# Patient Record
Sex: FEMALE | Race: WHITE | NOT HISPANIC OR LATINO | Employment: OTHER | URBAN - NONMETROPOLITAN AREA
[De-identification: names, ages, dates, MRNs, and addresses within clinical notes are randomized per-mention and may not be internally consistent; named-entity substitution may affect disease eponyms.]

---

## 2023-03-02 ENCOUNTER — OFFICE VISIT (OUTPATIENT)
Dept: CARDIOLOGY | Facility: CLINIC | Age: 58
End: 2023-03-02
Payer: COMMERCIAL

## 2023-03-02 VITALS
OXYGEN SATURATION: 97 % | HEIGHT: 65 IN | BODY MASS INDEX: 48.82 KG/M2 | DIASTOLIC BLOOD PRESSURE: 97 MMHG | RESPIRATION RATE: 16 BRPM | SYSTOLIC BLOOD PRESSURE: 151 MMHG | WEIGHT: 293 LBS

## 2023-03-02 DIAGNOSIS — R06.09 DYSPNEA ON EXERTION: ICD-10-CM

## 2023-03-02 DIAGNOSIS — I10 ESSENTIAL HYPERTENSION: ICD-10-CM

## 2023-03-02 DIAGNOSIS — I48.19 ATRIAL FIBRILLATION, PERSISTENT: Primary | ICD-10-CM

## 2023-03-02 PROCEDURE — 93000 ELECTROCARDIOGRAM COMPLETE: CPT | Performed by: INTERNAL MEDICINE

## 2023-03-02 PROCEDURE — 99204 OFFICE O/P NEW MOD 45 MIN: CPT | Performed by: INTERNAL MEDICINE

## 2023-03-02 RX ORDER — ASPIRIN 81 MG/1
1 TABLET, COATED ORAL DAILY
COMMUNITY
Start: 2022-12-30

## 2023-03-02 RX ORDER — LANOLIN ALCOHOL/MO/W.PET/CERES
2500 CREAM (GRAM) TOPICAL DAILY
COMMUNITY

## 2023-03-02 RX ORDER — SPIRONOLACTONE 50 MG/1
1 TABLET, FILM COATED ORAL DAILY
COMMUNITY
Start: 2023-02-15

## 2023-03-02 RX ORDER — MAGNESIUM OXIDE 400 MG/1
250 TABLET ORAL DAILY
COMMUNITY

## 2023-03-02 RX ORDER — APIXABAN 5 MG/1
TABLET, FILM COATED ORAL
COMMUNITY
Start: 2023-01-27

## 2023-03-02 RX ORDER — LEVOTHYROXINE SODIUM 0.12 MG/1
1 TABLET ORAL DAILY
COMMUNITY
Start: 2023-02-22

## 2023-03-02 RX ORDER — ATORVASTATIN CALCIUM 40 MG/1
40 TABLET, FILM COATED ORAL
COMMUNITY
Start: 2023-02-22

## 2023-03-02 RX ORDER — CARVEDILOL 3.12 MG/1
1 TABLET ORAL EVERY 12 HOURS SCHEDULED
COMMUNITY
Start: 2023-02-22 | End: 2023-03-02

## 2023-03-02 RX ORDER — CARVEDILOL 6.25 MG/1
6.25 TABLET ORAL EVERY 12 HOURS SCHEDULED
Qty: 60 TABLET | Refills: 3 | Status: SHIPPED | COMMUNITY
Start: 2023-03-02 | End: 2023-03-21 | Stop reason: SDUPTHER

## 2023-03-02 NOTE — PROGRESS NOTES
Val Purcell PA-C  Ana Maradiaga  1965  03/02/2023    There is no problem list on file for this patient.      Dear Val Purcell PA-C:    Subjective     Ana Maradiaga is a 57 y.o. female with the problems as listed above, presents    Chief complaint: To establish cardiac care and follow-up in a patient with persistent/chronic atrial fibrillation.    History of Present Illness: Ms. Maradiaga is a pleasant 57-year-old  female who has a history of atrial fibrillation apparently since 2018.  She has recently relocated from [place] and wants to establish cardiac care and follow-up through our office.  On further questioning she states that she was diagnosed to have atrial fibrillation in 2018.  She denies having had any attempts at electrocardioversion in [place].  She has been on Eliquis which she was initially taking once a day due to feeling of shakiness taking it twice a day.  She had a stroke in 2021 following which she was strongly advised to take it twice a day as reccommended.  She has since been taking it twice a day.  She denies any bleeding problems with Eliquis but still having the shakes from it.  She denies any other heart problems such as coronary artery disease or history of heart failure.  She has chronic dyspnea with mild to moderate exertion with no PND, significant orthopnea.  She has a chronic bilateral leg edema with lymphedema.  She denies any complaints of chest pains.    Exam End: 11/17/21 10:28 Last Resulted: 11/17/21 16:35   Received From: [facility]  Result Received: 03/02/23 11:31     Intracardiac echocardiogram  Order: [id number]  Narrative    This result has an attachment that is not available.   •  Left Ventricle: Systolic function is normal with an ejection fraction   of 55-60%.   •  Left Atrium: Agitated saline bubble study revealed no evidence of right   to left interatrial shunting .   •  Mitral Valve: The leaflets are mildly thickened. There is mild annular    calcification.     Left Ventricle   Left ventricle appears normal in size. There is borderline increased wall thickness/hypertrophy. Systolic function is normal with an ejection fraction of 55-60%. No segmental wall motion abnormalities. Unable to assess diastolic function.     Right Ventricle   Right ventricular size appears normal. Systolic function is normal.     Left Atrium   Left atrium is normal in size. Left atrium volume index is normal. The left atrial volume index is 23.4 mL/m2. Agitated saline bubble study revealed no evidence of right to left interatrial shunting .     Right Atrium   Right atrium is normal in size.     IVC/SVC   IVC appears normal. There is normal collapse with deep inspiration.     Mitral Valve   The leaflets are mildly thickened. There is mild annular calcification. There is trace regurgitation. There is no evidence of mitral valve stenosis.     Tricuspid Valve   Tricuspid valve appears to be normal. There is no regurgitation or stenosis.     Aortic Valve   The aortic valve is trileaflet. There is no regurgitation or stenosis.     Pulmonic Valve   Pulmonic valve structure is grossly normal. There is trace regurgitation. There is no evidence of pulmonic valve stenosis. The peak gradient is 7.00 mmHg.     Ascending Aorta   The aortic root is normal in size.     Pericardium   The pericardium has a fat pad.     Study Details   A complete echo was performed using complete 2D, color flow Doppler and spectral Doppler. An agitated saline bubble study was performed.      Allergies   Allergen Reactions   • Vancomycin Hives   • Cefepime Rash   :    Current Outpatient Medications:   •  Aspirin Low Dose 81 MG EC tablet, Take 1 tablet by mouth Daily., Disp: , Rfl:   •  atorvastatin (LIPITOR) 40 MG tablet, Take 1 tablet by mouth every night at bedtime., Disp: , Rfl:   •  carvedilol (COREG) 6.25 MG tablet, Take 1 tablet by mouth Every 12 (Twelve) Hours., Disp: 60 tablet, Rfl: 3  •  Eliquis 5 MG  "tablet tablet, , Disp: , Rfl:   •  levothyroxine (SYNTHROID, LEVOTHROID) 125 MCG tablet, Take 1 tablet by mouth Daily., Disp: , Rfl:   •  magnesium oxide (MAG-OX) 400 MG tablet, Take 250 mg by mouth Daily., Disp: , Rfl:   •  spironolactone (ALDACTONE) 50 MG tablet, Take 1 tablet by mouth Daily., Disp: , Rfl:   •  vitamin B-12 (CYANOCOBALAMIN) 1000 MCG tablet, Take 2.5 tablets by mouth Daily., Disp: , Rfl:   •  vitamin D3 125 MCG (5000 UT) capsule capsule, Take 1 capsule by mouth Daily., Disp: , Rfl:     Past Medical History:   Diagnosis Date   • Anemia    • Arrhythmia 2018    Afib   • Arthritis    • Atrial fibrillation (HCC) 2018   • Bronchitis    • Chronic kidney disease 2018   • Gout    • Hypertension    • Stroke (HCC) November 2021     History reviewed. No pertinent surgical history.  Family History   Problem Relation Age of Onset   • Heart disease Mother    • Stroke Mother    • Hypertension Father    • Arthritis Father      Social History     Tobacco Use   • Smoking status: Never   • Smokeless tobacco: Never   Vaping Use   • Vaping Use: Never used   Substance Use Topics   • Alcohol use: Never   • Drug use: Never     Review of Systems   Constitutional: Negative.   HENT: Negative.    Eyes: Negative.    Cardiovascular: Negative.    Respiratory: Positive for shortness of breath.    Endocrine: Negative.    Hematologic/Lymphatic: Negative.    Skin: Negative.    Musculoskeletal: Positive for joint pain.   Gastrointestinal: Negative.    Genitourinary: Negative.    Neurological: Negative.    Psychiatric/Behavioral: Negative.    Allergic/Immunologic: Negative.      Objective   Blood pressure 151/97, resp. rate 16, height 165.1 cm (65\"), weight (!) 162 kg (357 lb 9.6 oz), SpO2 97 %.  Body mass index is 59.51 kg/m².    Vitals reviewed.   Constitutional:       Appearance: Well-developed.      Comments: Morbidly obese lady who is alert, oriented x3 and is in no apparent distress at this time.   Eyes:      Conjunctiva/sclera: " Conjunctivae normal.   HENT:      Head: Normocephalic.   Neck:      Thyroid: No thyromegaly.      Vascular: No JVD.      Trachea: No tracheal deviation.   Pulmonary:      Effort: No respiratory distress.      Breath sounds: Normal breath sounds. No wheezing. No rales.   Cardiovascular:      PMI at left midclavicular line. Normal rate. Irregularly irregular rhythm. Normal S1. Normal S2.      Murmurs: There is no murmur.      No gallop. No click. No rub.   Pulses:     Intact distal pulses.   Edema:     Pretibial: bilateral 3+ edema of the pretibial area.     Ankle: bilateral 3+ edema of the ankle.     Feet: bilateral 3+ edema of the feet.  Abdominal:      General: Bowel sounds are normal.      Palpations: Abdomen is soft. There is no abdominal mass.      Tenderness: There is no abdominal tenderness.   Musculoskeletal:      Cervical back: Normal range of motion and neck supple. Skin:     General: Skin is warm and dry.   Neurological:      Mental Status: Alert and oriented to person, place, and time.      Cranial Nerves: No cranial nerve deficit.         Lab Results   Component Value Date     08/13/2022    K 4.3 08/13/2022     08/13/2022    CO2 23 08/13/2022    BUN 16 08/13/2022    CREATININE 0.85 08/13/2022    CALCIUM 8.0 (L) 08/13/2022    AST 12 08/12/2022    ALT 14 08/12/2022    ALKPHOS 105 08/12/2022     Lab Results   Component Value Date    CKTOTAL 237 (H) 11/14/2021       Lab Results   Component Value Date    MG 2.2 08/13/2022     Lab Results   Component Value Date    TSH 5.45 (H) 03/21/2022      Lab Results   Component Value Date     (H) 08/09/2022       ECG 12 Lead    Date/Time: 3/2/2023 11:57 AM  Performed by: Otilio Bartlett MD  Authorized by: Otilio Bartlett MD   Previous ECG: no previous ECG available  Rhythm: atrial fibrillation  Other findings: non-specific ST-T wave changes                 Assessment & Plan :   Diagnosis Plan   1. Atrial fibrillation, persistent (HCC)  ECG 12 Lead     Adult Transthoracic Echo Complete       2. Essential hypertension with elevated blood pressures.        3. Dyspnea on exertion  Adult Transthoracic Echo Complete           Recommendations:  Orders Placed This Encounter   Procedures   • ECG 12 Lead   • Adult Transthoracic Echo Complete w/ Color, Spectral and Contrast if necessary per protocol        1. We will increase the dose of carvedilol to 6.25 mg p.o. twice daily  2. I have asked her to keep a check on her blood pressure at home twice a day and bring it with next visit.  3. I have discussed about the option of electrical cardioversion which apparently has not been tried.  She wants to think about it and let us know.  4. We will reevaluate her LV function with an echo Doppler study.    Return in about 6 weeks (around 4/13/2023).     I appreciate very much the opportunity to participate in the cardiovascular care of your patients.      With Best Regards,    Otilio Bartlett MD, [facility]    Dragon disclaimer:  Much of this encounter note is an electronic transcription/translation of spoken language to printed text. The electronic translation of spoken language may permit erroneous, or at times, nonsensical words or phrases to be inadvertently transcribed; Although I have reviewed the note for such errors, some may still exist.

## 2023-03-21 NOTE — TELEPHONE ENCOUNTER
Caller: Ana Maradiaga    Relationship: Self    Best call back number: [phone]    Requested Prescriptions:   Requested Prescriptions     Pending Prescriptions Disp Refills   • carvedilol (COREG) 6.25 MG tablet 60 tablet 3     Sig: Take 1 tablet by mouth Every 12 (Twelve) Hours.        Pharmacy where request should be sent: [id number] - [place], [place] - [address] AT [facility] [address] - [phone]  - [phone] FX     Last office visit with prescribing clinician: Visit date not found   Last telemedicine visit with prescribing clinician: 4/14/2023   Next office visit with prescribing clinician: 4/14/2023         Does the patient have less than a 3 day supply:  [] Yes  [x] No    Would you like a call back once the refill request has been completed: [] Yes [x] No    If the office needs to give you a call back, can they leave a voicemail: [] Yes [x] No    Kristina Titus Rep   03/21/23 12:04 EDT

## 2023-03-22 RX ORDER — CARVEDILOL 6.25 MG/1
6.25 TABLET ORAL EVERY 12 HOURS SCHEDULED
Qty: 60 TABLET | Refills: 3 | Status: SHIPPED | OUTPATIENT
Start: 2023-03-22

## 2023-03-23 ENCOUNTER — HOSPITAL ENCOUNTER (OUTPATIENT)
Dept: CARDIOLOGY | Facility: HOSPITAL | Age: 58
Discharge: HOME | End: 2023-03-23
Admitting: INTERNAL MEDICINE
Payer: COMMERCIAL

## 2023-03-23 DIAGNOSIS — I48.19 ATRIAL FIBRILLATION, PERSISTENT: ICD-10-CM

## 2023-03-23 DIAGNOSIS — R06.09 DYSPNEA ON EXERTION: ICD-10-CM

## 2023-03-23 PROCEDURE — 93306 TTE W/DOPPLER COMPLETE: CPT

## 2023-03-23 PROCEDURE — 93306 TTE W/DOPPLER COMPLETE: CPT | Performed by: INTERNAL MEDICINE

## 2023-03-26 LAB
BH CV ECHO MEAS - AO MAX PG: 6.3 MMHG
BH CV ECHO MEAS - AO MEAN PG: 3 MMHG
BH CV ECHO MEAS - AO ROOT DIAM: 2.9 CM
BH CV ECHO MEAS - AO V2 MAX: 125 CM/SEC
BH CV ECHO MEAS - AO V2 VTI: 21.9 CM
BH CV ECHO MEAS - EDV(CUBED): 68.9 ML
BH CV ECHO MEAS - EDV(MOD-SP4): 41.7 ML
BH CV ECHO MEAS - EF(MOD-SP4): 41 %
BH CV ECHO MEAS - ESV(CUBED): 68.9 ML
BH CV ECHO MEAS - ESV(MOD-SP4): 24.6 ML
BH CV ECHO MEAS - FS: 0 %
BH CV ECHO MEAS - IVS/LVPW: 1 CM
BH CV ECHO MEAS - IVSD: 1.5 CM
BH CV ECHO MEAS - LA DIMENSION: 3.6 CM
BH CV ECHO MEAS - LAT PEAK E' VEL: 11 CM/SEC
BH CV ECHO MEAS - LV DIASTOLIC VOL/BSA (35-75): 16.5 CM2
BH CV ECHO MEAS - LV MASS(C)D: 241 GRAMS
BH CV ECHO MEAS - LV SYSTOLIC VOL/BSA (12-30): 9.7 CM2
BH CV ECHO MEAS - LVIDD: 4.1 CM
BH CV ECHO MEAS - LVIDS: 4.1 CM
BH CV ECHO MEAS - LVOT AREA: 4.2 CM2
BH CV ECHO MEAS - LVOT DIAM: 2.3 CM
BH CV ECHO MEAS - LVPWD: 1.5 CM
BH CV ECHO MEAS - MED PEAK E' VEL: 8.5 CM/SEC
BH CV ECHO MEAS - MV A MAX VEL: 30.6 CM/SEC
BH CV ECHO MEAS - MV E MAX VEL: 93.6 CM/SEC
BH CV ECHO MEAS - MV E/A: 3.1
BH CV ECHO MEAS - PA ACC TIME: 0.04 SEC
BH CV ECHO MEAS - PA PR(ACCEL): 63.3 MMHG
BH CV ECHO MEAS - SI(MOD-SP4): 6.8 ML/M2
BH CV ECHO MEAS - SV(MOD-SP4): 17.1 ML
BH CV ECHO MEAS - TAPSE (>1.6): 1.38 CM
BH CV ECHO MEASUREMENTS AVERAGE E/E' RATIO: 9.6
LEFT ATRIUM VOLUME INDEX: 27.2 ML/M2
MAXIMAL PREDICTED HEART RATE: 163 BPM
STRESS TARGET HR: 139 BPM

## 2023-04-14 ENCOUNTER — OFFICE VISIT (OUTPATIENT)
Dept: CARDIOLOGY | Facility: CLINIC | Age: 58
End: 2023-04-14
Payer: COMMERCIAL

## 2023-04-14 VITALS
DIASTOLIC BLOOD PRESSURE: 88 MMHG | HEART RATE: 91 BPM | HEIGHT: 65 IN | BODY MASS INDEX: 48.82 KG/M2 | WEIGHT: 293 LBS | SYSTOLIC BLOOD PRESSURE: 160 MMHG | OXYGEN SATURATION: 95 %

## 2023-04-14 DIAGNOSIS — I10 ESSENTIAL HYPERTENSION: ICD-10-CM

## 2023-04-14 DIAGNOSIS — R06.09 DYSPNEA ON EXERTION: ICD-10-CM

## 2023-04-14 DIAGNOSIS — I48.19 ATRIAL FIBRILLATION, PERSISTENT: Primary | ICD-10-CM

## 2023-04-14 PROCEDURE — 99214 OFFICE O/P EST MOD 30 MIN: CPT | Performed by: NURSE PRACTITIONER

## 2023-04-14 PROCEDURE — 93000 ELECTROCARDIOGRAM COMPLETE: CPT | Performed by: NURSE PRACTITIONER

## 2023-04-14 RX ORDER — FLECAINIDE ACETATE 50 MG/1
50 TABLET ORAL 2 TIMES DAILY
Qty: 60 TABLET | Refills: 11 | Status: SHIPPED | OUTPATIENT
Start: 2023-04-14

## 2023-04-14 NOTE — H&P (VIEW-ONLY)
aVl Purcell PA  Ana Maradiaga  1965  04/14/2023    There is no problem list on file for this patient.      Dear Val Purcell PA:    Subjective     Chief Complaint   Patient presents with   • Follow-up     Echo, 6 week   • Med Management           History of Present Illness:    Ana Maradiaga is a 57 y.o. female with a past medical history of atrial fibrillation and CVA. She presents today for routine cardiology follow up.  Recently, she did undergo echocardiogram which revealed an LVEF of 56 to 60% with normal LV wall motion.  Left atrium was normal in size and volume.  Previously, antiarrhythmic therapy and cardioversion was recommended.  The patient has been thinking about this and would like to pursue cardioversion.  She has shortness of breath with mild exertion.  She has no known history of coronary artery disease and has had no recent chest pains.          Allergies   Allergen Reactions   • Vancomycin Hives   • Cefepime Rash   :      Current Outpatient Medications:   •  Aspirin Low Dose 81 MG EC tablet, Take 1 tablet by mouth Daily., Disp: , Rfl:   •  atorvastatin (LIPITOR) 40 MG tablet, Take 1 tablet by mouth every night at bedtime., Disp: , Rfl:   •  carvedilol (COREG) 6.25 MG tablet, Take 1 tablet by mouth Every 12 (Twelve) Hours., Disp: 60 tablet, Rfl: 3  •  Eliquis 5 MG tablet tablet, , Disp: , Rfl:   •  levothyroxine (SYNTHROID, LEVOTHROID) 125 MCG tablet, Take 1 tablet by mouth Daily., Disp: , Rfl:   •  magnesium oxide (MAG-OX) 400 MG tablet, Take 250 mg by mouth Daily., Disp: , Rfl:   •  vitamin B-12 (CYANOCOBALAMIN) 1000 MCG tablet, Take 2.5 tablets by mouth Daily., Disp: , Rfl:   •  vitamin D3 125 MCG (5000 UT) capsule capsule, Take 1 capsule by mouth Daily., Disp: , Rfl:   •  flecainide (TAMBOCOR) 50 MG tablet, Take 1 tablet by mouth 2 (Two) Times a Day., Disp: 60 tablet, Rfl: 11  •  spironolactone (ALDACTONE) 50 MG tablet, Take 1 tablet by mouth Daily., Disp: , Rfl:       The following  "portions of the patient's history were reviewed and updated as appropriate: allergies, current medications, past family history, past medical history, past social history, past surgical history and problem list.    Social History     Tobacco Use   • Smoking status: Never   • Smokeless tobacco: Never   Vaping Use   • Vaping Use: Never used   Substance Use Topics   • Alcohol use: Never   • Drug use: Never       Review of Systems   Constitutional: Positive for malaise/fatigue. Negative for decreased appetite.   Cardiovascular: Positive for dyspnea on exertion. Negative for chest pain and palpitations.   Respiratory: Negative for cough and shortness of breath.        Objective   Vitals:    04/14/23 1152 04/14/23 1154   BP: 162/99 160/88   BP Location: Right arm Left arm   Patient Position: Sitting Sitting   Cuff Size: Adult Adult   Pulse: 91    SpO2: 95%    Weight: (!) 161 kg (356 lb)    Height: 165.1 cm (65\")      Body mass index is 59.24 kg/m².        Vitals reviewed.   Constitutional:       Appearance: Healthy appearance. Well-developed and not in distress.   HENT:      Head: Normocephalic and atraumatic.   Neck:      Vascular: No JVD.   Pulmonary:      Effort: Pulmonary effort is normal.      Breath sounds: Normal breath sounds. No wheezing. No rales.   Cardiovascular:      Normal rate. Irregularly irregular rhythm.      Murmurs: There is no murmur.      . No S3 and S4 gallop.   Abdominal:      General: Bowel sounds are normal.      Palpations: Abdomen is soft.   Skin:     General: Skin is warm and dry.   Neurological:      Mental Status: Alert, oriented to person, place, and time and oriented to person, place and time.   Psychiatric:         Mood and Affect: Mood normal.         Behavior: Behavior normal.         Lab Results   Component Value Date     08/13/2022    K 4.3 08/13/2022     08/13/2022    CO2 23 08/13/2022    BUN 16 08/13/2022    CREATININE 0.85 08/13/2022    CALCIUM 8.0 (L) 08/13/2022    AST " 12 08/12/2022    ALT 14 08/12/2022    ALKPHOS 105 08/12/2022     Lab Results   Component Value Date    CKTOTAL 237 (H) 11/14/2021     Lab Results   Component Value Date    HGB 10.0 (L) 10/20/2018     Lab Results   Component Value Date    INR 1.6 (H) 11/12/2021    INR 1.3 (H) 10/19/2018     Lab Results   Component Value Date    MG 2.2 08/13/2022     Lab Results   Component Value Date    TSH 5.45 (H) 03/21/2022      Lab Results   Component Value Date     (H) 08/09/2022             ECG 12 Lead    Date/Time: 4/14/2023 12:50 PM  Performed by: Neli Vergara APRN  Authorized by: Neli Vergara APRN   Comparison: compared with previous ECG   Rhythm: atrial fibrillation  BPM: 67  Other findings: non-specific ST-T wave changes  Comments: QRS 89 ms              Assessment & Plan    Diagnosis Plan   1. Atrial fibrillation, persistent  flecainide (TAMBOCOR) 50 MG tablet    Cardioversion External in Cardiology Department    ECG 12 Lead      2. Dyspnea on exertion  ECG 12 Lead      3. Essential hypertension with elevated blood pressures.  ECG 12 Lead                   Recommendations:    1. I discussed her plan of care with Dr. Bartlett. We will start flecainide 50 mg BID since she has had no recent anginal symptoms and no history of CAD.  2. EKG in 48 hours  3. Will plan for electrical cardioversion in 3 weeks. She states has not missed any doses of Eliquis.  4. Follow up in 4 weeks or sooner if needed.        Return in about 4 weeks (around 5/12/2023) for Recheck.    As always, I appreciate very much the opportunity to participate in the cardiovascular care of your patients.      With Best Regards,    TREASURE Garrett

## 2023-04-14 NOTE — LETTER
April 17, 2023     RED Moreno  [address] [address]    Patient: Ana Maradiaga   YOB: 1965   Date of Visit: 4/14/2023       Dear RED Padgett:    Thank you for referring Ana Maradiaga to me for evaluation. Below are the relevant portions of my assessment and plan of care.    If you have questions, please do not hesitate to call me. I look forward to following Ana along with you.         Sincerely,        TREASURE Garrett        CC: No Recipients    Neli Vergara, TREASURE  04/17/23 1522  Signed  Val Purcell PA  Ana Maradiaga  1965  04/14/2023    There is no problem list on file for this patient.      Dear Val Purcell PA:    Subjective      Chief Complaint   Patient presents with   • Follow-up     Echo, 6 week   • Med Management           History of Present Illness:    Ana Maradiaga is a 57 y.o. female with a past medical history of atrial fibrillation and CVA. She presents today for routine cardiology follow up.  Recently, she did undergo echocardiogram which revealed an LVEF of 56 to 60% with normal LV wall motion.  Left atrium was normal in size and volume.  Previously, antiarrhythmic therapy and cardioversion was recommended.  The patient has been thinking about this and would like to pursue cardioversion.  She has shortness of breath with mild exertion.  She has no known history of coronary artery disease and has had no recent chest pains.          Allergies   Allergen Reactions   • Vancomycin Hives   • Cefepime Rash   :      Current Outpatient Medications:   •  Aspirin Low Dose 81 MG EC tablet, Take 1 tablet by mouth Daily., Disp: , Rfl:   •  atorvastatin (LIPITOR) 40 MG tablet, Take 1 tablet by mouth every night at bedtime., Disp: , Rfl:   •  carvedilol (COREG) 6.25 MG tablet, Take 1 tablet by mouth Every 12 (Twelve) Hours., Disp: 60 tablet, Rfl: 3  •  Eliquis 5 MG tablet tablet, , Disp: , Rfl:   •  levothyroxine (SYNTHROID, LEVOTHROID) 125 MCG  "tablet, Take 1 tablet by mouth Daily., Disp: , Rfl:   •  magnesium oxide (MAG-OX) 400 MG tablet, Take 250 mg by mouth Daily., Disp: , Rfl:   •  vitamin B-12 (CYANOCOBALAMIN) 1000 MCG tablet, Take 2.5 tablets by mouth Daily., Disp: , Rfl:   •  vitamin D3 125 MCG (5000 UT) capsule capsule, Take 1 capsule by mouth Daily., Disp: , Rfl:   •  flecainide (TAMBOCOR) 50 MG tablet, Take 1 tablet by mouth 2 (Two) Times a Day., Disp: 60 tablet, Rfl: 11  •  spironolactone (ALDACTONE) 50 MG tablet, Take 1 tablet by mouth Daily., Disp: , Rfl:       The following portions of the patient's history were reviewed and updated as appropriate: allergies, current medications, past family history, past medical history, past social history, past surgical history and problem list.    Social History     Tobacco Use   • Smoking status: Never   • Smokeless tobacco: Never   Vaping Use   • Vaping Use: Never used   Substance Use Topics   • Alcohol use: Never   • Drug use: Never       Review of Systems   Constitutional: Positive for malaise/fatigue. Negative for decreased appetite.   Cardiovascular: Positive for dyspnea on exertion. Negative for chest pain and palpitations.   Respiratory: Negative for cough and shortness of breath.        Objective    Vitals:    04/14/23 1152 04/14/23 1154   BP: 162/99 160/88   BP Location: Right arm Left arm   Patient Position: Sitting Sitting   Cuff Size: Adult Adult   Pulse: 91    SpO2: 95%    Weight: (!) 161 kg (356 lb)    Height: 165.1 cm (65\")      Body mass index is 59.24 kg/m².        Vitals reviewed.   Constitutional:       Appearance: Healthy appearance. Well-developed and not in distress.   HENT:      Head: Normocephalic and atraumatic.   Neck:      Vascular: No JVD.   Pulmonary:      Effort: Pulmonary effort is normal.      Breath sounds: Normal breath sounds. No wheezing. No rales.   Cardiovascular:      Normal rate. Irregularly irregular rhythm.      Murmurs: There is no murmur.      . No S3 and S4 " gallop.   Abdominal:      General: Bowel sounds are normal.      Palpations: Abdomen is soft.   Skin:     General: Skin is warm and dry.   Neurological:      Mental Status: Alert, oriented to person, place, and time and oriented to person, place and time.   Psychiatric:         Mood and Affect: Mood normal.         Behavior: Behavior normal.         Lab Results   Component Value Date     08/13/2022    K 4.3 08/13/2022     08/13/2022    CO2 23 08/13/2022    BUN 16 08/13/2022    CREATININE 0.85 08/13/2022    CALCIUM 8.0 (L) 08/13/2022    AST 12 08/12/2022    ALT 14 08/12/2022    ALKPHOS 105 08/12/2022     Lab Results   Component Value Date    CKTOTAL 237 (H) 11/14/2021     Lab Results   Component Value Date    HGB 10.0 (L) 10/20/2018     Lab Results   Component Value Date    INR 1.6 (H) 11/12/2021    INR 1.3 (H) 10/19/2018     Lab Results   Component Value Date    MG 2.2 08/13/2022     Lab Results   Component Value Date    TSH 5.45 (H) 03/21/2022      Lab Results   Component Value Date     (H) 08/09/2022             ECG 12 Lead    Date/Time: 4/14/2023 12:50 PM  Performed by: Neli Vergara APRN  Authorized by: Neli Vergara APRN   Comparison: compared with previous ECG   Rhythm: atrial fibrillation  BPM: 67  Other findings: non-specific ST-T wave changes  Comments: QRS 89 ms              Assessment & Plan    Diagnosis Plan   1. Atrial fibrillation, persistent  flecainide (TAMBOCOR) 50 MG tablet    Cardioversion External in Cardiology Department    ECG 12 Lead      2. Dyspnea on exertion  ECG 12 Lead      3. Essential hypertension with elevated blood pressures.  ECG 12 Lead                  Recommendations:    1. I discussed her plan of care with Dr. Bartlett. We will start flecainide 50 mg BID since she has had no recent anginal symptoms and no history of CAD.  2. EKG in 48 hours  3. Will plan for electrical cardioversion in 3 weeks. She states has not missed any doses of Eliquis.  4. Follow  up in 4 weeks or sooner if needed.        Return in about 4 weeks (around 5/12/2023) for Recheck.    As always, I appreciate very much the opportunity to participate in the cardiovascular care of your patients.      With Best Regards,    Neli Vergara APRN

## 2023-04-14 NOTE — PROGRESS NOTES
Val Purcell PA  Ana Maradiaga  1965  04/14/2023    There is no problem list on file for this patient.      Dear Val Purcell PA:    Subjective     Chief Complaint   Patient presents with   • Follow-up     Echo, 6 week   • Med Management           History of Present Illness:    Ana Maradiaga is a 57 y.o. female with a past medical history of atrial fibrillation and CVA. She presents today for routine cardiology follow up.  Recently, she did undergo echocardiogram which revealed an LVEF of 56 to 60% with normal LV wall motion.  Left atrium was normal in size and volume.  Previously, antiarrhythmic therapy and cardioversion was recommended.  The patient has been thinking about this and would like to pursue cardioversion.  She has shortness of breath with mild exertion.  She has no known history of coronary artery disease and has had no recent chest pains.          Allergies   Allergen Reactions   • Vancomycin Hives   • Cefepime Rash   :      Current Outpatient Medications:   •  Aspirin Low Dose 81 MG EC tablet, Take 1 tablet by mouth Daily., Disp: , Rfl:   •  atorvastatin (LIPITOR) 40 MG tablet, Take 1 tablet by mouth every night at bedtime., Disp: , Rfl:   •  carvedilol (COREG) 6.25 MG tablet, Take 1 tablet by mouth Every 12 (Twelve) Hours., Disp: 60 tablet, Rfl: 3  •  Eliquis 5 MG tablet tablet, , Disp: , Rfl:   •  levothyroxine (SYNTHROID, LEVOTHROID) 125 MCG tablet, Take 1 tablet by mouth Daily., Disp: , Rfl:   •  magnesium oxide (MAG-OX) 400 MG tablet, Take 250 mg by mouth Daily., Disp: , Rfl:   •  vitamin B-12 (CYANOCOBALAMIN) 1000 MCG tablet, Take 2.5 tablets by mouth Daily., Disp: , Rfl:   •  vitamin D3 125 MCG (5000 UT) capsule capsule, Take 1 capsule by mouth Daily., Disp: , Rfl:   •  flecainide (TAMBOCOR) 50 MG tablet, Take 1 tablet by mouth 2 (Two) Times a Day., Disp: 60 tablet, Rfl: 11  •  spironolactone (ALDACTONE) 50 MG tablet, Take 1 tablet by mouth Daily., Disp: , Rfl:       The following  "portions of the patient's history were reviewed and updated as appropriate: allergies, current medications, past family history, past medical history, past social history, past surgical history and problem list.    Social History     Tobacco Use   • Smoking status: Never   • Smokeless tobacco: Never   Vaping Use   • Vaping Use: Never used   Substance Use Topics   • Alcohol use: Never   • Drug use: Never       Review of Systems   Constitutional: Positive for malaise/fatigue. Negative for decreased appetite.   Cardiovascular: Positive for dyspnea on exertion. Negative for chest pain and palpitations.   Respiratory: Negative for cough and shortness of breath.        Objective   Vitals:    04/14/23 1152 04/14/23 1154   BP: 162/99 160/88   BP Location: Right arm Left arm   Patient Position: Sitting Sitting   Cuff Size: Adult Adult   Pulse: 91    SpO2: 95%    Weight: (!) 161 kg (356 lb)    Height: 165.1 cm (65\")      Body mass index is 59.24 kg/m².        Vitals reviewed.   Constitutional:       Appearance: Healthy appearance. Well-developed and not in distress.   HENT:      Head: Normocephalic and atraumatic.   Neck:      Vascular: No JVD.   Pulmonary:      Effort: Pulmonary effort is normal.      Breath sounds: Normal breath sounds. No wheezing. No rales.   Cardiovascular:      Normal rate. Irregularly irregular rhythm.      Murmurs: There is no murmur.      . No S3 and S4 gallop.   Abdominal:      General: Bowel sounds are normal.      Palpations: Abdomen is soft.   Skin:     General: Skin is warm and dry.   Neurological:      Mental Status: Alert, oriented to person, place, and time and oriented to person, place and time.   Psychiatric:         Mood and Affect: Mood normal.         Behavior: Behavior normal.         Lab Results   Component Value Date     08/13/2022    K 4.3 08/13/2022     08/13/2022    CO2 23 08/13/2022    BUN 16 08/13/2022    CREATININE 0.85 08/13/2022    CALCIUM 8.0 (L) 08/13/2022    AST " 12 08/12/2022    ALT 14 08/12/2022    ALKPHOS 105 08/12/2022     Lab Results   Component Value Date    CKTOTAL 237 (H) 11/14/2021     Lab Results   Component Value Date    HGB 10.0 (L) 10/20/2018     Lab Results   Component Value Date    INR 1.6 (H) 11/12/2021    INR 1.3 (H) 10/19/2018     Lab Results   Component Value Date    MG 2.2 08/13/2022     Lab Results   Component Value Date    TSH 5.45 (H) 03/21/2022      Lab Results   Component Value Date     (H) 08/09/2022             ECG 12 Lead    Date/Time: 4/14/2023 12:50 PM  Performed by: Neli Vergara APRN  Authorized by: Neli Vergara APRN   Comparison: compared with previous ECG   Rhythm: atrial fibrillation  BPM: 67  Other findings: non-specific ST-T wave changes  Comments: QRS 89 ms              Assessment & Plan    Diagnosis Plan   1. Atrial fibrillation, persistent  flecainide (TAMBOCOR) 50 MG tablet    Cardioversion External in Cardiology Department    ECG 12 Lead      2. Dyspnea on exertion  ECG 12 Lead      3. Essential hypertension with elevated blood pressures.  ECG 12 Lead                   Recommendations:    1. I discussed her plan of care with Dr. Bartlett. We will start flecainide 50 mg BID since she has had no recent anginal symptoms and no history of CAD.  2. EKG in 48 hours  3. Will plan for electrical cardioversion in 3 weeks. She states has not missed any doses of Eliquis.  4. Follow up in 4 weeks or sooner if needed.        Return in about 4 weeks (around 5/12/2023) for Recheck.    As always, I appreciate very much the opportunity to participate in the cardiovascular care of your patients.      With Best Regards,    TREASURE Garrett

## 2023-04-17 ENCOUNTER — TELEPHONE (OUTPATIENT)
Dept: CARDIOLOGY | Facility: CLINIC | Age: 58
End: 2023-04-17
Payer: COMMERCIAL

## 2023-04-18 NOTE — TELEPHONE ENCOUNTER
Called pt back and spoke with her. She stated someone had tired to call her form the office. I advised her it was in regards into her EKG her son done. Neli reviewed it and stated she will need to come in office for an EKG. She expressed understanding and stated she will speak with her son and see when he can bring her by for one. I advised her that she does not have to have an apt but to come as soon as she can.

## 2023-04-19 ENCOUNTER — CLINICAL SUPPORT (OUTPATIENT)
Dept: CARDIOLOGY | Facility: CLINIC | Age: 58
End: 2023-04-19
Payer: COMMERCIAL

## 2023-04-19 DIAGNOSIS — I48.19 ATRIAL FIBRILLATION, PERSISTENT: Primary | ICD-10-CM

## 2023-04-19 PROCEDURE — 93000 ELECTROCARDIOGRAM COMPLETE: CPT | Performed by: NURSE PRACTITIONER

## 2023-04-19 NOTE — PROGRESS NOTES
Patient here for EKG only s/p starting flecainide     .  ECG 12 Lead    Date/Time: 4/19/2023 8:14 AM  Performed by: Neli Vergara APRN  Authorized by: Neli Vergara APRN   Comparison: compared with previous ECG   Rhythm: atrial fibrillation  BPM: 60  Comments: QRS 98 ms

## 2023-04-28 ENCOUNTER — TRANSCRIBE ORDERS (OUTPATIENT)
Dept: ADMINISTRATIVE | Facility: HOSPITAL | Age: 58
End: 2023-04-28
Payer: COMMERCIAL

## 2023-04-28 DIAGNOSIS — N18.9 CHRONIC KIDNEY DISEASE, UNSPECIFIED CKD STAGE: Primary | ICD-10-CM

## 2023-05-05 ENCOUNTER — ANESTHESIA EVENT (OUTPATIENT)
Dept: CARDIOLOGY | Facility: HOSPITAL | Age: 58
End: 2023-05-05
Payer: COMMERCIAL

## 2023-05-05 ENCOUNTER — ANESTHESIA (OUTPATIENT)
Dept: CARDIOLOGY | Facility: HOSPITAL | Age: 58
End: 2023-05-05
Payer: COMMERCIAL

## 2023-05-05 ENCOUNTER — HOSPITAL ENCOUNTER (OUTPATIENT)
Dept: CARDIOLOGY | Facility: HOSPITAL | Age: 58
Discharge: HOME | End: 2023-05-05
Payer: COMMERCIAL

## 2023-05-05 VITALS
HEART RATE: 77 BPM | OXYGEN SATURATION: 99 % | HEIGHT: 65 IN | WEIGHT: 293 LBS | BODY MASS INDEX: 48.82 KG/M2 | TEMPERATURE: 97.7 F | DIASTOLIC BLOOD PRESSURE: 90 MMHG | SYSTOLIC BLOOD PRESSURE: 138 MMHG | RESPIRATION RATE: 20 BRPM

## 2023-05-05 DIAGNOSIS — I48.19 ATRIAL FIBRILLATION, PERSISTENT: ICD-10-CM

## 2023-05-05 LAB
MAXIMAL PREDICTED HEART RATE: 163 BPM
STRESS TARGET HR: 139 BPM

## 2023-05-05 PROCEDURE — 92960 CARDIOVERSION ELECTRIC EXT: CPT

## 2023-05-05 PROCEDURE — 25010000002 PROPOFOL 10 MG/ML EMULSION: Performed by: NURSE ANESTHETIST, CERTIFIED REGISTERED

## 2023-05-05 RX ORDER — FLECAINIDE ACETATE 50 MG/1
100 TABLET ORAL 2 TIMES DAILY
Qty: 60 TABLET | Refills: 11 | Status: SHIPPED | OUTPATIENT
Start: 2023-05-05 | End: 2023-05-05 | Stop reason: SDUPTHER

## 2023-05-05 RX ORDER — FLECAINIDE ACETATE 100 MG/1
100 TABLET ORAL 2 TIMES DAILY
Qty: 60 TABLET | Refills: 2 | Status: SHIPPED | OUTPATIENT
Start: 2023-05-05

## 2023-05-05 RX ORDER — SODIUM CHLORIDE 9 MG/ML
INJECTION, SOLUTION INTRAVENOUS CONTINUOUS PRN
Status: DISCONTINUED | OUTPATIENT
Start: 2023-05-05 | End: 2023-05-05 | Stop reason: SURG

## 2023-05-05 RX ORDER — PROPOFOL 10 MG/ML
VIAL (ML) INTRAVENOUS AS NEEDED
Status: DISCONTINUED | OUTPATIENT
Start: 2023-05-05 | End: 2023-05-05 | Stop reason: SURG

## 2023-05-05 RX ORDER — BUMETANIDE 1 MG/1
1 TABLET ORAL DAILY
COMMUNITY

## 2023-05-05 RX ADMIN — SODIUM CHLORIDE: 9 INJECTION, SOLUTION INTRAVENOUS at 08:18

## 2023-05-05 RX ADMIN — PROPOFOL 50 MG: 10 INJECTION, EMULSION INTRAVENOUS at 08:26

## 2023-05-05 RX ADMIN — PROPOFOL 50 MG: 10 INJECTION, EMULSION INTRAVENOUS at 08:30

## 2023-05-05 NOTE — TELEPHONE ENCOUNTER
Pt had cardioversion done today and  sent in Flecainide 50 mg 2 tabs BID and her insurance wont cover that.

## 2023-05-05 NOTE — DISCHARGE INSTR - ACTIVITY
Resume previous activity. Do not drive for 24 hours.  Please arrive between 9:00am-11:00am to get and EKG at Dr. Bartlett's office on Monday, May 8th, 2023.  Patient/Family given discharge instructions, verbalizes understanding. Patient taken to POV per [facility] staff.

## 2023-05-05 NOTE — ANESTHESIA POSTPROCEDURE EVALUATION
Patient: Ana Maradiaga    Procedure Summary     Date: 05/05/23 Room / Location: [facility]    Anesthesia Start: 0818 Anesthesia Stop: 0834    Procedure: CARDIOVERSION EXTERNAL IN CARDIOLOGY DEPARTMENT Diagnosis:       Atrial fibrillation, persistent      (persistent atrial fibrillation)    Scheduled Providers: Otilio Bartlett MD Provider: Robert Livingston MD    Anesthesia Type: general ASA Status: 3          Anesthesia Type: general    Vitals  Vitals Value Taken Time   /90 05/05/23 0855   Temp     Pulse 77 05/05/23 0855   Resp 20 05/05/23 0855   SpO2 99 % 05/05/23 0855           Post Anesthesia Care and Evaluation    Patient location during evaluation: bedside  Patient participation: complete - patient participated  Level of consciousness: awake  Pain score: 2  Pain management: adequate    Airway patency: patent  Anesthetic complications: No anesthetic complications  PONV Status: controlled  Cardiovascular status: acceptable and blood pressure returned to baseline  Respiratory status: acceptable and room air  Hydration status: acceptable  No anesthesia care post op

## 2023-05-05 NOTE — ANESTHESIA PREPROCEDURE EVALUATION
Anesthesia Evaluation     Patient summary reviewed and Nursing notes reviewed   no history of anesthetic complications:  NPO Solid Status: > 8 hours  NPO Liquid Status: > 8 hours           Airway   Mallampati: II  TM distance: >3 FB  Neck ROM: full  No difficulty expected  Dental - normal exam     Pulmonary - negative pulmonary ROS and normal exam    breath sounds clear to auscultation  Cardiovascular - normal exam    PT is on anticoagulation therapy  Patient on routine beta blocker  Rhythm: regular  Rate: normal    (+) hypertension, dysrhythmias Atrial Fib, hyperlipidemia,       Neuro/Psych  (+) CVA,    GI/Hepatic/Renal/Endo    (+)   renal disease CRI, thyroid problem hypothyroidism    Musculoskeletal     Abdominal  - normal exam   Substance History - negative use     OB/GYN negative ob/gyn ROS         Other   arthritis,                      Anesthesia Plan    ASA 3     general     intravenous induction     Anesthetic plan, risks, benefits, and alternatives have been provided, discussed and informed consent has been obtained with: patient.        CODE STATUS:

## 2023-05-08 ENCOUNTER — CLINICAL SUPPORT (OUTPATIENT)
Dept: CARDIOLOGY | Facility: CLINIC | Age: 58
End: 2023-05-08
Payer: COMMERCIAL

## 2023-05-08 DIAGNOSIS — I48.19 ATRIAL FIBRILLATION, PERSISTENT: ICD-10-CM

## 2023-05-08 NOTE — PROGRESS NOTES
ECG 12 Lead    Date/Time: 5/8/2023 9:01 AM  Performed by: Adam Lane PA-C  Authorized by: Adam Lane PA-C   Comparison: compared with previous ECG   Comparison to previous ECG: Now in sinus rhythm  Rhythm: sinus rhythm  ST Segments: ST segments normal    Clinical impression: normal ECG  Comments: Now in sinus rhythm. QTc 426

## 2023-05-11 ENCOUNTER — CLINICAL SUPPORT (OUTPATIENT)
Dept: CARDIOLOGY | Facility: CLINIC | Age: 58
End: 2023-05-11
Payer: COMMERCIAL

## 2023-05-12 ENCOUNTER — CLINICAL SUPPORT (OUTPATIENT)
Dept: CARDIOLOGY | Facility: CLINIC | Age: 58
End: 2023-05-12
Payer: COMMERCIAL

## 2023-05-12 DIAGNOSIS — I48.0 PAROXYSMAL ATRIAL FIBRILLATION: ICD-10-CM

## 2023-05-12 DIAGNOSIS — I48.19 ATRIAL FIBRILLATION, PERSISTENT: Primary | ICD-10-CM

## 2023-05-12 PROCEDURE — 93000 ELECTROCARDIOGRAM COMPLETE: CPT | Performed by: NURSE PRACTITIONER

## 2023-05-12 NOTE — PROGRESS NOTES
Patient here for EKG only post cardioversion and increase in flecainide    .  ECG 12 Lead    Date/Time: 5/12/2023 8:11 AM  Performed by: Neli Vergara APRN  Authorized by: Neli Vergara APRN   Comparison: compared with previous ECG   Similar to previous ECG  Rhythm: sinus rhythm  BPM: 78  Conduction: 1st degree AV block  Comments:  ms  QTc 372 ms        .

## 2023-05-18 LAB
MAXIMAL PREDICTED HEART RATE: 163 BPM
STRESS TARGET HR: 139 BPM

## 2023-06-02 ENCOUNTER — HOSPITAL ENCOUNTER (OUTPATIENT)
Dept: ULTRASOUND IMAGING | Facility: HOSPITAL | Age: 58
Discharge: HOME | End: 2023-06-02

## 2023-06-02 DIAGNOSIS — N18.9 CHRONIC KIDNEY DISEASE, UNSPECIFIED CKD STAGE: ICD-10-CM

## 2023-06-02 PROCEDURE — 76775 US EXAM ABDO BACK WALL LIM: CPT

## 2023-06-05 ENCOUNTER — OFFICE VISIT (OUTPATIENT)
Dept: CARDIOLOGY | Facility: CLINIC | Age: 58
End: 2023-06-05
Payer: COMMERCIAL

## 2023-06-05 VITALS
HEART RATE: 77 BPM | HEIGHT: 65 IN | OXYGEN SATURATION: 96 % | WEIGHT: 293 LBS | DIASTOLIC BLOOD PRESSURE: 83 MMHG | SYSTOLIC BLOOD PRESSURE: 152 MMHG | BODY MASS INDEX: 48.82 KG/M2

## 2023-06-05 DIAGNOSIS — I48.0 PAROXYSMAL ATRIAL FIBRILLATION: Primary | ICD-10-CM

## 2023-06-05 PROCEDURE — 99213 OFFICE O/P EST LOW 20 MIN: CPT | Performed by: NURSE PRACTITIONER

## 2023-06-05 RX ORDER — FLECAINIDE ACETATE 100 MG/1
100 TABLET ORAL 2 TIMES DAILY
Qty: 180 TABLET | Refills: 1 | Status: SHIPPED | OUTPATIENT
Start: 2023-06-05

## 2023-06-05 RX ORDER — APIXABAN 5 MG/1
5 TABLET, FILM COATED ORAL EVERY 12 HOURS SCHEDULED
Qty: 180 TABLET | Refills: 1 | Status: SHIPPED | OUTPATIENT
Start: 2023-06-05

## 2023-06-05 RX ORDER — CARVEDILOL 6.25 MG/1
6.25 TABLET ORAL EVERY 12 HOURS SCHEDULED
Qty: 180 TABLET | Refills: 1 | Status: SHIPPED | OUTPATIENT
Start: 2023-06-05

## 2023-06-05 NOTE — LETTER
June 5, 2023     RED Moreno  [address] [address]    Patient: Ana Maradiaga   YOB: 1965   Date of Visit: 6/5/2023       Dear RED Padgett:    Thank you for referring Ana Maradiaga to me for evaluation. Below are the relevant portions of my assessment and plan of care.    If you have questions, please do not hesitate to call me. I look forward to following Ana along with you.         Sincerely,        TREASURE Garrett        CC: No Recipients    Neli Vergara APRN  06/05/23 0926  Sign when Signing Visit  Val Purcell PA  Ana Maradiaga  1965  06/05/2023    There is no problem list on file for this patient.      Dear Val Purcell PA:    Subjective    Chief Complaint   Patient presents with   • Follow-up     S/P CARDIOVERSION    FEELING MUCH BETTER           History of Present Illness:    Ana Maradiaga is a 57 y.o. female with a past medical history of atrial fibrillation and CVA.  She presents today for routine cardiology follow-up.  She underwent electrical cardioversion on 5/5/2023 by Dr. Bartlett.  However, this was unsuccessful.  Flecainide was increased to 100 mg twice daily.  She then returned for EKG and was noted to be in sinus rhythm.  She is feeling much better.  Her shortness of breath has improved.  She is no longer having any palpitations.  She denies any bleeding issues with Eliquis.          Allergies   Allergen Reactions   • Vancomycin Hives   • Cefepime Rash   :      Current Outpatient Medications:   •  Aspirin Low Dose 81 MG EC tablet, Take 1 tablet by mouth Daily., Disp: , Rfl:   •  atorvastatin (LIPITOR) 40 MG tablet, Take 1 tablet by mouth every night at bedtime., Disp: , Rfl:   •  bumetanide (BUMEX) 1 MG tablet, Take 1 tablet by mouth Daily., Disp: , Rfl:   •  carvedilol (COREG) 6.25 MG tablet, Take 1 tablet by mouth Every 12 (Twelve) Hours., Disp: 180 tablet, Rfl: 1  •  Eliquis 5 MG tablet tablet, Take 1 tablet by mouth Every 12  "(Twelve) Hours., Disp: 180 tablet, Rfl: 1  •  flecainide (TAMBOCOR) 100 MG tablet, Take 1 tablet by mouth 2 (Two) Times a Day., Disp: 180 tablet, Rfl: 1  •  levothyroxine (SYNTHROID, LEVOTHROID) 125 MCG tablet, Take 1 tablet by mouth Daily., Disp: , Rfl:   •  magnesium oxide (MAG-OX) 400 MG tablet, Take 250 mg by mouth Daily., Disp: , Rfl:   •  vitamin B-12 (CYANOCOBALAMIN) 1000 MCG tablet, Take 2.5 tablets by mouth Daily., Disp: , Rfl:   •  vitamin D3 125 MCG (5000 UT) capsule capsule, Take 1 capsule by mouth Daily., Disp: , Rfl:       The following portions of the patient's history were reviewed and updated as appropriate: allergies, current medications, past family history, past medical history, past social history, past surgical history and problem list.    Social History     Tobacco Use   • Smoking status: Never   • Smokeless tobacco: Never   Vaping Use   • Vaping Use: Never used   Substance Use Topics   • Alcohol use: Never   • Drug use: Never       Review of Systems   Constitutional: Negative for decreased appetite and malaise/fatigue.   Cardiovascular:  Negative for chest pain, dyspnea on exertion and palpitations.   Respiratory:  Negative for cough and shortness of breath.      Objective  Vitals:    06/05/23 0840   BP: 152/83   Pulse: 77   SpO2: 96%   Weight: (!) 163 kg (360 lb 3.2 oz)   Height: 165.1 cm (65\")     Body mass index is 59.94 kg/m².        Vitals reviewed.   Constitutional:       Appearance: Healthy appearance. Well-developed and not in distress.   HENT:      Head: Normocephalic and atraumatic.   Neck:      Vascular: No carotid bruit or JVD.   Pulmonary:      Effort: Pulmonary effort is normal.      Breath sounds: Normal breath sounds. No wheezing. No rales.   Cardiovascular:      Normal rate. Regular rhythm.      Murmurs: There is no murmur.      . No S3 and S4 gallop.   Abdominal:      General: Bowel sounds are normal.      Palpations: Abdomen is soft.   Skin:     General: Skin is warm and " dry.   Neurological:      Mental Status: Alert, oriented to person, place, and time and oriented to person, place and time.   Psychiatric:         Mood and Affect: Mood normal.         Behavior: Behavior normal.       Lab Results   Component Value Date     08/13/2022    K 4.3 08/13/2022     08/13/2022    CO2 23 08/13/2022    BUN 16 08/13/2022    CREATININE 0.85 08/13/2022    CALCIUM 8.0 (L) 08/13/2022    AST 12 08/12/2022    ALT 14 08/12/2022    ALKPHOS 105 08/12/2022     Lab Results   Component Value Date    CKTOTAL 237 (H) 11/14/2021     Lab Results   Component Value Date    HGB 10.0 (L) 10/20/2018     Lab Results   Component Value Date    INR 1.6 (H) 11/12/2021    INR 1.3 (H) 10/19/2018     Lab Results   Component Value Date    MG 2.2 08/13/2022     Lab Results   Component Value Date    TSH 5.45 (H) 03/21/2022      Lab Results   Component Value Date     (H) 08/09/2022           Procedures      Assessment & Plan   Diagnosis Plan   1. Paroxysmal atrial fibrillation  flecainide (TAMBOCOR) 100 MG tablet    Eliquis 5 MG tablet tablet    carvedilol (COREG) 6.25 MG tablet                   Recommendations:    Paroxysmal atrial fibrillation - now maintaining sinus rhythm. Continue flecainide, Eliquis, and carvedilol.  Follow up in 3 months or sooner if needed.        Return in about 3 months (around 9/5/2023) for Recheck.    As always, I appreciate very much the opportunity to participate in the cardiovascular care of your patients.      With Best Regards,    TREASURE Garrett

## 2023-06-05 NOTE — PROGRESS NOTES
Val Purcell PA  Ana Maradiaga  1965  06/05/2023    There is no problem list on file for this patient.      Dear Val Purcell PA:    Subjective     Chief Complaint   Patient presents with    Follow-up     S/P CARDIOVERSION    FEELING MUCH BETTER           History of Present Illness:    Ana Maradiaga is a 57 y.o. female with a past medical history of atrial fibrillation and CVA.  She presents today for routine cardiology follow-up.  She underwent electrical cardioversion on 5/5/2023 by Dr. Bartlett.  However, this was unsuccessful.  Flecainide was increased to 100 mg twice daily.  She then returned for EKG and was noted to be in sinus rhythm.  She is feeling much better.  Her shortness of breath has improved.  She is no longer having any palpitations.  She denies any bleeding issues with Eliquis.          Allergies   Allergen Reactions    Vancomycin Hives    Cefepime Rash   :      Current Outpatient Medications:     Aspirin Low Dose 81 MG EC tablet, Take 1 tablet by mouth Daily., Disp: , Rfl:     atorvastatin (LIPITOR) 40 MG tablet, Take 1 tablet by mouth every night at bedtime., Disp: , Rfl:     bumetanide (BUMEX) 1 MG tablet, Take 1 tablet by mouth Daily., Disp: , Rfl:     carvedilol (COREG) 6.25 MG tablet, Take 1 tablet by mouth Every 12 (Twelve) Hours., Disp: 180 tablet, Rfl: 1    Eliquis 5 MG tablet tablet, Take 1 tablet by mouth Every 12 (Twelve) Hours., Disp: 180 tablet, Rfl: 1    flecainide (TAMBOCOR) 100 MG tablet, Take 1 tablet by mouth 2 (Two) Times a Day., Disp: 180 tablet, Rfl: 1    levothyroxine (SYNTHROID, LEVOTHROID) 125 MCG tablet, Take 1 tablet by mouth Daily., Disp: , Rfl:     magnesium oxide (MAG-OX) 400 MG tablet, Take 250 mg by mouth Daily., Disp: , Rfl:     vitamin B-12 (CYANOCOBALAMIN) 1000 MCG tablet, Take 2.5 tablets by mouth Daily., Disp: , Rfl:     vitamin D3 125 MCG (5000 UT) capsule capsule, Take 1 capsule by mouth Daily., Disp: , Rfl:       The following portions of the  "patient's history were reviewed and updated as appropriate: allergies, current medications, past family history, past medical history, past social history, past surgical history and problem list.    Social History     Tobacco Use    Smoking status: Never    Smokeless tobacco: Never   Vaping Use    Vaping Use: Never used   Substance Use Topics    Alcohol use: Never    Drug use: Never       Review of Systems   Constitutional: Negative for decreased appetite and malaise/fatigue.   Cardiovascular:  Negative for chest pain, dyspnea on exertion and palpitations.   Respiratory:  Negative for cough and shortness of breath.      Objective   Vitals:    06/05/23 0840   BP: 152/83   Pulse: 77   SpO2: 96%   Weight: (!) 163 kg (360 lb 3.2 oz)   Height: 165.1 cm (65\")     Body mass index is 59.94 kg/m².        Vitals reviewed.   Constitutional:       Appearance: Healthy appearance. Well-developed and not in distress.   HENT:      Head: Normocephalic and atraumatic.   Neck:      Vascular: No carotid bruit or JVD.   Pulmonary:      Effort: Pulmonary effort is normal.      Breath sounds: Normal breath sounds. No wheezing. No rales.   Cardiovascular:      Normal rate. Regular rhythm.      Murmurs: There is no murmur.      . No S3 and S4 gallop.   Abdominal:      General: Bowel sounds are normal.      Palpations: Abdomen is soft.   Skin:     General: Skin is warm and dry.   Neurological:      Mental Status: Alert, oriented to person, place, and time and oriented to person, place and time.   Psychiatric:         Mood and Affect: Mood normal.         Behavior: Behavior normal.       Lab Results   Component Value Date     08/13/2022    K 4.3 08/13/2022     08/13/2022    CO2 23 08/13/2022    BUN 16 08/13/2022    CREATININE 0.85 08/13/2022    CALCIUM 8.0 (L) 08/13/2022    AST 12 08/12/2022    ALT 14 08/12/2022    ALKPHOS 105 08/12/2022     Lab Results   Component Value Date    CKTOTAL 237 (H) 11/14/2021     Lab Results "   Component Value Date    HGB 10.0 (L) 10/20/2018     Lab Results   Component Value Date    INR 1.6 (H) 11/12/2021    INR 1.3 (H) 10/19/2018     Lab Results   Component Value Date    MG 2.2 08/13/2022     Lab Results   Component Value Date    TSH 5.45 (H) 03/21/2022      Lab Results   Component Value Date     (H) 08/09/2022           Procedures      Assessment & Plan    Diagnosis Plan   1. Paroxysmal atrial fibrillation  flecainide (TAMBOCOR) 100 MG tablet    Eliquis 5 MG tablet tablet    carvedilol (COREG) 6.25 MG tablet                   Recommendations:    Paroxysmal atrial fibrillation - now maintaining sinus rhythm. Continue flecainide, Eliquis, and carvedilol.  Follow up in 3 months or sooner if needed.        Return in about 3 months (around 9/5/2023) for Recheck.    As always, I appreciate very much the opportunity to participate in the cardiovascular care of your patients.      With Best Regards,    TREASURE Garrett

## 2023-08-04 ENCOUNTER — TRANSCRIBE ORDERS (OUTPATIENT)
Dept: ADMINISTRATIVE | Facility: HOSPITAL | Age: 58
End: 2023-08-04
Payer: COMMERCIAL

## 2023-08-04 DIAGNOSIS — R31.9 HEMATURIA, UNSPECIFIED TYPE: ICD-10-CM

## 2023-08-04 DIAGNOSIS — R93.41 ABNORMAL RADIOLOGIC FINDINGS ON DIAGNOSTIC IMAGING OF RENAL PELVIS, URETER, OR BLADDER: ICD-10-CM

## 2023-08-04 DIAGNOSIS — R93.41 BLADDER FILLING DEFECT: ICD-10-CM

## 2023-08-04 DIAGNOSIS — E28.1 ANDROGEN EXCESS: Primary | ICD-10-CM

## 2023-09-01 ENCOUNTER — HOSPITAL ENCOUNTER (OUTPATIENT)
Dept: CT IMAGING | Facility: HOSPITAL | Age: 58
Discharge: HOME | End: 2023-09-01
Admitting: STUDENT IN AN ORGANIZED HEALTH CARE EDUCATION/TRAINING PROGRAM
Payer: COMMERCIAL

## 2023-09-01 DIAGNOSIS — R31.9 HEMATURIA, UNSPECIFIED TYPE: ICD-10-CM

## 2023-09-01 DIAGNOSIS — R93.41 BLADDER FILLING DEFECT: ICD-10-CM

## 2023-09-01 DIAGNOSIS — E28.1 ANDROGEN EXCESS: ICD-10-CM

## 2023-09-01 PROCEDURE — 74176 CT ABD & PELVIS W/O CONTRAST: CPT

## 2023-09-08 ENCOUNTER — OFFICE VISIT (OUTPATIENT)
Dept: CARDIOLOGY | Facility: CLINIC | Age: 58
End: 2023-09-08
Payer: COMMERCIAL

## 2023-09-08 VITALS
DIASTOLIC BLOOD PRESSURE: 92 MMHG | WEIGHT: 293 LBS | OXYGEN SATURATION: 97 % | SYSTOLIC BLOOD PRESSURE: 138 MMHG | HEART RATE: 70 BPM | HEIGHT: 65 IN | BODY MASS INDEX: 48.82 KG/M2

## 2023-09-08 DIAGNOSIS — I48.0 PAROXYSMAL ATRIAL FIBRILLATION: Primary | ICD-10-CM

## 2023-09-08 DIAGNOSIS — I10 ESSENTIAL HYPERTENSION: ICD-10-CM

## 2023-09-08 PROCEDURE — 93000 ELECTROCARDIOGRAM COMPLETE: CPT | Performed by: NURSE PRACTITIONER

## 2023-09-08 PROCEDURE — 99213 OFFICE O/P EST LOW 20 MIN: CPT | Performed by: NURSE PRACTITIONER

## 2023-09-08 RX ORDER — SPIRONOLACTONE 25 MG/1
25 TABLET ORAL DAILY
COMMUNITY

## 2023-09-08 NOTE — PROGRESS NOTES
Val Purcell PA  Ana Maradiaga  1965  09/08/2023    There is no problem list on file for this patient.      Dear Val Purcell PA:    Subjective     Chief Complaint   Patient presents with    Med Management     Verbal.     Follow-up     Routine 3 mth f/up.            History of Present Illness:    Ana Maradiaga is a 58 y.o. female with a past medical history of atrial fibrillation and CVA.  She presents today for routine cardiology follow-up. She reports she has been doing very well. The patient denies chest pain, shortness of breath, palpitations, dizziness, lightheadedness, near syncope, and syncope.  She denies any bleeding issues with Eliquis.          Allergies   Allergen Reactions    Vancomycin Hives    Cefepime Rash   :      Current Outpatient Medications:     Aspirin Low Dose 81 MG EC tablet, Take 1 tablet by mouth Daily., Disp: , Rfl:     atorvastatin (LIPITOR) 40 MG tablet, Take 1 tablet by mouth every night at bedtime., Disp: , Rfl:     bumetanide (BUMEX) 1 MG tablet, Take 1 tablet by mouth Daily., Disp: , Rfl:     carvedilol (COREG) 6.25 MG tablet, Take 1 tablet by mouth Every 12 (Twelve) Hours., Disp: 180 tablet, Rfl: 1    Eliquis 5 MG tablet tablet, Take 1 tablet by mouth Every 12 (Twelve) Hours., Disp: 180 tablet, Rfl: 1    flecainide (TAMBOCOR) 100 MG tablet, Take 1 tablet by mouth 2 (Two) Times a Day., Disp: 180 tablet, Rfl: 1    levothyroxine (SYNTHROID, LEVOTHROID) 125 MCG tablet, Take 1 tablet by mouth Daily., Disp: , Rfl:     magnesium oxide (MAG-OX) 400 MG tablet, Take 250 mg by mouth Daily., Disp: , Rfl:     metFORMIN (GLUCOPHAGE) 500 MG tablet, Take 1 tablet by mouth Daily With Breakfast., Disp: , Rfl:     spironolactone (ALDACTONE) 25 MG tablet, Take 1 tablet by mouth Daily., Disp: , Rfl:     vitamin B-12 (CYANOCOBALAMIN) 1000 MCG tablet, Take 2.5 tablets by mouth Daily., Disp: , Rfl:     vitamin D3 125 MCG (5000 UT) capsule capsule, Take 1 capsule by mouth Daily., Disp: , Rfl:  "      The following portions of the patient's history were reviewed and updated as appropriate: allergies, current medications, past family history, past medical history, past social history, past surgical history and problem list.    Social History     Tobacco Use    Smoking status: Never    Smokeless tobacco: Never   Vaping Use    Vaping Use: Never used   Substance Use Topics    Alcohol use: Never    Drug use: Never       Review of Systems   Constitutional: Negative for decreased appetite and malaise/fatigue.   Cardiovascular:  Negative for chest pain, dyspnea on exertion and palpitations.   Respiratory:  Negative for cough and shortness of breath.      Objective   Vitals:    09/08/23 0912 09/08/23 0913   BP: 175/95 138/92   BP Location: Right arm Left arm   Patient Position: Sitting Sitting   Cuff Size: Adult Adult   Pulse: 76 70   SpO2: 97% 97%   Weight: (!) 156 kg (345 lb) (!) 156 kg (345 lb)   Height: 165.1 cm (65\") 165.1 cm (65\")     Body mass index is 57.41 kg/m².        Vitals reviewed.   Constitutional:       Appearance: Healthy appearance. Well-developed and not in distress.   HENT:      Head: Normocephalic and atraumatic.   Neck:      Vascular: No carotid bruit or JVD.   Pulmonary:      Effort: Pulmonary effort is normal.      Breath sounds: Normal breath sounds. No wheezing. No rales.   Cardiovascular:      Normal rate. Regular rhythm.      Murmurs: There is no murmur.      . No S3 and S4 gallop.   Edema:     Peripheral edema absent.   Abdominal:      General: Bowel sounds are normal.      Palpations: Abdomen is soft.   Skin:     General: Skin is warm and dry.   Neurological:      Mental Status: Alert, oriented to person, place, and time and oriented to person, place and time.   Psychiatric:         Mood and Affect: Mood normal.         Behavior: Behavior normal.       Lab Results   Component Value Date     06/22/2023    K 4.0 06/22/2023    CL 98 06/22/2023    CO2 27.0 06/22/2023    BUN 22 (H) " 06/22/2023    CREATININE 1.11 (H) 06/22/2023    GLUCOSE 110 (H) 06/22/2023    CALCIUM 9.0 06/22/2023    AST 19 06/22/2023    ALT 19 06/22/2023    ALKPHOS 122 (H) 06/22/2023     Lab Results   Component Value Date    CKTOTAL 237 (H) 11/14/2021     Lab Results   Component Value Date    HGB 10.0 (L) 10/20/2018     Lab Results   Component Value Date    INR 1.6 (H) 11/12/2021    INR 1.3 (H) 10/19/2018     Lab Results   Component Value Date    MG 2.2 08/13/2022     Lab Results   Component Value Date    TSH 5.45 (H) 03/21/2022      Lab Results   Component Value Date     (H) 08/09/2022             ECG 12 Lead    Date/Time: 9/8/2023 9:36 AM  Performed by: Neli Vergara APRN  Authorized by: Neli Vergara APRN   Comparison: compared with previous ECG   Similar to previous ECG  Rhythm: sinus rhythm  BPM: 66  Conduction: 1st degree AV block  Comments:  ms  QTc 421 ms          Assessment & Plan    Diagnosis Plan   1. Paroxysmal atrial fibrillation  ECG 12 Lead      2. Essential hypertension with elevated blood pressures.  ECG 12 Lead                   Recommendations:    Paroxysmal atrial fibrillation - maintaining sinus rhythm. Continue flecainide, carvedilol, and Eliquis.  Essential hypertension - BP controlled.  Follow up in 6 months or sooner if needed.        Return in about 6 months (around 3/8/2024) for Recheck.    As always, I appreciate very much the opportunity to participate in the cardiovascular care of your patients.      With Best Regards,    TREASURE Garrett

## 2023-09-13 ENCOUNTER — TRANSCRIBE ORDERS (OUTPATIENT)
Dept: ADMINISTRATIVE | Facility: HOSPITAL | Age: 58
End: 2023-09-13
Payer: COMMERCIAL

## 2023-09-13 DIAGNOSIS — Z12.31 VISIT FOR SCREENING MAMMOGRAM: Primary | ICD-10-CM

## 2023-10-17 ENCOUNTER — TRANSCRIBE ORDERS (OUTPATIENT)
Dept: ADMINISTRATIVE | Facility: HOSPITAL | Age: 58
End: 2023-10-17
Payer: COMMERCIAL

## 2023-10-17 ENCOUNTER — HOSPITAL ENCOUNTER (OUTPATIENT)
Dept: GENERAL RADIOLOGY | Facility: HOSPITAL | Age: 58
Discharge: HOME | End: 2023-10-17
Payer: COMMERCIAL

## 2023-10-17 ENCOUNTER — HOSPITAL ENCOUNTER (OUTPATIENT)
Dept: MAMMOGRAPHY | Facility: HOSPITAL | Age: 58
Discharge: HOME | End: 2023-10-17
Payer: COMMERCIAL

## 2023-10-17 DIAGNOSIS — M54.50 LOW BACK PAIN, UNSPECIFIED BACK PAIN LATERALITY, UNSPECIFIED CHRONICITY, UNSPECIFIED WHETHER SCIATICA PRESENT: Primary | ICD-10-CM

## 2023-10-17 DIAGNOSIS — M54.50 LOW BACK PAIN, UNSPECIFIED BACK PAIN LATERALITY, UNSPECIFIED CHRONICITY, UNSPECIFIED WHETHER SCIATICA PRESENT: ICD-10-CM

## 2023-10-17 DIAGNOSIS — M13.0 POLYARTHRITIS: ICD-10-CM

## 2023-10-17 DIAGNOSIS — Z12.31 VISIT FOR SCREENING MAMMOGRAM: ICD-10-CM

## 2023-10-17 PROCEDURE — 77067 SCR MAMMO BI INCL CAD: CPT

## 2023-10-17 PROCEDURE — 72070 X-RAY EXAM THORAC SPINE 2VWS: CPT

## 2023-10-17 PROCEDURE — 72220 X-RAY EXAM SACRUM TAILBONE: CPT

## 2023-10-17 PROCEDURE — 72040 X-RAY EXAM NECK SPINE 2-3 VW: CPT

## 2023-10-17 PROCEDURE — 77063 BREAST TOMOSYNTHESIS BI: CPT

## 2023-10-17 PROCEDURE — 72100 X-RAY EXAM L-S SPINE 2/3 VWS: CPT

## 2024-01-25 DIAGNOSIS — I48.0 PAROXYSMAL ATRIAL FIBRILLATION: ICD-10-CM

## 2024-01-25 RX ORDER — FLECAINIDE ACETATE 100 MG/1
100 TABLET ORAL 2 TIMES DAILY
Qty: 180 TABLET | Refills: 1 | Status: SHIPPED | OUTPATIENT
Start: 2024-01-25

## 2024-04-11 ENCOUNTER — OFFICE VISIT (OUTPATIENT)
Dept: CARDIOLOGY | Facility: CLINIC | Age: 59
End: 2024-04-11
Payer: MEDICARE

## 2024-04-11 VITALS
BODY MASS INDEX: 48.82 KG/M2 | WEIGHT: 293 LBS | SYSTOLIC BLOOD PRESSURE: 173 MMHG | HEIGHT: 65 IN | OXYGEN SATURATION: 93 % | DIASTOLIC BLOOD PRESSURE: 91 MMHG | HEART RATE: 72 BPM

## 2024-04-11 DIAGNOSIS — I10 ESSENTIAL HYPERTENSION: ICD-10-CM

## 2024-04-11 DIAGNOSIS — I48.0 PAROXYSMAL ATRIAL FIBRILLATION: Primary | ICD-10-CM

## 2024-04-11 PROCEDURE — 99214 OFFICE O/P EST MOD 30 MIN: CPT | Performed by: NURSE PRACTITIONER

## 2024-04-11 PROCEDURE — 1160F RVW MEDS BY RX/DR IN RCRD: CPT | Performed by: NURSE PRACTITIONER

## 2024-04-11 PROCEDURE — 93000 ELECTROCARDIOGRAM COMPLETE: CPT | Performed by: NURSE PRACTITIONER

## 2024-04-11 PROCEDURE — 1159F MED LIST DOCD IN RCRD: CPT | Performed by: NURSE PRACTITIONER

## 2024-04-11 NOTE — PROGRESS NOTES
Val Purcell PA  Ana Maradiaga  1965  04/11/2024    There is no problem list on file for this patient.      Dear Val Purcell PA:    Subjective     Chief Complaint   Patient presents with    Follow-up     6 MONTH FOLLOW UP           History of Present Illness:    Ana Maradiaga is a 58 y.o. female with a past medical history of paroxysmal atrial fibrillation and CVA.  She presents today for routine cardiology follow-up.  She denies any chest pain, shortness of breath, or palpitations.  Her blood pressure is elevated in the office today but she reports she was up most of the night and believes this is the issue.  She reports her blood pressure is typically within normal limits.  She denies any bleeding issues with Eliquis.          Allergies   Allergen Reactions    Vancomycin Hives    Cefepime Rash   :      Current Outpatient Medications:     Aspirin Low Dose 81 MG EC tablet, Take 1 tablet by mouth Daily., Disp: , Rfl:     atorvastatin (LIPITOR) 40 MG tablet, Take 1 tablet by mouth every night at bedtime., Disp: , Rfl:     bumetanide (BUMEX) 1 MG tablet, Take 1 tablet by mouth Daily., Disp: , Rfl:     carvedilol (COREG) 6.25 MG tablet, Take 1 tablet by mouth Every 12 (Twelve) Hours., Disp: 180 tablet, Rfl: 1    Eliquis 5 MG tablet tablet, Take 1 tablet by mouth Every 12 (Twelve) Hours., Disp: 180 tablet, Rfl: 1    flecainide (TAMBOCOR) 100 MG tablet, TAKE 1 TABLET BY MOUTH TWICE DAILY, Disp: 180 tablet, Rfl: 1    levothyroxine (SYNTHROID, LEVOTHROID) 125 MCG tablet, Take 1 tablet by mouth Daily., Disp: , Rfl:     magnesium oxide (MAG-OX) 400 MG tablet, Take 250 mg by mouth Daily., Disp: , Rfl:     metFORMIN (GLUCOPHAGE) 500 MG tablet, Take 1 tablet by mouth Daily With Breakfast., Disp: , Rfl:     vitamin B-12 (CYANOCOBALAMIN) 1000 MCG tablet, Take 2.5 tablets by mouth Daily., Disp: , Rfl:     vitamin D3 125 MCG (5000 UT) capsule capsule, Take 1 capsule by mouth Daily., Disp: , Rfl:       The following  "portions of the patient's history were reviewed and updated as appropriate: allergies, current medications, past family history, past medical history, past social history, past surgical history and problem list.    Social History     Tobacco Use    Smoking status: Never    Smokeless tobacco: Never   Vaping Use    Vaping status: Never Used   Substance Use Topics    Alcohol use: Never    Drug use: Never       Review of Systems   Constitutional: Negative for decreased appetite and malaise/fatigue.   Cardiovascular:  Negative for chest pain, dyspnea on exertion and palpitations.   Respiratory:  Negative for cough and shortness of breath.        Objective   Vitals:    04/11/24 1135   BP: 173/91   BP Location: Left arm   Patient Position: Sitting   Cuff Size: Large Adult   Pulse: 72   SpO2: 93%   Weight: (!) 159 kg (351 lb 3.2 oz)   Height: 165.1 cm (65\")     Body mass index is 58.44 kg/m².        Vitals reviewed.   Constitutional:       Appearance: Healthy appearance. Well-developed and not in distress.   HENT:      Head: Normocephalic and atraumatic.   Neck:      Vascular: No carotid bruit or JVD.   Pulmonary:      Effort: Pulmonary effort is normal.      Breath sounds: Normal breath sounds. No wheezing. No rales.   Cardiovascular:      Normal rate. Regular rhythm.      Murmurs: There is no murmur.      . No S3 and S4 gallop.   Edema:     Peripheral edema absent.   Abdominal:      General: Bowel sounds are normal.      Palpations: Abdomen is soft.   Skin:     General: Skin is warm and dry.   Neurological:      Mental Status: Alert, oriented to person, place, and time and oriented to person, place and time.   Psychiatric:         Mood and Affect: Mood normal.         Behavior: Behavior normal.         Lab Results   Component Value Date     06/22/2023    K 4.0 06/22/2023    CL 98 06/22/2023    CO2 27.0 06/22/2023    BUN 22 (H) 06/22/2023    CREATININE 1.11 (H) 06/22/2023    GLUCOSE 110 (H) 06/22/2023    CALCIUM 9.0 " 06/22/2023    AST 19 06/22/2023    ALT 19 06/22/2023    ALKPHOS 122 (H) 06/22/2023     Lab Results   Component Value Date    HGB 10.0 (L) 10/20/2018     Lab Results   Component Value Date    TSH 5.45 (H) 03/21/2022          Results for orders placed during the hospital encounter of 03/23/23    Adult Transthoracic Echo Complete w/ Color, Spectral and Contrast if necessary per protocol    Interpretation Summary    Normal left ventricular cavity size and wall thickness noted. All left ventricular wall segments contract normally.    Left ventricular ejection fraction appears to be 56 - 60%.    The aortic valve is structurally normal with no regurgitation or stenosis present.    The mitral valve is structurally normal with no significant stenosis present. Trace mitral valve regurgitation is present.    There is no evidence of pericardial effusion. .                ECG 12 Lead    Date/Time: 4/11/2024 11:57 AM  Performed by: Neli Vergara APRN    Authorized by: Neli Vergara APRN  Comparison: compared with previous ECG   Similar to previous ECG  Rhythm: sinus rhythm  BPM: 68  Comments:  ms  QTc 463 ms            Assessment & Plan    Diagnosis Plan   1. Paroxysmal atrial fibrillation  ECG 12 Lead    CBC & Differential    Basic Metabolic Panel      2. Essential hypertension  ECG 12 Lead    CBC & Differential    Basic Metabolic Panel                   Recommendations:    Paroxysmal atrial fibrillation-maintaining sinus rhythm on flecainide.  Continue flecainide, carvedilol, and Eliquis.  CBC and BMP ordered.  Essential hypertension-BP uncontrolled.  I did recommend increasing antihypertensives that she has declined.  She is going to monitor BP at home and let us know what her home BP is running.  Will adjust antihypertensives pending results.  Follow up in 6 months or sooner if needed.        Return in about 6 months (around 10/11/2024) for Recheck.    As always, I appreciate very much the opportunity to  participate in the cardiovascular care of your patients.      With Best Regards,    TREASURE Garrett

## 2024-04-12 ENCOUNTER — TELEPHONE (OUTPATIENT)
Dept: CARDIOLOGY | Facility: CLINIC | Age: 59
End: 2024-04-12
Payer: COMMERCIAL

## 2024-04-12 NOTE — TELEPHONE ENCOUNTER
Patient called and said that Neli wanted her to call her back today with her blood pressure readings for today.     7:45 a.m. 142/84  9:05 a.m. 150/81    She said she tried it 5 minutes after that and it went to 131/75.     Thanks!

## 2024-05-22 DIAGNOSIS — I48.0 PAROXYSMAL ATRIAL FIBRILLATION: ICD-10-CM

## 2024-05-24 RX ORDER — FLECAINIDE ACETATE 100 MG/1
100 TABLET ORAL 2 TIMES DAILY
Qty: 180 TABLET | Refills: 1 | Status: SHIPPED | OUTPATIENT
Start: 2024-05-24

## 2024-08-27 ENCOUNTER — TELEPHONE (OUTPATIENT)
Dept: CARDIOLOGY | Facility: CLINIC | Age: 59
End: 2024-08-27
Payer: COMMERCIAL

## 2024-08-27 DIAGNOSIS — I48.0 PAROXYSMAL ATRIAL FIBRILLATION: ICD-10-CM

## 2024-08-27 RX ORDER — CARVEDILOL 6.25 MG/1
6.25 TABLET ORAL EVERY 12 HOURS
Qty: 60 TABLET | Refills: 4 | Status: SHIPPED | OUTPATIENT
Start: 2024-08-27

## 2024-08-27 NOTE — TELEPHONE ENCOUNTER
Caller: Ana Maradiaga    Relationship: Self    Best call back number: [phone]     Which medication are you concerned about: LOSARTAN POTASSIUM 25MG    Who prescribed you this medication: RED GUTIERREZ    When did you start taking this medication: HAS NOT STARTED IT YET, WANTED TO CHECK WITH HARVEY MCNALLY.     What are your concerns: WANTED TO BE SURE THIS IS OKAY PER CARDIOLOGIST    How long have you had these concerns: SINCE YESTERDAY WHEN SEEN AT PCP'S OFFICE.   BP YESTERDAY: 140/90  AFTER WALKING 140/88

## 2024-10-03 ENCOUNTER — TELEPHONE (OUTPATIENT)
Dept: CARDIOLOGY | Facility: CLINIC | Age: 59
End: 2024-10-03
Payer: COMMERCIAL

## 2024-10-11 ENCOUNTER — OFFICE VISIT (OUTPATIENT)
Dept: CARDIOLOGY | Facility: CLINIC | Age: 59
End: 2024-10-11
Payer: MEDICARE

## 2024-10-11 VITALS
WEIGHT: 293 LBS | DIASTOLIC BLOOD PRESSURE: 110 MMHG | OXYGEN SATURATION: 97 % | HEART RATE: 73 BPM | BODY MASS INDEX: 48.82 KG/M2 | HEIGHT: 65 IN | SYSTOLIC BLOOD PRESSURE: 168 MMHG

## 2024-10-11 DIAGNOSIS — I48.0 PAROXYSMAL ATRIAL FIBRILLATION: Primary | ICD-10-CM

## 2024-10-11 DIAGNOSIS — I10 ESSENTIAL HYPERTENSION: ICD-10-CM

## 2024-10-11 DIAGNOSIS — I48.0 PAROXYSMAL ATRIAL FIBRILLATION: ICD-10-CM

## 2024-10-11 PROCEDURE — 1160F RVW MEDS BY RX/DR IN RCRD: CPT | Performed by: NURSE PRACTITIONER

## 2024-10-11 PROCEDURE — 1159F MED LIST DOCD IN RCRD: CPT | Performed by: NURSE PRACTITIONER

## 2024-10-11 PROCEDURE — 99214 OFFICE O/P EST MOD 30 MIN: CPT | Performed by: NURSE PRACTITIONER

## 2024-10-11 PROCEDURE — 93000 ELECTROCARDIOGRAM COMPLETE: CPT | Performed by: NURSE PRACTITIONER

## 2024-10-11 RX ORDER — AMLODIPINE BESYLATE 5 MG/1
5 TABLET ORAL DAILY
Qty: 90 TABLET | Refills: 1 | Status: SHIPPED | OUTPATIENT
Start: 2024-10-11 | End: 2024-10-14

## 2024-10-11 RX ORDER — AMLODIPINE BESYLATE 5 MG/1
5 TABLET ORAL DAILY
Qty: 30 TABLET | Refills: 0 | Status: SHIPPED | OUTPATIENT
Start: 2024-10-11 | End: 2024-10-11 | Stop reason: SDUPTHER

## 2024-10-11 NOTE — PROGRESS NOTES
Val Purcell PA  Ana Maradiaga  1965  10/11/2024    There is no problem list on file for this patient.      Dear Val Purcell PA:    Subjective     Chief Complaint   Patient presents with    Paroxysmal atrial fibrillation         History of Present Illness:    Ana Maradiaga is a 59 y.o. female with a past medical history of paroxysmal atrial fibrillation and CVA. She presents today for routine cardiology follow-up. The patient denies chest pain, shortness of breath, palpitations, dizziness, lightheadedness, near syncope, and syncope.  Denies bleeding issues with Eliquis. Her BP has been in the 140's systolic elsewhere. PCP recommended starting Losartan but nephrology wanted to hold off for now due to renal function.          Allergies   Allergen Reactions    Vancomycin Hives    Cefepime Rash    Cephalosporins Rash   :      Current Outpatient Medications:     amLODIPine (NORVASC) 5 MG tablet, Take 1 tablet by mouth Daily., Disp: 90 tablet, Rfl: 1    Aspirin Low Dose 81 MG EC tablet, Take 1 tablet by mouth Daily., Disp: , Rfl:     atorvastatin (LIPITOR) 40 MG tablet, Take 1 tablet by mouth every night at bedtime., Disp: , Rfl:     bumetanide (BUMEX) 1 MG tablet, Take 2 tablets by mouth Daily., Disp: , Rfl:     carvedilol (COREG) 6.25 MG tablet, TAKE 1 TABLET BY MOUTH EVERY 12 HOURS, Disp: 60 tablet, Rfl: 4    Eliquis 5 MG tablet tablet, Take 1 tablet by mouth Every 12 (Twelve) Hours., Disp: 180 tablet, Rfl: 1    flecainide (TAMBOCOR) 100 MG tablet, TAKE 1 TABLET BY MOUTH TWICE A DAY, Disp: 180 tablet, Rfl: 1    levothyroxine (SYNTHROID, LEVOTHROID) 125 MCG tablet, Take 1 tablet by mouth Daily., Disp: , Rfl:     magnesium oxide (MAG-OX) 400 MG tablet, Take 250 mg by mouth Daily., Disp: , Rfl:     metFORMIN (GLUCOPHAGE) 500 MG tablet, Take 1 tablet by mouth Daily With Breakfast., Disp: , Rfl:     vitamin B-12 (CYANOCOBALAMIN) 1000 MCG tablet, Take 2.5 tablets by mouth Daily., Disp: , Rfl:     vitamin D3  "125 MCG (5000 UT) capsule capsule, Take 1 capsule by mouth Daily., Disp: , Rfl:       The following portions of the patient's history were reviewed and updated as appropriate: allergies, current medications, past family history, past medical history, past social history, past surgical history and problem list.    Social History     Tobacco Use    Smoking status: Never    Smokeless tobacco: Never   Vaping Use    Vaping status: Never Used   Substance Use Topics    Alcohol use: Never    Drug use: Never       Review of Systems   Constitutional: Negative for decreased appetite and malaise/fatigue.   Cardiovascular:  Negative for chest pain, dyspnea on exertion and palpitations.   Respiratory:  Negative for cough and shortness of breath.        Objective   Vitals:    10/11/24 1126   BP: (!) 168/110   BP Location: Left arm   Patient Position: Sitting   Cuff Size: Adult   Pulse: 73   SpO2: 97%   Weight: (!) 164 kg (362 lb)   Height: 165.1 cm (65\")     Body mass index is 60.24 kg/m².        Vitals reviewed.   Constitutional:       Appearance: Healthy appearance. Well-developed and not in distress.   HENT:      Head: Normocephalic and atraumatic.   Neck:      Vascular: No carotid bruit or JVD.   Pulmonary:      Effort: Pulmonary effort is normal.      Breath sounds: Normal breath sounds. No wheezing. No rales.   Cardiovascular:      Normal rate. Regular rhythm.      Murmurs: There is no murmur.      . No S3 and S4 gallop.   Edema:     Peripheral edema absent.   Abdominal:      General: Bowel sounds are normal.      Palpations: Abdomen is soft.   Skin:     General: Skin is warm and dry.   Neurological:      Mental Status: Alert, oriented to person, place, and time and oriented to person, place and time.   Psychiatric:         Mood and Affect: Mood normal.         Behavior: Behavior normal.         Lab Results   Component Value Date     06/22/2023    K 4.0 06/22/2023    CL 98 06/22/2023    CO2 27.0 06/22/2023    BUN 22 " (H) 06/22/2023    CREATININE 1.11 (H) 06/22/2023    GLUCOSE 110 (H) 06/22/2023    CALCIUM 9.0 06/22/2023    AST 19 06/22/2023    ALT 19 06/22/2023    ALKPHOS 122 (H) 06/22/2023     Lab Results   Component Value Date    HGB 10.0 (L) 10/20/2018     Lab Results   Component Value Date    TSH 5.45 (H) 03/21/2022          Results for orders placed during the hospital encounter of 03/23/23    Adult Transthoracic Echo Complete w/ Color, Spectral and Contrast if necessary per protocol    Interpretation Summary    Normal left ventricular cavity size and wall thickness noted. All left ventricular wall segments contract normally.    Left ventricular ejection fraction appears to be 56 - 60%.    The aortic valve is structurally normal with no regurgitation or stenosis present.    The mitral valve is structurally normal with no significant stenosis present. Trace mitral valve regurgitation is present.    There is no evidence of pericardial effusion. .                ECG 12 Lead    Date/Time: 10/11/2024 11:56 AM  Performed by: Neli Vergara APRN    Authorized by: Neli Vergara APRN  Comparison: compared with previous ECG   Similar to previous ECG  Rhythm: sinus rhythm  BPM: 66  Conduction: 1st degree AV block  Comments:  ms  QTc 436 ms          Assessment & Plan    Diagnosis Plan   1. Paroxysmal atrial fibrillation  ECG 12 Lead    amLODIPine (NORVASC) 5 MG tablet      2. Essential hypertension  ECG 12 Lead               Recommendations:  Paroxysmal atrial fibrillation - Continue flecainide, Eliquis, and carvedilol. She is currently maintaining sinus rhythm.  Essential hypertension - Will add amlodipine 5 mg daily. She will return to PCP office in a few weeks and have BP rechecked. I would not increase carvedilol since heart rate has been around 60 bpm.  Follow up in 6 months or sooner if needed.      Return in about 6 months (around 4/11/2025) for Recheck.    As always, I appreciate very much the opportunity to  participate in the cardiovascular care of your patients.      With Best Regards,    TREASURE Garrett

## 2024-10-14 RX ORDER — AMLODIPINE BESYLATE 5 MG/1
5 TABLET ORAL DAILY
Qty: 90 TABLET | Refills: 1 | Status: SHIPPED | OUTPATIENT
Start: 2024-10-14

## 2024-10-22 DIAGNOSIS — I48.0 PAROXYSMAL ATRIAL FIBRILLATION: ICD-10-CM

## 2024-10-23 RX ORDER — FLECAINIDE ACETATE 100 MG/1
100 TABLET ORAL 2 TIMES DAILY
Qty: 180 TABLET | Refills: 1 | Status: SHIPPED | OUTPATIENT
Start: 2024-10-23

## 2024-10-24 ENCOUNTER — OFFICE VISIT (OUTPATIENT)
Dept: ENDOCRINOLOGY | Facility: CLINIC | Age: 59
End: 2024-10-24
Payer: MEDICARE

## 2024-10-24 ENCOUNTER — SPECIALTY PHARMACY (OUTPATIENT)
Dept: PHARMACY | Facility: HOSPITAL | Age: 59
End: 2024-10-24
Payer: MEDICARE

## 2024-10-24 VITALS
HEART RATE: 94 BPM | SYSTOLIC BLOOD PRESSURE: 195 MMHG | DIASTOLIC BLOOD PRESSURE: 101 MMHG | WEIGHT: 256.4 LBS | OXYGEN SATURATION: 95 % | BODY MASS INDEX: 42.67 KG/M2

## 2024-10-24 DIAGNOSIS — E11.65 TYPE 2 DIABETES MELLITUS WITH HYPERGLYCEMIA, WITHOUT LONG-TERM CURRENT USE OF INSULIN: Primary | ICD-10-CM

## 2024-10-24 DIAGNOSIS — L68.0 HIRSUTISM: ICD-10-CM

## 2024-10-24 DIAGNOSIS — E03.9 ACQUIRED HYPOTHYROIDISM: ICD-10-CM

## 2024-10-24 PROCEDURE — 82627 DEHYDROEPIANDROSTERONE: CPT | Performed by: INTERNAL MEDICINE

## 2024-10-24 PROCEDURE — 84403 ASSAY OF TOTAL TESTOSTERONE: CPT | Performed by: INTERNAL MEDICINE

## 2024-10-24 PROCEDURE — 82533 TOTAL CORTISOL: CPT | Performed by: INTERNAL MEDICINE

## 2024-10-24 PROCEDURE — 99204 OFFICE O/P NEW MOD 45 MIN: CPT | Performed by: INTERNAL MEDICINE

## 2024-10-24 PROCEDURE — 84443 ASSAY THYROID STIM HORMONE: CPT | Performed by: INTERNAL MEDICINE

## 2024-10-24 NOTE — PROGRESS NOTES
Specialty Pharmacy Patient Management Program  Endocrinology Initial Assessment       Ana Maradiaga is a 59 y.o. female with Type 2 Diabetes seen by an Endocrinology provider and enrolled in the Endocrinology Patient Management program offered by [facility].  An initial outreach was conducted, including assessment of therapy appropriateness and specialty medication education for Mounjaro. The patient was introduced to services offered by [facility], including: regular assessments, refill coordination, curbside pick-up or mail order delivery options, prior authorization maintenance, and financial assistance programs as applicable. The patient was also provided with contact information for the pharmacy team.     Patient wants to use [facility] for Mounjaro.     Insurance Coverage & Financial Support  Problems getting insurance coverage- Will enroll in SpRx services      Relevant Past Medical History and Comorbidities  Relevant medical history and concomitant health conditions were discussed with the patient. The patient's chart has been reviewed for relevant past medical history and comorbid health conditions and updated as necessary.   Past Medical History:   Diagnosis Date    Anemia     Arrhythmia 2018    Afib    Arthritis     Atrial fibrillation 2018    Bronchitis     Chronic kidney disease 2018    Gout     Hypertension     Stroke November 2021     Social History     Socioeconomic History    Marital status: Single   Tobacco Use    Smoking status: Never    Smokeless tobacco: Never   Vaping Use    Vaping status: Never Used   Substance and Sexual Activity    Alcohol use: Never    Drug use: Never    Sexual activity: Not Currently     Partners: Male       Problem list reviewed by Elisa Kaufman RP on 10/24/2024 at  3:34 PM    Allergies  Known allergies and reactions were discussed with the patient. The patient's chart has been reviewed for  allergy information and  "updated as necessary.   Allergies   Allergen Reactions    Vancomycin Hives    Cefepime Rash    Cephalosporins Rash       Allergies reviewed by Elisa Kaufman [facility] on 10/24/2024 at  2:00 PM    Relevant Laboratory Values    No results found for: \"HGBA1C\"  Lab Results   Component Value Date    GLUCOSE 110 (H) 06/22/2023    CALCIUM 9.0 06/22/2023     06/22/2023    K 4.0 06/22/2023    CO2 27.0 06/22/2023    CL 98 06/22/2023    BUN 22 (H) 06/22/2023    CREATININE 1.11 (H) 06/22/2023    EGFRIFAFRI >60 08/13/2022    EGFRIFNONA >60 08/13/2022    BCR 19.8 06/22/2023    ANIONGAP 13.0 06/22/2023     No results found for: \"CHOL\", \"CHLPL\", \"TRIG\", \"HDL\", \"LDL\", \"LDLDIRECT\"      Current Medication List  This medication list has been reviewed with the patient and evaluated for any interactions or necessary modifications/recommendations, and updated to include all prescription medications, OTC medications, and supplements the patient is currently taking.  This list reflects what is contained in the patient's profile, which has also been marked as reviewed to communicate to other providers it is the most up to date version of the patient's current medication therapy.     Current Outpatient Medications:     amLODIPine (NORVASC) 5 MG tablet, TAKE 1 TABLET BY MOUTH DAILY, Disp: 90 tablet, Rfl: 1    Aspirin Low Dose 81 MG EC tablet, Take 1 tablet by mouth Daily., Disp: , Rfl:     atorvastatin (LIPITOR) 40 MG tablet, Take 1 tablet by mouth every night at bedtime., Disp: , Rfl:     carvedilol (COREG) 6.25 MG tablet, TAKE 1 TABLET BY MOUTH EVERY 12 HOURS, Disp: 60 tablet, Rfl: 4    Eliquis 5 MG tablet tablet, Take 1 tablet by mouth Every 12 (Twelve) Hours., Disp: 180 tablet, Rfl: 1    flecainide (TAMBOCOR) 100 MG tablet, TAKE 1 TABLET BY MOUTH TWICE A DAY, Disp: 180 tablet, Rfl: 1    levothyroxine (SYNTHROID, LEVOTHROID) 125 MCG tablet, Take 1 tablet by mouth Daily., Disp: , Rfl:     magnesium oxide (MAG-OX) 400 MG tablet, Take 250 mg " by mouth Daily., Disp: , Rfl:     vitamin B-12 (CYANOCOBALAMIN) 1000 MCG tablet, Take 2.5 tablets by mouth Daily., Disp: , Rfl:     vitamin D3 125 MCG (5000 UT) capsule capsule, Take 1 capsule by mouth Daily., Disp: , Rfl:     bumetanide (BUMEX) 1 MG tablet, Take 2 tablets by mouth Daily., Disp: , Rfl:     Tirzepatide (MOUNJARO) 2.5 MG/0.5ML solution pen-injector pen, Inject 0.5 mL under the skin into the appropriate area as directed 1 (One) Time Per Week., Disp: 2 mL, Rfl: 5    Medicines reviewed by Elisa Kaufman [facility] on 10/24/2024 at  2:05 PM    Drug Interactions  Aspirin + Eliquis: use together than increase risk of bleeding. Patient denies any signs or symptoms of of bleeding     Recommended Medications Assessment  Aspirin -  Currently Taking   Statin -  Currently Taking   ACEi/ARB - Not Taking Currently    Current 10-Year ASCVD Risk: cannot calculate due to LDL <70    Adherence and Self-Administration  Adherence related to the patient's specialty therapy was discussed with the patient. The Adherence segment of this outreach has been reviewed and updated.   Is there a concern with patient's ability to self administer the medication correctly and without issue?: No  Were any potential barriers to adherence identified during the initial assessment or patient education?: Yes  Are there any concerns regarding the patient's understanding of the importance of medication adherence?: No    Barriers to Patient Adherence and/or Self-Administration: Medication Access issues    Methods for Supporting Patient Adherence and/or Self-Administration: Enroll in specialty pharmacy for coordination of refills     Goals of Therapy  Goals related to the patient's specialty therapy were discussed with the patient. The Patient Goals segment of this outreach has been reviewed and updated.    Goals Addressed Today        HEMOGLOBIN A1C < 7              Specialty Pharmacy General Goal      Minimize hypoglycemia             Reassessment  Plan & Follow-Up  Patient's diabetes is controlled with A1C of 6.5%.  Medication Therapy Changes:  Per Clemente Barclay MD: will order  Mounjaro 2.5mg weekly   Related Plans, Therapy Recommendations or Therapy Problems to Be Addressed:   PA required for coverage. Submitted today via cover my meds  Will contact patient when approved to verify cost  Informed patient to monitor for signs and symptoms of active bleeding due to combination of aspirin and eliquis    Attestation  I attest the patient was actively involved in and has agreed to the above plan of care. If the prescribed therapy is at any point deemed not appropriate based on the current or future assessments, a consultation will be initiated with the patient's specialty care provider to determine the best course of action. The revised plan of therapy will be documented along with any required assessments and/or additional patient education provided..    Elisa Kaufman [facility]  15:57 EDT

## 2024-10-24 NOTE — ASSESSMENT & PLAN NOTE
A1c 6.5 so by definition has diabetes. No tolerant to metformin.   The plan would be mounjaro  Side effects were addressed   Will have her meet with the pharmacy team

## 2024-10-24 NOTE — PROGRESS NOTES
Office Note      Date: 10/24/2024  Patient Name: Ana Maradiaga  MRN: [id number]  : 1965    Chief Complaint   Patient presents with    Hypothyroidism       History of Present Illness:   Ana Maradiaga is a 59 y.o. female who presents for Diabetes type 2. .   Has had pre- diabetes for about 2 years and has been prescribed metformin but does not take it regularly.     Bg checks are not consistent  A1c - 6.5  Complications: none known has ckd but not from diabetes.  Assocaited conditions: htn, hlp, overweight, hypothyoridism     Nutritions: eats one meal per day;.. fast food and sweets are her weaknesses  Physical activity- limited.       Subjective          Review of Systems:   Review of Systems   Constitutional:  Positive for fatigue and unexpected weight change.   HENT:  Positive for trouble swallowing.    Musculoskeletal:  Positive for back pain.       The following portions of the patient's history were reviewed and updated as appropriate: allergies, current medications, past family history, past medical history, past social history, past surgical history, and problem list.    Objective     Visit Vitals  BP (!) 195/101 (BP Location: Right arm, Patient Position: Sitting, Cuff Size: Small Adult)   Pulse 94   Wt 116 kg (256 lb 6.4 oz)   SpO2 95%   BMI 42.67 kg/m²           Physical Exam:  Physical Exam  Vitals reviewed.   Constitutional:       Comments: Bmi 42   HENT:      Head: Normocephalic.   Eyes:      Extraocular Movements: Extraocular movements intact.   Cardiovascular:      Rate and Rhythm: Normal rate.   Pulmonary:      Effort: Pulmonary effort is normal.   Lymphadenopathy:      Cervical: No cervical adenopathy.   Neurological:      Mental Status: She is alert.   Psychiatric:         Mood and Affect: Mood normal.         Thought Content: Thought content normal.         Judgment: Judgment normal.         Assessment / Plan      Assessment & Plan:  Problem List Items Addressed This Visit        Type 2 diabetes mellitus with hyperglycemia, without long-term current use of insulin - Primary    Overview     Has had pre- diabetes for about 2 years and has been prescribed metformin but does not take it regularly.    Bg checks are not consistent  A1c - 6.5  Complications: none known has ckd but not from diabetes.  Assocaited conditions: htn, hlp, overweight, hypothyoridism    Nutritions: eats one meal per day;.. fast food and sweets are her weaknesses  Physical activity- limited.          Current Assessment & Plan     A1c 6.5 so by definition has diabetes. No tolerant to metformin.   The plan would be mounjaro  Side effects were addressed   Will have her meet with the pharmacy team          Relevant Medications    Tirzepatide (MOUNJARO) 2.5 MG/0.5ML solution pen-injector pen    Acquired hypothyroidism    Current Assessment & Plan     Clinically euthyroid. Thyroid levels ordered. Medication to be adjusted accordingly.          Relevant Medications    levothyroxine (SYNTHROID, LEVOTHROID) 125 MCG tablet    carvedilol (COREG) 6.25 MG tablet    Other Relevant Orders    TSH    Hirsutism    Overview     Has noted onset of increased facial hair for several years          Current Assessment & Plan     Has moderate facial hirsutism. Likely a normal post menopausal variant  Plan to check androgens          Relevant Orders    Testosterone    Cortisol    DHEA-Sulfate        Electronically signed by : Clemente Barclay MD   10/24/2024

## 2024-10-25 LAB
CORTIS SERPL-MCNC: 0.34 MCG/DL
DHEA-S SERPL-MCNC: 2.4 UG/DL (ref 29.4–220.5)
TESTOST SERPL-MCNC: 333 NG/DL (ref 2.9–40.8)
TSH SERPL DL<=0.05 MIU/L-ACNC: 4.02 UIU/ML (ref 0.27–4.2)

## 2024-10-28 NOTE — PROGRESS NOTES
Specialty Pharmacy Patient Management Program  Prior Authorization Approval     Prior Authorization for Mounjaro was Approved    Approval Start Date: 7/26/24  Approval End Date: 10/24/25  Authorization / Reference / Case Number: [id number]    CoverMyMeds Key: [id number]    Thank you,    Leyla Crooks, PharmD, [facility]  Clinical Specialty Pharmacist, Endocrinology   10/28/24  08:30 EDT

## 2024-11-07 ENCOUNTER — TELEPHONE (OUTPATIENT)
Dept: ENDOCRINOLOGY | Facility: CLINIC | Age: 59
End: 2024-11-07
Payer: MEDICARE

## 2024-11-07 DIAGNOSIS — R79.89 ELEVATED TESTOSTERONE LEVEL IN FEMALE: Primary | ICD-10-CM

## 2024-11-07 NOTE — TELEPHONE ENCOUNTER
Patient has some questions about her test results and possible medication that she could take. She would please like a call back.

## 2024-11-07 NOTE — TELEPHONE ENCOUNTER
Spoke with patient.  She states her DHEA-sulfate was low.  States that she has been really weak and this has been going on for a while.  When she looked it up, it said her levels could cause muscle weakness. States it is getting worse.  She wants to know if there is anything she can take for that.  Also states that she was not on any steroids when she had labs done but she was taking biotin.

## 2024-11-07 NOTE — TELEPHONE ENCOUNTER
Spoke with patient.  She is agreeable with CT.  Would like to have it done at [facility] in [place].

## 2024-11-07 NOTE — TELEPHONE ENCOUNTER
The dhea-s is low because her testosterone is exceedingly high which is why I recommended a CT of the abdomen and pelvis. She did not answer my question  about ordering one of those.

## 2024-11-07 NOTE — TELEPHONE ENCOUNTER
Please let pt know the order has been placed. Please verify with patient that she has not had and treatments- pellets or shots of testosterone

## 2024-11-14 ENCOUNTER — TELEPHONE (OUTPATIENT)
Dept: ENDOCRINOLOGY | Facility: CLINIC | Age: 59
End: 2024-11-14
Payer: MEDICARE

## 2024-11-14 RX ORDER — TIRZEPATIDE 5 MG/.5ML
0.5 INJECTION, SOLUTION SUBCUTANEOUS WEEKLY
Qty: 2 ML | Refills: 0 | Status: SHIPPED | OUTPATIENT
Start: 2024-11-14

## 2024-11-14 NOTE — TELEPHONE ENCOUNTER
Patient called to see if she could go up on her Mounjaro. She has done really well on the 2.5 MG.

## 2024-11-15 ENCOUNTER — SPECIALTY PHARMACY (OUTPATIENT)
Dept: PHARMACY | Facility: HOSPITAL | Age: 59
End: 2024-11-15
Payer: MEDICARE

## 2024-11-15 NOTE — PROGRESS NOTES
Specialty Pharmacy Refill Coordination Note     Ana is a 59 y.o. female contacted today regarding refills of  Mounjaro specialty medication(s).    Reviewed and verified with patient:       Specialty medication(s) and dose(s) confirmed: yes    Refill Questions      Flowsheet Row Most Recent Value   Changes to allergies? No   Changes to medications? No   New conditions or infections since last clinic visit No   Unplanned office visit, urgent care, ED, or hospital admission in the last 4 weeks  No   How does patient/caregiver feel medication is working? Very good   Financial problems or insurance changes  No   Since the previous refill, were any specialty medication doses or scheduled injections missed or delayed?  No   Does this patient require a clinical escalation to a pharmacist? No            Delivery Questions      Flowsheet Row Most Recent Value   Delivery method  at Pharmacy   Delivery address verified with patient/caregiver? Yes   Delivery address Home   Medication(s) being filled and delivered Tirzepatide (Mounjaro)   Doses left of specialty medications na   Copay verified? No   Copay amount 288.09$   Copay form of payment No copayment ($0)   Signature Required Yes                   Follow-up: 30 day(s)     Meme Desai Pharmacy Technician  Specialty Pharmacy Technician

## 2024-12-10 NOTE — TELEPHONE ENCOUNTER
Rx Refill Note  Requested Prescriptions     Pending Prescriptions Disp Refills    Tirzepatide (Mounjaro) 5 MG/0.5ML solution auto-injector 2 mL 0     Sig: Inject 0.5 mL under the skin into the appropriate area as directed 1 (One) Time Per Week.      Last office visit with prescribing clinician: 10/24/2024      Next office visit with prescribing clinician: Visit date not found       Archana Desai MA  12/10/24, 15:47 EST

## 2024-12-11 RX ORDER — TIRZEPATIDE 5 MG/.5ML
0.5 INJECTION, SOLUTION SUBCUTANEOUS WEEKLY
Qty: 2 ML | Refills: 0 | Status: SHIPPED | OUTPATIENT
Start: 2024-12-11

## 2024-12-14 ENCOUNTER — HOSPITAL ENCOUNTER (OUTPATIENT)
Dept: CT IMAGING | Facility: HOSPITAL | Age: 59
Discharge: HOME | End: 2024-12-14
Payer: MEDICARE

## 2024-12-14 DIAGNOSIS — R79.89 ELEVATED TESTOSTERONE LEVEL IN FEMALE: ICD-10-CM

## 2024-12-14 PROCEDURE — 25510000001 IOPAMIDOL 61 % SOLUTION: Performed by: INTERNAL MEDICINE

## 2024-12-14 PROCEDURE — 74178 CT ABD&PLV WO CNTR FLWD CNTR: CPT

## 2024-12-14 RX ORDER — IOPAMIDOL 612 MG/ML
100 INJECTION, SOLUTION INTRAVASCULAR
Status: COMPLETED | OUTPATIENT
Start: 2024-12-14 | End: 2024-12-14

## 2024-12-14 RX ADMIN — IOPAMIDOL 70 ML: 612 INJECTION, SOLUTION INTRAVENOUS at 14:28

## 2024-12-19 ENCOUNTER — HOSPITAL ENCOUNTER (INPATIENT)
Facility: HOSPITAL | Age: 59
LOS: 21 days | Discharge: NURSING, REHAB OR LONG-TERM CARE | End: 2025-01-09
Attending: STUDENT IN AN ORGANIZED HEALTH CARE EDUCATION/TRAINING PROGRAM | Admitting: FAMILY MEDICINE
Payer: MEDICARE

## 2024-12-19 ENCOUNTER — APPOINTMENT (OUTPATIENT)
Dept: MRI IMAGING | Facility: HOSPITAL | Age: 59
End: 2024-12-19
Payer: MEDICARE

## 2024-12-19 ENCOUNTER — APPOINTMENT (OUTPATIENT)
Dept: ULTRASOUND IMAGING | Facility: HOSPITAL | Age: 59
End: 2024-12-19
Payer: MEDICARE

## 2024-12-19 DIAGNOSIS — R31.9 URINARY TRACT INFECTION WITH HEMATURIA, SITE UNSPECIFIED: Primary | ICD-10-CM

## 2024-12-19 DIAGNOSIS — N39.0 URINARY TRACT INFECTION WITH HEMATURIA, SITE UNSPECIFIED: Primary | ICD-10-CM

## 2024-12-19 DIAGNOSIS — R53.1 WEAKNESS: ICD-10-CM

## 2024-12-19 DIAGNOSIS — R79.89 HIGH SERUM TESTOSTERONE: ICD-10-CM

## 2024-12-19 LAB
ABO GROUP BLD: NORMAL
ABO GROUP BLD: NORMAL
ALBUMIN SERPL-MCNC: 3.7 G/DL (ref 3.5–5.2)
ALBUMIN/GLOB SERPL: 0.8 G/DL
ALP SERPL-CCNC: 128 U/L (ref 39–117)
ALT SERPL W P-5'-P-CCNC: 31 U/L (ref 1–33)
AMPHET+METHAMPHET UR QL: NEGATIVE
AMPHETAMINES UR QL: NEGATIVE
ANION GAP SERPL CALCULATED.3IONS-SCNC: 15.3 MMOL/L (ref 5–15)
ANION GAP SERPL CALCULATED.3IONS-SCNC: 16.7 MMOL/L (ref 5–15)
AST SERPL-CCNC: 34 U/L (ref 1–32)
BACTERIA UR QL AUTO: ABNORMAL /HPF
BARBITURATES UR QL SCN: NEGATIVE
BASOPHILS # BLD AUTO: 0.04 10*3/MM3 (ref 0–0.2)
BASOPHILS # BLD AUTO: 0.05 10*3/MM3 (ref 0–0.2)
BASOPHILS NFR BLD AUTO: 0.4 % (ref 0–1.5)
BASOPHILS NFR BLD AUTO: 0.5 % (ref 0–1.5)
BENZODIAZ UR QL SCN: NEGATIVE
BILIRUB CONJ SERPL-MCNC: 0.4 MG/DL (ref 0–0.3)
BILIRUB SERPL-MCNC: 1.5 MG/DL (ref 0–1.2)
BILIRUB UR QL STRIP: ABNORMAL
BLD GP AB SCN SERPL QL: NEGATIVE
BUN SERPL-MCNC: 20 MG/DL (ref 6–20)
BUN SERPL-MCNC: 20 MG/DL (ref 6–20)
BUN/CREAT SERPL: 12.9 (ref 7–25)
BUN/CREAT SERPL: 13.1 (ref 7–25)
BUPRENORPHINE SERPL-MCNC: NEGATIVE NG/ML
CALCIUM SPEC-SCNC: 9.4 MG/DL (ref 8.6–10.5)
CALCIUM SPEC-SCNC: 9.9 MG/DL (ref 8.6–10.5)
CANNABINOIDS SERPL QL: NEGATIVE
CHLORIDE SERPL-SCNC: 97 MMOL/L (ref 98–107)
CHLORIDE SERPL-SCNC: 98 MMOL/L (ref 98–107)
CK SERPL-CCNC: 67 U/L (ref 20–180)
CLARITY UR: ABNORMAL
CO2 SERPL-SCNC: 24.3 MMOL/L (ref 22–29)
CO2 SERPL-SCNC: 24.7 MMOL/L (ref 22–29)
COCAINE UR QL: NEGATIVE
COLOR UR: ABNORMAL
CORTIS SERPL-MCNC: 4.44 MCG/DL
CREAT SERPL-MCNC: 1.53 MG/DL (ref 0.57–1)
CREAT SERPL-MCNC: 1.55 MG/DL (ref 0.57–1)
D DIMER PPP FEU-MCNC: 0.27 MCGFEU/ML (ref 0–0.59)
D-LACTATE SERPL-SCNC: 1.5 MMOL/L (ref 0.5–2)
DEPRECATED RDW RBC AUTO: 55 FL (ref 37–54)
DEPRECATED RDW RBC AUTO: 56.9 FL (ref 37–54)
EGFRCR SERPLBLD CKD-EPI 2021: 38.4 ML/MIN/1.73
EGFRCR SERPLBLD CKD-EPI 2021: 39 ML/MIN/1.73
EOSINOPHIL # BLD AUTO: 0.11 10*3/MM3 (ref 0–0.4)
EOSINOPHIL # BLD AUTO: 0.13 10*3/MM3 (ref 0–0.4)
EOSINOPHIL NFR BLD AUTO: 1.2 % (ref 0.3–6.2)
EOSINOPHIL NFR BLD AUTO: 1.2 % (ref 0.3–6.2)
ERYTHROCYTE [DISTWIDTH] IN BLOOD BY AUTOMATED COUNT: 16 % (ref 12.3–15.4)
ERYTHROCYTE [DISTWIDTH] IN BLOOD BY AUTOMATED COUNT: 16 % (ref 12.3–15.4)
ETHANOL BLD-MCNC: <10 MG/DL (ref 0–10)
ETHANOL UR QL: <0.01 %
FENTANYL UR-MCNC: NEGATIVE NG/ML
FERRITIN SERPL-MCNC: 53.7 NG/ML (ref 13–150)
FLUAV RNA RESP QL NAA+PROBE: NOT DETECTED
FLUBV RNA RESP QL NAA+PROBE: NOT DETECTED
GLOBULIN UR ELPH-MCNC: 4.5 GM/DL
GLUCOSE SERPL-MCNC: 83 MG/DL (ref 65–99)
GLUCOSE SERPL-MCNC: 93 MG/DL (ref 65–99)
GLUCOSE UR STRIP-MCNC: NEGATIVE MG/DL
HCT VFR BLD AUTO: 41.1 % (ref 34–46.6)
HCT VFR BLD AUTO: 43 % (ref 34–46.6)
HGB BLD-MCNC: 13.3 G/DL (ref 12–15.9)
HGB BLD-MCNC: 14.4 G/DL (ref 12–15.9)
HGB UR QL STRIP.AUTO: ABNORMAL
HOLD SPECIMEN: NORMAL
HOLD SPECIMEN: NORMAL
HYALINE CASTS UR QL AUTO: ABNORMAL /LPF
IMM GRANULOCYTES # BLD AUTO: 0.05 10*3/MM3 (ref 0–0.05)
IMM GRANULOCYTES # BLD AUTO: 0.09 10*3/MM3 (ref 0–0.05)
IMM GRANULOCYTES NFR BLD AUTO: 0.5 % (ref 0–0.5)
IMM GRANULOCYTES NFR BLD AUTO: 0.8 % (ref 0–0.5)
INR PPP: 1.91 (ref 0.9–1.1)
KETONES UR QL STRIP: NEGATIVE
LDH SERPL-CCNC: 387 U/L (ref 135–214)
LEUKOCYTE ESTERASE UR QL STRIP.AUTO: ABNORMAL
LIPASE SERPL-CCNC: 42 U/L (ref 13–60)
LYMPHOCYTES # BLD AUTO: 1.62 10*3/MM3 (ref 0.7–3.1)
LYMPHOCYTES # BLD AUTO: 1.63 10*3/MM3 (ref 0.7–3.1)
LYMPHOCYTES NFR BLD AUTO: 15 % (ref 19.6–45.3)
LYMPHOCYTES NFR BLD AUTO: 17.2 % (ref 19.6–45.3)
MCH RBC QN AUTO: 31 PG (ref 26.6–33)
MCH RBC QN AUTO: 31.4 PG (ref 26.6–33)
MCHC RBC AUTO-ENTMCNC: 32.4 G/DL (ref 31.5–35.7)
MCHC RBC AUTO-ENTMCNC: 33.5 G/DL (ref 31.5–35.7)
MCV RBC AUTO: 93.9 FL (ref 79–97)
MCV RBC AUTO: 95.8 FL (ref 79–97)
METHADONE UR QL SCN: NEGATIVE
MONOCYTES # BLD AUTO: 0.86 10*3/MM3 (ref 0.1–0.9)
MONOCYTES # BLD AUTO: 0.92 10*3/MM3 (ref 0.1–0.9)
MONOCYTES NFR BLD AUTO: 8.5 % (ref 5–12)
MONOCYTES NFR BLD AUTO: 9.1 % (ref 5–12)
NEUTROPHILS NFR BLD AUTO: 6.76 10*3/MM3 (ref 1.7–7)
NEUTROPHILS NFR BLD AUTO: 71.6 % (ref 42.7–76)
NEUTROPHILS NFR BLD AUTO: 74 % (ref 42.7–76)
NEUTROPHILS NFR BLD AUTO: 8.04 10*3/MM3 (ref 1.7–7)
NITRITE UR QL STRIP: POSITIVE
NRBC BLD AUTO-RTO: 0 /100 WBC (ref 0–0.2)
NRBC BLD AUTO-RTO: 0 /100 WBC (ref 0–0.2)
OPIATES UR QL: NEGATIVE
OXYCODONE UR QL SCN: NEGATIVE
PCP UR QL SCN: NEGATIVE
PH UR STRIP.AUTO: <=5 [PH] (ref 5–8)
PLATELET # BLD AUTO: 195 10*3/MM3 (ref 140–450)
PLATELET # BLD AUTO: 225 10*3/MM3 (ref 140–450)
PMV BLD AUTO: 10 FL (ref 6–12)
PMV BLD AUTO: 9.7 FL (ref 6–12)
POTASSIUM SERPL-SCNC: 3.5 MMOL/L (ref 3.5–5.2)
POTASSIUM SERPL-SCNC: 3.6 MMOL/L (ref 3.5–5.2)
PROT SERPL-MCNC: 8.2 G/DL (ref 6–8.5)
PROT UR QL STRIP: ABNORMAL
PROTHROMBIN TIME: 22 SECONDS (ref 12.1–14.7)
RBC # BLD AUTO: 4.29 10*6/MM3 (ref 3.77–5.28)
RBC # BLD AUTO: 4.58 10*6/MM3 (ref 3.77–5.28)
RBC # UR STRIP: ABNORMAL /HPF
REF LAB TEST METHOD: ABNORMAL
RH BLD: POSITIVE
RH BLD: POSITIVE
SARS-COV-2 RNA RESP QL NAA+PROBE: NOT DETECTED
SODIUM SERPL-SCNC: 138 MMOL/L (ref 136–145)
SODIUM SERPL-SCNC: 138 MMOL/L (ref 136–145)
SP GR UR STRIP: 1.02 (ref 1–1.03)
SQUAMOUS #/AREA URNS HPF: ABNORMAL /HPF
T&S EXPIRATION DATE: NORMAL
TRICYCLICS UR QL SCN: NEGATIVE
TSH SERPL DL<=0.05 MIU/L-ACNC: 3.32 UIU/ML (ref 0.27–4.2)
URATE SERPL-MCNC: 13.5 MG/DL (ref 2.4–5.7)
UROBILINOGEN UR QL STRIP: ABNORMAL
WBC # UR STRIP: ABNORMAL /HPF
WBC NRBC COR # BLD AUTO: 10.86 10*3/MM3 (ref 3.4–10.8)
WBC NRBC COR # BLD AUTO: 9.44 10*3/MM3 (ref 3.4–10.8)
WHOLE BLOOD HOLD COAG: NORMAL
WHOLE BLOOD HOLD SPECIMEN: NORMAL

## 2024-12-19 PROCEDURE — 82024 ASSAY OF ACTH: CPT | Performed by: STUDENT IN AN ORGANIZED HEALTH CARE EDUCATION/TRAINING PROGRAM

## 2024-12-19 PROCEDURE — 86900 BLOOD TYPING SEROLOGIC ABO: CPT

## 2024-12-19 PROCEDURE — 86850 RBC ANTIBODY SCREEN: CPT | Performed by: STUDENT IN AN ORGANIZED HEALTH CARE EDUCATION/TRAINING PROGRAM

## 2024-12-19 PROCEDURE — 36415 COLL VENOUS BLD VENIPUNCTURE: CPT | Performed by: STUDENT IN AN ORGANIZED HEALTH CARE EDUCATION/TRAINING PROGRAM

## 2024-12-19 PROCEDURE — 70553 MRI BRAIN STEM W/O & W/DYE: CPT

## 2024-12-19 PROCEDURE — 36415 COLL VENOUS BLD VENIPUNCTURE: CPT

## 2024-12-19 PROCEDURE — 87086 URINE CULTURE/COLONY COUNT: CPT | Performed by: STUDENT IN AN ORGANIZED HEALTH CARE EDUCATION/TRAINING PROGRAM

## 2024-12-19 PROCEDURE — 86901 BLOOD TYPING SEROLOGIC RH(D): CPT | Performed by: STUDENT IN AN ORGANIZED HEALTH CARE EDUCATION/TRAINING PROGRAM

## 2024-12-19 PROCEDURE — 76830 TRANSVAGINAL US NON-OB: CPT | Performed by: RADIOLOGY

## 2024-12-19 PROCEDURE — 83690 ASSAY OF LIPASE: CPT | Performed by: STUDENT IN AN ORGANIZED HEALTH CARE EDUCATION/TRAINING PROGRAM

## 2024-12-19 PROCEDURE — 99285 EMERGENCY DEPT VISIT HI MDM: CPT

## 2024-12-19 PROCEDURE — 80053 COMPREHEN METABOLIC PANEL: CPT | Performed by: STUDENT IN AN ORGANIZED HEALTH CARE EDUCATION/TRAINING PROGRAM

## 2024-12-19 PROCEDURE — 84443 ASSAY THYROID STIM HORMONE: CPT | Performed by: STUDENT IN AN ORGANIZED HEALTH CARE EDUCATION/TRAINING PROGRAM

## 2024-12-19 PROCEDURE — 86901 BLOOD TYPING SEROLOGIC RH(D): CPT

## 2024-12-19 PROCEDURE — 84550 ASSAY OF BLOOD/URIC ACID: CPT | Performed by: STUDENT IN AN ORGANIZED HEALTH CARE EDUCATION/TRAINING PROGRAM

## 2024-12-19 PROCEDURE — 82728 ASSAY OF FERRITIN: CPT | Performed by: STUDENT IN AN ORGANIZED HEALTH CARE EDUCATION/TRAINING PROGRAM

## 2024-12-19 PROCEDURE — 99222 1ST HOSP IP/OBS MODERATE 55: CPT | Performed by: FAMILY MEDICINE

## 2024-12-19 PROCEDURE — 93010 ELECTROCARDIOGRAM REPORT: CPT | Performed by: INTERNAL MEDICINE

## 2024-12-19 PROCEDURE — 85025 COMPLETE CBC W/AUTO DIFF WBC: CPT | Performed by: STUDENT IN AN ORGANIZED HEALTH CARE EDUCATION/TRAINING PROGRAM

## 2024-12-19 PROCEDURE — 82077 ASSAY SPEC XCP UR&BREATH IA: CPT | Performed by: STUDENT IN AN ORGANIZED HEALTH CARE EDUCATION/TRAINING PROGRAM

## 2024-12-19 PROCEDURE — 93005 ELECTROCARDIOGRAM TRACING: CPT | Performed by: STUDENT IN AN ORGANIZED HEALTH CARE EDUCATION/TRAINING PROGRAM

## 2024-12-19 PROCEDURE — 85379 FIBRIN DEGRADATION QUANT: CPT | Performed by: STUDENT IN AN ORGANIZED HEALTH CARE EDUCATION/TRAINING PROGRAM

## 2024-12-19 PROCEDURE — 25010000002 CEFTRIAXONE PER 250 MG: Performed by: STUDENT IN AN ORGANIZED HEALTH CARE EDUCATION/TRAINING PROGRAM

## 2024-12-19 PROCEDURE — 76830 TRANSVAGINAL US NON-OB: CPT

## 2024-12-19 PROCEDURE — 85610 PROTHROMBIN TIME: CPT | Performed by: STUDENT IN AN ORGANIZED HEALTH CARE EDUCATION/TRAINING PROGRAM

## 2024-12-19 PROCEDURE — 82550 ASSAY OF CK (CPK): CPT | Performed by: STUDENT IN AN ORGANIZED HEALTH CARE EDUCATION/TRAINING PROGRAM

## 2024-12-19 PROCEDURE — 25510000002 GADOBENATE DIMEGLUMINE 529 MG/ML SOLUTION: Performed by: STUDENT IN AN ORGANIZED HEALTH CARE EDUCATION/TRAINING PROGRAM

## 2024-12-19 PROCEDURE — 80307 DRUG TEST PRSMV CHEM ANLYZR: CPT | Performed by: STUDENT IN AN ORGANIZED HEALTH CARE EDUCATION/TRAINING PROGRAM

## 2024-12-19 PROCEDURE — 83615 LACTATE (LD) (LDH) ENZYME: CPT | Performed by: STUDENT IN AN ORGANIZED HEALTH CARE EDUCATION/TRAINING PROGRAM

## 2024-12-19 PROCEDURE — 81001 URINALYSIS AUTO W/SCOPE: CPT | Performed by: STUDENT IN AN ORGANIZED HEALTH CARE EDUCATION/TRAINING PROGRAM

## 2024-12-19 PROCEDURE — 25810000003 SODIUM CHLORIDE 0.9 % SOLUTION: Performed by: FAMILY MEDICINE

## 2024-12-19 PROCEDURE — 82248 BILIRUBIN DIRECT: CPT | Performed by: STUDENT IN AN ORGANIZED HEALTH CARE EDUCATION/TRAINING PROGRAM

## 2024-12-19 PROCEDURE — 85025 COMPLETE CBC W/AUTO DIFF WBC: CPT | Performed by: FAMILY MEDICINE

## 2024-12-19 PROCEDURE — 87040 BLOOD CULTURE FOR BACTERIA: CPT | Performed by: STUDENT IN AN ORGANIZED HEALTH CARE EDUCATION/TRAINING PROGRAM

## 2024-12-19 PROCEDURE — 84146 ASSAY OF PROLACTIN: CPT | Performed by: STUDENT IN AN ORGANIZED HEALTH CARE EDUCATION/TRAINING PROGRAM

## 2024-12-19 PROCEDURE — 86900 BLOOD TYPING SEROLOGIC ABO: CPT | Performed by: STUDENT IN AN ORGANIZED HEALTH CARE EDUCATION/TRAINING PROGRAM

## 2024-12-19 PROCEDURE — 83605 ASSAY OF LACTIC ACID: CPT | Performed by: STUDENT IN AN ORGANIZED HEALTH CARE EDUCATION/TRAINING PROGRAM

## 2024-12-19 PROCEDURE — A9577 INJ MULTIHANCE: HCPCS | Performed by: STUDENT IN AN ORGANIZED HEALTH CARE EDUCATION/TRAINING PROGRAM

## 2024-12-19 PROCEDURE — 70553 MRI BRAIN STEM W/O & W/DYE: CPT | Performed by: RADIOLOGY

## 2024-12-19 PROCEDURE — 82533 TOTAL CORTISOL: CPT | Performed by: STUDENT IN AN ORGANIZED HEALTH CARE EDUCATION/TRAINING PROGRAM

## 2024-12-19 PROCEDURE — 87636 SARSCOV2 & INF A&B AMP PRB: CPT | Performed by: STUDENT IN AN ORGANIZED HEALTH CARE EDUCATION/TRAINING PROGRAM

## 2024-12-19 RX ORDER — BISACODYL 5 MG/1
5 TABLET, DELAYED RELEASE ORAL DAILY PRN
Status: DISCONTINUED | OUTPATIENT
Start: 2024-12-19 | End: 2025-01-09 | Stop reason: HOSPADM

## 2024-12-19 RX ORDER — ONDANSETRON 2 MG/ML
4 INJECTION INTRAMUSCULAR; INTRAVENOUS EVERY 6 HOURS PRN
Status: DISCONTINUED | OUTPATIENT
Start: 2024-12-19 | End: 2025-01-09 | Stop reason: HOSPADM

## 2024-12-19 RX ORDER — POLYETHYLENE GLYCOL 3350 17 G/17G
17 POWDER, FOR SOLUTION ORAL DAILY PRN
Status: DISCONTINUED | OUTPATIENT
Start: 2024-12-19 | End: 2025-01-09 | Stop reason: HOSPADM

## 2024-12-19 RX ORDER — SODIUM CHLORIDE 0.9 % (FLUSH) 0.9 %
10 SYRINGE (ML) INJECTION AS NEEDED
Status: DISCONTINUED | OUTPATIENT
Start: 2024-12-19 | End: 2025-01-09 | Stop reason: HOSPADM

## 2024-12-19 RX ORDER — SODIUM CHLORIDE 9 MG/ML
75 INJECTION, SOLUTION INTRAVENOUS CONTINUOUS
Status: ACTIVE | OUTPATIENT
Start: 2024-12-19 | End: 2024-12-20

## 2024-12-19 RX ORDER — SODIUM CHLORIDE 9 MG/ML
40 INJECTION, SOLUTION INTRAVENOUS AS NEEDED
Status: DISCONTINUED | OUTPATIENT
Start: 2024-12-19 | End: 2025-01-09 | Stop reason: HOSPADM

## 2024-12-19 RX ORDER — POTASSIUM CHLORIDE 1500 MG/1
40 TABLET, EXTENDED RELEASE ORAL EVERY 4 HOURS
Status: COMPLETED | OUTPATIENT
Start: 2024-12-19 | End: 2024-12-20

## 2024-12-19 RX ORDER — HYDROCODONE BITARTRATE AND ACETAMINOPHEN 5; 325 MG/1; MG/1
1 TABLET ORAL EVERY 6 HOURS PRN
Status: ACTIVE | OUTPATIENT
Start: 2024-12-19 | End: 2025-01-02

## 2024-12-19 RX ORDER — BISACODYL 10 MG
10 SUPPOSITORY, RECTAL RECTAL DAILY PRN
Status: DISCONTINUED | OUTPATIENT
Start: 2024-12-19 | End: 2025-01-09 | Stop reason: HOSPADM

## 2024-12-19 RX ORDER — SODIUM CHLORIDE 0.9 % (FLUSH) 0.9 %
10 SYRINGE (ML) INJECTION EVERY 12 HOURS SCHEDULED
Status: DISCONTINUED | OUTPATIENT
Start: 2024-12-19 | End: 2025-01-09 | Stop reason: HOSPADM

## 2024-12-19 RX ORDER — AMOXICILLIN 250 MG
2 CAPSULE ORAL 2 TIMES DAILY PRN
Status: DISCONTINUED | OUTPATIENT
Start: 2024-12-19 | End: 2025-01-09 | Stop reason: HOSPADM

## 2024-12-19 RX ORDER — ONDANSETRON 4 MG/1
4 TABLET, ORALLY DISINTEGRATING ORAL EVERY 6 HOURS PRN
Status: DISCONTINUED | OUTPATIENT
Start: 2024-12-19 | End: 2025-01-09 | Stop reason: HOSPADM

## 2024-12-19 RX ADMIN — GADOBENATE DIMEGLUMINE 20 ML: 529 INJECTION, SOLUTION INTRAVENOUS at 18:55

## 2024-12-19 RX ADMIN — SODIUM CHLORIDE 75 ML/HR: 9 INJECTION, SOLUTION INTRAVENOUS at 21:17

## 2024-12-19 RX ADMIN — SODIUM CHLORIDE 2000 MG: 9 INJECTION, SOLUTION INTRAVENOUS at 17:43

## 2024-12-19 RX ADMIN — Medication 10 ML: at 21:17

## 2024-12-19 RX ADMIN — POTASSIUM CHLORIDE 40 MEQ: 1500 TABLET, EXTENDED RELEASE ORAL at 21:39

## 2024-12-19 NOTE — H&P
[facility]   HISTORY AND PHYSICAL    Patient Name: Ana Maradiaga  : 1965  MRN: [id number]  Primary Care Physician:  Val Purcell PA  Date of admission: 2024    Subjective   Subjective     Chief Complaint: Hematuria leg weakness    History of Present Illness  Patient is a 59-year-old female with past medical history of anemia, atrial fibrillation, chronic kidney disease, hypertension, gout, and a history of a CVA.  Patient presents to the emergency department with a couple of different complaints.  Patient says she started noticing some hematuria for the last couple of days.  Patient had a CT scan of her abdomen to evaluate for possible intra-abdominal tumors.  The patient has had very high levels of testosterone and they were looking to rule out hormone secreting tumors.  They did not discover tumors but did find a staghorn calculus in her left kidney.  Patient says she had had occasional dysuria but all of it has been much worse today.  Patient is also been experiencing gradual but significant lower extremity weakness.  Patient says that it is not on focal but over the last several weeks she has had more and more difficulty ambulating throughout the house.  Patient says the only thing that is really different in her routine is that she was started on Mounjaro.  She has lost 20 pounds and is having no GI difficulties.  Here in the emergency department the patient was found to have a significant nitrite positive urinary tract infection with large blood.  Her blood work shows an increased creatinine at 1.55, her white blood cell count is greater than 10 and her LDH is elevated.  Review of Systems   As stated above in his present illness all other systems were reviewed and subsequently negative  Personal History     Past Medical History:   Diagnosis Date    Anemia     Arrhythmia 2018    Afib    Arthritis     Atrial fibrillation 2018    Bronchitis     Chronic kidney disease 2018    Gout      Hypertension     Stroke November 2021       No past surgical history on file.    Family History: family history includes Arthritis in her father; Breast cancer in her paternal cousin; Heart disease in her mother; Hypertension in her father and sister; Stroke in her mother. Otherwise pertinent FHx was reviewed and not pertinent to current issue.    Social History:  reports that she has never smoked. She has never used smokeless tobacco. She reports that she does not drink alcohol and does not use drugs.    Home Medications:  Tirzepatide, amLODIPine, apixaban, aspirin, atorvastatin, bumetanide, carvedilol, flecainide, levothyroxine, magnesium oxide, vitamin B-12, and vitamin D3    Allergies:  Allergies   Allergen Reactions    Vancomycin Hives    Cefepime Rash    Cephalosporins Rash       Objective    Objective     Vitals:   Temp:  [97.9 °F (36.6 °C)] 97.9 °F (36.6 °C)  Heart Rate:  [66-69] 69  Resp:  [17] 17  BP: (127-159)/(74-92) 149/81    Physical Exam  Constitutional:  Well-developed and well-nourished.  No acute distress.      HENT:  Head:  Normocephalic and atraumatic.  Mouth:  Moist mucous membranes.    Eyes:  Conjunctivae and EOM are normal. No scleral icterus.    Neck:  Neck supple.  No JVD present.    Cardiovascular:  Normal rate, regular rhythm and normal heart sounds with no murmur.  Pulmonary/Chest:  No respiratory distress, no wheezes, no crackles, with normal breath sounds and good air movement.  Abdominal:  Soft. No distension and no tenderness.   Musculoskeletal:  No tenderness, and no deformity.  No red or swollen joints anywhere.    Neurological:  Alert and oriented to person, place, and time.  No cranial nerve deficit.    Skin:  Skin is warm and dry. No rash noted. No pallor.   Peripheral vascular:  No clubbing, no cyanosis, no edema.  Psychiatric: Appropriate mood and affect  :    Result Review    Result Review:  I have personally reviewed the results from the time of this admission to 12/19/2024  18:37 EST and agree with these findings:  []  Laboratory list / accordion  []  Microbiology  []  Radiology  []  EKG/Telemetry   []  Cardiology/Vascular   []  Pathology  []  Old records  []  Other:  Most notable findings include:       Assessment & Plan   Assessment / Plan     Brief Patient Summary:  Ana Maradiaga is a 59 y.o. female who presents to the ED with complaints of bilateral lower extremity weakness and hematuria.  She is found to have a significant urinary tract infection and the workup for her lower extremity weakness has began    Active Hospital Problems:  Complicated UTI  - Patient was started on Rocephin in the ED which we will continue on the floor  - Gentle hydration overnight  - Await urine culture    Bilateral lower extremity weakness  -MRI of the brain  - PT and OT evaluation  - Consider neurology consultation  -    VTE Prophylaxis:  Mechanical VTE prophylaxis orders are signed & held.          CODE STATUS:    Code Status (Patient has no pulse and is not breathing): CPR (Attempt to Resuscitate)  Medical Interventions (Patient has pulse or is breathing): Full Support    Admission Status:  I believe this patient meets inpatient status.    Thien Reardon, DO

## 2024-12-19 NOTE — ED PROVIDER NOTES
Subjective   History of Present Illness  Patient is a 59-year-old female with history significant for type 2 diabetes mellitus, atrial fibrillation and hypertension who comes to the ER for evaluation of weakness and gross hematuria.  Patient has been following outpatient with endocrinology due to elevated testosterone levels and recently had a CT abdomen pelvis to rule out ovarian/adrenal tumor.  That CT 4 days ago noted a large left staghorn calculus.  She reports occasional dysuria but no flank pain.  She has no abdominal pain, diarrhea.  No shortness of breath, productive cough or upper respiratory symptoms.  She reports occasional blurred vision, sometimes sharp pains in the right eye but then will dissipate without any intervention.  She does not have issues with headaches.  Her weakness is nonfocal.  She has had difficulty ambulating throughout the house.  She is on Mounjaro-denies any nausea or vomiting.  She is lost approximately 20 pounds and this was recently refilled by endocrinology.      Review of Systems   Constitutional:  Positive for fatigue. Negative for chills and fever.   HENT:  Negative for ear pain, sinus pain and sore throat.    Respiratory:  Negative for cough, chest tightness, shortness of breath and wheezing.    Cardiovascular:  Negative for chest pain, palpitations and leg swelling.   Gastrointestinal:  Negative for abdominal pain, constipation, diarrhea, nausea and vomiting.   Genitourinary:  Positive for dysuria and hematuria. Negative for urgency.   Musculoskeletal:  Negative for arthralgias and myalgias.   Neurological:  Positive for weakness. Negative for dizziness, syncope and light-headedness.   Psychiatric/Behavioral:  Negative for confusion.        Past Medical History:   Diagnosis Date    Anemia     Arrhythmia 2018    Afib    Arthritis     Atrial fibrillation 2018    Bronchitis     Chronic kidney disease 2018    Gout     Hypertension     Stroke November 2021       Allergies    Allergen Reactions    Vancomycin Hives    Cefepime Rash    Cephalosporins Rash       No past surgical history on file.    Family History   Problem Relation Age of Onset    Heart disease Mother     Stroke Mother     Hypertension Father     Arthritis Father     Hypertension Sister     Breast cancer Paternal Cousin        Social History     Socioeconomic History    Marital status: Single   Tobacco Use    Smoking status: Never    Smokeless tobacco: Never   Vaping Use    Vaping status: Never Used   Substance and Sexual Activity    Alcohol use: Never    Drug use: Never    Sexual activity: Not Currently     Partners: Male           Objective   Physical Exam    Procedures           ED Course  ED Course as of 12/19/24 1746   Thu Dec 19, 2024   1441 ECG 12 Lead Other; weakness  NSR, 1st degree AV block, rate 68, , no acute ST or T wave changes [CW]      ED Course User Index  [CW] Mark Vasquez, DO                                                       Medical Decision Making  --Patient is hemodynamically stable  -- Labs overall fairly unremarkable  --Urine with 3+ bacteria  --Discussed with endocrinology Dr. Barclay, recommended ovarian ultrasound to rule out tumor which was ultimately negative  --IV Rocephin, follow-up urine culture  --Patient has significantly declined from a physical standpoint cannot get in and out of bed or ambulate throughout the house.  She is agreeable to PT/OT and short-term rehab  --MRI of the brain ordered after discussion with medicine  --Discussed with medicine, will admit    Amount and/or Complexity of Data Reviewed  Labs: ordered.  Radiology: ordered.  ECG/medicine tests: ordered. Decision-making details documented in ED Course.    Risk  Prescription drug management.        Final diagnoses:   Urinary tract infection with hematuria, site unspecified   Weakness   High serum testosterone       ED Disposition  ED Disposition       ED Disposition   Decision to Admit    Condition    --    Comment   --               No follow-up provider specified.       Medication List      No changes were made to your prescriptions during this visit.            Mark Vasquez,   12/19/24 1037

## 2024-12-19 NOTE — ED NOTES
Attempted to call report, was told the RN was getting report and would call the ED back for report

## 2024-12-20 LAB
ANION GAP SERPL CALCULATED.3IONS-SCNC: 11.1 MMOL/L (ref 5–15)
BASOPHILS # BLD AUTO: 0.04 10*3/MM3 (ref 0–0.2)
BASOPHILS NFR BLD AUTO: 0.5 % (ref 0–1.5)
BUN SERPL-MCNC: 19 MG/DL (ref 6–20)
BUN/CREAT SERPL: 13.6 (ref 7–25)
CALCIUM SPEC-SCNC: 8.7 MG/DL (ref 8.6–10.5)
CHLORIDE SERPL-SCNC: 101 MMOL/L (ref 98–107)
CO2 SERPL-SCNC: 25.9 MMOL/L (ref 22–29)
CREAT SERPL-MCNC: 1.4 MG/DL (ref 0.57–1)
DEPRECATED RDW RBC AUTO: 59.3 FL (ref 37–54)
EGFRCR SERPLBLD CKD-EPI 2021: 43.4 ML/MIN/1.73
EOSINOPHIL # BLD AUTO: 0.1 10*3/MM3 (ref 0–0.4)
EOSINOPHIL NFR BLD AUTO: 1.3 % (ref 0.3–6.2)
ERYTHROCYTE [DISTWIDTH] IN BLOOD BY AUTOMATED COUNT: 16.5 % (ref 12.3–15.4)
GLUCOSE SERPL-MCNC: 77 MG/DL (ref 65–99)
HCT VFR BLD AUTO: 38.1 % (ref 34–46.6)
HGB BLD-MCNC: 11.9 G/DL (ref 12–15.9)
IMM GRANULOCYTES # BLD AUTO: 0.04 10*3/MM3 (ref 0–0.05)
IMM GRANULOCYTES NFR BLD AUTO: 0.5 % (ref 0–0.5)
LYMPHOCYTES # BLD AUTO: 1.41 10*3/MM3 (ref 0.7–3.1)
LYMPHOCYTES NFR BLD AUTO: 17.9 % (ref 19.6–45.3)
MCH RBC QN AUTO: 30.4 PG (ref 26.6–33)
MCHC RBC AUTO-ENTMCNC: 31.2 G/DL (ref 31.5–35.7)
MCV RBC AUTO: 97.4 FL (ref 79–97)
MONOCYTES # BLD AUTO: 0.82 10*3/MM3 (ref 0.1–0.9)
MONOCYTES NFR BLD AUTO: 10.4 % (ref 5–12)
NEUTROPHILS NFR BLD AUTO: 5.45 10*3/MM3 (ref 1.7–7)
NEUTROPHILS NFR BLD AUTO: 69.4 % (ref 42.7–76)
NRBC BLD AUTO-RTO: 0 /100 WBC (ref 0–0.2)
PLATELET # BLD AUTO: 169 10*3/MM3 (ref 140–450)
PMV BLD AUTO: 10.1 FL (ref 6–12)
POTASSIUM SERPL-SCNC: 4.5 MMOL/L (ref 3.5–5.2)
PROLACTIN SERPL-MCNC: 10 NG/ML (ref 4.79–23.3)
QT INTERVAL: 436 MS
QTC INTERVAL: 463 MS
RBC # BLD AUTO: 3.91 10*6/MM3 (ref 3.77–5.28)
SODIUM SERPL-SCNC: 138 MMOL/L (ref 136–145)
WBC NRBC COR # BLD AUTO: 7.86 10*3/MM3 (ref 3.4–10.8)

## 2024-12-20 PROCEDURE — 99232 SBSQ HOSP IP/OBS MODERATE 35: CPT | Performed by: FAMILY MEDICINE

## 2024-12-20 PROCEDURE — 97167 OT EVAL HIGH COMPLEX 60 MIN: CPT

## 2024-12-20 PROCEDURE — 25010000002 CEFTRIAXONE PER 250 MG: Performed by: FAMILY MEDICINE

## 2024-12-20 PROCEDURE — 85025 COMPLETE CBC W/AUTO DIFF WBC: CPT | Performed by: FAMILY MEDICINE

## 2024-12-20 PROCEDURE — 80048 BASIC METABOLIC PNL TOTAL CA: CPT | Performed by: FAMILY MEDICINE

## 2024-12-20 RX ORDER — ATORVASTATIN CALCIUM 40 MG/1
40 TABLET, FILM COATED ORAL NIGHTLY
Status: DISCONTINUED | OUTPATIENT
Start: 2024-12-20 | End: 2025-01-09 | Stop reason: HOSPADM

## 2024-12-20 RX ORDER — AMLODIPINE BESYLATE 5 MG/1
5 TABLET ORAL DAILY
Status: DISCONTINUED | OUTPATIENT
Start: 2024-12-20 | End: 2024-12-23

## 2024-12-20 RX ORDER — ASPIRIN 81 MG/1
81 TABLET ORAL DAILY
Status: DISCONTINUED | OUTPATIENT
Start: 2024-12-20 | End: 2025-01-09 | Stop reason: HOSPADM

## 2024-12-20 RX ORDER — CARVEDILOL 6.25 MG/1
6.25 TABLET ORAL EVERY 12 HOURS SCHEDULED
Status: DISCONTINUED | OUTPATIENT
Start: 2024-12-20 | End: 2024-12-23

## 2024-12-20 RX ORDER — FLECAINIDE ACETATE 50 MG/1
100 TABLET ORAL 2 TIMES DAILY
Status: DISCONTINUED | OUTPATIENT
Start: 2024-12-20 | End: 2025-01-09 | Stop reason: HOSPADM

## 2024-12-20 RX ORDER — LEVOTHYROXINE SODIUM 125 UG/1
125 TABLET ORAL DAILY
Status: DISCONTINUED | OUTPATIENT
Start: 2024-12-20 | End: 2025-01-09 | Stop reason: HOSPADM

## 2024-12-20 RX ORDER — LANOLIN ALCOHOL/MO/W.PET/CERES
1000 CREAM (GRAM) TOPICAL DAILY
Status: DISCONTINUED | OUTPATIENT
Start: 2024-12-20 | End: 2025-01-09 | Stop reason: HOSPADM

## 2024-12-20 RX ORDER — BUMETANIDE 1 MG/1
2 TABLET ORAL DAILY
Status: CANCELLED | OUTPATIENT
Start: 2024-12-20

## 2024-12-20 RX ADMIN — SODIUM CHLORIDE 2000 MG: 9 INJECTION, SOLUTION INTRAVENOUS at 16:31

## 2024-12-20 RX ADMIN — ASPIRIN 81 MG: 81 TABLET, COATED ORAL at 15:01

## 2024-12-20 RX ADMIN — POLYETHYLENE GLYCOL (3350) 17 G: 17 POWDER, FOR SOLUTION ORAL at 10:35

## 2024-12-20 RX ADMIN — CARVEDILOL 6.25 MG: 6.25 TABLET, FILM COATED ORAL at 15:01

## 2024-12-20 RX ADMIN — Medication 10 ML: at 22:47

## 2024-12-20 RX ADMIN — LEVOTHYROXINE SODIUM 125 MCG: 0.12 TABLET ORAL at 15:02

## 2024-12-20 RX ADMIN — Medication 1000 MCG: at 15:01

## 2024-12-20 RX ADMIN — Medication 400 MG: at 15:01

## 2024-12-20 RX ADMIN — FLECAINIDE ACETATE 100 MG: 50 TABLET ORAL at 22:47

## 2024-12-20 RX ADMIN — ATORVASTATIN CALCIUM 40 MG: 40 TABLET, FILM COATED ORAL at 22:47

## 2024-12-20 RX ADMIN — POTASSIUM CHLORIDE 40 MEQ: 1500 TABLET, EXTENDED RELEASE ORAL at 00:22

## 2024-12-20 RX ADMIN — APIXABAN 5 MG: 5 TABLET, FILM COATED ORAL at 22:47

## 2024-12-20 RX ADMIN — APIXABAN 5 MG: 5 TABLET, FILM COATED ORAL at 15:01

## 2024-12-20 RX ADMIN — CARVEDILOL 6.25 MG: 6.25 TABLET, FILM COATED ORAL at 22:47

## 2024-12-20 RX ADMIN — Medication 5000 UNITS: at 15:01

## 2024-12-20 RX ADMIN — AMLODIPINE BESYLATE 5 MG: 5 TABLET ORAL at 15:01

## 2024-12-20 NOTE — PROGRESS NOTES
[facility] HOSPITALIST PROGRESS NOTE     Patient Identification:  Name:  Ana Maradiaga  Age:  59 y.o.  Sex:  female  :  1965  MRN:  [id number]  Visit Number:  [id number]  ROOM: [address] [place] [address]     Primary Care Provider:  Val Purcell PA     Date of Admission: 2024    Length of stay in inpatient status:  1    Subjective     Chief Compliant:    Chief Complaint   Patient presents with    Weakness - Generalized    Blood in Urine     History of Presenting Illness:    Patient was seen and examined face-to-face.  Overall she says she is feeling a little bit better.  Physical therapy has been working with her.  I informed her that her MRI did not show any signs of multiple sclerosis.  She continues to have gross blood in her urine  Objective     Current Hospital Meds:  amLODIPine, 5 mg, Oral, Daily  apixaban, 5 mg, Oral, Q12H  aspirin, 81 mg, Oral, Daily  atorvastatin, 40 mg, Oral, Nightly  carvedilol, 6.25 mg, Oral, Q12H  cefTRIAXone, 2,000 mg, Intravenous, Q24H  flecainide, 100 mg, Oral, BID  levothyroxine, 125 mcg, Oral, Daily  magnesium oxide, 400 mg, Oral, Daily  sodium chloride, 10 mL, Intravenous, Q12H  vitamin B-12, 1,000 mcg, Oral, Daily  vitamin D3, 5,000 Units, Oral, Daily       Current Antimicrobial Therapy:  Anti-Infectives (From admission, onward)      Ordered     Dose/Rate Route Frequency Start Stop    24 0935  cefTRIAXone (ROCEPHIN) 2,000 mg in sodium chloride 0.9 % 100 mL IVPB-VTB        Ordering Provider: Thien Reardon DO    2,000 mg  200 mL/hr over 30 Minutes Intravenous Every 24 Hours 24 1700 24 1659    24 1555  cefTRIAXone (ROCEPHIN) 2,000 mg in sodium chloride 0.9 % 100 mL IVPB-VTB        Ordering Provider: Mark Vasquez DO    2,000 mg  200 mL/hr over 30 Minutes Intravenous Once 24 1610 24 1813          Current Diuretic Therapy:  Diuretics (From admission, onward)      None           ----------------------------------------------------------------------------------------------------------------------  Vital Signs:  Temp:  [97.9 °F (36.6 °C)-98.3 °F (36.8 °C)] 98.3 °F (36.8 °C)  Heart Rate:  [66-73] 73  Resp:  [16-18] 18  BP: (104-159)/(55-92) 129/67  SpO2:  [94 %-99 %] 96 %  on   ;   Device (Oxygen Therapy): room air  Body mass index is 56.25 kg/m².    Wt Readings from Last 3 Encounters:   12/19/24 (!) 153 kg (338 lb)   10/24/24 116 kg (256 lb 6.4 oz)   10/11/24 (!) 164 kg (362 lb)     Intake & Output (last 3 days)         12/17 0701  12/18 0700 12/18 0701  12/19 0700 12/19 0701  12/20 0700 12/20 0701  12/21 0700    P.O.   500 360    I.V. (mL/kg)   686 (4.5)     IV Piggyback   100     Total Intake(mL/kg)   1286 (8.4) 360 (2.4)    Urine (mL/kg/hr)   250     Stool   0     Total Output   250     Net   +1036 +360            Stool Unmeasured Occurrence   0 x           Diet: Cardiac; Healthy Heart (2-3 Na+); Fluid Consistency: Thin (IDDSI 0)  ----------------------------------------------------------------------------------------------------------------------  Physical Exam  Constitutional:  Well-developed and well-nourished.  No acute distress.      HENT:  Head:  Normocephalic and atraumatic.  Mouth:  Moist mucous membranes.    Eyes:  Conjunctivae and EOM are normal. No scleral icterus.    Neck:  Neck supple.  No JVD present.    Cardiovascular:  Normal rate, regular rhythm and normal heart sounds with no murmur.  Pulmonary/Chest:  No respiratory distress, no wheezes, no crackles, with normal breath sounds and good air movement.  Abdominal:  Soft. No distension and no tenderness.   Musculoskeletal:  No tenderness, and no deformity.  No red or swollen joints anywhere.    Neurological:  Alert and oriented to person, place, and time.  No cranial nerve deficit.    Skin:  Skin is warm and dry. No rash noted. No pallor.   Peripheral vascular:  No clubbing, no cyanosis, no edema.  Psychiatric:  "Appropriate mood and affect  : Pure wick in place with grossly bloody urine in the tubing and canister    ----------------------------------------------------------------------------------------------------------------------  Tele:    ----------------------------------------------------------------------------------------------------------------------  LABS:    CBC and coagulation:  Results from last 7 days   Lab Units 12/20/24 0424 12/19/24 1956 12/19/24  1439   LACTATE mmol/L  --   --  1.5   WBC 10*3/mm3 7.86 9.44 10.86*   HEMOGLOBIN g/dL 11.9* 13.3 14.4   HEMATOCRIT % 38.1 41.1 43.0   MCV fL 97.4* 95.8 93.9   MCHC g/dL 31.2* 32.4 33.5   PLATELETS 10*3/mm3 169 195 225   INR   --   --  1.91*   D DIMER QUANT MCGFEU/mL  --   --  0.27     Acid/base balance:      Renal and electrolytes:  Results from last 7 days   Lab Units 12/20/24 0424 12/19/24 1956 12/19/24  1439   SODIUM mmol/L 138 138 138   POTASSIUM mmol/L 4.5 3.6 3.5   CHLORIDE mmol/L 101 98 97*   CO2 mmol/L 25.9 24.7 24.3   BUN mg/dL 19 20 20   CREATININE mg/dL 1.40* 1.53* 1.55*   CALCIUM mg/dL 8.7 9.4 9.9   GLUCOSE mg/dL 77 93 83     Estimated Creatinine Clearance: 65.2 mL/min (A) (by C-G formula based on SCr of 1.4 mg/dL (H)).    Liver and pancreatic function:  Results from last 7 days   Lab Units 12/19/24  1439   ALBUMIN g/dL 3.7   BILIRUBIN mg/dL 1.5*   ALK PHOS U/L 128*   AST (SGOT) U/L 34*   ALT (SGPT) U/L 31   LIPASE U/L 42     Endocrine function:  No results found for: \"HGBA1C\"  Point of care bedside glucose levels:      Glucose levels from the [facility]:  Results from last 7 days   Lab Units 12/20/24  0424 12/19/24  1956 12/19/24  1439   GLUCOSE mg/dL 77 93 83     Lab Results   Component Value Date    TSH 3.320 12/19/2024    FREET4 1.18 03/21/2022     Cardiac:  Results from last 7 days   Lab Units 12/19/24  1439   CK TOTAL U/L 67       Cultures:  Lab Results   Component Value Date    COLORU Red (A) 12/19/2024    CLARITYU Turbid (A) 12/19/2024    PHUR " "<=5.0 12/19/2024    PROTEINUR 50.7 06/22/2023    GLUCOSEU Negative 12/19/2024    KETONESU Negative 12/19/2024    BLOODU Moderate (2+) (A) 12/19/2024    NITRITEU Positive (A) 12/19/2024    LEUKOCYTESUR Large (3+) (A) 12/19/2024    BILIRUBINUR Moderate (2+) (A) 12/19/2024    UROBILINOGEN 0.2 E.U./dL 12/19/2024    RBCUA Too Numerous to Count (A) 12/19/2024    WBCUA Too Numerous to Count (A) 12/19/2024    BACTERIA 3+ (A) 12/19/2024     Microbiology Results (last 10 days)       Procedure Component Value - Date/Time    COVID PRE-OP / PRE-PROCEDURE SCREENING ORDER (NO ISOLATION) - Swab, Nasopharynx [[id number]]  (Normal) Collected: 12/19/24 1452    Lab Status: Final result Specimen: Swab from Nasopharynx Updated: 12/19/24 1624    Narrative:      The following orders were created for panel order COVID PRE-OP / PRE-PROCEDURE SCREENING ORDER (NO ISOLATION) - Swab, Nasopharynx.  Procedure                               Abnormality         Status                     ---------                               -----------         ------                     COVID-19 and FLU A/B PCR...[[id number]]  Normal              Final result                 Please view results for these tests on the individual orders.    COVID-19 and FLU A/B PCR, 1 HR TAT - Swab, Nasopharynx [[id number]]  (Normal) Collected: 12/19/24 1452    Lab Status: Final result Specimen: Swab from Nasopharynx Updated: 12/19/24 1624     COVID19 Not Detected     Influenza A PCR Not Detected     Influenza B PCR Not Detected    Narrative:      Fact sheet for providers: https://www.fda.gov/media/520486/download    Fact sheet for patients: https://www.fda.gov/media/296935/download    Test performed by PCR.    Urine Culture - Urine, Urine, Clean Catch [[id number]]  (Normal) Collected: 12/19/24 1439    Lab Status: Preliminary result Specimen: Urine, Clean Catch Updated: 12/20/24 1206     Urine Culture Culture in progress            No results found for: \"PREGTESTUR\", \"PREGSERUM\", \"HCG\", " "\"HCGQUANT\"  Pain Management Panel  More data may exist         Latest Ref Rng & Units 12/19/2024 6/22/2023   Pain Management Panel   Creatinine, Urine mg/dL  mg/dL - 152.9  152.9    Amphetamine, Urine Qual Negative Negative  -   Barbiturates Screen, Urine Negative Negative  -   Benzodiazepine Screen, Urine Negative Negative  -   Buprenorphine, Screen, Urine Negative Negative  -   Cocaine Screen, Urine Negative Negative  -   Fentanyl, Urine Negative Negative  -   Methadone Screen , Urine Negative Negative  -   Methamphetamine, Ur Negative Negative  -      Details          Multiple values from one day are sorted in reverse-chronological order               I have personally looked at the labs and they are summarized above.  ----------------------------------------------------------------------------------------------------------------------  Detailed radiology reports for the last 24 hours:    Imaging Results (Last 24 Hours)       Procedure Component Value Units Date/Time    MRI Brain With & Without Contrast [[id number]] Collected: 12/19/24 1929     Updated: 12/19/24 1937    Narrative:      PROCEDURE: MRI examination of the brain performed with and without IV  contrast on December 19, 2024. The examination was performed with T1,  T2, FLAIR, diffusion, and postcontrast T1-weighted sequences with  imaging performed in the axial, sagittal, and coronal planes. The  patient was administered 20 mL of gadolinium contrast IV without  complication.     HISTORY: Evaluate for MS.     COMPARISON: None.     FINDINGS:     Mild generalized brain volume loss consistent with age.  Mild to moderate chronic small vessel ischemic type changes in the  periventricular and subcortical white matter.  Prior infarct noted along the mid to anterior segment of the right  insular cortex with involvement of the lateral cortex of the right  frontal lobe as well as the subcortical white matter most consistent  with changes waited to prior infarction. " Mild encephalomalacia is noted  affecting the affected cortex in the lateral aspect of the right frontal  lobe seen best on image 14, series 6.  No hydrocephalus.  No shift of midline structures.  No restricted diffusion identified to suggest acute or subacute  ischemia.  No structural anomaly identified.  Mild mucosal thickening in the paranasal sinuses.  The mastoid air cells are clear.  No pathologic contrast enhancement.  Unremarkable appearance to the intracranial arterial vasculature with no  features to suggest aneurysm or vascular malformation.  Patent dural sinuses with no features to suggest dural sinus thrombosis.  Unremarkable appearance to the pituitary gland and there is a midline  position to the pituitary stalk.  Normal size to the sella.  No tonsillar ectopia.       Impression:         Mild generalized brain volume loss with mild to moderate chronic small  vessel ischemic type changes in the periventricular and subcortical  white matter.  Prior infarct noted involving the mid to anterior segment of the right  insular cortex  Prior infarct noted involving the lateral aspect of the right frontal  lobe with associated mild encephalomalacia affecting the cortex.  No restricted diffusion identified to suggest acute or subacute  ischemia.  No acute intracranial hemorrhage, mass, infarct, or edema.  No conclusive features of demyelinating process.  No conclusive features of demyelinating process within the corpus  callosum.  No pathologic constant Aldana.  Mild mucosal thickening in the paranasal sinuses.        This report was finalized on 12/19/2024 7:35 PM by Sj Ware MD.       US Non-ob Transvaginal [[id number]] Collected: 12/19/24 1654     Updated: 12/19/24 1658    Narrative:      EXAMINATION: US NON-OB TRANSVAGINAL-      CLINICAL INDICATION: r/o ovarian mass        COMPARISON: None available     Transabdominal sonographic imaging of the pelvis     Uterus measures 7.53 x 2.54 x 4.03 cm      Endometrium is 0.53 cm.     Ovaries show blood flow     No complex adnexal mass     No free fluid.       Impression:      No complex mass or free fluid identified on today's study        This report was finalized on 12/19/2024 4:56 PM by Dr. Jasson Shaw MD.             I have personally looked at the radiology images and I have read the available final and preliminary reports.    Assessment & Plan    Complicated UTI  - Patient was started on Rocephin in the ED which we will continue on the floor  - Gentle hydration overnight  - Await urine culture     Bilateral lower extremity weakness  -MRI of the brain within normal limits  - PT and OT evaluation  - Consider neurology consultation if her strength does not improve    VTE Prophylaxis:   Active VTE Prophylaxis  Pharmacologic:        Start     Dose Route Frequency Stop    12/20/24 1430  apixaban (ELIQUIS) tablet 5 mg         5 mg PO Every 12 Hours Scheduled --                  Mechanical:        Start        12/19/24 1932  Maintain Sequential Compression Device  Continuous                             The patient is high risk due to the following diagnoses/reasons: Complicated UTI    Disposition: Discharge to home early next week if she is not approved for inpatient rehab    Thien Reardon DO  [facility]  12/20/24  13:53 EST

## 2024-12-20 NOTE — PLAN OF CARE
Goal Outcome Evaluation:  Plan of Care Reviewed With: patient        Progress: no change  Outcome Evaluation: Patient sitting up in bed at this itme. A&O. VSS on RA. No acutew changes noted. Patient has been up to bedside commode this shift, x2 assist to ambulate. No requests or complaints at this time. Will continue with POC.

## 2024-12-20 NOTE — THERAPY EVALUATION
Acute Care - Occupational Therapy Initial Evaluation   [place]     Patient Name: Ana Maradiaga  : 1965  MRN: [id number]  Today's Date: 2024             Admit Date: 2024       ICD-10-CM ICD-9-CM   1. Urinary tract infection with hematuria, site unspecified  N39.0 599.0    R31.9 599.70   2. Weakness  R53.1 780.79   3. High serum testosterone  R79.89 790.99     Patient Active Problem List   Diagnosis    Type 2 diabetes mellitus with hyperglycemia, without long-term current use of insulin    Acquired hypothyroidism    Hirsutism    Elevated testosterone level in female    Complicated UTI (urinary tract infection)     Past Medical History:   Diagnosis Date    Anemia     Arrhythmia     Afib    Arthritis     Atrial fibrillation     Bronchitis     Chronic kidney disease     Gout     Hypertension     Stroke 2021     History reviewed. No pertinent surgical history.      OT ASSESSMENT FLOWSHEET (Last 12 Hours)       OT Evaluation and Treatment       Row Name 24 1030                   OT Time and Intention    Subjective Information complains of;weakness  -KR        Document Type evaluation  -KR        Mode of Treatment occupational therapy  -KR        Patient Effort adequate  -KR        Symptoms Noted During/After Treatment fatigue  -KR           General Information    General Observations of Patient alert/cooperative  -KR        Prior Level of Function mod assist:  -KR           Living Environment    Current Living Arrangements home  -KR        People in Home child(woody), adult  -KR        Primary Care Provided by self;child(woody)  -KR           Home Use of Assistive/Adaptive Equipment    Equipment Currently Used at Home rollator  -KR           Cognition    Affect/Mental Status (Cognition) WFL  -KR        Orientation Status (Cognition) oriented x 3  -KR        Follows Commands (Cognition) WFL  -KR           Range of Motion Comprehensive    Comment, General Range of Motion BUE WFL   -KR           Strength Comprehensive (MMT)    Comment, General Manual Muscle Testing (MMT) Assessment BUE 3-/5  -KR           Activities of Daily Living    BADL Assessment/Intervention bathing;upper body dressing;lower body dressing;grooming;feeding;toileting  -KR           Bathing Assessment/Intervention    [place] Level (Bathing) bathing skills;maximum assist (25% patient effort)  -KR           Upper Body Dressing Assessment/Training    [place] Level (Upper Body Dressing) upper body dressing skills;maximum assist (25% patient effort)  -KR           Lower Body Dressing Assessment/Training    [place] Level (Lower Body Dressing) lower body dressing skills;maximum assist (25% patient effort)  -KR           Grooming Assessment/Training    [place] Level (Grooming) grooming skills;moderate assist (50% patient effort)  -KR           Self-Feeding Assessment/Training    [place] Level (Feeding) feeding skills;set up  -KR           Toileting Assessment/Training    [place] Level (Toileting) toileting skills;maximum assist (25% patient effort);dependent (less than 25% patient effort)  -KR           Plan of Care Review    Plan of Care Reviewed With patient  -KR           Therapy Assessment/Plan (OT)    Planned Therapy Interventions (OT) activity tolerance training;adaptive equipment training;BADL retraining;strengthening exercise  -KR           Therapy Plan Review/Discharge Plan (OT)    Anticipated Discharge Disposition (OT) inpatient rehabilitation facility;home  -KR           OT Goals    Dressing Goal Selection (OT) dressing, OT goal 1  -KR        Strength Goal Selection (OT) strength, OT goal 1  -KR        Activity Tolerance Goal Selection (OT) activity tolerance, OT goal 1  -KR           Dressing Goal 1 (OT)    Activity/Device (Dressing Goal 1, OT) dressing skills, all  -KR        [place]/Cues Needed (Dressing Goal 1, OT) minimum assist (75% or more patient effort)  -KR        Time Frame  (Dressing Goal 1, OT) by discharge  -KR           Strength Goal 1 (OT)    Strength Goal 1 (OT) BUE increase x 1 to improve mobility/self care  -KR        Time Frame (Strength Goal 1, OT) by discharge  -KR            Activity Tolerance Goal 1 (OT)    Activity Tolerance Goal 1 (OT) Increase to enhance ADL performance  -KR        Activity Level (Endurance Goal 1, OT) 15 min activity  -KR        Time Frame (Activity Tolerance Goal 1, OT) by discharge  -KR           Patient Education Goal (OT)    Activity (Patient Education Goal, OT) AE/DME training to enhance safety/independence in home environment  -KR        Todd/Cues/Accuracy (Memory Goal 2, OT) verbalizes understanding  -KR        Time Frame (Patient Education Goal, OT) by discharge  -KR                  User Key  (r) = Recorded By, (t) = Taken By, (c) = Cosigned By      Initials Name Effective Dates    Dano Bruno OT 06/16/21 -                      Occupational Therapy Education        No education to display                      OT Recommendation and Plan  Planned Therapy Interventions (OT): activity tolerance training, adaptive equipment training, BADL retraining, strengthening exercise  Plan of Care Review  Plan of Care Reviewed With: patient  Plan of Care Reviewed With: patient        Time Calculation:     Therapy Charges for Today       Code Description Service Date Service Provider Modifiers Qty    [id number] HC OT EVAL HIGH COMPLEXITY 4 12/20/2024 Dano Samuel OT GO 1                 aDno Samuel OT  12/20/2024

## 2024-12-20 NOTE — CASE MANAGEMENT/SOCIAL WORK
Discharge Planning Assessment  [facility]     Patient Name: Ana Maradiaga  MRN: [id number]  Today's Date: 12/20/2024    Admit Date: 12/19/2024    Plan: CM spoke with Pt. Pt lives with her son Dano in [facility] and plans to return at d/c. Pt does not have HH or oxygen. Pt has a rollator that she bought off [employer]. Pt is requesting a wheelchair at d/c. Pt has no preference of DME company. PCP is Val MOON and gets prescriptions filled at [facility]. Pt has [employer] and denies any trouble with coverage. Family will transport at d/c. CM will follow.   Discharge Needs Assessment       Row Name 12/20/24 1519       Living Environment    People in Home child(woody), adult    Primary Care Provided by self    Provides Primary Care For no one    Family Caregiver if Needed none    Able to Return to Prior Arrangements yes       Resource/Environmental Concerns    Resource/Environmental Concerns none    Transportation Concerns none       Transition Planning    Patient/Family Anticipates Transition to home with family       Discharge Needs Assessment    Equipment Currently Used at Home rollator    Anticipated Changes Related to Illness none    Equipment Needed After Discharge wheelchair, manual    Provided Post Acute Provider List? N/A    Provided Post Acute Provider Quality & Resource List? N/A                   Discharge Plan       Row Name 12/20/24 1522       Plan    Plan CM spoke with Pt. Pt lives with her son Dano in [facility] and plans to return at d/c. Pt does not have HH or oxygen. Pt has a rollator that she bought off [employer]. Pt is requesting a wheelchair at d/c. Pt has no preference of DME company. PCP is Val MOON and gets prescriptions filled at [facility]. Pt has [employer] and denies any trouble with coverage. Family will transport at d/c. CM will follow.                        Alexa Lorwy RN

## 2024-12-20 NOTE — PLAN OF CARE
Goal Outcome Evaluation:      Pt admitted from ER this shift. Pt complains of severe weakness. Pt is x2 assist to turn in bed, dress, sit up, etc. Potassium replaced. VSS. No further requests at this time. Will continue with plan of care.

## 2024-12-20 NOTE — PAYOR COMM NOTE
"[facility]  NPI:[id number]    Utilization Review  Contact: Ny Odom RN  Phone: [phone]  Fax:[phone]    INITIATE INPATIENT AUTHORIZATION  Ana Maradiaga (59 y.o. Female)       Date of Birth   1965    Social Security Number       Address   [address] [place] [address]    Home Phone   [phone]    MRN   [id number]       Christian   Orthodox    Marital Status   Single                            Admission Date   24    Admission Type   Emergency    Admitting Provider   Thien Reardon DO    Attending Provider   Thien Reardon DO    Department, Room/Bed   [address], [address]/       Discharge Date       Discharge Disposition       Discharge Destination                                 Attending Provider: Thien Reardon DO    Allergies: Vancomycin, Cefepime, Cephalosporins    Isolation: None   Infection: None   Code Status: CPR    Ht: 165.1 cm (65\")   Wt: 153 kg (338 lb)    Admission Cmt: None   Principal Problem: Complicated UTI (urinary tract infection) [N39.0]                   Active Insurance as of 2024       Primary Coverage       Payor Plan Insurance Group Employer/Plan Group    ANTHEM MEDICARE REPLACEMENT ANTHEM MEDICARE ADVANTAGE [id number]       Payor Plan Address Payor Plan Phone Number Payor Plan Fax Number Effective Dates    [address] [phone]  2024 - None Entered    [facility] [id number]         Subscriber Name Subscriber Birth Date Member ID       ANA MARADIAGA 1965 [id number]                     Emergency Contacts        (Rel.) Home Phone Work Phone Mobile Phone    BLANCA MARADIAGA (Son) -- -- [phone]    DANYEL MARADIAGAOTHY (Relative) -- -- [phone]                 History & Physical        Thien Reardon DO at 24 1837          [facility]   HISTORY AND PHYSICAL    Patient Name: Ana Maradiaga  : 1965  MRN: [id number]  Primary Care Physician:  Val Purcell, " PA  Date of admission: 12/19/2024    Subjective  Subjective     Chief Complaint: Hematuria leg weakness    History of Present Illness  Patient is a 59-year-old female with past medical history of anemia, atrial fibrillation, chronic kidney disease, hypertension, gout, and a history of a CVA.  Patient presents to the emergency department with a couple of different complaints.  Patient says she started noticing some hematuria for the last couple of days.  Patient had a CT scan of her abdomen to evaluate for possible intra-abdominal tumors.  The patient has had very high levels of testosterone and they were looking to rule out hormone secreting tumors.  They did not discover tumors but did find a staghorn calculus in her left kidney.  Patient says she had had occasional dysuria but all of it has been much worse today.  Patient is also been experiencing gradual but significant lower extremity weakness.  Patient says that it is not on focal but over the last several weeks she has had more and more difficulty ambulating throughout the house.  Patient says the only thing that is really different in her routine is that she was started on Mounjaro.  She has lost 20 pounds and is having no GI difficulties.  Here in the emergency department the patient was found to have a significant nitrite positive urinary tract infection with large blood.  Her blood work shows an increased creatinine at 1.55, her white blood cell count is greater than 10 and her LDH is elevated.  Review of Systems   As stated above in his present illness all other systems were reviewed and subsequently negative  Personal History     Past Medical History:   Diagnosis Date    Anemia     Arrhythmia 2018    Afib    Arthritis     Atrial fibrillation 2018    Bronchitis     Chronic kidney disease 2018    Gout     Hypertension     Stroke November 2021       No past surgical history on file.    Family History: family history includes Arthritis in her father; Breast  cancer in her paternal cousin; Heart disease in her mother; Hypertension in her father and sister; Stroke in her mother. Otherwise pertinent FHx was reviewed and not pertinent to current issue.    Social History:  reports that she has never smoked. She has never used smokeless tobacco. She reports that she does not drink alcohol and does not use drugs.    Home Medications:  Tirzepatide, amLODIPine, apixaban, aspirin, atorvastatin, bumetanide, carvedilol, flecainide, levothyroxine, magnesium oxide, vitamin B-12, and vitamin D3    Allergies:  Allergies   Allergen Reactions    Vancomycin Hives    Cefepime Rash    Cephalosporins Rash       Objective   Objective     Vitals:   Temp:  [97.9 °F (36.6 °C)] 97.9 °F (36.6 °C)  Heart Rate:  [66-69] 69  Resp:  [17] 17  BP: (127-159)/(74-92) 149/81    Physical Exam  Constitutional:  Well-developed and well-nourished.  No acute distress.      HENT:  Head:  Normocephalic and atraumatic.  Mouth:  Moist mucous membranes.    Eyes:  Conjunctivae and EOM are normal. No scleral icterus.    Neck:  Neck supple.  No JVD present.    Cardiovascular:  Normal rate, regular rhythm and normal heart sounds with no murmur.  Pulmonary/Chest:  No respiratory distress, no wheezes, no crackles, with normal breath sounds and good air movement.  Abdominal:  Soft. No distension and no tenderness.   Musculoskeletal:  No tenderness, and no deformity.  No red or swollen joints anywhere.    Neurological:  Alert and oriented to person, place, and time.  No cranial nerve deficit.    Skin:  Skin is warm and dry. No rash noted. No pallor.   Peripheral vascular:  No clubbing, no cyanosis, no edema.  Psychiatric: Appropriate mood and affect  :    Result Review   Result Review:  I have personally reviewed the results from the time of this admission to 12/19/2024 18:37 EST and agree with these findings:  []  Laboratory list / accordion  []  Microbiology  []  Radiology  []  EKG/Telemetry   []  Cardiology/Vascular    []  Pathology  []  Old records  []  Other:  Most notable findings include:       Assessment & Plan  Assessment / Plan     Brief Patient Summary:  Ana Maradiaga is a 59 y.o. female who presents to the ED with complaints of bilateral lower extremity weakness and hematuria.  She is found to have a significant urinary tract infection and the workup for her lower extremity weakness has began    Active Hospital Problems:  Complicated UTI  - Patient was started on Rocephin in the ED which we will continue on the floor  - Gentle hydration overnight  - Await urine culture    Bilateral lower extremity weakness  -MRI of the brain  - PT and OT evaluation  - Consider neurology consultation  -    VTE Prophylaxis:  Mechanical VTE prophylaxis orders are signed & held.          CODE STATUS:    Code Status (Patient has no pulse and is not breathing): CPR (Attempt to Resuscitate)  Medical Interventions (Patient has pulse or is breathing): Full Support    Admission Status:  I believe this patient meets inpatient status.    Thien Reardon DO    Electronically signed by Thien Reardon DO at 12/19/24 1901          Emergency Department Notes        Patricia Jean RN at 12/19/24 1918          Report given to 3N, RN    Electronically signed by Patricia Jean RN at 12/19/24 1918       Patricia Jean RN at 12/19/24 1849          Attempted to call report, was told the RN was getting report and would call the ED back for report     Electronically signed by Patricia Jean RN at 12/19/24 1858       Mark Vasquez DO at 12/19/24 1423          Subjective   History of Present Illness  Patient is a 59-year-old female with history significant for type 2 diabetes mellitus, atrial fibrillation and hypertension who comes to the ER for evaluation of weakness and gross hematuria.  Patient has been following outpatient with endocrinology due to elevated testosterone levels and recently had a CT abdomen pelvis to rule out  ovarian/adrenal tumor.  That CT 4 days ago noted a large left staghorn calculus.  She reports occasional dysuria but no flank pain.  She has no abdominal pain, diarrhea.  No shortness of breath, productive cough or upper respiratory symptoms.  She reports occasional blurred vision, sometimes sharp pains in the right eye but then will dissipate without any intervention.  She does not have issues with headaches.  Her weakness is nonfocal.  She has had difficulty ambulating throughout the house.  She is on Mounjaro-denies any nausea or vomiting.  She is lost approximately 20 pounds and this was recently refilled by endocrinology.      Review of Systems   Constitutional:  Positive for fatigue. Negative for chills and fever.   HENT:  Negative for ear pain, sinus pain and sore throat.    Respiratory:  Negative for cough, chest tightness, shortness of breath and wheezing.    Cardiovascular:  Negative for chest pain, palpitations and leg swelling.   Gastrointestinal:  Negative for abdominal pain, constipation, diarrhea, nausea and vomiting.   Genitourinary:  Positive for dysuria and hematuria. Negative for urgency.   Musculoskeletal:  Negative for arthralgias and myalgias.   Neurological:  Positive for weakness. Negative for dizziness, syncope and light-headedness.   Psychiatric/Behavioral:  Negative for confusion.        Past Medical History:   Diagnosis Date    Anemia     Arrhythmia 2018    Afib    Arthritis     Atrial fibrillation 2018    Bronchitis     Chronic kidney disease 2018    Gout     Hypertension     Stroke November 2021       Allergies   Allergen Reactions    Vancomycin Hives    Cefepime Rash    Cephalosporins Rash       No past surgical history on file.    Family History   Problem Relation Age of Onset    Heart disease Mother     Stroke Mother     Hypertension Father     Arthritis Father     Hypertension Sister     Breast cancer Paternal Cousin        Social History     Socioeconomic History    Marital status:  Single   Tobacco Use    Smoking status: Never    Smokeless tobacco: Never   Vaping Use    Vaping status: Never Used   Substance and Sexual Activity    Alcohol use: Never    Drug use: Never    Sexual activity: Not Currently     Partners: Male           Objective   Physical Exam    Procedures          ED Course  ED Course as of 12/19/24 1746   Thu Dec 19, 2024   1441 ECG 12 Lead Other; weakness  NSR, 1st degree AV block, rate 68, , no acute ST or T wave changes [CW]      ED Course User Index  [CW] Mark Vasquez DO                                                       Medical Decision Making  --Patient is hemodynamically stable  -- Labs overall fairly unremarkable  --Urine with 3+ bacteria  --Discussed with endocrinology Dr. Barclay, recommended ovarian ultrasound to rule out tumor which was ultimately negative  --IV Rocephin, follow-up urine culture  --Patient has significantly declined from a physical standpoint cannot get in and out of bed or ambulate throughout the house.  She is agreeable to PT/OT and short-term rehab  --MRI of the brain ordered after discussion with medicine  --Discussed with medicine, will admit    Amount and/or Complexity of Data Reviewed  Labs: ordered.  Radiology: ordered.  ECG/medicine tests: ordered. Decision-making details documented in ED Course.    Risk  Prescription drug management.        Final diagnoses:   Urinary tract infection with hematuria, site unspecified   Weakness   High serum testosterone       ED Disposition  ED Disposition       ED Disposition   Decision to Admit    Condition   --    Comment   --               No follow-up provider specified.       Medication List      No changes were made to your prescriptions during this visit.            Mark Vasquez DO  12/19/24 1746      Electronically signed by Mark Vasquez DO at 12/19/24 1746       Vital Signs (last 2 days)       Date/Time Temp Temp src Pulse Resp BP Patient Position SpO2     12/20/24 0600 98 (36.7) Oral 67 16 104/55 Lying --    12/20/24 0237 98.2 (36.8) Oral 70 18 120/61 Lying --    12/19/24 1917 98 (36.7) Oral 69 18 145/82  Lying 96    BP: rn aware at 12/19/24 1917 12/19/24 1800 -- -- 69 -- 149/81 -- 97    12/19/24 1745 -- -- 68 -- 145/76 -- 95    12/19/24 1730 -- -- 67 -- 127/85 -- 96    12/19/24 1715 -- -- 67 -- 142/79 -- 94    12/19/24 1700 -- -- 67 -- 155/85 -- 97    12/19/24 1645 -- -- 67 -- 154/83 -- 97    12/19/24 1630 -- -- 69 -- 151/89 -- 98    12/19/24 1615 -- -- 68 -- 144/75 -- 96    12/19/24 1600 -- -- 66 -- 138/87 -- 97    12/19/24 1530 -- -- 67 -- 134/90 -- 98    12/19/24 1515 -- -- 67 -- 140/74 -- 98    12/19/24 1500 -- -- 68 -- 141/78 -- 97    12/19/24 1445 -- -- 69 -- 145/81 -- 99    12/19/24 1430 -- -- 69 -- 159/92 -- 99    12/19/24 1425 -- -- 69 17 152/76 -- 96    12/19/24 1420 97.9 (36.6) Oral -- -- -- -- --          Facility-Administered Medications as of 12/20/2024   Medication Dose Route Frequency Provider Last Rate Last Admin    sennosides-docusate (PERICOLACE) 8.6-50 MG per tablet 2 tablet  2 tablet Oral BID PRN Thien Reardon DO        And    polyethylene glycol (MIRALAX) packet 17 g  17 g Oral Daily PRN Thien Reardon DO        And    bisacodyl (DULCOLAX) EC tablet 5 mg  5 mg Oral Daily PRN Thien Reardon DO        And    bisacodyl (DULCOLAX) suppository 10 mg  10 mg Rectal Daily PRN Thien Reardon DO        Calcium Replacement - Follow Nurse / BPA Driven Protocol   Not Applicable PRN Thien Reardon DO        [COMPLETED] cefTRIAXone (ROCEPHIN) 2,000 mg in sodium chloride 0.9 % 100 mL IVPB-VTB  2,000 mg Intravenous Once Mark Vasquez DO   Stopped at 12/19/24 1813    [COMPLETED] gadobenate dimeglumine (MULTIHANCE) injection 20 mL  20 mL Intravenous Once in imaging Mark Vasquez DO   20 mL at 12/19/24 1855    HYDROcodone-acetaminophen (NORCO) 5-325 MG per tablet 1 tablet  1 tablet Oral Q6H PRN Thien Reardon DO         Magnesium Standard Dose Replacement - Follow Nurse / BPA Driven Protocol   Not Applicable PRN Thien Reardon DO        ondansetron ODT (ZOFRAN-ODT) disintegrating tablet 4 mg  4 mg Oral Q6H PRN Thien Reardon DO        Or    ondansetron (ZOFRAN) injection 4 mg  4 mg Intravenous Q6H PRN Thien Reardon DO        Phosphorus Replacement - Follow Nurse / BPA Driven Protocol   Not Applicable PRN Thien Reardon DO        [COMPLETED] potassium chloride (KLOR-CON M20) CR tablet 40 mEq  40 mEq Oral Q4H Thien Reardon DO   40 mEq at 12/20/24 0022    Potassium Replacement - Follow Nurse / BPA Driven Protocol   Not Applicable PRN Thien Reardon DO        sodium chloride 0.9 % flush 10 mL  10 mL Intravenous PRN Mark Vasquez DO        sodium chloride 0.9 % flush 10 mL  10 mL Intravenous Q12H Thien Reardon DO   10 mL at 12/19/24 2117    sodium chloride 0.9 % flush 10 mL  10 mL Intravenous PRN Thien Reardon DO        sodium chloride 0.9 % infusion 40 mL  40 mL Intravenous PRN Thien Reardon DO        sodium chloride 0.9 % infusion  75 mL/hr Intravenous Continuous Thien Reardon DO 75 mL/hr at 12/19/24 2117 75 mL/hr at 12/19/24 2117     Physician Progress Notes (all)    No notes of this type exist for this encounter.       Consult Notes (all)    No notes of this type exist for this encounter.

## 2024-12-21 LAB
ANION GAP SERPL CALCULATED.3IONS-SCNC: 10.3 MMOL/L (ref 5–15)
BACTERIA SPEC AEROBE CULT: NORMAL
BASOPHILS # BLD AUTO: 0.04 10*3/MM3 (ref 0–0.2)
BASOPHILS NFR BLD AUTO: 0.6 % (ref 0–1.5)
BUN SERPL-MCNC: 18 MG/DL (ref 6–20)
BUN/CREAT SERPL: 14.3 (ref 7–25)
CALCIUM SPEC-SCNC: 8.4 MG/DL (ref 8.6–10.5)
CHLORIDE SERPL-SCNC: 105 MMOL/L (ref 98–107)
CO2 SERPL-SCNC: 24.7 MMOL/L (ref 22–29)
CREAT SERPL-MCNC: 1.26 MG/DL (ref 0.57–1)
DEPRECATED RDW RBC AUTO: 59.9 FL (ref 37–54)
EGFRCR SERPLBLD CKD-EPI 2021: 49.3 ML/MIN/1.73
EOSINOPHIL # BLD AUTO: 0.14 10*3/MM3 (ref 0–0.4)
EOSINOPHIL NFR BLD AUTO: 2 % (ref 0.3–6.2)
ERYTHROCYTE [DISTWIDTH] IN BLOOD BY AUTOMATED COUNT: 16.5 % (ref 12.3–15.4)
GLUCOSE SERPL-MCNC: 127 MG/DL (ref 65–99)
HCT VFR BLD AUTO: 35.6 % (ref 34–46.6)
HGB BLD-MCNC: 11.3 G/DL (ref 12–15.9)
IMM GRANULOCYTES # BLD AUTO: 0.05 10*3/MM3 (ref 0–0.05)
IMM GRANULOCYTES NFR BLD AUTO: 0.7 % (ref 0–0.5)
LYMPHOCYTES # BLD AUTO: 1.15 10*3/MM3 (ref 0.7–3.1)
LYMPHOCYTES NFR BLD AUTO: 16.7 % (ref 19.6–45.3)
MCH RBC QN AUTO: 31.1 PG (ref 26.6–33)
MCHC RBC AUTO-ENTMCNC: 31.7 G/DL (ref 31.5–35.7)
MCV RBC AUTO: 98.1 FL (ref 79–97)
MONOCYTES # BLD AUTO: 0.77 10*3/MM3 (ref 0.1–0.9)
MONOCYTES NFR BLD AUTO: 11.2 % (ref 5–12)
NEUTROPHILS NFR BLD AUTO: 4.75 10*3/MM3 (ref 1.7–7)
NEUTROPHILS NFR BLD AUTO: 68.8 % (ref 42.7–76)
NRBC BLD AUTO-RTO: 0 /100 WBC (ref 0–0.2)
PLATELET # BLD AUTO: 158 10*3/MM3 (ref 140–450)
PMV BLD AUTO: 10.4 FL (ref 6–12)
POTASSIUM SERPL-SCNC: 3.8 MMOL/L (ref 3.5–5.2)
RBC # BLD AUTO: 3.63 10*6/MM3 (ref 3.77–5.28)
SODIUM SERPL-SCNC: 140 MMOL/L (ref 136–145)
WBC NRBC COR # BLD AUTO: 6.9 10*3/MM3 (ref 3.4–10.8)

## 2024-12-21 PROCEDURE — 25010000002 CEFTRIAXONE PER 250 MG: Performed by: FAMILY MEDICINE

## 2024-12-21 PROCEDURE — 80048 BASIC METABOLIC PNL TOTAL CA: CPT | Performed by: FAMILY MEDICINE

## 2024-12-21 PROCEDURE — 85025 COMPLETE CBC W/AUTO DIFF WBC: CPT | Performed by: FAMILY MEDICINE

## 2024-12-21 PROCEDURE — 99232 SBSQ HOSP IP/OBS MODERATE 35: CPT | Performed by: FAMILY MEDICINE

## 2024-12-21 RX ORDER — NYSTATIN 100000 [USP'U]/G
POWDER TOPICAL EVERY 12 HOURS SCHEDULED
Status: DISPENSED | OUTPATIENT
Start: 2024-12-21 | End: 2025-01-03

## 2024-12-21 RX ADMIN — LEVOTHYROXINE SODIUM 125 MCG: 0.12 TABLET ORAL at 09:21

## 2024-12-21 RX ADMIN — APIXABAN 5 MG: 5 TABLET, FILM COATED ORAL at 09:21

## 2024-12-21 RX ADMIN — NYSTATIN: 100000 POWDER TOPICAL at 11:26

## 2024-12-21 RX ADMIN — APIXABAN 5 MG: 5 TABLET, FILM COATED ORAL at 21:57

## 2024-12-21 RX ADMIN — NYSTATIN: 100000 POWDER TOPICAL at 22:03

## 2024-12-21 RX ADMIN — FLECAINIDE ACETATE 100 MG: 50 TABLET ORAL at 21:57

## 2024-12-21 RX ADMIN — Medication 1000 MCG: at 09:21

## 2024-12-21 RX ADMIN — SODIUM CHLORIDE 2000 MG: 9 INJECTION, SOLUTION INTRAVENOUS at 17:19

## 2024-12-21 RX ADMIN — Medication 5000 UNITS: at 09:20

## 2024-12-21 RX ADMIN — Medication 400 MG: at 09:21

## 2024-12-21 RX ADMIN — CARVEDILOL 6.25 MG: 6.25 TABLET, FILM COATED ORAL at 09:21

## 2024-12-21 RX ADMIN — AMLODIPINE BESYLATE 5 MG: 5 TABLET ORAL at 09:20

## 2024-12-21 RX ADMIN — Medication 10 ML: at 21:57

## 2024-12-21 RX ADMIN — ATORVASTATIN CALCIUM 40 MG: 40 TABLET, FILM COATED ORAL at 21:57

## 2024-12-21 RX ADMIN — ASPIRIN 81 MG: 81 TABLET, COATED ORAL at 09:20

## 2024-12-21 RX ADMIN — FLECAINIDE ACETATE 100 MG: 50 TABLET ORAL at 09:20

## 2024-12-21 RX ADMIN — Medication 10 ML: at 09:21

## 2024-12-21 RX ADMIN — CARVEDILOL 6.25 MG: 6.25 TABLET, FILM COATED ORAL at 21:57

## 2024-12-21 NOTE — PLAN OF CARE
Goal Outcome Evaluation:  Plan of Care Reviewed With: patient        Progress: no change  Outcome Evaluation: Patient's VSS. Pt wound care done per orders. Pt a&ox4. Pt voiced no further concerns at this time.

## 2024-12-21 NOTE — PROGRESS NOTES
[facility] HOSPITALIST PROGRESS NOTE     Patient Identification:  Name:  Ana Maradiaga  Age:  59 y.o.  Sex:  female  :  1965  MRN:  [id number]  Visit Number:  [id number]  ROOM: [address] [place] [address]     Primary Care Provider:  Val Purcell PA     Date of Admission: 2024    Length of stay in inpatient status:  2    Subjective     Chief Compliant:    Chief Complaint   Patient presents with    Weakness - Generalized    Blood in Urine     History of Presenting Illness:    Patient was seen and examined face-to-face.  Overall she says she is feeling a little bit better.  Physical therapy has been working with her.  I informed her that her MRI did not show any signs of multiple sclerosis.  She continues to have gross blood in her urine  Objective     Current Hospital Meds:  amLODIPine, 5 mg, Oral, Daily  apixaban, 5 mg, Oral, Q12H  aspirin, 81 mg, Oral, Daily  atorvastatin, 40 mg, Oral, Nightly  carvedilol, 6.25 mg, Oral, Q12H  cefTRIAXone, 2,000 mg, Intravenous, Q24H  flecainide, 100 mg, Oral, BID  levothyroxine, 125 mcg, Oral, Daily  magnesium oxide, 400 mg, Oral, Daily  nystatin, , Topical, Q12H  sodium chloride, 10 mL, Intravenous, Q12H  vitamin B-12, 1,000 mcg, Oral, Daily  vitamin D3, 5,000 Units, Oral, Daily       Current Antimicrobial Therapy:  Anti-Infectives (From admission, onward)      Ordered     Dose/Rate Route Frequency Start Stop    24 0935  cefTRIAXone (ROCEPHIN) 2,000 mg in sodium chloride 0.9 % 100 mL IVPB-VTB        Ordering Provider: Thien Reardon DO    2,000 mg  200 mL/hr over 30 Minutes Intravenous Every 24 Hours 24 1700 24 1659    24 1555  cefTRIAXone (ROCEPHIN) 2,000 mg in sodium chloride 0.9 % 100 mL IVPB-VTB        Ordering Provider: Mark Vasquez DO    2,000 mg  200 mL/hr over 30 Minutes Intravenous Once 24 1610 24 1813          Current Diuretic Therapy:  Diuretics (From admission, onward)      None           ----------------------------------------------------------------------------------------------------------------------  Vital Signs:  Temp:  [97.6 °F (36.4 °C)-98.2 °F (36.8 °C)] 98 °F (36.7 °C)  Heart Rate:  [60-72] 64  Resp:  [17-20] 18  BP: (118-159)/(50-84) 127/72  SpO2:  [92 %-98 %] 98 %  on   ;   Device (Oxygen Therapy): room air  Body mass index is 56.25 kg/m².    Wt Readings from Last 3 Encounters:   12/19/24 (!) 153 kg (338 lb)   10/24/24 116 kg (256 lb 6.4 oz)   10/11/24 (!) 164 kg (362 lb)     Intake & Output (last 3 days)         12/17 0701  12/18 0700 12/18 0701  12/19 0700 12/19 0701  12/20 0700 12/20 0701  12/21 0700    P.O.   500 360    I.V. (mL/kg)   686 (4.5)     IV Piggyback   100     Total Intake(mL/kg)   1286 (8.4) 360 (2.4)    Urine (mL/kg/hr)   250     Stool   0     Total Output   250     Net   +1036 +360            Stool Unmeasured Occurrence   0 x           Diet: Cardiac; Healthy Heart (2-3 Na+); Fluid Consistency: Thin (IDDSI 0)  ----------------------------------------------------------------------------------------------------------------------  Physical Exam  Constitutional:  Well-developed and well-nourished.  No acute distress.      HENT:  Head:  Normocephalic and atraumatic.  Mouth:  Moist mucous membranes.    Eyes:  Conjunctivae and EOM are normal. No scleral icterus.    Neck:  Neck supple.  No JVD present.    Cardiovascular:  Normal rate, regular rhythm and normal heart sounds with no murmur.  Pulmonary/Chest:  No respiratory distress, no wheezes, no crackles, with normal breath sounds and good air movement.  Abdominal:  Soft. No distension and no tenderness.   Musculoskeletal:  No tenderness, and no deformity.  No red or swollen joints anywhere.    Neurological:  Alert and oriented to person, place, and time.  No cranial nerve deficit.    Skin: Dry appearing redness of her face.  Peripheral vascular:  No clubbing, no cyanosis, no edema.  Psychiatric: Appropriate mood and  "affect  : Pure wick in place with grossly bloody urine in the tubing and canister    ----------------------------------------------------------------------------------------------------------------------  Tele:    ----------------------------------------------------------------------------------------------------------------------  LABS:    CBC and coagulation:  Results from last 7 days   Lab Units 12/21/24 0019 12/20/24 0424 12/19/24 1956 12/19/24  1439   LACTATE mmol/L  --   --   --  1.5   WBC 10*3/mm3 6.90 7.86 9.44 10.86*   HEMOGLOBIN g/dL 11.3* 11.9* 13.3 14.4   HEMATOCRIT % 35.6 38.1 41.1 43.0   MCV fL 98.1* 97.4* 95.8 93.9   MCHC g/dL 31.7 31.2* 32.4 33.5   PLATELETS 10*3/mm3 158 169 195 225   INR   --   --   --  1.91*   D DIMER QUANT MCGFEU/mL  --   --   --  0.27     Acid/base balance:      Renal and electrolytes:  Results from last 7 days   Lab Units 12/21/24 0019 12/20/24 0424 12/19/24 1956 12/19/24  1439   SODIUM mmol/L 140 138 138 138   POTASSIUM mmol/L 3.8 4.5 3.6 3.5   CHLORIDE mmol/L 105 101 98 97*   CO2 mmol/L 24.7 25.9 24.7 24.3   BUN mg/dL 18 19 20 20   CREATININE mg/dL 1.26* 1.40* 1.53* 1.55*   CALCIUM mg/dL 8.4* 8.7 9.4 9.9   GLUCOSE mg/dL 127* 77 93 83     Estimated Creatinine Clearance: 72.4 mL/min (A) (by C-G formula based on SCr of 1.26 mg/dL (H)).    Liver and pancreatic function:  Results from last 7 days   Lab Units 12/19/24  1439   ALBUMIN g/dL 3.7   BILIRUBIN mg/dL 1.5*   ALK PHOS U/L 128*   AST (SGOT) U/L 34*   ALT (SGPT) U/L 31   LIPASE U/L 42     Endocrine function:  No results found for: \"HGBA1C\"  Point of care bedside glucose levels:      Glucose levels from the [facility]:  Results from last 7 days   Lab Units 12/21/24  0019 12/20/24  0424 12/19/24  1956 12/19/24  1439   GLUCOSE mg/dL 127* 77 93 83     Lab Results   Component Value Date    TSH 3.320 12/19/2024    FREET4 1.18 03/21/2022     Cardiac:  Results from last 7 days   Lab Units 12/19/24  1439   CK TOTAL U/L 67     "   Cultures:  Lab Results   Component Value Date    COLORU Red (A) 12/19/2024    CLARITYU Turbid (A) 12/19/2024    PHUR <=5.0 12/19/2024    PROTEINUR 50.7 06/22/2023    GLUCOSEU Negative 12/19/2024    KETONESU Negative 12/19/2024    BLOODU Moderate (2+) (A) 12/19/2024    NITRITEU Positive (A) 12/19/2024    LEUKOCYTESUR Large (3+) (A) 12/19/2024    BILIRUBINUR Moderate (2+) (A) 12/19/2024    UROBILINOGEN 0.2 E.U./dL 12/19/2024    RBCUA Too Numerous to Count (A) 12/19/2024    WBCUA Too Numerous to Count (A) 12/19/2024    BACTERIA 3+ (A) 12/19/2024     Microbiology Results (last 10 days)       Procedure Component Value - Date/Time    COVID PRE-OP / PRE-PROCEDURE SCREENING ORDER (NO ISOLATION) - Swab, Nasopharynx [[id number]]  (Normal) Collected: 12/19/24 1452    Lab Status: Final result Specimen: Swab from Nasopharynx Updated: 12/19/24 1624    Narrative:      The following orders were created for panel order COVID PRE-OP / PRE-PROCEDURE SCREENING ORDER (NO ISOLATION) - Swab, Nasopharynx.  Procedure                               Abnormality         Status                     ---------                               -----------         ------                     COVID-19 and FLU A/B PCR...[[id number]]  Normal              Final result                 Please view results for these tests on the individual orders.    COVID-19 and FLU A/B PCR, 1 HR TAT - Swab, Nasopharynx [[id number]]  (Normal) Collected: 12/19/24 1452    Lab Status: Final result Specimen: Swab from Nasopharynx Updated: 12/19/24 1624     COVID19 Not Detected     Influenza A PCR Not Detected     Influenza B PCR Not Detected    Narrative:      Fact sheet for providers: https://www.fda.gov/media/069479/download    Fact sheet for patients: https://www.fda.gov/media/812570/download    Test performed by PCR.    Blood Culture - Blood, Hand, Right [[id number]]  (Normal) Collected: 12/19/24 4003    Lab Status: Preliminary result Specimen: Blood from Hand, Right Updated:  "12/20/24 1500     Blood Culture No growth at 24 hours    Blood Culture - Blood, Arm, Right [[id number]]  (Normal) Collected: 12/19/24 1439    Lab Status: Preliminary result Specimen: Blood from Arm, Right Updated: 12/20/24 1500     Blood Culture No growth at 24 hours    Urine Culture - Urine, Urine, Clean Catch [[id number]]  (Normal) Collected: 12/19/24 1439    Lab Status: Preliminary result Specimen: Urine, Clean Catch Updated: 12/20/24 1206     Urine Culture Culture in progress            No results found for: \"PREGTESTUR\", \"PREGSERUM\", \"HCG\", \"HCGQUANT\"  Pain Management Panel  More data may exist         Latest Ref Rng & Units 12/19/2024 6/22/2023   Pain Management Panel   Creatinine, Urine mg/dL  mg/dL - 152.9  152.9    Amphetamine, Urine Qual Negative Negative  -   Barbiturates Screen, Urine Negative Negative  -   Benzodiazepine Screen, Urine Negative Negative  -   Buprenorphine, Screen, Urine Negative Negative  -   Cocaine Screen, Urine Negative Negative  -   Fentanyl, Urine Negative Negative  -   Methadone Screen , Urine Negative Negative  -   Methamphetamine, Ur Negative Negative  -      Details          Multiple values from one day are sorted in reverse-chronological order               I have personally looked at the labs and they are summarized above.  ----------------------------------------------------------------------------------------------------------------------  Detailed radiology reports for the last 24 hours:    Imaging Results (Last 24 Hours)       ** No results found for the last 24 hours. **          I have personally looked at the radiology images and I have read the available final and preliminary reports.    Assessment & Plan    Complicated UTI  - Patient was started on Rocephin in the ED which we will continue on the floor  - Gentle hydration overnight  - Await urine culture    Bilateral lower extremity weakness  -MRI of the brain within normal limits  - PT and OT evaluation  - Consider " neurology consultation if her strength does not improve    Rosacea  -I will start the patient on MetroGel if available  VTE Prophylaxis:   Active VTE Prophylaxis  Pharmacologic:        Start     Dose Route Frequency Stop    12/20/24 1430  apixaban (ELIQUIS) tablet 5 mg         5 mg PO Every 12 Hours Scheduled --                  Mechanical:        Start        12/19/24 1932  Maintain Sequential Compression Device  Continuous                             The patient is high risk due to the following diagnoses/reasons: Complicated UTI    Disposition: Discharge to home early next week if she is not approved for inpatient rehab    Thien Reardon DO  [facility]  12/21/24  14:11 EST

## 2024-12-21 NOTE — PLAN OF CARE
Goal Outcome Evaluation:  Plan of Care Reviewed With: patient        Progress: no change  Outcome Evaluation: Patient has been resting in bed throughout the night. aox4, VSS on RA. no acute changes noted at this time. will continue with plan of care.

## 2024-12-22 PROCEDURE — 25010000002 CEFTRIAXONE PER 250 MG: Performed by: FAMILY MEDICINE

## 2024-12-22 PROCEDURE — 99232 SBSQ HOSP IP/OBS MODERATE 35: CPT | Performed by: FAMILY MEDICINE

## 2024-12-22 RX ORDER — DIPHENHYDRAMINE HCL 12.5 MG/5ML
12.5 SOLUTION ORAL ONCE
Status: COMPLETED | OUTPATIENT
Start: 2024-12-22 | End: 2024-12-22

## 2024-12-22 RX ORDER — FAMOTIDINE 20 MG/1
20 TABLET, FILM COATED ORAL ONCE
Status: COMPLETED | OUTPATIENT
Start: 2024-12-22 | End: 2024-12-22

## 2024-12-22 RX ADMIN — Medication 400 MG: at 08:07

## 2024-12-22 RX ADMIN — AMLODIPINE BESYLATE 5 MG: 5 TABLET ORAL at 08:08

## 2024-12-22 RX ADMIN — CARVEDILOL 6.25 MG: 6.25 TABLET, FILM COATED ORAL at 21:12

## 2024-12-22 RX ADMIN — APIXABAN 5 MG: 5 TABLET, FILM COATED ORAL at 08:08

## 2024-12-22 RX ADMIN — ATORVASTATIN CALCIUM 40 MG: 40 TABLET, FILM COATED ORAL at 20:51

## 2024-12-22 RX ADMIN — FLECAINIDE ACETATE 100 MG: 50 TABLET ORAL at 20:51

## 2024-12-22 RX ADMIN — CARVEDILOL 6.25 MG: 6.25 TABLET, FILM COATED ORAL at 08:08

## 2024-12-22 RX ADMIN — Medication 5000 UNITS: at 08:08

## 2024-12-22 RX ADMIN — APIXABAN 5 MG: 5 TABLET, FILM COATED ORAL at 20:51

## 2024-12-22 RX ADMIN — Medication 1000 MCG: at 08:08

## 2024-12-22 RX ADMIN — ASPIRIN 81 MG: 81 TABLET, COATED ORAL at 08:11

## 2024-12-22 RX ADMIN — Medication 10 ML: at 20:51

## 2024-12-22 RX ADMIN — DIPHENHYDRAMINE HYDROCHLORIDE 12.5 MG: 12.5 SOLUTION ORAL at 23:36

## 2024-12-22 RX ADMIN — FLECAINIDE ACETATE 100 MG: 50 TABLET ORAL at 08:08

## 2024-12-22 RX ADMIN — SODIUM CHLORIDE 2000 MG: 9 INJECTION, SOLUTION INTRAVENOUS at 16:16

## 2024-12-22 RX ADMIN — LEVOTHYROXINE SODIUM 125 MCG: 0.12 TABLET ORAL at 08:07

## 2024-12-22 RX ADMIN — Medication 10 ML: at 08:07

## 2024-12-22 RX ADMIN — NYSTATIN: 100000 POWDER TOPICAL at 20:51

## 2024-12-22 RX ADMIN — NYSTATIN: 100000 POWDER TOPICAL at 08:09

## 2024-12-22 RX ADMIN — FAMOTIDINE 20 MG: 20 TABLET, FILM COATED ORAL at 23:48

## 2024-12-22 NOTE — PROGRESS NOTES
[facility] HOSPITALIST PROGRESS NOTE     Patient Identification:  Name:  Ana Maradiaga  Age:  59 y.o.  Sex:  female  :  1965  MRN:  [id number]  Visit Number:  [id number]  ROOM: [address] [place] [address]     Primary Care Provider:  Val Purcell PA     Date of Admission: 2024    Length of stay in inpatient status:  3    Subjective     Chief Compliant:    Chief Complaint   Patient presents with    Weakness - Generalized    Blood in Urine     History of Presenting Illness:    Patient was seen and examined face-to-face.  Patient says she attempted to stand on her own and while able to with her hands on the arms of the bedside commode she felt very shaky and overall very weak.    Objective     Current Hospital Meds:  amLODIPine, 5 mg, Oral, Daily  apixaban, 5 mg, Oral, Q12H  aspirin, 81 mg, Oral, Daily  atorvastatin, 40 mg, Oral, Nightly  carvedilol, 6.25 mg, Oral, Q12H  cefTRIAXone, 2,000 mg, Intravenous, Q24H  flecainide, 100 mg, Oral, BID  levothyroxine, 125 mcg, Oral, Daily  magnesium oxide, 400 mg, Oral, Daily  nystatin, , Topical, Q12H  sodium chloride, 10 mL, Intravenous, Q12H  vitamin B-12, 1,000 mcg, Oral, Daily  vitamin D3, 5,000 Units, Oral, Daily       Current Antimicrobial Therapy:  Anti-Infectives (From admission, onward)      Ordered     Dose/Rate Route Frequency Start Stop    24 0935  cefTRIAXone (ROCEPHIN) 2,000 mg in sodium chloride 0.9 % 100 mL IVPB-VTB        Ordering Provider: Thien Reardon DO    2,000 mg  200 mL/hr over 30 Minutes Intravenous Every 24 Hours 24 1700 24 1659    24 1555  cefTRIAXone (ROCEPHIN) 2,000 mg in sodium chloride 0.9 % 100 mL IVPB-VTB        Ordering Provider: Mark Vasquez DO    2,000 mg  200 mL/hr over 30 Minutes Intravenous Once 24 1610 24 1813          Current Diuretic Therapy:  Diuretics (From admission, onward)      None           ----------------------------------------------------------------------------------------------------------------------  Vital Signs:  Temp:  [97.9 °F (36.6 °C)-98.4 °F (36.9 °C)] 98.2 °F (36.8 °C)  Heart Rate:  [68-74] 70  Resp:  [16-20] 18  BP: (105-133)/(60-74) 117/69  SpO2:  [95 %-96 %] 96 %  on   ;   Device (Oxygen Therapy): room air  Body mass index is 56.25 kg/m².    Wt Readings from Last 3 Encounters:   12/19/24 (!) 153 kg (338 lb)   10/24/24 116 kg (256 lb 6.4 oz)   10/11/24 (!) 164 kg (362 lb)     Intake & Output (last 3 days)         12/17 0701  12/18 0700 12/18 0701  12/19 0700 12/19 0701  12/20 0700 12/20 0701  12/21 0700    P.O.   500 360    I.V. (mL/kg)   686 (4.5)     IV Piggyback   100     Total Intake(mL/kg)   1286 (8.4) 360 (2.4)    Urine (mL/kg/hr)   250     Stool   0     Total Output   250     Net   +1036 +360            Stool Unmeasured Occurrence   0 x           Diet: Cardiac; Healthy Heart (2-3 Na+); Fluid Consistency: Thin (IDDSI 0)  ----------------------------------------------------------------------------------------------------------------------  Physical Exam  Constitutional:  Well-developed and well-nourished.  No acute distress.      HENT:  Head:  Normocephalic and atraumatic.  Mouth:  Moist mucous membranes.    Eyes:  Conjunctivae and EOM are normal. No scleral icterus.    Neck:  Neck supple.  No JVD present.    Cardiovascular:  Normal rate, regular rhythm and normal heart sounds with no murmur.  Pulmonary/Chest:  No respiratory distress, no wheezes, no crackles, with normal breath sounds and good air movement.  Abdominal:  Soft. No distension and no tenderness.   Musculoskeletal:  No tenderness, and no deformity.  No red or swollen joints anywhere.    Neurological:  Alert and oriented to person, place, and time.  No cranial nerve deficit.    Skin: Dry appearing redness of her face.  Peripheral vascular:  No clubbing, no cyanosis, no edema.  Psychiatric: Appropriate mood and  "affect  : Pure wick in place with grossly bloody urine in the tubing and canister    ----------------------------------------------------------------------------------------------------------------------  Tele:    ----------------------------------------------------------------------------------------------------------------------  LABS:    CBC and coagulation:  Results from last 7 days   Lab Units 12/21/24 0019 12/20/24 0424 12/19/24 1956 12/19/24  1439   LACTATE mmol/L  --   --   --  1.5   WBC 10*3/mm3 6.90 7.86 9.44 10.86*   HEMOGLOBIN g/dL 11.3* 11.9* 13.3 14.4   HEMATOCRIT % 35.6 38.1 41.1 43.0   MCV fL 98.1* 97.4* 95.8 93.9   MCHC g/dL 31.7 31.2* 32.4 33.5   PLATELETS 10*3/mm3 158 169 195 225   INR   --   --   --  1.91*   D DIMER QUANT MCGFEU/mL  --   --   --  0.27     Acid/base balance:      Renal and electrolytes:  Results from last 7 days   Lab Units 12/21/24 0019 12/20/24 0424 12/19/24 1956 12/19/24  1439   SODIUM mmol/L 140 138 138 138   POTASSIUM mmol/L 3.8 4.5 3.6 3.5   CHLORIDE mmol/L 105 101 98 97*   CO2 mmol/L 24.7 25.9 24.7 24.3   BUN mg/dL 18 19 20 20   CREATININE mg/dL 1.26* 1.40* 1.53* 1.55*   CALCIUM mg/dL 8.4* 8.7 9.4 9.9   GLUCOSE mg/dL 127* 77 93 83     Estimated Creatinine Clearance: 72.4 mL/min (A) (by C-G formula based on SCr of 1.26 mg/dL (H)).    Liver and pancreatic function:  Results from last 7 days   Lab Units 12/19/24  1439   ALBUMIN g/dL 3.7   BILIRUBIN mg/dL 1.5*   ALK PHOS U/L 128*   AST (SGOT) U/L 34*   ALT (SGPT) U/L 31   LIPASE U/L 42     Endocrine function:  No results found for: \"HGBA1C\"  Point of care bedside glucose levels:      Glucose levels from the [facility]:  Results from last 7 days   Lab Units 12/21/24  0019 12/20/24  0424 12/19/24  1956 12/19/24  1439   GLUCOSE mg/dL 127* 77 93 83     Lab Results   Component Value Date    TSH 3.320 12/19/2024    FREET4 1.18 03/21/2022     Cardiac:  Results from last 7 days   Lab Units 12/19/24  1439   CK TOTAL U/L 67     "   Cultures:  Lab Results   Component Value Date    COLORU Red (A) 12/19/2024    CLARITYU Turbid (A) 12/19/2024    PHUR <=5.0 12/19/2024    PROTEINUR 50.7 06/22/2023    GLUCOSEU Negative 12/19/2024    KETONESU Negative 12/19/2024    BLOODU Moderate (2+) (A) 12/19/2024    NITRITEU Positive (A) 12/19/2024    LEUKOCYTESUR Large (3+) (A) 12/19/2024    BILIRUBINUR Moderate (2+) (A) 12/19/2024    UROBILINOGEN 0.2 E.U./dL 12/19/2024    RBCUA Too Numerous to Count (A) 12/19/2024    WBCUA Too Numerous to Count (A) 12/19/2024    BACTERIA 3+ (A) 12/19/2024     Microbiology Results (last 10 days)       Procedure Component Value - Date/Time    COVID PRE-OP / PRE-PROCEDURE SCREENING ORDER (NO ISOLATION) - Swab, Nasopharynx [[id number]]  (Normal) Collected: 12/19/24 1452    Lab Status: Final result Specimen: Swab from Nasopharynx Updated: 12/19/24 1624    Narrative:      The following orders were created for panel order COVID PRE-OP / PRE-PROCEDURE SCREENING ORDER (NO ISOLATION) - Swab, Nasopharynx.  Procedure                               Abnormality         Status                     ---------                               -----------         ------                     COVID-19 and FLU A/B PCR...[[id number]]  Normal              Final result                 Please view results for these tests on the individual orders.    COVID-19 and FLU A/B PCR, 1 HR TAT - Swab, Nasopharynx [[id number]]  (Normal) Collected: 12/19/24 1452    Lab Status: Final result Specimen: Swab from Nasopharynx Updated: 12/19/24 1624     COVID19 Not Detected     Influenza A PCR Not Detected     Influenza B PCR Not Detected    Narrative:      Fact sheet for providers: https://www.fda.gov/media/435446/download    Fact sheet for patients: https://www.fda.gov/media/996879/download    Test performed by PCR.    Blood Culture - Blood, Hand, Right [[id number]]  (Normal) Collected: 12/19/24 9498    Lab Status: Preliminary result Specimen: Blood from Hand, Right Updated:  "12/21/24 1500     Blood Culture No growth at 2 days    Blood Culture - Blood, Arm, Right [[id number]]  (Normal) Collected: 12/19/24 1439    Lab Status: Preliminary result Specimen: Blood from Arm, Right Updated: 12/21/24 1500     Blood Culture No growth at 2 days    Urine Culture - Urine, Urine, Clean Catch [[id number]] Collected: 12/19/24 1439    Lab Status: Final result Specimen: Urine, Clean Catch Updated: 12/21/24 1450     Urine Culture >100,000 CFU/mL Mixed Toya Isolated    Narrative:      Specimen contains mixed organisms of questionable pathogenicity suggestive of contamination. If symptoms persist, suggest recollection.  Colonization of the urinary tract without infection is common. Treatment is discouraged unless the patient is symptomatic, pregnant, or undergoing an invasive urologic procedure.            No results found for: \"PREGTESTUR\", \"PREGSERUM\", \"HCG\", \"HCGQUANT\"  Pain Management Panel  More data may exist         Latest Ref Rng & Units 12/19/2024 6/22/2023   Pain Management Panel   Creatinine, Urine mg/dL  mg/dL - 152.9  152.9    Amphetamine, Urine Qual Negative Negative  -   Barbiturates Screen, Urine Negative Negative  -   Benzodiazepine Screen, Urine Negative Negative  -   Buprenorphine, Screen, Urine Negative Negative  -   Cocaine Screen, Urine Negative Negative  -   Fentanyl, Urine Negative Negative  -   Methadone Screen , Urine Negative Negative  -   Methamphetamine, Ur Negative Negative  -      Details          Multiple values from one day are sorted in reverse-chronological order               I have personally looked at the labs and they are summarized above.  ----------------------------------------------------------------------------------------------------------------------  Detailed radiology reports for the last 24 hours:    Imaging Results (Last 24 Hours)       ** No results found for the last 24 hours. **          I have personally looked at the radiology images and I have read the " available final and preliminary reports.    Assessment & Plan    Complicated UTI with gross hematuria  - Patient was started on Rocephin in the ED which we will continue on the floor  - Gentle hydration overnight  - Await urine culture    Bilateral lower extremity weakness  -MRI of the brain within normal limits  - PT and OT evaluation  - Consider neurology consultation if her strength does not improve  -Consultation for inpatient rehab  Rosacea  -I will start the patient on MetroGel if available  VTE Prophylaxis:   Active VTE Prophylaxis  Pharmacologic:        Start     Dose Route Frequency Stop    12/20/24 1430  apixaban (ELIQUIS) tablet 5 mg         5 mg PO Every 12 Hours Scheduled --                  Mechanical:        Start        12/19/24 1932  Maintain Sequential Compression Device  Continuous                             The patient is high risk due to the following diagnoses/reasons: Complicated UTI    Disposition: Discharge to home early next week if she is not approved for inpatient rehab    Thien Reardon DO  [facility]  12/22/24  13:59 EST

## 2024-12-22 NOTE — PLAN OF CARE
Goal Outcome Evaluation:   Pt resting in bed. No acute changes this shift. VSS. Will continue plan of care.

## 2024-12-22 NOTE — PLAN OF CARE
Goal Outcome Evaluation:  Plan of Care Reviewed With: patient        Progress: no change  Outcome Evaluation: Patient has been resting in bed throughout the night, No pt complaints or concerns. VSS on RA. no acute changes noted at this time. will continue with plan of care.

## 2024-12-23 LAB — ACTH PLAS-MCNC: 5.8 PG/ML (ref 7.2–63.3)

## 2024-12-23 PROCEDURE — 99221 1ST HOSP IP/OBS SF/LOW 40: CPT

## 2024-12-23 PROCEDURE — 25010000002 CEFTRIAXONE PER 250 MG: Performed by: FAMILY MEDICINE

## 2024-12-23 PROCEDURE — 97162 PT EVAL MOD COMPLEX 30 MIN: CPT

## 2024-12-23 PROCEDURE — 99232 SBSQ HOSP IP/OBS MODERATE 35: CPT | Performed by: STUDENT IN AN ORGANIZED HEALTH CARE EDUCATION/TRAINING PROGRAM

## 2024-12-23 RX ORDER — CARVEDILOL 3.12 MG/1
3.12 TABLET ORAL EVERY 12 HOURS SCHEDULED
Status: DISCONTINUED | OUTPATIENT
Start: 2024-12-23 | End: 2024-12-27

## 2024-12-23 RX ORDER — BENZOCAINE/MENTHOL 6 MG-10 MG
1 LOZENGE MUCOUS MEMBRANE EVERY 8 HOURS SCHEDULED
Status: DISCONTINUED | OUTPATIENT
Start: 2024-12-23 | End: 2025-01-09 | Stop reason: HOSPADM

## 2024-12-23 RX ORDER — AMMONIUM LACTATE 12 G/100G
1 CREAM TOPICAL DAILY
Status: DISCONTINUED | OUTPATIENT
Start: 2024-12-23 | End: 2025-01-09 | Stop reason: HOSPADM

## 2024-12-23 RX ADMIN — CARVEDILOL 6.25 MG: 6.25 TABLET, FILM COATED ORAL at 08:39

## 2024-12-23 RX ADMIN — Medication 400 MG: at 08:33

## 2024-12-23 RX ADMIN — LEVOTHYROXINE SODIUM 125 MCG: 0.12 TABLET ORAL at 08:33

## 2024-12-23 RX ADMIN — FLECAINIDE ACETATE 100 MG: 50 TABLET ORAL at 08:33

## 2024-12-23 RX ADMIN — HYDROCORTISONE 1 APPLICATION: 1 CREAM TOPICAL at 13:43

## 2024-12-23 RX ADMIN — APIXABAN 5 MG: 5 TABLET, FILM COATED ORAL at 20:45

## 2024-12-23 RX ADMIN — Medication 5000 UNITS: at 08:33

## 2024-12-23 RX ADMIN — FLECAINIDE ACETATE 100 MG: 50 TABLET ORAL at 20:45

## 2024-12-23 RX ADMIN — Medication 1000 MCG: at 08:33

## 2024-12-23 RX ADMIN — AMMONIUM LACTATE 1 APPLICATION: 120 CREAM TOPICAL at 17:23

## 2024-12-23 RX ADMIN — Medication 10 ML: at 20:48

## 2024-12-23 RX ADMIN — ATORVASTATIN CALCIUM 40 MG: 40 TABLET, FILM COATED ORAL at 20:45

## 2024-12-23 RX ADMIN — AMLODIPINE BESYLATE 5 MG: 5 TABLET ORAL at 08:40

## 2024-12-23 RX ADMIN — NYSTATIN: 100000 POWDER TOPICAL at 20:48

## 2024-12-23 RX ADMIN — CARVEDILOL 3.12 MG: 3.12 TABLET, FILM COATED ORAL at 20:45

## 2024-12-23 RX ADMIN — NYSTATIN: 100000 POWDER TOPICAL at 08:36

## 2024-12-23 RX ADMIN — APIXABAN 5 MG: 5 TABLET, FILM COATED ORAL at 08:33

## 2024-12-23 RX ADMIN — Medication 10 ML: at 08:36

## 2024-12-23 RX ADMIN — ASPIRIN 81 MG: 81 TABLET, COATED ORAL at 08:34

## 2024-12-23 RX ADMIN — HYDROCORTISONE 1 APPLICATION: 1 CREAM TOPICAL at 22:52

## 2024-12-23 RX ADMIN — SODIUM CHLORIDE 2000 MG: 9 INJECTION, SOLUTION INTRAVENOUS at 16:20

## 2024-12-23 NOTE — PLAN OF CARE
Goal Outcome Evaluation:      Pt has slept throughout the night. Pt complained of irritating rash. MD made aware. Benadryl ordered and given. Pt sat on the side of the bed this shift. VSS. Will continue with plan of care.

## 2024-12-23 NOTE — CONSULTS
Consult Note     Patient Identification:  Name:  Ana Maradiaga  Age:  59 y.o.  Sex:  female  :  1965  MRN:  [id number]   Visit Number:  [id number]  Primary Care Physician:  Val Purcell PA     Subjective     Chief complaint:   Chief Complaint   Patient presents with    Weakness - Generalized    Blood in Urine       History of presenting illness:   Patient is a 59 y.o. female with past medical history significant for anemia, CKD, atrial fibrillation, hypertension, gout and CVA that presented to the [facility] Emergency Department for complaints of hematuria. Wound care consulted to evaluate bilateral lower extremities. She reports she has had lymphedema for several years now but has not undergone any therapy or treatment. She states she has dealt with recurrent cellulitis which led to hospitalization. She currently does not appear to have cellulitis but has a purple/red discoloration that she reports has been chronic after her last recurrence of cellulitis. Denies pain or open wounds. No fever or chills. The patients nurse, Dia, was at bedside during her exam.    ---------------------------------------------------------------------------------------------------------------------   Review of Systems:  Review of Systems   Constitutional: Negative.    Respiratory: Negative.     Cardiovascular:  Positive for leg swelling.   Endocrine: Negative.    Skin:  Positive for color change.        ---------------------------------------------------------------------------------------------------------------------   Past Medical History:   Diagnosis Date    Anemia     Arrhythmia 2018    Afib    Arthritis     Atrial fibrillation 2018    Bronchitis     Chronic kidney disease 2018    Gout     Hypertension     Stroke 2021     History reviewed. No pertinent surgical history.  Family History   Problem Relation Age of Onset    Heart disease Mother     Stroke Mother     Hypertension Father     Arthritis  Father     Hypertension Sister     Breast cancer Paternal Cousin      Social History     Socioeconomic History    Marital status: Single   Tobacco Use    Smoking status: Never    Smokeless tobacco: Never   Vaping Use    Vaping status: Never Used   Substance and Sexual Activity    Alcohol use: Never    Drug use: Never    Sexual activity: Not Currently     Partners: Male     ---------------------------------------------------------------------------------------------------------------------   Allergies:  Vancomycin, Cefepime, and Cephalosporins  ---------------------------------------------------------------------------------------------------------------------   Medications below are reported home medications pulling from within the system; at this time, these medications have not been reconciled unless otherwise specified and are in the verification process for further verifcation as current home medications.    Prior to Admission Medications       Prescriptions Last Dose Informant Patient Reported? Taking?    amLODIPine (NORVASC) 5 MG tablet Past Week Self, Other No Yes    TAKE 1 TABLET BY MOUTH DAILY    Aspirin Low Dose 81 MG EC tablet 12/19/2024 Self, Other Yes Yes    Take 1 tablet by mouth Daily.    atorvastatin (LIPITOR) 40 MG tablet Past Week Self, Other Yes Yes    Take 1 tablet by mouth every night at bedtime.    bumetanide (BUMEX) 1 MG tablet 12/19/2024 Self, Other Yes Yes    Take 2 tablets by mouth Daily.    carvedilol (COREG) 6.25 MG tablet 12/19/2024 Self, Other No Yes    TAKE 1 TABLET BY MOUTH EVERY 12 HOURS    Eliquis 5 MG tablet tablet 12/19/2024 Self, Other No Yes    Take 1 tablet by mouth Every 12 (Twelve) Hours.    flecainide (TAMBOCOR) 100 MG tablet 12/19/2024 Self, Other No Yes    TAKE 1 TABLET BY MOUTH TWICE A DAY    levothyroxine (SYNTHROID, LEVOTHROID) 125 MCG tablet 12/19/2024 Self, Other Yes Yes    Take 1 tablet by mouth Daily.    magnesium oxide (MAG-OX) 400 MG tablet 12/19/2024 Self Yes Yes     Take 1 tablet by mouth Daily.    Tirzepatide (Mounjaro) 5 MG/0.5ML solution auto-injector 12/13/2024 Self, Other No No    Inject 0.5 mL under the skin into the appropriate area as directed 1 (One) Time Per Week.    vitamin B-12 (CYANOCOBALAMIN) 1000 MCG tablet 12/19/2024 Self Yes Yes    Take 1 tablet by mouth Daily.    vitamin D3 125 MCG (5000 UT) capsule capsule 12/19/2024 Self Yes Yes    Take 1 capsule by mouth Daily.          ---------------------------------------------------------------------------------------------------------------------    Objective     Hospital Scheduled Meds:  amLODIPine, 5 mg, Oral, Daily  apixaban, 5 mg, Oral, Q12H  aspirin, 81 mg, Oral, Daily  atorvastatin, 40 mg, Oral, Nightly  carvedilol, 6.25 mg, Oral, Q12H  cefTRIAXone, 2,000 mg, Intravenous, Q24H  flecainide, 100 mg, Oral, BID  hydrocortisone, 1 Application, Topical, Q8H  levothyroxine, 125 mcg, Oral, Daily  magnesium oxide, 400 mg, Oral, Daily  nystatin, , Topical, Q12H  sodium chloride, 10 mL, Intravenous, Q12H  vitamin B-12, 1,000 mcg, Oral, Daily  vitamin D3, 5,000 Units, Oral, Daily           Current listed hospital scheduled medications may not yet reflect those currently placed in orders that are signed and held, awaiting patient's arrival to floor/unit.    ---------------------------------------------------------------------------------------------------------------------   Vital Signs:  Temp:  [98.2 °F (36.8 °C)-98.6 °F (37 °C)] 98.2 °F (36.8 °C)  Heart Rate:  [64-77] 73  Resp:  [16-18] 16  BP: ()/(54-76) 108/60  Mean Arterial Pressure (Non-Invasive) for the past 24 hrs (Last 3 readings):   Noninvasive MAP (mmHg)   12/23/24 0300 81   12/23/24 0003 79   12/22/24 1900 97     SpO2 Percentage    12/23/24 0653 12/23/24 1100 12/23/24 1500   SpO2: 96% 94% 96%     SpO2:  [94 %-96 %] 96 %  on   ;   Device (Oxygen Therapy): room air    Body mass index is 56.25 kg/m².  Wt Readings from Last 3 Encounters:   12/19/24 (!) 153  kg (338 lb)   10/24/24 116 kg (256 lb 6.4 oz)   10/11/24 (!) 164 kg (362 lb)       ---------------------------------------------------------------------------------------------------------------------   Physical Exam:    B/L lower extremities with evidence of edema, dryness and discoloration. No significant warmth or erythema noted.     Assessment & Plan      Assessment     Lymphedema  Xerosis    Plan:     Apply ammonium lactate lotion to the bilateral lower extremities QD/PRN. Patient would benefit from compression but will order B/L ABIs before initiating. She is agreeable to this plan.     She would benefit from outpatient lymphedema treatment as well.       Ema Paris PA-C  [facility]    12/23/24  15:44 EST

## 2024-12-23 NOTE — CONSULTS
Inpatient rehab to sign off on patient at this time as PT note on 12/23 notes anticipated discharge disposition is home with assist.

## 2024-12-23 NOTE — THERAPY EVALUATION
"Acute Care - Physical Therapy Initial Evaluation   [facility]     Patient Name: Ana Maradiaga  : 1965  MRN: [id number]  Today's Date: 2024   Onset of Illness/Injury or Date of Surgery: 24  Visit Dx:     ICD-10-CM ICD-9-CM   1. Urinary tract infection with hematuria, site unspecified  N39.0 599.0    R31.9 599.70   2. Weakness  R53.1 780.79   3. High serum testosterone  R79.89 790.99     Patient Active Problem List   Diagnosis    Type 2 diabetes mellitus with hyperglycemia, without long-term current use of insulin    Acquired hypothyroidism    Hirsutism    Elevated testosterone level in female    Complicated UTI (urinary tract infection)     Past Medical History:   Diagnosis Date    Anemia     Arrhythmia     Afib    Arthritis     Atrial fibrillation     Bronchitis     Chronic kidney disease     Gout     Hypertension     Stroke 2021     History reviewed. No pertinent surgical history.  PT Assessment (Last 12 Hours)       PT Evaluation and Treatment       Row Name 24 1253          Physical Therapy Time and Intention    Subjective Information complains of;weakness;swelling  -AG     Document Type evaluation  -AG     Mode of Treatment physical therapy  -AG     Patient Effort adequate  -AG     Symptoms Noted During/After Treatment fatigue  -AG       Row Name 24 1253          General Information    Patient Profile Reviewed yes  -AG     Onset of Illness/Injury or Date of Surgery 24  -     Referring Physician Dr. Reardon  -     Patient Observations agree to therapy;cooperative;alert  -AG     Patient/Family/Caregiver Comments/Observations pt. supine in bed on room air; pt. morbidly obese, agreeable to participation.  Lymphedema B lower legs.  -AG     Prior Level of Function --  pt. reports incr difficulty with ambulation; states he legs begin to \"buckle and she is too shaky to walk far\".  Pt. has been using rollator at all times lately, short distances.  -AG     " Equipment Currently Used at Home cane, quad  -AG     Pertinent History of Current Functional Problem pt. admitted wtih complicated UTI; hx CVA  -AG     Existing Precautions/Restrictions fall  -AG     Equipment Issued to Patient gait belt  -AG     Risks Reviewed patient:;increased discomfort;LOB  -AG     Benefits Reviewed patient:;improve function;increase independence;increase strength;increase balance;increase knowledge;decrease risk of DVT  -AG     Barriers to Rehab medically complex;physical barrier  -AG       Row Name 12/23/24 1253          Previous Level of Function/Home Environm    BADLs, Previous Functional Level independent  -AG     Bed Mobility, Previous Functional Level independent  uses an adjustable bed at home, requires HOB elevated  -AG     Household Ambulation, Previous Functional Level independent;uses device or equipment  -AG       Row Name 12/23/24 1253          Living Environment    Current Living Arrangements home  -AG     Home Accessibility --  son is currently building exterior ramp into home; no interior stairs  -AG     People in Home child(woody), adult  -AG     Name(s) of People in Home pt. lives with son, DIL and grandchild  -AG     Primary Care Provided by self  -AG       Row Name 12/23/24 1253          Home Use of Assistive/Adaptive Equipment    Equipment Currently Used at Home rollator  -AG       Row Name 12/23/24 1253          Pain    Pretreatment Pain Rating 0/10 - no pain  -AG     Posttreatment Pain Rating 0/10 - no pain  -AG       Row Name 12/23/24 1253          Cognition    Affect/Mental Status (Cognition) WFL  -AG     Orientation Status (Cognition) oriented x 3  -AG     Follows Commands (Cognition) WFL  -AG     Personal Safety Interventions fall prevention program maintained;gait belt;nonskid shoes/slippers when out of bed;supervised activity  -AG       Row Name 12/23/24 1253          Range of Motion Comprehensive    General Range of Motion --  B knees, ankles WFL; decr B hips  -AG        Row Name 12/23/24 1253          Strength Comprehensive (MMT)    General Manual Muscle Testing (MMT) Assessment --  L LE 3+/5; R LE 4-/5 quads; B psoas 2+/5  -AG       Row Name 12/23/24 1253          Bed Mobility    Bed Mobility scooting/bridging;supine-sit;sit-supine  -AG     Scooting/Bridging [place] (Bed Mobility) standby assist  -AG     Supine-Sit [place] (Bed Mobility) minimum assist (75% patient effort);nonverbal cues (demo/gesture);verbal cues  -AG     Bed Mobility, Safety Issues decreased use of arms for pushing/pulling;decreased use of legs for bridging/pushing  -AG     Assistive Device (Bed Mobility) bed rails  -AG       Row Name 12/23/24 1253          Transfers    Transfers sit-stand transfer;stand-sit transfer;stand pivot/stand step transfer  -AG       Row Name 12/23/24 1253          Sit-Stand Transfer    Sit-Stand [place] (Transfers) verbal cues;nonverbal cues (demo/gesture);contact guard  -AG     Assistive Device (Sit-Stand Transfers) walker, front-wheeled  -AG       Row Name 12/23/24 1253          Stand-Sit Transfer    Stand-Sit [place] (Transfers) verbal cues;nonverbal cues (demo/gesture);contact guard;minimum assist (75% patient effort)  -AG     Assistive Device (Stand-Sit Transfers) walker, front-wheeled  -AG       Row Name 12/23/24 1253          Stand Pivot/Stand Step Transfer    Stand Pivot/Stand Step [place] (Transfers) contact guard;nonverbal cues (demo/gesture);verbal cues  -AG     Assistive Device (Stand Pivot Stand Step Transfer) walker, front-wheeled  -AG       Row Name 12/23/24 1253          Gait/Stairs (Locomotion)    [place] Level (Gait) contact guard;nonverbal cues (demo/gesture);verbal cues  -AG     Assistive Device (Gait) walker, front-wheeled  -AG     Patient was able to Ambulate yes  -AG     Distance in Feet (Gait) 6  -AG     Pattern (Gait) step-to  -AG       Row Name 12/23/24 1253          Safety Issues/Impairments Affecting Functional Mobility     Impairments Affecting Function (Mobility) balance;strength;endurance/activity tolerance;range of motion (ROM)  -       Row Name 12/23/24 1253          Balance    Balance Assessment sitting static balance;sitting dynamic balance;sit to stand dynamic balance;standing static balance;standing dynamic balance  -     Static Sitting Balance independent  -AG     Position, Sitting Balance unsupported;sitting edge of bed  -AG     Static Standing Balance contact guard;non-verbal cues (demo/gesture);verbal cues  -AG     Dynamic Standing Balance contact guard;non-verbal cues (demo/gesture);verbal cues  -AG     Position/Device Used, Standing Balance walker, front-wheeled  -AG       Row Name 12/23/24 1253          Coping    Observed Emotional State calm;cooperative;pleasant  -AG     Verbalized Emotional State acceptance  -     Trust Relationship/Rapport care explained;choices provided;thoughts/feelings acknowledged  -     Family/Support Persons family  -AG     Involvement in Care not present at bedside  -     Family/Support System Care self-care encouraged;support provided  -       Row Name 12/23/24 1253          Plan of Care Review    Plan of Care Reviewed With patient  -AG     Outcome Evaluation PT evaluation complete. Pt. required min A for supine>sit, STS w/ RW and min A/ CGA and ambulated x 6 ft to bedside chair; pt. fatigued quickly and stated B LE felt very weak while ambulating.  Pt. would benefit from continued skilled PT prior to d/c home.  Will continue to follow.  -       Row Name 12/23/24 1253          Positioning and Restraints    Pre-Treatment Position in bed  -     Post Treatment Position chair  -       Row Name 12/23/24 1255          Therapy Assessment/Plan (PT)    Patient/Family Therapy Goals Statement (PT) return home with family  -AG     Functional Level at Time of Evaluation (PT) min A  -AG     PT Diagnosis (PT) impaired functional endurance, mobility  -AG     Rehab Potential (PT) good  -AG      Criteria for Skilled Interventions Met (PT) yes;meets criteria  -AG     Therapy Frequency (PT) 2 times/wk  1-5 times/ week per priority  -AG     Predicted Duration of Therapy Intervention (PT) LOS  -AG     Problem List (PT) problems related to;balance;mobility;strength;range of motion (ROM)  -AG     Activity Limitations Related to Problem List (PT) unable to ambulate safely;unable to transfer safely;BADLs not performed adequately or safely  -       Row Name 12/23/24 1251          Therapy Plan Review/Discharge Plan (PT)    Therapy Plan Review (PT) evaluation/treatment results reviewed;care plan/treatment goals reviewed;patient;participants included;participants voiced agreement with care plan;current/potential barriers reviewed;risks/benefits reviewed  -       Row Name 12/23/24 1255          Physical Therapy Goals    Bed Mobility Goal Selection (PT) bed mobility, PT goal 1  -AG     Transfer Goal Selection (PT) transfer, PT goal 1  -AG     Gait Training Goal Selection (PT) gait training, PT goal 1  -AG       Row Name 12/23/24 1254          Bed Mobility Goal 1 (PT)    Activity/Assistive Device (Bed Mobility Goal 1, PT) scooting;sit to supine;supine to sit  -AG     [place] Level/Cues Needed (Bed Mobility Goal 1, PT) standby assist  -AG     Time Frame (Bed Mobility Goal 1, PT) by discharge  -AG       Row Name 12/23/24 1255          Transfer Goal 1 (PT)    Activity/Assistive Device (Transfer Goal 1, PT) sit-to-stand/stand-to-sit;bed-to-chair/chair-to-bed;toilet;walker, rolling  -AG     [place] Level/Cues Needed (Transfer Goal 1, PT) standby assist  -AG     Time Frame (Transfer Goal 1, PT) by discharge  -AG       Row Name 12/23/24 1250          Gait Training Goal 1 (PT)    Activity/Assistive Device (Gait Training Goal 1, PT) gait (walking locomotion);assistive device use;decrease fall risk;diminish gait deviation;improve balance and speed;increase endurance/gait distance;walker, rolling  -AG      [place] Level (Gait Training Goal 1, PT) standby assist  -AG     Distance (Gait Training Goal 1, PT) 30  -AG     Time Frame (Gait Training Goal 1, PT) by discharge  -AG               User Key  (r) = Recorded By, (t) = Taken By, (c) = Cosigned By      Initials Name Provider Type    Elizabeth Allen, PT Physical Therapist                    Physical Therapy Education       Title: PT OT SLP Therapies (Done)       Topic: Physical Therapy (Done)       Point: Mobility training (Done)       Learning Progress Summary            Patient Acceptance, E,D, VU,NR by  at 12/23/2024 1249                      Point: Home exercise program (Done)       Learning Progress Summary            Patient Acceptance, E,D, VU,NR by  at 12/23/2024 1249                      Point: Body mechanics (Done)       Learning Progress Summary            Patient Acceptance, E,D, VU,NR by  at 12/23/2024 1249                      Point: Precautions (Done)       Learning Progress Summary            Patient Acceptance, E,D, VU,NR by  at 12/23/2024 1249                                      User Key       Initials Effective Dates Name Provider Type [facility] 06/16/21 -  Elizabeth Aquino, PT Physical Therapist PT                  PT Recommendation and Plan  Anticipated Discharge Disposition (PT): home with assist, home with home health  Planned Therapy Interventions (PT): balance training, bed mobility training, gait training, home exercise program, orthotic fitting/training, transfer training, strengthening, patient/family education  Therapy Frequency (PT): 2 times/wk (1-5 times/ week per priority)  Plan of Care Reviewed With: patient  Outcome Evaluation: PT evaluation complete. Pt. required min A for supine>sit, STS w/ RW and min A/ CGA and ambulated x 6 ft to bedside chair; pt. fatigued quickly and stated B LE felt very weak while ambulating.  Pt. would benefit from continued skilled PT prior to d/c home.  Will continue to follow.        Time Calculation:    PT Charges       Row Name 12/23/24 1310             Time Calculation    PT Received On 12/23/24  -      PT Goal Re-Cert Due Date 01/06/25  -                User Key  (r) = Recorded By, (t) = Taken By, (c) = Cosigned By      Initials Name Provider Type    Elizabeth Allen, PT Physical Therapist                  Therapy Charges for Today       Code Description Service Date Service Provider Modifiers Qty    25797119112 HC PT EVAL MOD COMPLEXITY 4 12/23/2024 Elizabeth Aquino, PT GP 1            PT G-Codes  AM-PAC 6 Clicks Score (PT): 12    Elizabeth Aquino, PT  12/23/2024

## 2024-12-23 NOTE — CONSULTS
Diabetes Education    Patient Name:  Ana Maradiaga  YOB: 1965  MRN: [id number]  Admit Date:  12/19/2024        No A1C noted no history of diabetes, BG WNL, per stroke protocol, If any questions or concerns please re-consult or call. Thank you.       Electronically signed by:  Trixie Staley RN  12/23/24 07:46 EST

## 2024-12-23 NOTE — PLAN OF CARE
Goal Outcome Evaluation:   Pt resting in bed at this time. Pt has hematuria in urine, Dr. clemons. Pt worked with PT. Pt sat at bedside chair for lunch. Pt ambulated back to bed with x2 assist and walker. Pt was seen by urology and they plan to see her outpatient for large kidney stone. Pt seen by Inpatient wound care for lymphedema. No acute changes noted at this time. Will continue to follow plan of care.

## 2024-12-23 NOTE — PROGRESS NOTES
"Hospitalist Update:    Notified by RN that patient was complaining of new rash, primarily on her back and under her legs but also on upper arms to lesser extent. Patient declined benadryl per RN b/c did not want to be \"knocked out\". Went to bedside to examine patient, accompanied by RED Medina and later patient's RN joined us. Very mild scattered rash on upper arms anteriorly, but has large blanching rash that covers majority of the back. Patient reports itching. She admits she has been shifting back and forth in bed to try to relieve the itching. Back of legs has milder appearing blanching rash. Patient reports did not have any issue with rocephin she has been receiving since admission. However, she noticed rash developed shortly after she was washed yesterday and suspects possibly she is reacting the agent she was washed with. I would expect to see a more diffuse rash if that was the case, though. Her rash is primarily in areas where increased moisture would gather from lying in the bed. I recommended getting up and sitting in a chair to relieve some of the pressure on her back and posterior legs. Also recommended trying a dose of benadryl in case she is having an allergic reaction to something, and she is agreeable as long as it's a very low dose. Therefore, one time dose of liquid benadryl 12.5mg ordered, along with one time dose of pepcid. Continue to monitor.   "

## 2024-12-23 NOTE — CASE MANAGEMENT/SOCIAL WORK
Discharge Planning Assessment  [facility]     Patient Name: Ana Maradiaga  MRN: [id number]  Today's Date: 12/23/2024    Admit Date: 12/19/2024    Plan: CM spoke with Pt. Pt lives with her son Dano in [facility] and plans to return at d/c. Pt does not have HH or oxygen. Pt has a rollator that she bought off [employer]. Pt is requesting a wheelchair at d/c. Pt has no preference of DME company. PCP is Val MOON and gets prescriptions filled at [facility]. Pt has [employer] and denies any trouble with coverage. Family will transport at d/c after 1-2 weeks rehab.  CM will follow.   Discharge Needs Assessment    No documentation.                  Discharge Plan       Row Name 12/23/24 0849       Plan    Plan CM spoke with Pt. Pt lives with her son Dano in [facility] and plans to return at d/c. Pt does not have HH or oxygen. Pt has a rollator that she bought off [employer]. Pt is requesting a wheelchair at d/c. Pt has no preference of DME company. PCP is Val MOON and gets prescriptions filled at [facility]. Pt has [employer] and denies any trouble with coverage. Family will transport at d/c after 1-2 weeks rehab.  CM will follow.      Row Name 12/23/24 0790                                    Alexa Lowry RN

## 2024-12-23 NOTE — CONSULTS
Name:  Ana Maradiaga  :  1965    DATE OF ADMISSION  2024    DATE OF CONSULT  2024     REFERRING PHYSICIAN  * No referring provider recorded for this case *    PRIMARY CARE PHYSICIAN  Val Purcell PA    REASON FOR CONSULT  Gross hematuria, UTI, and nephrolithiasis    CHIEF COMPLAINT  Chief Complaint   Patient presents with    Weakness - Generalized    Blood in Urine       HISTORY OF PRESENT ILLNESS:   Ana Maradiaga is a 59 y.o. female who was admitted to the hospital who was admitted to the hospital for complicated urinary tract infection and gross hematuria.  She has a past medical history significant for anemia, atrial fibrillation, chronic kidney disease, hypertension, gout, and CVA.  Initial CT scan of the abdomen pelvis completed at time of ER visit showed a left UPJ calculus causing no significant obstructive uropathy measuring roughly 14 mm in size.  I did review a CT scan abdomen pelvis and 2023 and she had a left UPJ stone at that time with a nonobstructing lower to mid pole left intrarenal calculi.  It appears that both these calculi have migrated to the left UPJ.  Initial workup in the ER showed elevated creatinine of 1.55 and GFR 38.4.  Most recent blood work on the  did show improvement in her creatinine to 1.26 and GFR to 49.3.  Patient's white blood cell count was slightly elevated at 10.86. Hemoglobin and hematocrit are within normal range at 14.4 and 43 respectively.  Hemoglobin hematocrit of slightly decreased to 11.3 and 35.6 respectively.  White blood cell count is increased to 6.9.  Urine analysis completed 2 days ago showed 2+ bilirubin, 2+ blood, 2+ protein, 3+ leukocytes, and positive nitrites.  Microscopic UA showed too numerous to count red blood cells, too numerous to count white blood cells, and 3+ bacteria.  Urine culture finalized with 100,000 colony-forming units of mixed ann marie.  MRI showed no acute processes of the brain.    I saw Ana FU  Hiren in their hospital room this morning.  She was lying in bed in no acute distress.  Patient currently experienced a rash overnight and was evaluated the hospital and given Benadryl.  She has notable ecchymosis of the upper and lower extremities.  She does have an external PureWick catheter in place with red urine noted in collection canister.  Patient reports that she is known about her kidney stone for roughly 1 year.  She has never followed up with urologist for this.  She reports she has had intermittent gross blood in her urine since then.  States this worsened recently with generalized weakness and fatigue.  She continues on Rocephin 2000 mg SQ every 24 hours.  She is still currently taking Eliquis and aspirin.   She denies any back or flank pain at this time.  She is still having weakness and difficulty with ambulation.  She has been evaluated by PT and OT.  She denies any family history of kidney or bladder cancer.    PAST MEDICAL HISTORY  Past Medical History:   Diagnosis Date    Anemia     Arrhythmia 2018    Afib    Arthritis     Atrial fibrillation 2018    Bronchitis     Chronic kidney disease 2018    Gout     Hypertension     Stroke November 2021       PAST SURGICAL HISTORY  History reviewed. No pertinent surgical history.    SOCIAL HISTORY  Social History     Socioeconomic History    Marital status: Single   Tobacco Use    Smoking status: Never    Smokeless tobacco: Never   Vaping Use    Vaping status: Never Used   Substance and Sexual Activity    Alcohol use: Never    Drug use: Never    Sexual activity: Not Currently     Partners: Male       FAMILY HISTORY  Family History   Problem Relation Age of Onset    Heart disease Mother     Stroke Mother     Hypertension Father     Arthritis Father     Hypertension Sister     Breast cancer Paternal Cousin        ALLERGIES  Allergies   Allergen Reactions    Vancomycin Hives    Cefepime Rash    Cephalosporins Rash         INPATIENT MEDICATIONS  Current  Facility-Administered Medications   Medication Dose Route Frequency Provider Last Rate Last Admin    amLODIPine (NORVASC) tablet 5 mg  5 mg Oral Daily Thien Reardon DO   5 mg at 12/22/24 0808    apixaban (ELIQUIS) tablet 5 mg  5 mg Oral Q12H Thien Reardon DO   5 mg at 12/22/24 2051    aspirin EC tablet 81 mg  81 mg Oral Daily Thien Reardon DO   81 mg at 12/22/24 0811    atorvastatin (LIPITOR) tablet 40 mg  40 mg Oral Nightly Thien Reardon DO   40 mg at 12/22/24 2051    sennosides-docusate (PERICOLACE) 8.6-50 MG per tablet 2 tablet  2 tablet Oral BID PRN Thien Reardon DO        And    polyethylene glycol (MIRALAX) packet 17 g  17 g Oral Daily PRN Thien Reardon DO   17 g at 12/20/24 1035    And    bisacodyl (DULCOLAX) EC tablet 5 mg  5 mg Oral Daily PRN Thien Reardon DO        And    bisacodyl (DULCOLAX) suppository 10 mg  10 mg Rectal Daily PRN Thien Reardon DO        Calcium Replacement - Follow Nurse / BPA Driven Protocol   Not Applicable PRN Thien Reardon DO        carvedilol (COREG) tablet 6.25 mg  6.25 mg Oral Q12H Thien Reardon DO   6.25 mg at 12/22/24 2112    cefTRIAXone (ROCEPHIN) 2,000 mg in sodium chloride 0.9 % 100 mL IVPB-VTB  2,000 mg Intravenous Q24H Thien Reardon  mL/hr at 12/22/24 1616 2,000 mg at 12/22/24 1616    flecainide (TAMBOCOR) tablet 100 mg  100 mg Oral BID Thien Reardon DO   100 mg at 12/22/24 2051    HYDROcodone-acetaminophen (NORCO) 5-325 MG per tablet 1 tablet  1 tablet Oral Q6H PRN Thien Reardon DO        levothyroxine (SYNTHROID, LEVOTHROID) tablet 125 mcg  125 mcg Oral Daily Thien Reardon DO   125 mcg at 12/22/24 0807    magnesium oxide (MAG-OX) tablet 400 mg  400 mg Oral Daily Thien Reardon DO   400 mg at 12/22/24 0807    Magnesium Standard Dose Replacement - Follow Nurse / BPA Driven Protocol   Not Applicable PRN Thien Reardon DO        nystatin (MYCOSTATIN) powder   Topical Q12H Thien Reardon DO   Given at 12/22/24 2051    ondansetron ODT  "(ZOFRAN-ODT) disintegrating tablet 4 mg  4 mg Oral Q6H PRN Thien Reardon DO        Or    ondansetron (ZOFRAN) injection 4 mg  4 mg Intravenous Q6H PRN Thien Reardon DO        Phosphorus Replacement - Follow Nurse / BPA Driven Protocol   Not Applicable PRN Thien Reardon DO        Potassium Replacement - Follow Nurse / BPA Driven Protocol   Not Applicable PRN Thien Reardon DO        sodium chloride 0.9 % flush 10 mL  10 mL Intravenous PRN Mark Vasquez DO        sodium chloride 0.9 % flush 10 mL  10 mL Intravenous Q12H Thien Reardon DO   10 mL at 12/22/24 2051    sodium chloride 0.9 % flush 10 mL  10 mL Intravenous PRN Thien Reardon DO        sodium chloride 0.9 % infusion 40 mL  40 mL Intravenous PRN Thien Reardon DO        vitamin B-12 (CYANOCOBALAMIN) tablet 1,000 mcg  1,000 mcg Oral Daily Thien Reardon DO   1,000 mcg at 12/22/24 0808    vitamin D3 capsule 5,000 Units  5,000 Units Oral Daily Thien Reardon DO   5,000 Units at 12/22/24 0808       PHYSICAL EXAMINATION    BP 93/54 (BP Location: Right arm, Patient Position: Lying)   Pulse 68   Temp 98.2 °F (36.8 °C) (Oral)   Resp 16   Ht 165.1 cm (65\")   Wt (!) 153 kg (338 lb)   LMP  (LMP Unknown)   SpO2 96%   BMI 56.25 kg/m²     GENERAL:  A well-developed, chronically ill-appearing white female in no acute distress.  HEENT:  Pupils equally round and reactive to light.  Extraocular muscles intact.  CARDIOVASCULAR:  Regular rate and rhythm.  LUNGS:  Clear to auscultation bilaterally.  ABDOMEN:  Soft, nontender, nondistended with positive bowel sounds.  EXTREMITIES:  No clubbing, cyanosis or edema bilaterally.  SKIN: Notable petechiae and ecchymosis to the upper and lower extremity.  Small rash nonblanchable.  NEURO: Generalized weakness.  PSYCH:  Alert and oriented x3.    LABORATORY     WBC   Date Value Ref Range Status   12/21/2024 6.90 3.40 - 10.80 10*3/mm3 Final     RBC   Date Value Ref Range Status   12/21/2024 3.63 (L) 3.77 - " 5.28 10*6/mm3 Final     Hemoglobin   Date Value Ref Range Status   12/21/2024 11.3 (L) 12.0 - 15.9 g/dL Final     Hematocrit   Date Value Ref Range Status   12/21/2024 35.6 34.0 - 46.6 % Final     MCV   Date Value Ref Range Status   12/21/2024 98.1 (H) 79.0 - 97.0 fL Final     MCH   Date Value Ref Range Status   12/21/2024 31.1 26.6 - 33.0 pg Final     MCHC   Date Value Ref Range Status   12/21/2024 31.7 31.5 - 35.7 g/dL Final     RDW   Date Value Ref Range Status   12/21/2024 16.5 (H) 12.3 - 15.4 % Final     RDW-SD   Date Value Ref Range Status   12/21/2024 59.9 (H) 37.0 - 54.0 fl Final     MPV   Date Value Ref Range Status   12/21/2024 10.4 6.0 - 12.0 fL Final     Platelets   Date Value Ref Range Status   12/21/2024 158 140 - 450 10*3/mm3 Final     Neutrophil %   Date Value Ref Range Status   12/21/2024 68.8 42.7 - 76.0 % Final     Lymphocyte %   Date Value Ref Range Status   12/21/2024 16.7 (L) 19.6 - 45.3 % Final     Monocyte %   Date Value Ref Range Status   12/21/2024 11.2 5.0 - 12.0 % Final     Eosinophil %   Date Value Ref Range Status   12/21/2024 2.0 0.3 - 6.2 % Final     Basophil %   Date Value Ref Range Status   12/21/2024 0.6 0.0 - 1.5 % Final     Immature Grans %   Date Value Ref Range Status   12/21/2024 0.7 (H) 0.0 - 0.5 % Final     Neutrophils, Absolute   Date Value Ref Range Status   12/21/2024 4.75 1.70 - 7.00 10*3/mm3 Final     Lymphocytes, Absolute   Date Value Ref Range Status   12/21/2024 1.15 0.70 - 3.10 10*3/mm3 Final     Monocytes, Absolute   Date Value Ref Range Status   12/21/2024 0.77 0.10 - 0.90 10*3/mm3 Final     Eosinophils, Absolute   Date Value Ref Range Status   12/21/2024 0.14 0.00 - 0.40 10*3/mm3 Final     Basophils, Absolute   Date Value Ref Range Status   12/21/2024 0.04 0.00 - 0.20 10*3/mm3 Final     Immature Grans, Absolute   Date Value Ref Range Status   12/21/2024 0.05 0.00 - 0.05 10*3/mm3 Final     nRBC   Date Value Ref Range Status   12/21/2024 0.0 0.0 - 0.2 /100 WBC  "Final       Glucose   Date Value Ref Range Status   12/21/2024 127 (H) 65 - 99 mg/dL Final     Sodium   Date Value Ref Range Status   12/21/2024 140 136 - 145 mmol/L Final     Potassium   Date Value Ref Range Status   12/21/2024 3.8 3.5 - 5.2 mmol/L Final     Comment:     Slight hemolysis detected by analyzer. Result may be falsely elevated.     CO2   Date Value Ref Range Status   12/21/2024 24.7 22.0 - 29.0 mmol/L Final     Chloride   Date Value Ref Range Status   12/21/2024 105 98 - 107 mmol/L Final     Anion Gap   Date Value Ref Range Status   12/21/2024 10.3 5.0 - 15.0 mmol/L Final     Creatinine   Date Value Ref Range Status   12/21/2024 1.26 (H) 0.57 - 1.00 mg/dL Final     BUN   Date Value Ref Range Status   12/21/2024 18 6 - 20 mg/dL Final     BUN/Creatinine Ratio   Date Value Ref Range Status   12/21/2024 14.3 7.0 - 25.0 Final     Calcium   Date Value Ref Range Status   12/21/2024 8.4 (L) 8.6 - 10.5 mg/dL Final       No results found for: \"MG\", \"PHOS\"  No results found for: \"LDH\", \"URICACID\"     IMAGING  Imaging Results (Last 72 Hours)       ** No results found for the last 72 hours. **            CT Abdomen and Pelvis: Results for orders placed during the hospital encounter of 12/14/24    CT Abdomen Pelvis With & Without Contrast    Narrative  EXAM:  CT Abdomen and Pelvis Without and With Intravenous Contrast    EXAM DATE:  12/14/2024 2:01 PM    CLINICAL HISTORY:  high testosterone over 300/ suspect adrenal or ovarian tumor;  R79.89-Other specified abnormal findings of blood chemistry    TECHNIQUE:  Axial computed tomography images of the abdomen and pelvis without and  with intravenous contrast.  Sagittal and coronal reformatted images were  created and reviewed.  This CT exam was performed using one or more of  the following dose reduction techniques:  automated exposure control,  adjustment of the mA and/or kV according to patient size, and/or use of  iterative reconstruction " technique.    COMPARISON:  9/1/2023    FINDINGS:  LUNG BASES:  Unremarkable as visualized.  No mass.  No consolidation.    ABDOMEN:  LIVER:  Unremarkable as visualized.  No mass.  GALLBLADDER AND BILE DUCTS:  Unremarkable as visualized.  No calcified  stones.  No ductal dilation.  PANCREAS:  Unremarkable as visualized.  No mass.  No ductal dilation.  SPLEEN:  Unremarkable as visualized.  No splenomegaly.  ADRENALS:  Unremarkable as visualized.  No mass.  KIDNEYS AND URETERS:  Left renal collecting system mildly prominent  with staghorn calculus measuring 1.1 cm.  STOMACH AND BOWEL:  Unremarkable as visualized.  No obstruction.  No  mucosal thickening.    PELVIS:  APPENDIX:  No findings to suggest acute appendicitis.  BLADDER:  Unremarkable as visualized.  No mass.  No stones.  REPRODUCTIVE:  Unremarkable as visualized.    ABDOMEN and PELVIS:  INTRAPERITONEAL SPACE:  Unremarkable as visualized.  No free air.  No  significant fluid collection.  BONES/JOINTS:  Degenerative disc disease throughout the lumbar spine.  Degenerative facet arthropathy throughout the lumbar spine, most  prominent in the lower lumbar spine.  No acute fracture.  No  dislocation.  SOFT TISSUES:  Small umbilical hernia containing only fat.  VASCULATURE:  Atherosclerotic disease.  No abdominal aortic aneurysm.  LYMPH NODES:  Unremarkable as visualized.  No enlarged lymph nodes.    Impression  1.  Left renal collecting system mildly prominent with staghorn calculus  measuring 1.1 cm.  2.  Degenerative changes lumbar spine as described.    This report was finalized on 12/15/2024 3:07 PM by Dr. Diomedes Goncalves MD.       CT Stone Protocol: No results found for this or any previous visit.       KUB: No results found for this or any previous visit.   \    LABS:   No results displayed because visit has over 200 results.      Office Visit on 10/24/2024   Component Date Value Ref Range Status    TSH 10/24/2024 4.020  0.270 - 4.200 uIU/mL Final     Testosterone, Total 10/24/2024 333.00 (H)  2.90 - 40.80 ng/dL Final    Cortisol 10/24/2024 0.34    mcg/dL Final    DHEA-Sulfate 10/24/2024 2.4 (L)  29.4 - 220.5 ug/dL Final        PATHOLOGY  * Cannot find OR log *    IMPRESSION AND PLAN  Ana Maradiaga is a 59 y.o., white female with:  Nephrolithiasis -discussed with the patient most recent CT scan that showed left UPJ calculus causing no significant obstruction.  The stricture in roughly 15 mm in size and was consistent with the 2 previous stones that she had in September 2023.  Did discuss with the patient forms of treatment which can include percutaneous nephrolithotomy versus extracorporal shockwave lithotripsy.  Did discuss with the patient the risks of percutaneous nephrolithotomy in detail and advised her there is risk of hematoma and leading to significant kidney damage and loss of kidney.  Did discuss the use of complete nephrectomy when indicated post surgical intervention if kidney is nonfunctioning.  Also discussed the risk of extracorporal shockwave lithotripsy.  Discussed the risk of Steinstrasse including indications for stent placement.  Advised patient she will have to both aspirin and Eliquis prior to surgical intervention.  We recommend for patient to follow-up with us in outpatient setting in roughly 1 to 2 weeks postdischarge to rediscuss care and plan for surgical intervention.  Gross hematuria - patient's gross hematuria is most likely secondary to large left UPJ calculus.  Did advise her that given current use of Eliquis and aspirin this is most likely contributing to her hematuria.  Patient's hematuria persists and H&H continues to decrease we do recommend to stop Eliquis and aspirin. Did discuss with the patient the use of cystoscopy to rule out lower tract source.  She verbalized understanding.  Acute urinary tract infection -patient's urine culture did finalized with 100,000 colony-forming units of E. coli.  Given concerns of infection  on urine analysis, mixed ann marie growth, and large renal calculus would recommend to continue with a low-dose course of cefdinir 300 mg once every 12 hours for 5 days.  Otherwise patient can follow-up with us in the outpatient clinic in 1 to 2 weeks postdischarge for reevaluation.  If any changes occur do not hesitate to give us a call.    The patient was in agreement with these plans.    Thank you for asking us to participate in Ana Maradiaga's care.  We will continue to follow with you.  Please do not hesitate to call with any questions or concerns that you may have.    A total of 60 minutes were spent coordinating this patient’s care in clinic today; 30 minutes of which were face-to-face with the patient, reviewing medical history and counseling on the current treatment and followup plan.  All questions were answered to patient's satisfaction.         This document has been electronically signed by Jag Swanson PA-C  December 23, 2024 07:29 EST    Part of this note may be an electronic transcription/translation of spoken language to printed text using the Dragon Dictation System.

## 2024-12-23 NOTE — CASE MANAGEMENT/SOCIAL WORK
Discharge Planning Assessment  [facility]     Patient Name: Ana Maradiaga  MRN: [id number]  Today's Date: 12/23/2024    Admit Date: 12/19/2024    Plan: CM notified by [facility] admissions that unable to admit d/t PT's assessment of Home with assist or home with home health. Provider notified.   Discharge Needs Assessment    No documentation.                  Discharge Plan       Row Name 12/23/24 1417       Plan    Plan CM notified by [facility] admissions that unable to admit d/t PT's assessment of Home with assist or home with home health. Provider notified.      Row Name 12/23/24 1219       Plan           Row Name 12/23/24 1204       Plan                             Alexa Lowry RN

## 2024-12-24 ENCOUNTER — APPOINTMENT (OUTPATIENT)
Dept: ULTRASOUND IMAGING | Facility: HOSPITAL | Age: 59
End: 2024-12-24
Payer: MEDICARE

## 2024-12-24 LAB
ALBUMIN SERPL-MCNC: 2.4 G/DL (ref 3.5–5.2)
ALBUMIN/GLOB SERPL: 0.9 G/DL
ALP SERPL-CCNC: 97 U/L (ref 39–117)
ALT SERPL W P-5'-P-CCNC: 16 U/L (ref 1–33)
ANION GAP SERPL CALCULATED.3IONS-SCNC: 9 MMOL/L (ref 5–15)
AST SERPL-CCNC: 18 U/L (ref 1–32)
BACTERIA SPEC AEROBE CULT: NORMAL
BACTERIA SPEC AEROBE CULT: NORMAL
BASOPHILS # BLD AUTO: 0.01 10*3/MM3 (ref 0–0.2)
BASOPHILS NFR BLD AUTO: 0.1 % (ref 0–1.5)
BILIRUB SERPL-MCNC: 0.5 MG/DL (ref 0–1.2)
BUN SERPL-MCNC: 10 MG/DL (ref 6–20)
BUN/CREAT SERPL: 9.5 (ref 7–25)
CALCIUM SPEC-SCNC: 8.2 MG/DL (ref 8.6–10.5)
CHLORIDE SERPL-SCNC: 101 MMOL/L (ref 98–107)
CO2 SERPL-SCNC: 22 MMOL/L (ref 22–29)
CORTIS SERPL-MCNC: 0.66 MCG/DL
CREAT SERPL-MCNC: 1.05 MG/DL (ref 0.57–1)
DEPRECATED RDW RBC AUTO: 63.3 FL (ref 37–54)
EGFRCR SERPLBLD CKD-EPI 2021: 61.3 ML/MIN/1.73
EOSINOPHIL # BLD AUTO: 0.35 10*3/MM3 (ref 0–0.4)
EOSINOPHIL NFR BLD AUTO: 4.1 % (ref 0.3–6.2)
ERYTHROCYTE [DISTWIDTH] IN BLOOD BY AUTOMATED COUNT: 16.8 % (ref 12.3–15.4)
GLOBULIN UR ELPH-MCNC: 2.8 GM/DL
GLUCOSE SERPL-MCNC: 104 MG/DL (ref 65–99)
HCT VFR BLD AUTO: 37.1 % (ref 34–46.6)
HGB BLD-MCNC: 11.2 G/DL (ref 12–15.9)
IMM GRANULOCYTES # BLD AUTO: 0.08 10*3/MM3 (ref 0–0.05)
IMM GRANULOCYTES NFR BLD AUTO: 0.9 % (ref 0–0.5)
INR PPP: 1.33 (ref 0.9–1.1)
LYMPHOCYTES # BLD AUTO: 0.7 10*3/MM3 (ref 0.7–3.1)
LYMPHOCYTES NFR BLD AUTO: 8.3 % (ref 19.6–45.3)
MCH RBC QN AUTO: 30.7 PG (ref 26.6–33)
MCHC RBC AUTO-ENTMCNC: 30.2 G/DL (ref 31.5–35.7)
MCV RBC AUTO: 101.6 FL (ref 79–97)
MONOCYTES # BLD AUTO: 0.56 10*3/MM3 (ref 0.1–0.9)
MONOCYTES NFR BLD AUTO: 6.6 % (ref 5–12)
NEUTROPHILS NFR BLD AUTO: 6.75 10*3/MM3 (ref 1.7–7)
NEUTROPHILS NFR BLD AUTO: 80 % (ref 42.7–76)
NRBC BLD AUTO-RTO: 0 /100 WBC (ref 0–0.2)
PLATELET # BLD AUTO: 138 10*3/MM3 (ref 140–450)
PMV BLD AUTO: 10.1 FL (ref 6–12)
POTASSIUM SERPL-SCNC: 4 MMOL/L (ref 3.5–5.2)
PROT SERPL-MCNC: 5.2 G/DL (ref 6–8.5)
PROTHROMBIN TIME: 16.5 SECONDS (ref 12.1–14.7)
RBC # BLD AUTO: 3.65 10*6/MM3 (ref 3.77–5.28)
SODIUM SERPL-SCNC: 132 MMOL/L (ref 136–145)
WBC NRBC COR # BLD AUTO: 8.45 10*3/MM3 (ref 3.4–10.8)

## 2024-12-24 PROCEDURE — 97530 THERAPEUTIC ACTIVITIES: CPT

## 2024-12-24 PROCEDURE — 85025 COMPLETE CBC W/AUTO DIFF WBC: CPT | Performed by: STUDENT IN AN ORGANIZED HEALTH CARE EDUCATION/TRAINING PROGRAM

## 2024-12-24 PROCEDURE — 80053 COMPREHEN METABOLIC PANEL: CPT | Performed by: STUDENT IN AN ORGANIZED HEALTH CARE EDUCATION/TRAINING PROGRAM

## 2024-12-24 PROCEDURE — 99231 SBSQ HOSP IP/OBS SF/LOW 25: CPT | Performed by: STUDENT IN AN ORGANIZED HEALTH CARE EDUCATION/TRAINING PROGRAM

## 2024-12-24 PROCEDURE — 25010000002 CEFTRIAXONE PER 250 MG: Performed by: FAMILY MEDICINE

## 2024-12-24 PROCEDURE — 82533 TOTAL CORTISOL: CPT | Performed by: STUDENT IN AN ORGANIZED HEALTH CARE EDUCATION/TRAINING PROGRAM

## 2024-12-24 PROCEDURE — 93923 UPR/LXTR ART STDY 3+ LVLS: CPT

## 2024-12-24 PROCEDURE — 93923 UPR/LXTR ART STDY 3+ LVLS: CPT | Performed by: RADIOLOGY

## 2024-12-24 PROCEDURE — 85610 PROTHROMBIN TIME: CPT | Performed by: STUDENT IN AN ORGANIZED HEALTH CARE EDUCATION/TRAINING PROGRAM

## 2024-12-24 RX ADMIN — FLECAINIDE ACETATE 100 MG: 50 TABLET ORAL at 08:10

## 2024-12-24 RX ADMIN — Medication 10 ML: at 08:38

## 2024-12-24 RX ADMIN — Medication 10 ML: at 21:53

## 2024-12-24 RX ADMIN — SODIUM CHLORIDE 2000 MG: 9 INJECTION, SOLUTION INTRAVENOUS at 17:28

## 2024-12-24 RX ADMIN — APIXABAN 5 MG: 5 TABLET, FILM COATED ORAL at 21:50

## 2024-12-24 RX ADMIN — NYSTATIN: 100000 POWDER TOPICAL at 08:11

## 2024-12-24 RX ADMIN — ATORVASTATIN CALCIUM 40 MG: 40 TABLET, FILM COATED ORAL at 21:50

## 2024-12-24 RX ADMIN — Medication 5000 UNITS: at 08:10

## 2024-12-24 RX ADMIN — FLECAINIDE ACETATE 100 MG: 50 TABLET ORAL at 21:50

## 2024-12-24 RX ADMIN — CARVEDILOL 3.12 MG: 3.12 TABLET, FILM COATED ORAL at 08:11

## 2024-12-24 RX ADMIN — CARVEDILOL 3.12 MG: 3.12 TABLET, FILM COATED ORAL at 21:50

## 2024-12-24 RX ADMIN — HYDROCORTISONE 1 APPLICATION: 1 CREAM TOPICAL at 21:50

## 2024-12-24 RX ADMIN — NYSTATIN: 100000 POWDER TOPICAL at 21:49

## 2024-12-24 RX ADMIN — ASPIRIN 81 MG: 81 TABLET, COATED ORAL at 08:10

## 2024-12-24 RX ADMIN — HYDROCORTISONE 1 APPLICATION: 1 CREAM TOPICAL at 06:10

## 2024-12-24 RX ADMIN — LEVOTHYROXINE SODIUM 125 MCG: 0.12 TABLET ORAL at 08:10

## 2024-12-24 RX ADMIN — AMMONIUM LACTATE 1 APPLICATION: 120 CREAM TOPICAL at 08:11

## 2024-12-24 RX ADMIN — Medication 400 MG: at 08:10

## 2024-12-24 RX ADMIN — HYDROCORTISONE 1 APPLICATION: 1 CREAM TOPICAL at 15:14

## 2024-12-24 RX ADMIN — APIXABAN 5 MG: 5 TABLET, FILM COATED ORAL at 08:11

## 2024-12-24 RX ADMIN — Medication 1000 MCG: at 08:10

## 2024-12-24 NOTE — THERAPY TREATMENT NOTE
Acute Care - Physical Therapy Treatment Note   [place]     Patient Name: Ana Maradiaga  : 1965  MRN: [id number]  Today's Date: 2024   Onset of Illness/Injury or Date of Surgery: 24  Visit Dx:     ICD-10-CM ICD-9-CM   1. Urinary tract infection with hematuria, site unspecified  N39.0 599.0    R31.9 599.70   2. Weakness  R53.1 780.79   3. High serum testosterone  R79.89 790.99     Patient Active Problem List   Diagnosis    Type 2 diabetes mellitus with hyperglycemia, without long-term current use of insulin    Acquired hypothyroidism    Hirsutism    Elevated testosterone level in female    Complicated UTI (urinary tract infection)     Past Medical History:   Diagnosis Date    Anemia     Arrhythmia     Afib    Arthritis     Atrial fibrillation     Bronchitis     Chronic kidney disease     Gout     Hypertension     Stroke 2021     History reviewed. No pertinent surgical history.  PT Assessment (Last 12 Hours)       PT Evaluation and Treatment       Row Name 24 1505          Physical Therapy Time and Intention    Document Type therapy note (daily note)  -     Mode of Treatment physical therapy  -     Patient Effort good  -     Symptoms Noted During/After Treatment fatigue  -     Comment Pt seen for treatment this date, pleasant and cooperative with therapy. Pt assisted to BSC, at the most min/modA for safety. Pt unable to ambulate 2/2 fatigue. Assisted back in bed, grossly max/depA. Pt grossly ambulated a total of 2' or so along EOB, at most min/modA for steadying/safety with mobility. Pt rates fatigue at worse 9/10.  -       Row Name 24 1505          Bed Mobility    Bed Mobility supine-sit;sit-supine  -     Sit-Supine [place] (Bed Mobility) maximum assist (25% patient effort);dependent (less than 25% patient effort)  -       Row Name 24 1503          Transfers    Transfers sit-stand transfer;stand-sit transfer;toilet transfer  -[facility]  Name 12/24/24 1505          Sit-Stand Transfer    Sit-Stand [place] (Transfers) minimum assist (75% patient effort);2 person assist  -     Assistive Device (Sit-Stand Transfers) walker, front-wheeled  -       Row Name 12/24/24 1505          Stand-Sit Transfer    Stand-Sit [place] (Transfers) contact guard;1 person assist;2 person assist  -     Assistive Device (Stand-Sit Transfers) walker, front-wheeled  -       Row Name 12/24/24 1505          Toilet Transfer    Type (Toilet Transfer) stand pivot/stand step  -     [place] Level (Toilet Transfer) contact guard;minimum assist (75% patient effort)  -       Row Name 12/24/24 1505          Gait/Stairs (Locomotion)    Comment, (Gait/Stairs) Pt grossly ambulated 2' or so along EOB, at most min/modA for steadying/safety with mobility.  -       Row Name 12/24/24 1505          Positioning and Restraints    Pre-Treatment Position in bed  -     Post Treatment Position bed  -     In Bed supine;call light within reach  -       Row Name 12/24/24 1505          Progress Summary (PT)    Daily Progress Summary (PT) Pt seen for therapy this date, pleasant and cooperative with therapy. Pt grossly ambulated 2' or so along EOB, at most min/modA for steadying/safety with mobility. May benefit from further therapeutic interventions.  -               User Key  (r) = Recorded By, (t) = Taken By, (c) = Cosigned By      Initials Name Provider Type    Armida Andrews, NAOMY Physical Therapist                    Physical Therapy Education       Title: PT OT SLP Therapies (Done)       Topic: Physical Therapy (Done)       Point: Mobility training (Done)       Learning Progress Summary            Patient Acceptance, E,TB, VU by CF at 12/24/2024 0905    Acceptance, E, VU by SC at 12/24/2024 0331    Acceptance, E,D, VU,NR by AG at 12/23/2024 1249                      Point: Home exercise program (Done)       Learning Progress Summary            Patient Acceptance,  E,TB, VU by  at 12/24/2024 0905    Acceptance, E, VU by SC at 12/24/2024 0331    Acceptance, E,D, VU,NR by  at 12/23/2024 1249                      Point: Body mechanics (Done)       Learning Progress Summary            Patient Acceptance, E,TB, VU by  at 12/24/2024 0905    Acceptance, E, VU by SC at 12/24/2024 0331    Acceptance, E,D, VU,NR by  at 12/23/2024 1249                      Point: Precautions (Done)       Learning Progress Summary            Patient Acceptance, E,TB, VU by  at 12/24/2024 0905    Acceptance, E, VU by SC at 12/24/2024 0331    Acceptance, E,D, VU,NR by  at 12/23/2024 1249                                      User Key       Initials Effective Dates Name Provider Type Discipline     06/16/21 -  Elizabeth Aquino, PT Physical Therapist PT     06/25/24 -  Ebonie Rangel, RN Registered Nurse Nurse    SC 09/11/24 -  Naya Lowry, RN Registered Nurse Nurse                  PT Recommendation and Plan  Anticipated Discharge Disposition (PT): sub acute care setting, skilled nursing facility  Progress Summary (PT)  Daily Progress Summary (PT): Pt seen for therapy this date, pleasant and cooperative with therapy. Pt grossly ambulated 2' or so along EOB, at most min/modA for steadying/safety with mobility. May benefit from further therapeutic interventions.       Time Calculation:    PT Charges       Row Name 12/24/24 1544             Time Calculation    Start Time 1545  -      PT Received On 12/24/24  -         Time Calculation- PT    Total Timed Code Minutes- PT 15 minute(s)  -                User Key  (r) = Recorded By, (t) = Taken By, (c) = Cosigned By      Initials Name Provider Type     Armida Odell, PT Physical Therapist                  Therapy Charges for Today       Code Description Service Date Service Provider Modifiers Qty    73296851587  PT THERAPEUTIC ACT EA 15 MIN 12/24/2024 Armida Odell, PT GP 1            PT G-Codes  AM-PAC 6 Clicks Score (PT):  12    Armida Odell, PT  12/24/2024

## 2024-12-24 NOTE — PROGRESS NOTES
[facility] HOSPITALIST PROGRESS NOTE     Patient Identification:  Name:  Ana Maradiaga  Age:  59 y.o.  Sex:  female  :  1965  MRN:  [id number]  Visit Number:  [id number]  ROOM: [address] [place] [address]     Primary Care Provider:  Val Purcell PA    Length of stay in inpatient status:  4    Subjective     Chief Compliant:    Chief Complaint   Patient presents with    Weakness - Generalized    Blood in Urine       History of Presenting Illness:    Patient seen in follow-up for UTI and overall weakness.  Patient states she feels about the same since she is admitted.  Has been afebrile labs overall unremarkable.  PT/OT following.  No adverse events noted overnight.    Objective     Current Hospital Meds:ammonium lactate, 1 Application, Topical, Daily  apixaban, 5 mg, Oral, Q12H  aspirin, 81 mg, Oral, Daily  atorvastatin, 40 mg, Oral, Nightly  carvedilol, 3.125 mg, Oral, Q12H  cefTRIAXone, 2,000 mg, Intravenous, Q24H  flecainide, 100 mg, Oral, BID  hydrocortisone, 1 Application, Topical, Q8H  levothyroxine, 125 mcg, Oral, Daily  magnesium oxide, 400 mg, Oral, Daily  nystatin, , Topical, Q12H  sodium chloride, 10 mL, Intravenous, Q12H  vitamin B-12, 1,000 mcg, Oral, Daily  vitamin D3, 5,000 Units, Oral, Daily         Current Antimicrobial Therapy:  Anti-Infectives (From admission, onward)      Ordered     Dose/Rate Route Frequency Start Stop    24 0935  cefTRIAXone (ROCEPHIN) 2,000 mg in sodium chloride 0.9 % 100 mL IVPB-VTB        Ordering Provider: Thien Reardon DO    2,000 mg  200 mL/hr over 30 Minutes Intravenous Every 24 Hours 24 1700 24 1659    24 1555  cefTRIAXone (ROCEPHIN) 2,000 mg in sodium chloride 0.9 % 100 mL IVPB-VTB        Ordering Provider: Mark Vasquez DO    2,000 mg  200 mL/hr over 30 Minutes Intravenous Once 24 1610 24 1813          Current Diuretic Therapy:  Diuretics (From admission, onward)      None       Pt would like a call back to schedule in November.       ----------------------------------------------------------------------------------------------------------------------  Vital Signs:  Temp:  [98.1 °F (36.7 °C)-98.6 °F (37 °C)] 98.1 °F (36.7 °C)  Heart Rate:  [64-75] 75  Resp:  [16-18] 16  BP: ()/(54-68) 102/55  SpO2:  [94 %-97 %] 97 %  on   ;   Device (Oxygen Therapy): room air  Body mass index is 56.25 kg/m².    Wt Readings from Last 3 Encounters:   12/19/24 (!) 153 kg (338 lb)   10/24/24 116 kg (256 lb 6.4 oz)   10/11/24 (!) 164 kg (362 lb)     Intake & Output (last 3 days)         12/21 0701  12/22 0700 12/22 0701  12/23 0700 12/23 0701  12/24 0700    P.O. 1035 1300 960    I.V. (mL/kg)  0 (0) 203 (1.3)    IV Piggyback  100     Total Intake(mL/kg) 1035 (6.8) 1400 (9.2) 1163 (7.6)    Urine (mL/kg/hr) 650 (0.2) 250 (0.1) 200 (0.1)    Stool 0      Total Output 650 250 200    Net +385 +1150 +963           Urine Unmeasured Occurrence  2 x 2 x    Stool Unmeasured Occurrence 1 x            Diet: Cardiac; Healthy Heart (2-3 Na+); Fluid Consistency: Thin (IDDSI 0)  ----------------------------------------------------------------------------------------------------------------------  Physical exam:  Constitutional: Early obese female, nontoxic, Well-developed and well-nourished, resting comfortably in bed, no acute distress.      HENT:  Head:  Normocephalic and atraumatic.  Mouth:  Moist mucous membranes.  Eyes:  Conjunctivae and EOM are normal. No scleral icterus.   Cardiovascular:  Normal rate, regular rhythm and normal heart sounds with no murmur. No JVD.   Pulmonary/Chest:  No respiratory distress, no wheezes, no crackles, with normal breath sounds and good air movement. Unlabored. No accessory muscle use.  Abdominal:  Soft. No distension and no tenderness.  Bowel sounds present. No rebound or guarding.   Musculoskeletal:  No tenderness, and no deformity.  No red or swollen joints anywhere.    Neurological:  Alert and oriented to person, place, and time.  No  "cranial nerve deficit.   Nonfocal.   Skin:  Skin is warm and dry. No rash noted. No pallor.   Peripheral vascular:  No clubbing, no cyanosis, bilateral venous stasis.  Pedal and tibial pulses 2 out of 4 bilaterally.     ----------------------------------------------------------------------------------------------------------------------  Results from last 7 days   Lab Units 12/21/24  0019 12/20/24 0424 12/19/24 1956 12/19/24  1439   LACTATE mmol/L  --   --   --  1.5   WBC 10*3/mm3 6.90 7.86 9.44 10.86*   HEMOGLOBIN g/dL 11.3* 11.9* 13.3 14.4   HEMATOCRIT % 35.6 38.1 41.1 43.0   MCV fL 98.1* 97.4* 95.8 93.9   MCHC g/dL 31.7 31.2* 32.4 33.5   PLATELETS 10*3/mm3 158 169 195 225   INR   --   --   --  1.91*         Results from last 7 days   Lab Units 12/21/24 0019 12/20/24 0424 12/19/24 1956 12/19/24  1439   SODIUM mmol/L 140 138 138 138   POTASSIUM mmol/L 3.8 4.5 3.6 3.5   CHLORIDE mmol/L 105 101 98 97*   CO2 mmol/L 24.7 25.9 24.7 24.3   BUN mg/dL 18 19 20 20   CREATININE mg/dL 1.26* 1.40* 1.53* 1.55*   CALCIUM mg/dL 8.4* 8.7 9.4 9.9   GLUCOSE mg/dL 127* 77 93 83   ALBUMIN g/dL  --   --   --  3.7   BILIRUBIN mg/dL  --   --   --  1.5*   ALK PHOS U/L  --   --   --  128*   AST (SGOT) U/L  --   --   --  34*   ALT (SGPT) U/L  --   --   --  31   Estimated Creatinine Clearance: 72.4 mL/min (A) (by C-G formula based on SCr of 1.26 mg/dL (H)).  No results found for: \"AMMONIA\"  Results from last 7 days   Lab Units 12/19/24  1439   CK TOTAL U/L 67             No results found for: \"HGBA1C\", \"POCGLU\"  Lab Results   Component Value Date    TSH 3.320 12/19/2024    FREET4 1.18 03/21/2022     No results found for: \"PREGTESTUR\", \"PREGSERUM\", \"HCG\", \"HCGQUANT\"  Pain Management Panel  More data may exist         Latest Ref Rng & Units 12/19/2024 6/22/2023   Pain Management Panel   Creatinine, Urine mg/dL  mg/dL - 152.9  152.9    Amphetamine, Urine Qual Negative Negative  -   Barbiturates Screen, Urine Negative Negative  - " "  Benzodiazepine Screen, Urine Negative Negative  -   Buprenorphine, Screen, Urine Negative Negative  -   Cocaine Screen, Urine Negative Negative  -   Fentanyl, Urine Negative Negative  -   Methadone Screen , Urine Negative Negative  -   Methamphetamine, Ur Negative Negative  -      Details          Multiple values from one day are sorted in reverse-chronological order             Brief Urine Lab Results  (Last result in the past 365 days)        Color   Clarity   Blood   Leuk Est   Nitrite   Protein   CREAT   Urine HCG        12/19/24 1439 Red  Comment: Dipstick results may be inaccurate due to color interference.       Turbid   Moderate (2+)   Large (3+)   Positive   100 mg/dL (2+)                 Blood Culture   Date Value Ref Range Status   12/19/2024 No growth at 4 days  Preliminary   12/19/2024 No growth at 4 days  Preliminary     Urine Culture   Date Value Ref Range Status   12/19/2024 >100,000 CFU/mL Mixed Toya Isolated  Final     No results found for: \"WOUNDCX\"  No results found for: \"STOOLCX\"  No results found for: \"RESPCX\"  No results found for: \"AFBCX\"  Results from last 7 days   Lab Units 12/19/24  1439   LACTATE mmol/L 1.5       I have personally looked at the labs and they are summarized above.  ----------------------------------------------------------------------------------------------------------------------  Detailed radiology reports for the last 24 hours:  Imaging Results (Last 24 Hours)       ** No results found for the last 24 hours. **          Assessment & Plan      Patient is a 59-year-old female with history significant for atrial fibrillation, CKD and prior stroke who came to the ER with reports of gross hematuria and weakness.    #Acute urinary tract infection the E. coli  #Left staghorn calculus  --Urine culture positive for E. coli  --Hematuria likely due to large UPJ calculus in commendation of Eliquis  --Urology evaluated, recommend outpatient follow-up    #Acute on chronic " debility  #Prior ischemic infarct  #Bilateral lower extremity weakness, multifactorial  --MRI of the brain noted old infarcts but nothing acute  --PT/OT  --PT recommended home with home health at discharge    #Atrial fibrillation  --Continue flecainide, Eliquis  --Follow-up outpatient    #Elevated testosterone, unclear etiology  --Cortisol 4.4, ACTH low  --I discussed with endocrinology prior to admission, keep outpatient endocrinology follow-up    #Chronic venous stasis, wound care  #Morbid obesity, BMI 56    CHECKLIST:  Abx: Rocephin  VTE: Eliquis   GI ppx: PPI  Diet: Consistent carb  Code: CPR, full  Dispo: Patient is hemodynamically stable, plan for DC tomorrow.    Mark Vasquez,   [facility]  12/23/24  19:59 EST

## 2024-12-24 NOTE — PLAN OF CARE
Goal Outcome Evaluation:  Plan of Care Reviewed With: patient           Outcome Evaluation: Patient resting in bed at this time. VSS on room air. A&Ox4. Pt refused full skin assessment, denied any issues other than legs. Pt worked with PT this shift. [place] [facility] No acute changes or concerns at this time. Will continue with plan of care.

## 2024-12-24 NOTE — PROGRESS NOTES
[facility] HOSPITALIST PROGRESS NOTE     Patient Identification:  Name:  Ana Maradiaga  Age:  59 y.o.  Sex:  female  :  1965  MRN:  [id number]  Visit Number:  [id number]  ROOM: [address] [place] [address]     Primary Care Provider:  Val Purcell PA    Length of stay in inpatient status:  5    Subjective     Chief Compliant:    Chief Complaint   Patient presents with    Weakness - Generalized    Blood in Urine       History of Presenting Illness:    Patient seen in follow-up for UTI and overall weakness.  Patient states she feels about the same since she is admitted.  Has been afebrile labs overall unremarkable.  PT/OT following- did not do well today.  No adverse events noted overnight.    Objective     Current Hospital Meds:ammonium lactate, 1 Application, Topical, Daily  apixaban, 5 mg, Oral, Q12H  aspirin, 81 mg, Oral, Daily  atorvastatin, 40 mg, Oral, Nightly  carvedilol, 3.125 mg, Oral, Q12H  cefTRIAXone, 2,000 mg, Intravenous, Q24H  flecainide, 100 mg, Oral, BID  hydrocortisone, 1 Application, Topical, Q8H  levothyroxine, 125 mcg, Oral, Daily  magnesium oxide, 400 mg, Oral, Daily  nystatin, , Topical, Q12H  sodium chloride, 10 mL, Intravenous, Q12H  vitamin B-12, 1,000 mcg, Oral, Daily  vitamin D3, 5,000 Units, Oral, Daily         Current Antimicrobial Therapy:  Anti-Infectives (From admission, onward)      Ordered     Dose/Rate Route Frequency Start Stop    24 0935  cefTRIAXone (ROCEPHIN) 2,000 mg in sodium chloride 0.9 % 100 mL IVPB-VTB        Ordering Provider: Thien Reardon DO    2,000 mg  200 mL/hr over 30 Minutes Intravenous Every 24 Hours 24 1700 24 1659    24 1555  cefTRIAXone (ROCEPHIN) 2,000 mg in sodium chloride 0.9 % 100 mL IVPB-VTB        Ordering Provider: Mark Vasquez DO    2,000 mg  200 mL/hr over 30 Minutes Intravenous Once 24 1610 24 1813          Current Diuretic Therapy:  Diuretics (From admission, onward)      None           ----------------------------------------------------------------------------------------------------------------------  Vital Signs:  Temp:  [97.5 °F (36.4 °C)-98.4 °F (36.9 °C)] 97.9 °F (36.6 °C)  Heart Rate:  [73-79] 79  Resp:  [16-20] 20  BP: ()/(51-60) 105/56  SpO2:  [96 %-98 %] 98 %  on   ;   Device (Oxygen Therapy): room air  Body mass index is 56.25 kg/m².    Wt Readings from Last 3 Encounters:   12/19/24 (!) 153 kg (338 lb)   10/24/24 116 kg (256 lb 6.4 oz)   10/11/24 (!) 164 kg (362 lb)     Intake & Output (last 3 days)         12/21 0701  12/22 0700 12/22 0701 12/23 0700 12/23 0701  12/24 0700    P.O. 1035 1300 960    I.V. (mL/kg)  0 (0) 203 (1.3)    IV Piggyback  100     Total Intake(mL/kg) 1035 (6.8) 1400 (9.2) 1163 (7.6)    Urine (mL/kg/hr) 650 (0.2) 250 (0.1) 200 (0.1)    Stool 0      Total Output 650 250 200    Net +385 +1150 +963           Urine Unmeasured Occurrence  2 x 2 x    Stool Unmeasured Occurrence 1 x            Diet: Cardiac; Healthy Heart (2-3 Na+); Fluid Consistency: Thin (IDDSI 0)  ----------------------------------------------------------------------------------------------------------------------  Physical exam:  Constitutional: Early obese female, nontoxic, Well-developed and well-nourished, resting comfortably in bed, no acute distress.      HENT:  Head:  Normocephalic and atraumatic.  Mouth:  Moist mucous membranes.  Eyes:  Conjunctivae and EOM are normal. No scleral icterus.   Cardiovascular:  Normal rate, regular rhythm and normal heart sounds with no murmur. No JVD.   Pulmonary/Chest:  No respiratory distress, no wheezes, no crackles, with normal breath sounds and good air movement. Unlabored. No accessory muscle use.  Abdominal:  Soft. No distension and no tenderness.  Bowel sounds present. No rebound or guarding.   Musculoskeletal:  No tenderness, and no deformity.  No red or swollen joints anywhere.    Neurological:  Alert and oriented to person, place, and time.  "Nonfocal, weakness in upper and lower extremities bilaterally  Skin:  Skin is warm and dry. No rash noted. No pallor.   Peripheral vascular:  No clubbing, no cyanosis, bilateral venous stasis.  Pedal and tibial pulses 2 out of 4 bilaterally.     ----------------------------------------------------------------------------------------------------------------------  Results from last 7 days   Lab Units 12/24/24  0015 12/21/24 0019 12/20/24 0424 12/19/24 1956 12/19/24  1439   LACTATE mmol/L  --   --   --   --  1.5   WBC 10*3/mm3 8.45 6.90 7.86   < > 10.86*   HEMOGLOBIN g/dL 11.2* 11.3* 11.9*   < > 14.4   HEMATOCRIT % 37.1 35.6 38.1   < > 43.0   MCV fL 101.6* 98.1* 97.4*   < > 93.9   MCHC g/dL 30.2* 31.7 31.2*   < > 33.5   PLATELETS 10*3/mm3 138* 158 169   < > 225   INR  1.33*  --   --   --  1.91*    < > = values in this interval not displayed.         Results from last 7 days   Lab Units 12/24/24  0015 12/21/24  0019 12/20/24 0424 12/19/24 1956 12/19/24  1439   SODIUM mmol/L 132* 140 138   < > 138   POTASSIUM mmol/L 4.0 3.8 4.5   < > 3.5   CHLORIDE mmol/L 101 105 101   < > 97*   CO2 mmol/L 22.0 24.7 25.9   < > 24.3   BUN mg/dL 10 18 19   < > 20   CREATININE mg/dL 1.05* 1.26* 1.40*   < > 1.55*   CALCIUM mg/dL 8.2* 8.4* 8.7   < > 9.9   GLUCOSE mg/dL 104* 127* 77   < > 83   ALBUMIN g/dL 2.4*  --   --   --  3.7   BILIRUBIN mg/dL 0.5  --   --   --  1.5*   ALK PHOS U/L 97  --   --   --  128*   AST (SGOT) U/L 18  --   --   --  34*   ALT (SGPT) U/L 16  --   --   --  31    < > = values in this interval not displayed.   Estimated Creatinine Clearance: 86.9 mL/min (A) (by C-G formula based on SCr of 1.05 mg/dL (H)).  No results found for: \"AMMONIA\"  Results from last 7 days   Lab Units 12/19/24  1439   CK TOTAL U/L 67             No results found for: \"HGBA1C\", \"POCGLU\"  Lab Results   Component Value Date    TSH 3.320 12/19/2024    FREET4 1.18 03/21/2022     No results found for: \"PREGTESTUR\", \"PREGSERUM\", \"HCG\", " "\"HCGQUANT\"  Pain Management Panel  More data may exist         Latest Ref Rng & Units 12/19/2024 6/22/2023   Pain Management Panel   Creatinine, Urine mg/dL  mg/dL - 152.9  152.9    Amphetamine, Urine Qual Negative Negative  -   Barbiturates Screen, Urine Negative Negative  -   Benzodiazepine Screen, Urine Negative Negative  -   Buprenorphine, Screen, Urine Negative Negative  -   Cocaine Screen, Urine Negative Negative  -   Fentanyl, Urine Negative Negative  -   Methadone Screen , Urine Negative Negative  -   Methamphetamine, Ur Negative Negative  -      Details          Multiple values from one day are sorted in reverse-chronological order             Brief Urine Lab Results  (Last result in the past 365 days)        Color   Clarity   Blood   Leuk Est   Nitrite   Protein   CREAT   Urine HCG        12/19/24 1439 Red  Comment: Dipstick results may be inaccurate due to color interference.       Turbid   Moderate (2+)   Large (3+)   Positive   100 mg/dL (2+)                 Blood Culture   Date Value Ref Range Status   12/19/2024 No growth at 4 days  Preliminary   12/19/2024 No growth at 4 days  Preliminary     Urine Culture   Date Value Ref Range Status   12/19/2024 >100,000 CFU/mL Mixed Toya Isolated  Final     No results found for: \"WOUNDCX\"  No results found for: \"STOOLCX\"  No results found for: \"RESPCX\"  No results found for: \"AFBCX\"  Results from last 7 days   Lab Units 12/19/24  1439   LACTATE mmol/L 1.5       I have personally looked at the labs and they are summarized above.  ----------------------------------------------------------------------------------------------------------------------  Detailed radiology reports for the last 24 hours:  Imaging Results (Last 24 Hours)       Procedure Component Value Units Date/Time    US Ankle / Brachial Indices Extremity Complete [[id number]] Collected: 12/24/24 1219     Updated: 12/24/24 1222    Narrative:      EXAM:    US Ankle-Brachial Index (ISABEL), 1 or 2 Levels   "   EXAM DATE:    12/24/2024 11:06 AM     CLINICAL HISTORY:    edema; N39.0-Urinary tract infection, site not specified;  R31.9-Hematuria, unspecified; R53.1-Weakness; R79.89-Other specified  abnormal findings of blood chemistry     TECHNIQUE:    Limited bilateral and noninvasive physiological study of the upper or  lower extremity arteries.  Bidirectional, Doppler and pressure waveform  recording with analysis at 1-2 levels.     COMPARISON:    No relevant prior studies available.     FINDINGS:    Right ISABEL:  Unremarkable.  Right ankle-brachial index (ISABEL) is 1.19  which is within normal limits.    Left ISABEL:  Unremarkable.  Left ankle-brachial index (ISABEL) is 1.12  which is within normal limits.       Impression:      ISABEL values within normal limits.  No evidence of peripheral arterial  disease.     This report was finalized on 12/24/2024 12:20 PM by Dr. Dano Mccann MD.             Assessment & Plan      Patient is a 59-year-old female with history significant for atrial fibrillation, CKD and prior stroke who came to the ER with reports of gross hematuria and weakness.    #Acute on chronic debility  #Prior ischemic infarct  #Bilateral lower extremity weakness, multifactorial  --MRI of the brain noted old infarcts but nothing acute  --PT/OT, did not do well with physical therapy today, new notes updated, IPR re-consulted, case management following    #Acute urinary tract infection the E. coli  #Left staghorn calculus  --Urine culture positive for E. coli  --Hematuria likely due to large UPJ calculus in commendation of Eliquis  --Urology evaluated, recommend outpatient follow-up    #Atrial fibrillation  --Continue flecainide, Eliquis  --Follow-up outpatient    #Elevated testosterone, unclear etiology  --Cortisol 4.4, ACTH low  --I discussed with endocrinology prior to admission, keep outpatient endocrinology follow-up    #Chronic venous stasis, wound care  #Morbid obesity, BMI 56    Copied portions of this report  have been reviewed and are accurate as of 12/24/24     CHECKLIST:  Abx: Rocephin  VTE: Eliquis   GI ppx: PPI  Diet: Consistent carb  Code: CPR, full  Dispo: Patient is hemodynamically stable, did not do well with PT today will reconsult IPR. Case management following.     Mark Vasquez DO  [facility]  12/24/24  14:41 EST

## 2024-12-24 NOTE — PLAN OF CARE
Goal Outcome Evaluation:   Pt lying in bed with hob elevated,vss on ra,nadn,wctm

## 2024-12-25 LAB
BASOPHILS # BLD AUTO: 0.01 10*3/MM3 (ref 0–0.2)
BASOPHILS NFR BLD AUTO: 0.1 % (ref 0–1.5)
CHOLEST SERPL-MCNC: 94 MG/DL (ref 0–200)
CRP SERPL-MCNC: 10.07 MG/DL (ref 0–0.5)
DEPRECATED RDW RBC AUTO: 62.4 FL (ref 37–54)
EOSINOPHIL # BLD AUTO: 0.36 10*3/MM3 (ref 0–0.4)
EOSINOPHIL NFR BLD AUTO: 3.7 % (ref 0.3–6.2)
ERYTHROCYTE [DISTWIDTH] IN BLOOD BY AUTOMATED COUNT: 17.2 % (ref 12.3–15.4)
ERYTHROCYTE [SEDIMENTATION RATE] IN BLOOD: 39 MM/HR (ref 0–30)
FOLATE SERPL-MCNC: 3.01 NG/ML (ref 4.78–24.2)
FSH SERPL-ACNC: 0.4 MIU/ML
HCT VFR BLD AUTO: 38.5 % (ref 34–46.6)
HDLC SERPL-MCNC: 32 MG/DL (ref 40–60)
HGB BLD-MCNC: 12.1 G/DL (ref 12–15.9)
IMM GRANULOCYTES # BLD AUTO: 0.09 10*3/MM3 (ref 0–0.05)
IMM GRANULOCYTES NFR BLD AUTO: 0.9 % (ref 0–0.5)
LDLC SERPL CALC-MCNC: 41 MG/DL (ref 0–100)
LDLC/HDLC SERPL: 1.23 {RATIO}
LH SERPL-ACNC: <0.3 MIU/ML
LYMPHOCYTES # BLD AUTO: 0.76 10*3/MM3 (ref 0.7–3.1)
LYMPHOCYTES NFR BLD AUTO: 7.9 % (ref 19.6–45.3)
MCH RBC QN AUTO: 31.2 PG (ref 26.6–33)
MCHC RBC AUTO-ENTMCNC: 31.4 G/DL (ref 31.5–35.7)
MCV RBC AUTO: 99.2 FL (ref 79–97)
MONOCYTES # BLD AUTO: 0.38 10*3/MM3 (ref 0.1–0.9)
MONOCYTES NFR BLD AUTO: 3.9 % (ref 5–12)
NEUTROPHILS NFR BLD AUTO: 8.03 10*3/MM3 (ref 1.7–7)
NEUTROPHILS NFR BLD AUTO: 83.5 % (ref 42.7–76)
NRBC BLD AUTO-RTO: 0 /100 WBC (ref 0–0.2)
PLATELET # BLD AUTO: 154 10*3/MM3 (ref 140–450)
PMV BLD AUTO: 11.2 FL (ref 6–12)
RBC # BLD AUTO: 3.88 10*6/MM3 (ref 3.77–5.28)
TESTOST SERPL-MCNC: 271 NG/DL (ref 2.9–40.8)
TRIGL SERPL-MCNC: 114 MG/DL (ref 0–150)
VIT B12 BLD-MCNC: >2000 PG/ML (ref 211–946)
VLDLC SERPL-MCNC: 21 MG/DL (ref 5–40)
WBC NRBC COR # BLD AUTO: 9.63 10*3/MM3 (ref 3.4–10.8)

## 2024-12-25 PROCEDURE — 84144 ASSAY OF PROGESTERONE: CPT | Performed by: STUDENT IN AN ORGANIZED HEALTH CARE EDUCATION/TRAINING PROGRAM

## 2024-12-25 PROCEDURE — 82670 ASSAY OF TOTAL ESTRADIOL: CPT | Performed by: STUDENT IN AN ORGANIZED HEALTH CARE EDUCATION/TRAINING PROGRAM

## 2024-12-25 PROCEDURE — 86140 C-REACTIVE PROTEIN: CPT | Performed by: STUDENT IN AN ORGANIZED HEALTH CARE EDUCATION/TRAINING PROGRAM

## 2024-12-25 PROCEDURE — 80061 LIPID PANEL: CPT | Performed by: STUDENT IN AN ORGANIZED HEALTH CARE EDUCATION/TRAINING PROGRAM

## 2024-12-25 PROCEDURE — 83002 ASSAY OF GONADOTROPIN (LH): CPT | Performed by: STUDENT IN AN ORGANIZED HEALTH CARE EDUCATION/TRAINING PROGRAM

## 2024-12-25 PROCEDURE — 82627 DEHYDROEPIANDROSTERONE: CPT | Performed by: STUDENT IN AN ORGANIZED HEALTH CARE EDUCATION/TRAINING PROGRAM

## 2024-12-25 PROCEDURE — 83001 ASSAY OF GONADOTROPIN (FSH): CPT | Performed by: STUDENT IN AN ORGANIZED HEALTH CARE EDUCATION/TRAINING PROGRAM

## 2024-12-25 PROCEDURE — 82607 VITAMIN B-12: CPT | Performed by: STUDENT IN AN ORGANIZED HEALTH CARE EDUCATION/TRAINING PROGRAM

## 2024-12-25 PROCEDURE — 82746 ASSAY OF FOLIC ACID SERUM: CPT | Performed by: STUDENT IN AN ORGANIZED HEALTH CARE EDUCATION/TRAINING PROGRAM

## 2024-12-25 PROCEDURE — 99232 SBSQ HOSP IP/OBS MODERATE 35: CPT | Performed by: STUDENT IN AN ORGANIZED HEALTH CARE EDUCATION/TRAINING PROGRAM

## 2024-12-25 PROCEDURE — 84403 ASSAY OF TOTAL TESTOSTERONE: CPT | Performed by: STUDENT IN AN ORGANIZED HEALTH CARE EDUCATION/TRAINING PROGRAM

## 2024-12-25 PROCEDURE — 83498 ASY HYDROXYPROGESTERONE 17-D: CPT | Performed by: STUDENT IN AN ORGANIZED HEALTH CARE EDUCATION/TRAINING PROGRAM

## 2024-12-25 PROCEDURE — 82157 ASSAY OF ANDROSTENEDIONE: CPT | Performed by: STUDENT IN AN ORGANIZED HEALTH CARE EDUCATION/TRAINING PROGRAM

## 2024-12-25 PROCEDURE — 85025 COMPLETE CBC W/AUTO DIFF WBC: CPT | Performed by: STUDENT IN AN ORGANIZED HEALTH CARE EDUCATION/TRAINING PROGRAM

## 2024-12-25 PROCEDURE — 85652 RBC SED RATE AUTOMATED: CPT | Performed by: STUDENT IN AN ORGANIZED HEALTH CARE EDUCATION/TRAINING PROGRAM

## 2024-12-25 RX ORDER — DIPHENHYDRAMINE HCL 12.5 MG/5ML
12.5 SOLUTION ORAL ONCE
Status: COMPLETED | OUTPATIENT
Start: 2024-12-25 | End: 2024-12-25

## 2024-12-25 RX ORDER — COSYNTROPIN 0.25 MG/ML
0.25 INJECTION, POWDER, FOR SOLUTION INTRAMUSCULAR; INTRAVENOUS ONCE
Status: COMPLETED | OUTPATIENT
Start: 2024-12-26 | End: 2024-12-26

## 2024-12-25 RX ORDER — FOLIC ACID 1 MG/1
1 TABLET ORAL DAILY
Status: DISCONTINUED | OUTPATIENT
Start: 2024-12-26 | End: 2025-01-09 | Stop reason: HOSPADM

## 2024-12-25 RX ADMIN — HYDROCORTISONE 1 APPLICATION: 1 CREAM TOPICAL at 21:40

## 2024-12-25 RX ADMIN — ASPIRIN 81 MG: 81 TABLET, COATED ORAL at 09:27

## 2024-12-25 RX ADMIN — FLECAINIDE ACETATE 100 MG: 50 TABLET ORAL at 21:39

## 2024-12-25 RX ADMIN — CARVEDILOL 3.12 MG: 3.12 TABLET, FILM COATED ORAL at 21:36

## 2024-12-25 RX ADMIN — HYDROCORTISONE 1 APPLICATION: 1 CREAM TOPICAL at 06:12

## 2024-12-25 RX ADMIN — HYDROCORTISONE 1 APPLICATION: 1 CREAM TOPICAL at 13:17

## 2024-12-25 RX ADMIN — LEVOTHYROXINE SODIUM 125 MCG: 0.12 TABLET ORAL at 09:26

## 2024-12-25 RX ADMIN — CARVEDILOL 3.12 MG: 3.12 TABLET, FILM COATED ORAL at 09:27

## 2024-12-25 RX ADMIN — NYSTATIN: 100000 POWDER TOPICAL at 09:28

## 2024-12-25 RX ADMIN — AMMONIUM LACTATE 1 APPLICATION: 120 CREAM TOPICAL at 09:28

## 2024-12-25 RX ADMIN — APIXABAN 5 MG: 5 TABLET, FILM COATED ORAL at 21:39

## 2024-12-25 RX ADMIN — ATORVASTATIN CALCIUM 40 MG: 40 TABLET, FILM COATED ORAL at 21:39

## 2024-12-25 RX ADMIN — APIXABAN 5 MG: 5 TABLET, FILM COATED ORAL at 09:26

## 2024-12-25 RX ADMIN — Medication 400 MG: at 09:26

## 2024-12-25 RX ADMIN — Medication 5000 UNITS: at 09:26

## 2024-12-25 RX ADMIN — Medication 10 ML: at 21:40

## 2024-12-25 RX ADMIN — NYSTATIN: 100000 POWDER TOPICAL at 21:40

## 2024-12-25 RX ADMIN — Medication 10 ML: at 09:28

## 2024-12-25 RX ADMIN — FLECAINIDE ACETATE 100 MG: 50 TABLET ORAL at 09:27

## 2024-12-25 RX ADMIN — Medication 1000 MCG: at 09:26

## 2024-12-25 RX ADMIN — DIPHENHYDRAMINE HYDROCHLORIDE 12.5 MG: 12.5 SOLUTION ORAL at 22:58

## 2024-12-25 NOTE — PLAN OF CARE
Goal Outcome Evaluation:  Plan of Care Reviewed With: patient        Progress: no change  Outcome Evaluation: Pt rested well this shift. VSS on RA. Pt refused a bath this shift, education provided on importance of infection control. Pt still reporting itchiness to bilateral arms. No acute changes at this time. Will continue POC.

## 2024-12-25 NOTE — PLAN OF CARE
Goal Outcome Evaluation:              Outcome Evaluation: Pt's VSS on RA. Pt refused a bath this shift, educated on importance. No concerns or complaints at this time. Will follow POC.

## 2024-12-25 NOTE — PROGRESS NOTES
[facility] HOSPITALIST PROGRESS NOTE     Patient Identification:  Name:  Ana Maradiaga  Age:  59 y.o.  Sex:  female  :  1965  MRN:  [id number]  Visit Number:  [id number]  ROOM: [address] [place] [address]     Primary Care Provider:  Val Purcell PA    Length of stay in inpatient status:  6    Subjective     Chief Compliant:    Chief Complaint   Patient presents with    Weakness - Generalized    Blood in Urine       History of Presenting Illness:    Patient seen in follow-up for UTI and overall weakness.  Patient states she feels about the same since she is admitted.  Has been afebrile labs overall unremarkable.  PT/OT following-has only been able to barely transfer from bed to chair.  No adverse events noted overnight.    Objective     Current Hospital Meds:ammonium lactate, 1 Application, Topical, Daily  apixaban, 5 mg, Oral, Q12H  aspirin, 81 mg, Oral, Daily  atorvastatin, 40 mg, Oral, Nightly  carvedilol, 3.125 mg, Oral, Q12H  flecainide, 100 mg, Oral, BID  hydrocortisone, 1 Application, Topical, Q8H  levothyroxine, 125 mcg, Oral, Daily  magnesium oxide, 400 mg, Oral, Daily  nystatin, , Topical, Q12H  sodium chloride, 10 mL, Intravenous, Q12H  vitamin B-12, 1,000 mcg, Oral, Daily  vitamin D3, 5,000 Units, Oral, Daily         Current Antimicrobial Therapy:  Anti-Infectives (From admission, onward)      Ordered     Dose/Rate Route Frequency Start Stop    24 0935  cefTRIAXone (ROCEPHIN) 2,000 mg in sodium chloride 0.9 % 100 mL IVPB-VTB        Ordering Provider: Thien Reardon DO    2,000 mg  200 mL/hr over 30 Minutes Intravenous Every 24 Hours 24 1700 24 1758    24 1555  cefTRIAXone (ROCEPHIN) 2,000 mg in sodium chloride 0.9 % 100 mL IVPB-VTB        Ordering Provider: Mark Vasquez DO    2,000 mg  200 mL/hr over 30 Minutes Intravenous Once 24 1610 24 1813          Current Diuretic Therapy:  Diuretics (From admission, onward)      None           ----------------------------------------------------------------------------------------------------------------------  Vital Signs:  Temp:  [98.2 °F (36.8 °C)-98.8 °F (37.1 °C)] 98.8 °F (37.1 °C)  Heart Rate:  [75-82] 75  Resp:  [18-20] 18  BP: (102-129)/(59-69) 108/62  SpO2:  [98 %-99 %] 99 %  on   ;   Device (Oxygen Therapy): room air  Body mass index is 56.25 kg/m².    Wt Readings from Last 3 Encounters:   12/19/24 (!) 153 kg (338 lb)   10/24/24 116 kg (256 lb 6.4 oz)   10/11/24 (!) 164 kg (362 lb)     Intake & Output (last 3 days)         12/21 0701  12/22 0700 12/22 0701 12/23 0700 12/23 0701  12/24 0700    P.O. 1035 1300 960    I.V. (mL/kg)  0 (0) 203 (1.3)    IV Piggyback  100     Total Intake(mL/kg) 1035 (6.8) 1400 (9.2) 1163 (7.6)    Urine (mL/kg/hr) 650 (0.2) 250 (0.1) 200 (0.1)    Stool 0      Total Output 650 250 200    Net +385 +1150 +963           Urine Unmeasured Occurrence  2 x 2 x    Stool Unmeasured Occurrence 1 x            Diet: Cardiac; Healthy Heart (2-3 Na+); Fluid Consistency: Thin (IDDSI 0)  ----------------------------------------------------------------------------------------------------------------------  Physical exam:  Constitutional: Early obese female, nontoxic, Well-developed and well-nourished, resting comfortably in bed, no acute distress.      HENT:  Head:  Normocephalic and atraumatic.  Mouth:  Moist mucous membranes.  Eyes:  Conjunctivae and EOM are normal. No scleral icterus.   Cardiovascular:  Normal rate, regular rhythm and normal heart sounds with no murmur. No JVD.   Pulmonary/Chest:  No respiratory distress, no wheezes, no crackles, with normal breath sounds and good air movement. Unlabored. No accessory muscle use.  Abdominal:  Soft. No distension and no tenderness.  Bowel sounds present. No rebound or guarding.   Musculoskeletal:  No tenderness, and no deformity.  No red or swollen joints anywhere.    Neurological:  Alert and oriented to person, place, and time.  "Nonfocal, weakness in upper and lower extremities bilaterally  Skin:  Skin is warm and dry. No rash noted. No pallor.   Peripheral vascular:  No clubbing, no cyanosis, bilateral venous stasis.  Pedal and tibial pulses 2 out of 4 bilaterally.     ----------------------------------------------------------------------------------------------------------------------  Results from last 7 days   Lab Units 12/25/24  0116 12/24/24  0015 12/21/24  0019 12/19/24 1956 12/19/24  1439   LACTATE mmol/L  --   --   --   --  1.5   WBC 10*3/mm3 9.63 8.45 6.90   < > 10.86*   HEMOGLOBIN g/dL 12.1 11.2* 11.3*   < > 14.4   HEMATOCRIT % 38.5 37.1 35.6   < > 43.0   MCV fL 99.2* 101.6* 98.1*   < > 93.9   MCHC g/dL 31.4* 30.2* 31.7   < > 33.5   PLATELETS 10*3/mm3 154 138* 158   < > 225   INR   --  1.33*  --   --  1.91*    < > = values in this interval not displayed.         Results from last 7 days   Lab Units 12/24/24  0015 12/21/24  0019 12/20/24  0424 12/19/24 1956 12/19/24  1439   SODIUM mmol/L 132* 140 138   < > 138   POTASSIUM mmol/L 4.0 3.8 4.5   < > 3.5   CHLORIDE mmol/L 101 105 101   < > 97*   CO2 mmol/L 22.0 24.7 25.9   < > 24.3   BUN mg/dL 10 18 19   < > 20   CREATININE mg/dL 1.05* 1.26* 1.40*   < > 1.55*   CALCIUM mg/dL 8.2* 8.4* 8.7   < > 9.9   GLUCOSE mg/dL 104* 127* 77   < > 83   ALBUMIN g/dL 2.4*  --   --   --  3.7   BILIRUBIN mg/dL 0.5  --   --   --  1.5*   ALK PHOS U/L 97  --   --   --  128*   AST (SGOT) U/L 18  --   --   --  34*   ALT (SGPT) U/L 16  --   --   --  31    < > = values in this interval not displayed.   Estimated Creatinine Clearance: 86.9 mL/min (A) (by C-G formula based on SCr of 1.05 mg/dL (H)).  No results found for: \"AMMONIA\"  Results from last 7 days   Lab Units 12/19/24  1439   CK TOTAL U/L 67             No results found for: \"HGBA1C\", \"POCGLU\"  Lab Results   Component Value Date    TSH 3.320 12/19/2024    FREET4 1.18 03/21/2022     No results found for: \"PREGTESTUR\", \"PREGSERUM\", \"HCG\", " "\"HCGQUANT\"  Pain Management Panel  More data may exist         Latest Ref Rng & Units 12/19/2024 6/22/2023   Pain Management Panel   Creatinine, Urine mg/dL  mg/dL - 152.9  152.9    Amphetamine, Urine Qual Negative Negative  -   Barbiturates Screen, Urine Negative Negative  -   Benzodiazepine Screen, Urine Negative Negative  -   Buprenorphine, Screen, Urine Negative Negative  -   Cocaine Screen, Urine Negative Negative  -   Fentanyl, Urine Negative Negative  -   Methadone Screen , Urine Negative Negative  -   Methamphetamine, Ur Negative Negative  -      Details          Multiple values from one day are sorted in reverse-chronological order             Brief Urine Lab Results  (Last result in the past 365 days)        Color   Clarity   Blood   Leuk Est   Nitrite   Protein   CREAT   Urine HCG        12/19/24 1439 Red  Comment: Dipstick results may be inaccurate due to color interference.       Turbid   Moderate (2+)   Large (3+)   Positive   100 mg/dL (2+)                 Blood Culture   Date Value Ref Range Status   12/19/2024 No growth at 4 days  Preliminary   12/19/2024 No growth at 4 days  Preliminary     Urine Culture   Date Value Ref Range Status   12/19/2024 >100,000 CFU/mL Mixed Toya Isolated  Final     No results found for: \"WOUNDCX\"  No results found for: \"STOOLCX\"  No results found for: \"RESPCX\"  No results found for: \"AFBCX\"  Results from last 7 days   Lab Units 12/19/24  1439   LACTATE mmol/L 1.5       I have personally looked at the labs and they are summarized above.  ----------------------------------------------------------------------------------------------------------------------  Detailed radiology reports for the last 24 hours:  Imaging Results (Last 24 Hours)       ** No results found for the last 24 hours. **          Assessment & Plan      Patient is a 59-year-old female with history significant for atrial fibrillation, CKD and prior stroke who came to the ER with reports of gross hematuria " and weakness.    #Acute on chronic debility  #Prior ischemic infarct  #Bilateral lower extremity weakness, multifactorial  --MRI of the brain noted old infarcts but nothing acute  --PT/OT, has not done well with therapy, notes updated, IPR re-consulted, case management following    #Acute urinary tract infection the E. coli  #Left staghorn calculus  --Urine culture positive for E. 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  #Elevated testosterone, unclear etiology  --Cortisol low, ACTH low, testosterone elevated, DHEA-sulfate low, prolactin normal  --MRI brain unremarkable for lesions  --CT abdomen pelvis with contrast negative for ovarian/adrenal tumor  --Pelvic ultrasound negative for ovarian lesions  --I am not sure if the above is playing a role in her overall weakness but have reordered labs and will follow-up pending those results  --I discussed with endocrinology prior to admission, keep outpatient endocrinology follow-up    #Chronic venous stasis, wound care  #Morbid obesity, BMI 56    Copied portions of this report have been reviewed and are accurate as of 12/25/24     CHECKLIST:  Abx: None  VTE: Eliquis   GI ppx: PPI  Diet: Consistent carb  Code: CPR, full  Dispo: Patient is hemodynamically stable, did not do well with PT, IPR reconsulted. Case management following.     Mark Vasquez,   [facility]  12/25/24  14:56 EST

## 2024-12-26 ENCOUNTER — APPOINTMENT (OUTPATIENT)
Dept: MRI IMAGING | Facility: HOSPITAL | Age: 59
End: 2024-12-26
Payer: MEDICARE

## 2024-12-26 LAB
CORTIS SERPL-MCNC: 3.02 MCG/DL
CORTIS SERPL-MCNC: 7.63 MCG/DL
ESTRADIOL SERPL HS-MCNC: 122 PG/ML
PROGEST SERPL-MCNC: <0.05 NG/ML

## 2024-12-26 PROCEDURE — 82533 TOTAL CORTISOL: CPT | Performed by: STUDENT IN AN ORGANIZED HEALTH CARE EDUCATION/TRAINING PROGRAM

## 2024-12-26 PROCEDURE — 97110 THERAPEUTIC EXERCISES: CPT

## 2024-12-26 PROCEDURE — 83516 IMMUNOASSAY NONANTIBODY: CPT | Performed by: STUDENT IN AN ORGANIZED HEALTH CARE EDUCATION/TRAINING PROGRAM

## 2024-12-26 PROCEDURE — 84140 ASSAY OF PREGNENOLONE: CPT | Performed by: STUDENT IN AN ORGANIZED HEALTH CARE EDUCATION/TRAINING PROGRAM

## 2024-12-26 PROCEDURE — 82633 DESOXYCORTICOSTERONE: CPT | Performed by: STUDENT IN AN ORGANIZED HEALTH CARE EDUCATION/TRAINING PROGRAM

## 2024-12-26 PROCEDURE — 25010000002 COSYNTROPIN PER 0.25 MG: Performed by: STUDENT IN AN ORGANIZED HEALTH CARE EDUCATION/TRAINING PROGRAM

## 2024-12-26 PROCEDURE — 99232 SBSQ HOSP IP/OBS MODERATE 35: CPT | Performed by: STUDENT IN AN ORGANIZED HEALTH CARE EDUCATION/TRAINING PROGRAM

## 2024-12-26 PROCEDURE — 25510000002 GADOBENATE DIMEGLUMINE 529 MG/ML SOLUTION: Performed by: STUDENT IN AN ORGANIZED HEALTH CARE EDUCATION/TRAINING PROGRAM

## 2024-12-26 PROCEDURE — 70553 MRI BRAIN STEM W/O & W/DYE: CPT

## 2024-12-26 PROCEDURE — 82088 ASSAY OF ALDOSTERONE: CPT | Performed by: STUDENT IN AN ORGANIZED HEALTH CARE EDUCATION/TRAINING PROGRAM

## 2024-12-26 PROCEDURE — 82024 ASSAY OF ACTH: CPT | Performed by: STUDENT IN AN ORGANIZED HEALTH CARE EDUCATION/TRAINING PROGRAM

## 2024-12-26 PROCEDURE — 97530 THERAPEUTIC ACTIVITIES: CPT

## 2024-12-26 PROCEDURE — 84402 ASSAY OF FREE TESTOSTERONE: CPT | Performed by: STUDENT IN AN ORGANIZED HEALTH CARE EDUCATION/TRAINING PROGRAM

## 2024-12-26 PROCEDURE — 84244 ASSAY OF RENIN: CPT | Performed by: STUDENT IN AN ORGANIZED HEALTH CARE EDUCATION/TRAINING PROGRAM

## 2024-12-26 PROCEDURE — A9577 INJ MULTIHANCE: HCPCS | Performed by: STUDENT IN AN ORGANIZED HEALTH CARE EDUCATION/TRAINING PROGRAM

## 2024-12-26 PROCEDURE — 25010000002 DIPHENHYDRAMINE PER 50 MG: Performed by: STUDENT IN AN ORGANIZED HEALTH CARE EDUCATION/TRAINING PROGRAM

## 2024-12-26 RX ORDER — HYDROCORTISONE 10 MG/1
5 TABLET ORAL
Status: DISCONTINUED | OUTPATIENT
Start: 2024-12-26 | End: 2024-12-27

## 2024-12-26 RX ORDER — DIPHENHYDRAMINE HYDROCHLORIDE 50 MG/ML
25 INJECTION INTRAMUSCULAR; INTRAVENOUS ONCE
Status: COMPLETED | OUTPATIENT
Start: 2024-12-26 | End: 2024-12-26

## 2024-12-26 RX ORDER — DOXEPIN HYDROCHLORIDE 25 MG/1
25 CAPSULE ORAL ONCE
Status: COMPLETED | OUTPATIENT
Start: 2024-12-26 | End: 2024-12-26

## 2024-12-26 RX ORDER — HYDROCORTISONE 10 MG/1
10 TABLET ORAL
Status: DISCONTINUED | OUTPATIENT
Start: 2024-12-27 | End: 2024-12-27

## 2024-12-26 RX ADMIN — Medication 1000 MCG: at 09:09

## 2024-12-26 RX ADMIN — GADOBENATE DIMEGLUMINE 15 ML: 529 INJECTION, SOLUTION INTRAVENOUS at 23:06

## 2024-12-26 RX ADMIN — HYDROCORTISONE 1 APPLICATION: 1 CREAM TOPICAL at 05:57

## 2024-12-26 RX ADMIN — ATORVASTATIN CALCIUM 40 MG: 40 TABLET, FILM COATED ORAL at 21:03

## 2024-12-26 RX ADMIN — NYSTATIN: 100000 POWDER TOPICAL at 21:04

## 2024-12-26 RX ADMIN — HYDROCORTISONE 5 MG: 10 TABLET ORAL at 19:19

## 2024-12-26 RX ADMIN — Medication 10 ML: at 09:54

## 2024-12-26 RX ADMIN — Medication 10 ML: at 21:03

## 2024-12-26 RX ADMIN — FOLIC ACID 1 MG: 1 TABLET ORAL at 09:09

## 2024-12-26 RX ADMIN — DIPHENHYDRAMINE HYDROCHLORIDE 25 MG: 50 INJECTION, SOLUTION INTRAMUSCULAR; INTRAVENOUS at 11:11

## 2024-12-26 RX ADMIN — HYDROCORTISONE 1 APPLICATION: 1 CREAM TOPICAL at 21:07

## 2024-12-26 RX ADMIN — Medication 400 MG: at 09:08

## 2024-12-26 RX ADMIN — HYDROCORTISONE 1 APPLICATION: 1 CREAM TOPICAL at 15:33

## 2024-12-26 RX ADMIN — ASPIRIN 81 MG: 81 TABLET, COATED ORAL at 09:08

## 2024-12-26 RX ADMIN — AMMONIUM LACTATE 1 APPLICATION: 120 CREAM TOPICAL at 09:10

## 2024-12-26 RX ADMIN — FLECAINIDE ACETATE 100 MG: 50 TABLET ORAL at 09:08

## 2024-12-26 RX ADMIN — NYSTATIN: 100000 POWDER TOPICAL at 09:10

## 2024-12-26 RX ADMIN — APIXABAN 5 MG: 5 TABLET, FILM COATED ORAL at 21:03

## 2024-12-26 RX ADMIN — FLECAINIDE ACETATE 100 MG: 50 TABLET ORAL at 21:03

## 2024-12-26 RX ADMIN — LEVOTHYROXINE SODIUM 125 MCG: 0.12 TABLET ORAL at 09:09

## 2024-12-26 RX ADMIN — Medication 5000 UNITS: at 09:09

## 2024-12-26 RX ADMIN — COSYNTROPIN 0.25 MG: 0.25 INJECTION, POWDER, LYOPHILIZED, FOR SOLUTION INTRAMUSCULAR; INTRAVENOUS at 05:56

## 2024-12-26 RX ADMIN — APIXABAN 5 MG: 5 TABLET, FILM COATED ORAL at 09:08

## 2024-12-26 RX ADMIN — DOXEPIN HYDROCHLORIDE 25 MG: 25 CAPSULE ORAL at 11:11

## 2024-12-26 NOTE — PROGRESS NOTES
[facility] HOSPITALIST PROGRESS NOTE     Patient Identification:  Name:  Ana Maradiaga  Age:  59 y.o.  Sex:  female  :  1965  MRN:  [id number]  Visit Number:  [id number]  ROOM: [address] [place] [address]     Primary Care Provider:  Val Purcell PA    Length of stay in inpatient status:  7    Subjective     Chief Compliant:    Chief Complaint   Patient presents with    Weakness - Generalized    Blood in Urine       History of Presenting Illness:    Patient seen in follow-up for UTI and overall weakness.  Patient states she feels about the same since she is admitted.  Has been afebrile.  Continue to work with PT/OT, agreeable to SNF.  No adverse events noted overnight.    Objective     Current Hospital Meds:ammonium lactate, 1 Application, Topical, Daily  apixaban, 5 mg, Oral, Q12H  aspirin, 81 mg, Oral, Daily  atorvastatin, 40 mg, Oral, Nightly  [Held by provider] carvedilol, 3.125 mg, Oral, Q12H  flecainide, 100 mg, Oral, BID  folic acid, 1 mg, Oral, Daily  hydrocortisone, 1 Application, Topical, Q8H  levothyroxine, 125 mcg, Oral, Daily  magnesium oxide, 400 mg, Oral, Daily  nystatin, , Topical, Q12H  sodium chloride, 10 mL, Intravenous, Q12H  vitamin B-12, 1,000 mcg, Oral, Daily  vitamin D3, 5,000 Units, Oral, Daily         Current Antimicrobial Therapy:  Anti-Infectives (From admission, onward)      Ordered     Dose/Rate Route Frequency Start Stop    24 0935  cefTRIAXone (ROCEPHIN) 2,000 mg in sodium chloride 0.9 % 100 mL IVPB-VTB        Ordering Provider: Thien Reardon DO    2,000 mg  200 mL/hr over 30 Minutes Intravenous Every 24 Hours 24 1700 24 1758    24 1555  cefTRIAXone (ROCEPHIN) 2,000 mg in sodium chloride 0.9 % 100 mL IVPB-VTB        Ordering Provider: Mark Vasquez DO    2,000 mg  200 mL/hr over 30 Minutes Intravenous Once 24 1610 24 1813          Current Diuretic Therapy:  Diuretics (From admission, onward)      None           ----------------------------------------------------------------------------------------------------------------------  Vital Signs:  Temp:  [98.1 °F (36.7 °C)-98.9 °F (37.2 °C)] 98.3 °F (36.8 °C)  Heart Rate:  [70-78] 71  Resp:  [16-18] 16  BP: (108-125)/(58-69) 123/63  SpO2:  [96 %-98 %] 98 %  on   ;   Device (Oxygen Therapy): room air  Body mass index is 56.25 kg/m².    Wt Readings from Last 3 Encounters:   12/19/24 (!) 153 kg (338 lb)   10/24/24 116 kg (256 lb 6.4 oz)   10/11/24 (!) 164 kg (362 lb)     Intake & Output (last 3 days)         12/21 0701  12/22 0700 12/22 0701  12/23 0700 12/23 0701  12/24 0700    P.O. 1035 1300 960    I.V. (mL/kg)  0 (0) 203 (1.3)    IV Piggyback  100     Total Intake(mL/kg) 1035 (6.8) 1400 (9.2) 1163 (7.6)    Urine (mL/kg/hr) 650 (0.2) 250 (0.1) 200 (0.1)    Stool 0      Total Output 650 250 200    Net +385 +1150 +963           Urine Unmeasured Occurrence  2 x 2 x    Stool Unmeasured Occurrence 1 x            Diet: Cardiac; Healthy Heart (2-3 Na+); Fluid Consistency: Thin (IDDSI 0)  ----------------------------------------------------------------------------------------------------------------------  Physical exam:  Constitutional: Early obese female, nontoxic, Well-developed and well-nourished, resting comfortably in bed, no acute distress.      HENT:  Head:  Normocephalic and atraumatic.  Mouth:  Moist mucous membranes.  Eyes:  Conjunctivae and EOM are normal. No scleral icterus.   Cardiovascular:  Normal rate, regular rhythm and normal heart sounds with no murmur. No JVD.   Pulmonary/Chest:  No respiratory distress, no wheezes, no crackles, with normal breath sounds and good air movement. Unlabored. No accessory muscle use.  Abdominal:  Soft. No distension and no tenderness.  Bowel sounds present. No rebound or guarding.   Musculoskeletal:  No tenderness, and no deformity.  No red or swollen joints anywhere.    Neurological:  Alert and oriented to person, place, and time.  "Nonfocal, weakness in upper and lower extremities bilaterally  Skin:  Skin is warm and dry.  Morbilliform rash on upper and lower extremities, trunk .no pallor.   Peripheral vascular:  No clubbing, no cyanosis, bilateral venous stasis.      ----------------------------------------------------------------------------------------------------------------------  Results from last 7 days   Lab Units 12/25/24  1615 12/25/24  0116 12/24/24  0015 12/21/24  0019 12/19/24 1956 12/19/24  1439   CRP mg/dL 10.07*  --   --   --   --   --    LACTATE mmol/L  --   --   --   --   --  1.5   WBC 10*3/mm3  --  9.63 8.45 6.90   < > 10.86*   HEMOGLOBIN g/dL  --  12.1 11.2* 11.3*   < > 14.4   HEMATOCRIT %  --  38.5 37.1 35.6   < > 43.0   MCV fL  --  99.2* 101.6* 98.1*   < > 93.9   MCHC g/dL  --  31.4* 30.2* 31.7   < > 33.5   PLATELETS 10*3/mm3  --  154 138* 158   < > 225   INR   --   --  1.33*  --   --  1.91*    < > = values in this interval not displayed.         Results from last 7 days   Lab Units 12/24/24  0015 12/21/24  0019 12/20/24  0424 12/19/24 1956 12/19/24  1439   SODIUM mmol/L 132* 140 138   < > 138   POTASSIUM mmol/L 4.0 3.8 4.5   < > 3.5   CHLORIDE mmol/L 101 105 101   < > 97*   CO2 mmol/L 22.0 24.7 25.9   < > 24.3   BUN mg/dL 10 18 19   < > 20   CREATININE mg/dL 1.05* 1.26* 1.40*   < > 1.55*   CALCIUM mg/dL 8.2* 8.4* 8.7   < > 9.9   GLUCOSE mg/dL 104* 127* 77   < > 83   ALBUMIN g/dL 2.4*  --   --   --  3.7   BILIRUBIN mg/dL 0.5  --   --   --  1.5*   ALK PHOS U/L 97  --   --   --  128*   AST (SGOT) U/L 18  --   --   --  34*   ALT (SGPT) U/L 16  --   --   --  31    < > = values in this interval not displayed.   Estimated Creatinine Clearance: 86.9 mL/min (A) (by C-G formula based on SCr of 1.05 mg/dL (H)).  No results found for: \"AMMONIA\"  Results from last 7 days   Lab Units 12/19/24  1439   CK TOTAL U/L 67         Results from last 7 days   Lab Units 12/25/24  1615   CHOLESTEROL mg/dL 94   TRIGLYCERIDES mg/dL 114   HDL " "CHOL mg/dL 32*   LDL CHOL mg/dL 41     No results found for: \"HGBA1C\", \"POCGLU\"  Lab Results   Component Value Date    TSH 3.320 12/19/2024    FREET4 1.18 03/21/2022     No results found for: \"PREGTESTUR\", \"PREGSERUM\", \"HCG\", \"HCGQUANT\"  Pain Management Panel  More data may exist         Latest Ref Rng & Units 12/19/2024 6/22/2023   Pain Management Panel   Creatinine, Urine mg/dL  mg/dL - 152.9  152.9    Amphetamine, Urine Qual Negative Negative  -   Barbiturates Screen, Urine Negative Negative  -   Benzodiazepine Screen, Urine Negative Negative  -   Buprenorphine, Screen, Urine Negative Negative  -   Cocaine Screen, Urine Negative Negative  -   Fentanyl, Urine Negative Negative  -   Methadone Screen , Urine Negative Negative  -   Methamphetamine, Ur Negative Negative  -      Details          Multiple values from one day are sorted in reverse-chronological order             Brief Urine Lab Results  (Last result in the past 365 days)        Color   Clarity   Blood   Leuk Est   Nitrite   Protein   CREAT   Urine HCG        12/19/24 1439 Red  Comment: Dipstick results may be inaccurate due to color interference.       Turbid   Moderate (2+)   Large (3+)   Positive   100 mg/dL (2+)                 Blood Culture   Date Value Ref Range Status   12/19/2024 No growth at 4 days  Preliminary   12/19/2024 No growth at 4 days  Preliminary     Urine Culture   Date Value Ref Range Status   12/19/2024 >100,000 CFU/mL Mixed Toya Isolated  Final     No results found for: \"WOUNDCX\"  No results found for: \"STOOLCX\"  No results found for: \"RESPCX\"  No results found for: \"AFBCX\"  Results from last 7 days   Lab Units 12/25/24  1615 12/19/24  1439   LACTATE mmol/L  --  1.5   SED RATE mm/hr 39*  --    CRP mg/dL 10.07*  --        I have personally looked at the labs and they are summarized above.  ----------------------------------------------------------------------------------------------------------------------  Detailed radiology " reports for the last 24 hours:  Imaging Results (Last 24 Hours)       ** No results found for the last 24 hours. **          Assessment & Plan      Patient is a 59-year-old female with history significant for atrial fibrillation, CKD and prior stroke who came to the ER with reports of gross hematuria and weakness.    #Acute on chronic debility  #Bilateral lower extremity weakness, multifactorial  --PT/OT, has not done well with therapy, notes updated, IPR re-consulted, case management following    #Adrenal insufficiency, suspect central process  #Elevated testosterone, unclear etiology  --Cortisol low, ACTH low, testosterone elevated, DHEA-sulfate low, prolactin normal  --Estradiol elevated, FSH/LH low  --MRI brain with/without unremarkable for lesions  --CT abdomen pelvis with contrast negative for ovarian/adrenal tumor  --Pelvic ultrasound negative for ovarian lesions  --MRI pituitary protocol with and without ordered  --I am not sure if the above is playing a role in her overall weakness but have reordered labs and will follow-up pending those results  --Start Solu-Cortef 10 mg daily with breakfast, 5 mg daily with dinner  --I discussed with endocrinology prior to admission, keep outpatient endocrinology follow-up    #Acute urinary tract infection the E. coli  #Left staghorn calculus  --Urine culture positive for E. Coli  --Completed course of Rocephin  --Hematuria likely due to large UPJ calculus exacerbated by Eliquis  --Urology evaluated, recommend outpatient follow-up    #Atrial fibrillation, rate controlled  #Prior ischemic infarct  --MRI of the brain noted old infarcts but nothing acute  --Continue flecainide, Eliquis  --Follow-up outpatient    #Chronic venous stasis, wound care  #Morbid obesity, BMI 56    Copied portions of this report have been reviewed and are accurate as of 12/26/24     CHECKLIST:  Abx: None  VTE: Eliquis   GI ppx: PPI  Diet: Consistent carb  Code: CPR, full  Dispo: Patient is  hemodynamically stable, did not do well with PT, IPR reconsulted. Case management following.     Mark Vasquez DO  [facility] Hospitalist  12/26/24  13:06 EST

## 2024-12-26 NOTE — PLAN OF CARE
Goal Outcome Evaluation:  Plan of Care Reviewed With: patient           Outcome Evaluation: Patient resting in bed at this time. Pt had bath this shift. No acute changes or concerns at this time. Will continue with plan of care.

## 2024-12-26 NOTE — THERAPY TREATMENT NOTE
Acute Care - Physical Therapy Treatment Note   Elijah     Patient Name: Ana Maradiaga  : 1965  MRN: [id number]  Today's Date: 2024   Onset of Illness/Injury or Date of Surgery: 24  Visit Dx:     ICD-10-CM ICD-9-CM   1. Urinary tract infection with hematuria, site unspecified  N39.0 599.0    R31.9 599.70   2. Weakness  R53.1 780.79   3. High serum testosterone  R79.89 790.99     Patient Active Problem List   Diagnosis    Type 2 diabetes mellitus with hyperglycemia, without long-term current use of insulin    Acquired hypothyroidism    Hirsutism    Elevated testosterone level in female    Complicated UTI (urinary tract infection)     Past Medical History:   Diagnosis Date    Anemia     Arrhythmia     Afib    Arthritis     Atrial fibrillation     Bronchitis     Chronic kidney disease     Gout     Hypertension     Stroke 2021     History reviewed. No pertinent surgical history.  PT Assessment (Last 12 Hours)       PT Evaluation and Treatment       Row Name 24 1347          Physical Therapy Time and Intention    Document Type therapy note (daily note)  -     Mode of Treatment physical therapy  -     Patient Effort good  -     Symptoms Noted During/After Treatment fatigue  -     Comment Pt seen for treatment this date, pleasant and cooperative with therapy. Pt participated in ambulation at bedside, grossly CGA x1-2; some LE TE while sitting in chair. Encouraged to not get up without assist.  -       Row Name 24 1346          Bed Mobility    Bed Mobility supine-sit  -     Supine-Sit [place] (Bed Mobility) contact guard;minimum assist (75% patient effort)  -       Row Name 24 1345          Transfers    Transfers sit-stand transfer;stand-sit transfer  -       Row Name 24 1347          Sit-Stand Transfer    Sit-Stand [place] (Transfers) contact guard;minimum assist (75% patient effort);2 person assist  -     Assistive Device  (Sit-Stand Transfers) walker, front-wheeled  -KH       Row Name 12/26/24 1347          Stand-Sit Transfer    Stand-Sit [place] (Transfers) contact guard  -     Assistive Device (Stand-Sit Transfers) walker, front-wheeled  -KH       Row Name 12/26/24 1347          Gait/Stairs (Locomotion)    Gait/Stairs Locomotion gait/ambulation assistive device  -     [place] Level (Gait) contact guard  -     Assistive Device (Gait) walker, front-wheeled  -     Distance in Feet (Gait) 7  7'x2  -       Row Name 12/26/24 1347          Motor Skills    Therapeutic Exercise knee;ankle  LAQ x10, grossly x8' ; ankle pumps grossly 2x8, x4 BL  -       Row Name 12/26/24 1347          Positioning and Restraints    Pre-Treatment Position in bed  -     Post Treatment Position chair  -     In Chair reclined;call light within reach;encouraged to call for assist  -       Row Name 12/26/24 1347          Progress Summary (PT)    Daily Progress Summary (PT) Pt ambulated grossly 2x7' with RW, grossly CGA x1-2 (2nd person assit for pt and staff safety). Pt may benefit from therapy interventions, unsure if pt could tolerate IPR however pt seems willing. No change in POC, prognosis fair.  -               User Key  (r) = Recorded By, (t) = Taken By, (c) = Cosigned By      Initials Name Provider Type    Armida Andrews, PT Physical Therapist                    Physical Therapy Education       Title: PT OT SLP Therapies (Done)       Topic: Physical Therapy (Done)       Point: Mobility training (Done)       Learning Progress Summary            Patient Acceptance, E,TB, VU by CF at 12/26/2024 1222    Acceptance, E,TB, VU by CF at 12/24/2024 0905    Acceptance, E, VU by SC at 12/24/2024 0331    Acceptance, E,D, VU,NR by AG at 12/23/2024 1249                      Point: Home exercise program (Done)       Learning Progress Summary            Patient Acceptance, E,TB, VU by CF at 12/26/2024 1222    Acceptance, E,TB, VU by CF at  12/24/2024 0905    Acceptance, E, VU by SC at 12/24/2024 0331    Acceptance, E,D, VU,NR by  at 12/23/2024 1249                      Point: Body mechanics (Done)       Learning Progress Summary            Patient Acceptance, E,TB, VU by  at 12/26/2024 1222    Acceptance, E,TB, VU by  at 12/24/2024 0905    Acceptance, E, VU by SC at 12/24/2024 0331    Acceptance, E,D, VU,NR by  at 12/23/2024 1249                      Point: Precautions (Done)       Learning Progress Summary            Patient Acceptance, E,TB, VU by  at 12/26/2024 1222    Acceptance, E,TB, VU by  at 12/24/2024 0905    Acceptance, E, VU by SC at 12/24/2024 0331    Acceptance, E,D, VU,NR by  at 12/23/2024 1249                                      User Key       Initials Effective Dates Name Provider Type Discipline     06/16/21 -  Elizabeth Aquino, PT Physical Therapist PT     06/25/24 -  Ebonie Rangel, RN Registered Nurse Nurse    SC 09/11/24 -  Naya Lowry, RN Registered Nurse Nurse                  PT Recommendation and Plan  Anticipated Discharge Disposition (PT): sub acute care setting, skilled nursing facility  Progress Summary (PT)  Daily Progress Summary (PT): Pt ambulated grossly 2x7' with RW, grossly CGA x1-2 (2nd person assit for pt and staff safety). Pt may benefit from therapy interventions, unsure if pt could tolerate IPR however pt seems willing. No change in POC, prognosis fair.       Time Calculation:    PT Charges       Row Name 12/26/24 1435             Time Calculation    Start Time 1347  -KH      PT Received On 12/26/24  -         Time Calculation- PT    Total Timed Code Minutes- PT 23 minute(s)  -                User Key  (r) = Recorded By, (t) = Taken By, (c) = Cosigned By      Initials Name Provider Type    Armida Andrews, PT Physical Therapist                  Therapy Charges for Today       Code Description Service Date Service Provider Modifiers Qty    74885176237 HC PT THER PROC EA 15 MIN  12/26/2024 Armida Odell, PT GP 1    85454359789 HC PT THERAPEUTIC ACT EA 15 MIN 12/26/2024 Armida Odell, PT GP 1            PT G-Codes  AM-PAC 6 Clicks Score (PT): 12    Armida Odell, PT  12/26/2024

## 2024-12-26 NOTE — PLAN OF CARE
Goal Outcome Evaluation:  Plan of Care Reviewed With: patient        Progress: no change  Outcome Evaluation: Pt has been resting in bed through the night. Pt refused a bath, educated on the importance of bathing. No acute changes or concerns at this time.

## 2024-12-26 NOTE — CASE MANAGEMENT/SOCIAL WORK
Discharge Planning Assessment   Elijah     Patient Name: Ana Maradiaga  MRN: [id number]  Today's Date: 12/26/2024    Admit Date: 12/19/2024    Plan: SS received a consult for d/c planning SNF. SS spoke with pt at bedside on this date who states she is agreeable for SNF placement. Pt request's for SS to contact franky Matson. SS attempted to contact son on this date with no success. SS to follow and assist with discharge planning.     Discharge Plan       Row Name 12/26/24 1736       Plan    Plan SS received a consult for d/c planning SNF. SS spoke with pt at bedside on this date who states she is agreeable for SNF placement. Pt request's for SS to contact franky Matson. SS attempted to contact son on this date with no success. SS to follow and assist with discharge planning.    Patient/Family in Agreement with Plan yes        Expected Discharge Date and Time       Expected Discharge Date Expected Discharge Time    Dec 25, 2024         Demographic Summary       Row Name 12/26/24 1816       General Information    Admission Type inpatient    Referral Source nursing    Reason for Consult discharge planning  SS received a consult for d/c planning SNF.          ARMANDO Sands

## 2024-12-27 LAB
ACTH PLAS-MCNC: 4.7 PG/ML (ref 7.2–63.3)
DHEA-S SERPL-MCNC: 8.6 UG/DL (ref 29.4–220.5)
T4 FREE SERPL-MCNC: 1.3 NG/DL (ref 0.92–1.68)
TESTOST FREE SERPL-MCNC: 0.5 PG/ML (ref 0–4.2)

## 2024-12-27 PROCEDURE — 70553 MRI BRAIN STEM W/O & W/DYE: CPT | Performed by: RADIOLOGY

## 2024-12-27 PROCEDURE — 84481 FREE ASSAY (FT-3): CPT | Performed by: STUDENT IN AN ORGANIZED HEALTH CARE EDUCATION/TRAINING PROGRAM

## 2024-12-27 PROCEDURE — 99232 SBSQ HOSP IP/OBS MODERATE 35: CPT | Performed by: STUDENT IN AN ORGANIZED HEALTH CARE EDUCATION/TRAINING PROGRAM

## 2024-12-27 PROCEDURE — 84439 ASSAY OF FREE THYROXINE: CPT | Performed by: STUDENT IN AN ORGANIZED HEALTH CARE EDUCATION/TRAINING PROGRAM

## 2024-12-27 RX ORDER — HYDROCORTISONE 10 MG/1
15 TABLET ORAL
Status: DISCONTINUED | OUTPATIENT
Start: 2024-12-28 | End: 2025-01-09 | Stop reason: HOSPADM

## 2024-12-27 RX ORDER — HYDROCORTISONE 10 MG/1
10 TABLET ORAL DAILY
Status: DISCONTINUED | OUTPATIENT
Start: 2024-12-27 | End: 2025-01-09 | Stop reason: HOSPADM

## 2024-12-27 RX ORDER — HYDROCORTISONE 10 MG/1
10 TABLET ORAL
Status: DISCONTINUED | OUTPATIENT
Start: 2024-12-27 | End: 2024-12-27

## 2024-12-27 RX ORDER — HYDROCORTISONE 10 MG/1
25 TABLET ORAL
Status: DISCONTINUED | OUTPATIENT
Start: 2024-12-28 | End: 2024-12-27

## 2024-12-27 RX ORDER — HYDROCORTISONE 10 MG/1
10 TABLET ORAL
Status: DISCONTINUED | OUTPATIENT
Start: 2024-12-28 | End: 2024-12-27

## 2024-12-27 RX ADMIN — Medication 1000 MCG: at 08:50

## 2024-12-27 RX ADMIN — APIXABAN 5 MG: 5 TABLET, FILM COATED ORAL at 21:03

## 2024-12-27 RX ADMIN — HYDROCORTISONE 1 APPLICATION: 1 CREAM TOPICAL at 05:34

## 2024-12-27 RX ADMIN — ASPIRIN 81 MG: 81 TABLET, COATED ORAL at 08:50

## 2024-12-27 RX ADMIN — FLECAINIDE ACETATE 100 MG: 50 TABLET ORAL at 08:50

## 2024-12-27 RX ADMIN — Medication 400 MG: at 08:50

## 2024-12-27 RX ADMIN — FLECAINIDE ACETATE 100 MG: 50 TABLET ORAL at 21:03

## 2024-12-27 RX ADMIN — HYDROCORTISONE 1 APPLICATION: 1 CREAM TOPICAL at 15:06

## 2024-12-27 RX ADMIN — FOLIC ACID 1 MG: 1 TABLET ORAL at 08:50

## 2024-12-27 RX ADMIN — Medication 5000 UNITS: at 08:50

## 2024-12-27 RX ADMIN — AMMONIUM LACTATE 1 APPLICATION: 120 CREAM TOPICAL at 08:49

## 2024-12-27 RX ADMIN — APIXABAN 5 MG: 5 TABLET, FILM COATED ORAL at 08:50

## 2024-12-27 RX ADMIN — NYSTATIN: 100000 POWDER TOPICAL at 08:54

## 2024-12-27 RX ADMIN — NYSTATIN: 100000 POWDER TOPICAL at 21:03

## 2024-12-27 RX ADMIN — HYDROCORTISONE 5 MG: 10 TABLET ORAL at 17:09

## 2024-12-27 RX ADMIN — ATORVASTATIN CALCIUM 40 MG: 40 TABLET, FILM COATED ORAL at 21:03

## 2024-12-27 RX ADMIN — LEVOTHYROXINE SODIUM 125 MCG: 0.12 TABLET ORAL at 08:50

## 2024-12-27 RX ADMIN — Medication 10 ML: at 21:03

## 2024-12-27 RX ADMIN — HYDROCORTISONE 10 MG: 10 TABLET ORAL at 08:50

## 2024-12-27 RX ADMIN — HYDROCORTISONE 1 APPLICATION: 1 CREAM TOPICAL at 21:03

## 2024-12-27 NOTE — PLAN OF CARE
Goal Outcome Evaluation:  Plan of Care Reviewed With: patient        Progress: no change     Pt is resting well no acute changes at this time. Pt ambulated in mri. Mri done. Will continue to monitor and follow current plan of care.

## 2024-12-27 NOTE — CASE MANAGEMENT/SOCIAL WORK
Discharge Planning Assessment   Elijah     Patient Name: Ana Maradiaga  MRN: [id number]  Today's Date: 12/27/2024    Admit Date: 12/19/2024    Plan: SS followed up with pt on this date who remains agreeable for SNF placement. SS was able to contact pt's son, Dano on this date who states he has a preference of [facility]. SS contacted [facility] per Esme to check bed availablity. SS placed referral in epic in basket. SS to follow.       Discharge Plan       Row Name 12/27/24 6748       Plan    Plan SS followed up with pt on this date who remains agreeable for SNF placement. SS was able to contact pt's son, Dano on this date who states he has a preference of [facility]. SS contacted [facility] per Esme to check bed availablity. SS placed referral in epic in basket. SS to follow.          Continued Care and Services - Admitted Since 12/19/2024       Destination       Service Provider Request Status Services Address Phone Fax Patient Preferred    [facility] Pending - Request Sent -- [address] [place] [address] [phone] --                      Expected Discharge Date and Time       Expected Discharge Date Expected Discharge Time    Dec 30, 2024           ARMANDO Sands

## 2024-12-27 NOTE — PROGRESS NOTES
[facility] HOSPITALIST PROGRESS NOTE     Patient Identification:  Name:  Ana Maradiaga  Age:  59 y.o.  Sex:  female  :  1965  MRN:  [id number]  Visit Number:  [id number]  ROOM: [address] [place] [address]     Primary Care Provider:  Val Purcell PA    Length of stay in inpatient status:  8    Subjective     Chief Compliant:    Chief Complaint   Patient presents with    Weakness - Generalized    Blood in Urine       History of Presenting Illness:    Patient seen in follow-up for UTI and overall weakness.  Patient states she feels about the same since she is admitted.  Has been afebrile.  Continue to work with PT/OT, agreeable to SNF.  No adverse events noted overnight.    Objective     Current Hospital Meds:ammonium lactate, 1 Application, Topical, Daily  apixaban, 5 mg, Oral, Q12H  aspirin, 81 mg, Oral, Daily  atorvastatin, 40 mg, Oral, Nightly  flecainide, 100 mg, Oral, BID  folic acid, 1 mg, Oral, Daily  hydrocortisone, 10 mg, Oral, Daily With Breakfast  hydrocortisone, 5 mg, Oral, Daily With Dinner  hydrocortisone, 1 Application, Topical, Q8H  levothyroxine, 125 mcg, Oral, Daily  magnesium oxide, 400 mg, Oral, Daily  nystatin, , Topical, Q12H  sodium chloride, 10 mL, Intravenous, Q12H  vitamin B-12, 1,000 mcg, Oral, Daily  vitamin D3, 5,000 Units, Oral, Daily         Current Antimicrobial Therapy:  Anti-Infectives (From admission, onward)      Ordered     Dose/Rate Route Frequency Start Stop    24 0935  cefTRIAXone (ROCEPHIN) 2,000 mg in sodium chloride 0.9 % 100 mL IVPB-VTB        Ordering Provider: Thien Reardon DO    2,000 mg  200 mL/hr over 30 Minutes Intravenous Every 24 Hours 24 1700 24 1758    24 1555  cefTRIAXone (ROCEPHIN) 2,000 mg in sodium chloride 0.9 % 100 mL IVPB-VTB        Ordering Provider: Mark Vasquez DO    2,000 mg  200 mL/hr over 30 Minutes Intravenous Once 24 1610 24 1813          Current Diuretic Therapy:  Diuretics (From  admission, onward)      None          ----------------------------------------------------------------------------------------------------------------------  Vital Signs:  Temp:  [97.7 °F (36.5 °C)-98.4 °F (36.9 °C)] 98.4 °F (36.9 °C)  Heart Rate:  [67-79] 67  Resp:  [16-18] 16  BP: (101-128)/(63-74) 101/63  SpO2:  [98 %] 98 %  on   ;   Device (Oxygen Therapy): room air  Body mass index is 56.25 kg/m².    Wt Readings from Last 3 Encounters:   12/19/24 (!) 153 kg (338 lb)   10/24/24 116 kg (256 lb 6.4 oz)   10/11/24 (!) 164 kg (362 lb)     Intake & Output (last 3 days)         12/21 0701  12/22 0700 12/22 0701  12/23 0700 12/23 0701  12/24 0700    P.O. 1035 1300 960    I.V. (mL/kg)  0 (0) 203 (1.3)    IV Piggyback  100     Total Intake(mL/kg) 1035 (6.8) 1400 (9.2) 1163 (7.6)    Urine (mL/kg/hr) 650 (0.2) 250 (0.1) 200 (0.1)    Stool 0      Total Output 650 250 200    Net +385 +1150 +963           Urine Unmeasured Occurrence  2 x 2 x    Stool Unmeasured Occurrence 1 x            Diet: Cardiac; Healthy Heart (2-3 Na+); Fluid Consistency: Thin (IDDSI 0)  ----------------------------------------------------------------------------------------------------------------------  Physical exam:  Constitutional: Early obese female, nontoxic, Well-developed and well-nourished, resting comfortably in bed, no acute distress.      HENT:  Head:  Normocephalic and atraumatic.  Mouth:  Moist mucous membranes.  Eyes:  Conjunctivae and EOM are normal. No scleral icterus.   Cardiovascular:  Normal rate, regular rhythm and normal heart sounds with no murmur. No JVD.   Pulmonary/Chest:  No respiratory distress, no wheezes, no crackles, with normal breath sounds and good air movement. Unlabored. No accessory muscle use.  Abdominal:  Soft. No distension and no tenderness.  Bowel sounds present. No rebound or guarding.   Musculoskeletal:  No tenderness, and no deformity.  No red or swollen joints anywhere.    Neurological:  Alert and oriented  "to person, place, and time. Nonfocal, weakness in upper and lower extremities bilaterally  Skin:  Skin is warm and dry.  Morbilliform rash on upper and lower extremities, trunk .no pallor.   Peripheral vascular:  No clubbing, no cyanosis, bilateral venous stasis.      ----------------------------------------------------------------------------------------------------------------------  Results from last 7 days   Lab Units 12/25/24  1615 12/25/24  0116 12/24/24  0015 12/21/24  0019   CRP mg/dL 10.07*  --   --   --    WBC 10*3/mm3  --  9.63 8.45 6.90   HEMOGLOBIN g/dL  --  12.1 11.2* 11.3*   HEMATOCRIT %  --  38.5 37.1 35.6   MCV fL  --  99.2* 101.6* 98.1*   MCHC g/dL  --  31.4* 30.2* 31.7   PLATELETS 10*3/mm3  --  154 138* 158   INR   --   --  1.33*  --          Results from last 7 days   Lab Units 12/24/24  0015 12/21/24  0019   SODIUM mmol/L 132* 140   POTASSIUM mmol/L 4.0 3.8   CHLORIDE mmol/L 101 105   CO2 mmol/L 22.0 24.7   BUN mg/dL 10 18   CREATININE mg/dL 1.05* 1.26*   CALCIUM mg/dL 8.2* 8.4*   GLUCOSE mg/dL 104* 127*   ALBUMIN g/dL 2.4*  --    BILIRUBIN mg/dL 0.5  --    ALK PHOS U/L 97  --    AST (SGOT) U/L 18  --    ALT (SGPT) U/L 16  --    Estimated Creatinine Clearance: 86.9 mL/min (A) (by C-G formula based on SCr of 1.05 mg/dL (H)).  No results found for: \"AMMONIA\"            Results from last 7 days   Lab Units 12/25/24  1615   CHOLESTEROL mg/dL 94   TRIGLYCERIDES mg/dL 114   HDL CHOL mg/dL 32*   LDL CHOL mg/dL 41     No results found for: \"HGBA1C\", \"POCGLU\"  Lab Results   Component Value Date    TSH 3.320 12/19/2024    FREET4 1.18 03/21/2022     No results found for: \"PREGTESTUR\", \"PREGSERUM\", \"HCG\", \"HCGQUANT\"  Pain Management Panel  More data may exist         Latest Ref Rng & Units 12/19/2024 6/22/2023   Pain Management Panel   Creatinine, Urine mg/dL  mg/dL - 152.9  152.9    Amphetamine, Urine Qual Negative Negative  -   Barbiturates Screen, Urine Negative Negative  -   Benzodiazepine Screen, Urine " "Negative Negative  -   Buprenorphine, Screen, Urine Negative Negative  -   Cocaine Screen, Urine Negative Negative  -   Fentanyl, Urine Negative Negative  -   Methadone Screen , Urine Negative Negative  -   Methamphetamine, Ur Negative Negative  -      Details          Multiple values from one day are sorted in reverse-chronological order             Brief Urine Lab Results  (Last result in the past 365 days)        Color   Clarity   Blood   Leuk Est   Nitrite   Protein   CREAT   Urine HCG        12/19/24 1439 Red  Comment: Dipstick results may be inaccurate due to color interference.       Turbid   Moderate (2+)   Large (3+)   Positive   100 mg/dL (2+)                 Blood Culture   Date Value Ref Range Status   12/19/2024 No growth at 4 days  Preliminary   12/19/2024 No growth at 4 days  Preliminary     Urine Culture   Date Value Ref Range Status   12/19/2024 >100,000 CFU/mL Mixed [place] Isolated  Final     No results found for: \"WOUNDCX\"  No results found for: \"STOOLCX\"  No results found for: \"RESPCX\"  No results found for: \"AFBCX\"  Results from last 7 days   Lab Units 12/25/24  1615   SED RATE mm/hr 39*   CRP mg/dL 10.07*       I have personally looked at the labs and they are summarized above.  ----------------------------------------------------------------------------------------------------------------------  Detailed radiology reports for the last 24 hours:  Imaging Results (Last 24 Hours)       Procedure Component Value Units Date/Time    MRI Pituitary With & Without Contrast [[id number]] Collected: 12/27/24 0933     Updated: 12/27/24 0952    Narrative:      EXAM:    MR Head Without and With Intravenous Contrast, Sella Protocol     EXAM DATE:    12/26/2024 10:24 PM     CLINICAL HISTORY:    r/o lesion/adenoma; N39.0-Urinary tract infection, site not specified;  R31.9-Hematuria, unspecified; R53.1-Weakness; R79.89-Other specified  abnormal findings of blood chemistry     TECHNIQUE:    Magnetic resonance " images of the head/brain without and with  intravenous contrast in multiple planes using pituitary protocol.     COMPARISON:    12/19/2024     FINDINGS:    Sella:  Homogeneous enhancement pattern of the pituitary gland with no  focal lesions identified. Pituitary gland is within normal limits for  size and configuration.  Infundibulum is midline.  No sellar or  suprasellar mass.    Cavernous sinuses:  Unremarkable.    Optic chiasm:  Unremarkable.  No mass effect on the optic chiasm.    Orbits:  Unremarkable.    Brain and extra-axial spaces:  Increased cortical T1 signal in the  right insular region compatible with sequela of remote infarct.  Stable  equivocal leptomeningeal thickening with enhancement without nodular  features can be seen if patient has had prior or even remote spinal  procedure. No shay/subarachnoid enhancement is noted.  T2 signal  abnormality cerebral white matter most consistent with mild-moderate  chronic small vessel ischemic disease.  No hemorrhage.  No acute infarct  identified.  No enhancing brain parenchymal lesions.    Bones/joints:  Unremarkable.  No acute fracture.    Sinuses:  Unremarkable as visualized.  No acute sinusitis.    Mastoid air cells:  Unremarkable as visualized.  No mastoid effusion.       Impression:      1.  Increased cortical T1 signal in the right insular region compatible  with sequela of remote infarct.  2.  No acute infarct identified.  3.  No enhancing brain parenchymal lesions.  4.  Stable equivocal leptomeningeal thickening with enhancement without  nodular features can be seen if patient has had prior or even remote  spinal procedure. No shay/subarachnoid enhancement is noted.   Intracranial hypotension and meningeal inflammation may also be  considered although appearance on MRI is less likely. Correlate with  patient's history.  5.  Homogeneous enhancement pattern of the pituitary gland with no focal  lesions identified. Pituitary gland is within normal limits  for size and  configuration.  6.  T2 signal abnormality cerebral white matter most consistent with  mild-moderate chronic small vessel ischemic disease.     This report was finalized on 12/27/2024 9:49 AM by Dr. Dano Mccann MD.             Assessment & Plan      Patient is a 59-year-old female with history significant for atrial fibrillation, CKD and prior stroke who came to the ER with reports of gross hematuria and weakness.    #Acute on chronic debility  #Bilateral lower extremity weakness, multifactorial  --PT/OT, has not done well with therapy, notes updated, IPR re-consulted, case management following    #Adrenal insufficiency, suspect central process  #Elevated testosterone, unclear etiology  --Cortisol low, ACTH low, testosterone elevated, DHEA-sulfate low, prolactin normal  --Estradiol elevated, FSH/LH low  --MRI brain with/without unremarkable for lesions  --CT abdomen pelvis with contrast negative for ovarian/adrenal tumor  --Pelvic ultrasound negative for ovarian lesions  --MRI pituitary protocol with and without without abnormal pituitary lesion  --I am not sure if the above is playing a role in her overall weakness but have reordered labs and will follow-up pending those results  --Continue Solu-Cortef 10 mg daily with breakfast, 5 mg daily with dinner  --I discussed with endocrinology prior to admission, keep outpatient endocrinology follow-up    #Acute urinary tract infection the E. coli  #Left staghorn calculus  --Urine culture positive for E. Coli  --Completed course of Rocephin  --Hematuria likely due to large UPJ calculus exacerbated by Eliquis  --Urology evaluated, recommend outpatient follow-up    #Atrial fibrillation, rate controlled  #Prior ischemic infarct  --MRI of the brain noted old infarcts but nothing acute  --Continue flecainide, Eliquis  --Follow-up outpatient    #Chronic venous stasis, wound care  #Morbid obesity, BMI 56    Copied portions of this report have been reviewed and  are accurate as of 12/27/24     CHECKLIST:  Abx: None  VTE: Eliquis   GI ppx: PPI  Diet: Consistent carb  Code: CPR, full  Dispo: Patient is hemodynamically stable, did not do well with PT, IPR following. Agreeable to SNF as well. Case management following.     Mark Vasquez DO  [facility] Hospitalist  12/27/24  11:55 EST

## 2024-12-27 NOTE — PLAN OF CARE
Goal Outcome Evaluation:  Plan of Care Reviewed With: patient        Progress: no change  Outcome Evaluation: Patient resting in bed at this time. A&O. VSS on RA. No acute changes noted. Patient had partial bath this shift. Wound care completed per orders. No requests or complaints at this time. Will continue with POC.

## 2024-12-27 NOTE — DISCHARGE PLACEMENT REQUEST
"Ana Gasca (59 y.o. Female)       Date of Birth   1965    Social Security Number       Address   [address] [place] [address]    Home Phone   [phone]    MRN   [id number]       Religious   Yarsanism    Marital Status   Single                            Admission Date   12/19/24    Admission Type   Emergency    Admitting Provider   Thien Reardon DO    Attending Provider   Mark Vasquez DO    Department, Room/Bed   [address], 3336/       Discharge Date       Discharge Disposition       Discharge Destination                                 Attending Provider: Mark Vasquez DO    Allergies: Vancomycin, Cefepime, Cephalosporins    Isolation: None   Infection: None   Code Status: CPR    Ht: 165.1 cm (65\")   Wt: 153 kg (338 lb)    Admission Cmt: None   Principal Problem: Complicated UTI (urinary tract infection) [N39.0]                   Active Insurance as of 12/19/2024       Primary Coverage       Payor Plan Insurance Group Employer/Plan Group    ANTHEM MEDICARE REPLACEMENT [employer] MEDICARE ADVANTAGE [id number]       Payor Plan Address Payor Plan Phone Number Payor Plan Fax Number Effective Dates    [address] [phone]  4/1/2024 - None Entered    [facility] [id number]         Subscriber Name Subscriber Birth Date Member ID       ANA GASCA TNAK 1965 [id number]                     Emergency Contacts        (Rel.) Home Phone Work Phone Mobile Phone    BLANCA GASCA (Son) -- -- [phone]    CAMPOSCYDNEY (Relative) -- -- [phone]              Insurance Information                  ANTHEM MEDICARE REPLACEMENT/ANTHEM MEDICARE ADVANTAGE Phone: [phone]    Subscriber: Ana Gasca Subscriber#: [id number]    Group#: [id number] Precert#: [id number]    Authorization#: -- Effective Date: --             History & Physical        Thien Reardon DO at 12/19/24 1837          [place]   HISTORY AND " PHYSICAL    Patient Name: Ana Maradiaga  : 1965  MRN: [id number]  Primary Care Physician:  Val Purcell PA  Date of admission: 2024    Subjective  Subjective     Chief Complaint: Hematuria leg weakness    History of Present Illness  Patient is a 59-year-old female with past medical history of anemia, atrial fibrillation, chronic kidney disease, hypertension, gout, and a history of a CVA.  Patient presents to the emergency department with a couple of different complaints.  Patient says she started noticing some hematuria for the last couple of days.  Patient had a CT scan of her abdomen to evaluate for possible intra-abdominal tumors.  The patient has had very high levels of testosterone and they were looking to rule out hormone secreting tumors.  They did not discover tumors but did find a staghorn calculus in her left kidney.  Patient says she had had occasional dysuria but all of it has been much worse today.  Patient is also been experiencing gradual but significant lower extremity weakness.  Patient says that it is not on focal but over the last several weeks she has had more and more difficulty ambulating throughout the house.  Patient says the only thing that is really different in her routine is that she was started on Mounjaro.  She has lost 20 pounds and is having no GI difficulties.  Here in the emergency department the patient was found to have a significant nitrite positive urinary tract infection with large blood.  Her blood work shows an increased creatinine at 1.55, her white blood cell count is greater than 10 and her LDH is elevated.  Review of Systems   As stated above in his present illness all other systems were reviewed and subsequently negative  Personal History     Past Medical History:   Diagnosis Date    Anemia     Arrhythmia     Afib    Arthritis     Atrial fibrillation 2018    Bronchitis     Chronic kidney disease 2018    Gout     Hypertension     Stroke November  2021       No past surgical history on file.    Family History: family history includes Arthritis in her father; Breast cancer in her paternal cousin; Heart disease in her mother; Hypertension in her father and sister; Stroke in her mother. Otherwise pertinent FHx was reviewed and not pertinent to current issue.    Social History:  reports that she has never smoked. She has never used smokeless tobacco. She reports that she does not drink alcohol and does not use drugs.    Home Medications:  Tirzepatide, amLODIPine, apixaban, aspirin, atorvastatin, bumetanide, carvedilol, flecainide, levothyroxine, magnesium oxide, vitamin B-12, and vitamin D3    Allergies:  Allergies   Allergen Reactions    Vancomycin Hives    Cefepime Rash    Cephalosporins Rash       Objective   Objective     Vitals:   Temp:  [97.9 °F (36.6 °C)] 97.9 °F (36.6 °C)  Heart Rate:  [66-69] 69  Resp:  [17] 17  BP: (127-159)/(74-92) 149/81    Physical Exam  Constitutional:  Well-developed and well-nourished.  No acute distress.      HENT:  Head:  Normocephalic and atraumatic.  Mouth:  Moist mucous membranes.    Eyes:  Conjunctivae and EOM are normal. No scleral icterus.    Neck:  Neck supple.  No JVD present.    Cardiovascular:  Normal rate, regular rhythm and normal heart sounds with no murmur.  Pulmonary/Chest:  No respiratory distress, no wheezes, no crackles, with normal breath sounds and good air movement.  Abdominal:  Soft. No distension and no tenderness.   Musculoskeletal:  No tenderness, and no deformity.  No red or swollen joints anywhere.    Neurological:  Alert and oriented to person, place, and time.  No cranial nerve deficit.    Skin:  Skin is warm and dry. No rash noted. No pallor.   Peripheral vascular:  No clubbing, no cyanosis, no edema.  Psychiatric: Appropriate mood and affect  :    Result Review   Result Review:  I have personally reviewed the results from the time of this admission to 12/19/2024 18:37 EST and agree with these  findings:  []  Laboratory list / accordion  []  Microbiology  []  Radiology  []  EKG/Telemetry   []  Cardiology/Vascular   []  Pathology  []  Old records  []  Other:  Most notable findings include:       Assessment & Plan  Assessment / Plan     Brief Patient Summary:  Ana Maradiaga is a 59 y.o. female who presents to the ED with complaints of bilateral lower extremity weakness and hematuria.  She is found to have a significant urinary tract infection and the workup for her lower extremity weakness has began    Active Hospital Problems:  Complicated UTI  - Patient was started on Rocephin in the ED which we will continue on the floor  - Gentle hydration overnight  - Await urine culture    Bilateral lower extremity weakness  -MRI of the brain  - PT and OT evaluation  - Consider neurology consultation  -    VTE Prophylaxis:  Mechanical VTE prophylaxis orders are signed & held.          CODE STATUS:    Code Status (Patient has no pulse and is not breathing): CPR (Attempt to Resuscitate)  Medical Interventions (Patient has pulse or is breathing): Full Support    Admission Status:  I believe this patient meets inpatient status.    Thien Reardon DO    Electronically signed by Thien Reardon DO at 12/19/24 1901       Current Facility-Administered Medications   Medication Dose Route Frequency Provider Last Rate Last Admin    ammonium lactate (AMLACTIN) 12 % cream 1 Application  1 Application Topical Daily Ema Paris PA-C   1 Application at 12/27/24 0849    apixaban (ELIQUIS) tablet 5 mg  5 mg Oral Q12H Thien Reardon DO   5 mg at 12/27/24 0850    aspirin EC tablet 81 mg  81 mg Oral Daily Thien Reardon DO   81 mg at 12/27/24 0850    atorvastatin (LIPITOR) tablet 40 mg  40 mg Oral Nightly Thien Reardon DO   40 mg at 12/26/24 2103    sennosides-docusate (PERICOLACE) 8.6-50 MG per tablet 2 tablet  2 tablet Oral BID PRN Thien Reardon DO        And    polyethylene glycol (MIRALAX) packet 17 g  17 g Oral Daily  PRN Thien Reardon DO   17 g at 12/20/24 1035    And    bisacodyl (DULCOLAX) EC tablet 5 mg  5 mg Oral Daily PRN Thien Reardon DO        And    bisacodyl (DULCOLAX) suppository 10 mg  10 mg Rectal Daily PRN Thien Reardon DO        Calcium Replacement - Follow Nurse / BPA Driven Protocol   Not Applicable PRN Thien Reardon DO        flecainide (TAMBOCOR) tablet 100 mg  100 mg Oral BID Thien Reardon DO   100 mg at 12/27/24 0850    folic acid (FOLVITE) tablet 1 mg  1 mg Oral Daily Mark Vasquez DO   1 mg at 12/27/24 0850    HYDROcodone-acetaminophen (NORCO) 5-325 MG per tablet 1 tablet  1 tablet Oral Q6H PRN Mark Vasquez DO        hydrocortisone (CORTEF) tablet 10 mg  10 mg Oral Daily Mark Vasquez DO        [START ON 12/28/2024] hydrocortisone (CORTEF) tablet 15 mg  15 mg Oral Daily With Breakfast Mark Vasquez DO        hydrocortisone 1 % cream 1 Application  1 Application Topical Q8H Mark Vasquez DO   1 Application at 12/27/24 1506    levothyroxine (SYNTHROID, LEVOTHROID) tablet 125 mcg  125 mcg Oral Daily Thien Reardon DO   125 mcg at 12/27/24 0850    magnesium oxide (MAG-OX) tablet 400 mg  400 mg Oral Daily Thien Reardon DO   400 mg at 12/27/24 0850    Magnesium Standard Dose Replacement - Follow Nurse / BPA Driven Protocol   Not Applicable PRN Thien Reardon DO        nystatin (MYCOSTATIN) powder   Topical Q12H Thien Reardon DO   Given at 12/27/24 0854    ondansetron ODT (ZOFRAN-ODT) disintegrating tablet 4 mg  4 mg Oral Q6H PRN Thien Reardon DO        Or    ondansetron (ZOFRAN) injection 4 mg  4 mg Intravenous Q6H PRN Thien Reardon DO        Phosphorus Replacement - Follow Nurse / BPA Driven Protocol   Not Applicable PRN Thien Reardon DO        Potassium Replacement - Follow Nurse / BPA Driven Protocol   Not Applicable PRN Thien Reardon DO        sodium chloride 0.9 % flush 10 mL  10 mL Intravenous PRN Mark Vasquez  DO        sodium chloride 0.9 % flush 10 mL  10 mL Intravenous Q12H Thien Reardon, DO   10 mL at 24 2103    sodium chloride 0.9 % flush 10 mL  10 mL Intravenous PRN Alexys, John, DO        sodium chloride 0.9 % infusion 40 mL  40 mL Intravenous PRN Alexys, Thien, DO        vitamin B-12 (CYANOCOBALAMIN) tablet 1,000 mcg  1,000 mcg Oral Daily AlexysThien, DO   1,000 mcg at 24 0850    vitamin D3 capsule 5,000 Units  5,000 Units Oral Daily Alexys, John, DO   5,000 Units at 24 0850        Physician Progress Notes (most recent note)        Mark Vasquez DO at 24 1155              [facility]     Patient Identification:  Name:  Ana Maradiaga  Age:  59 y.o.  Sex:  female  :  1965  MRN:  [id number]  Visit Number:  [id number]  ROOM: [address] [place] [address]     Primary Care Provider:  Val Purcell PA    Length of stay in inpatient status:  8    Subjective     Chief Compliant:    Chief Complaint   Patient presents with    Weakness - Generalized    Blood in Urine       History of Presenting Illness:    Patient seen in follow-up for UTI and overall weakness.  Patient states she feels about the same since she is admitted.  Has been afebrile.  Continue to work with PT/OT, agreeable to SNF.  No adverse events noted overnight.    Objective     Current Hospital Meds:ammonium lactate, 1 Application, Topical, Daily  apixaban, 5 mg, Oral, Q12H  aspirin, 81 mg, Oral, Daily  atorvastatin, 40 mg, Oral, Nightly  flecainide, 100 mg, Oral, BID  folic acid, 1 mg, Oral, Daily  hydrocortisone, 10 mg, Oral, Daily With Breakfast  hydrocortisone, 5 mg, Oral, Daily With Dinner  hydrocortisone, 1 Application, Topical, Q8H  levothyroxine, 125 mcg, Oral, Daily  magnesium oxide, 400 mg, Oral, Daily  nystatin, , Topical, Q12H  sodium chloride, 10 mL, Intravenous, Q12H  vitamin B-12, 1,000 mcg, Oral, Daily  vitamin D3, 5,000 Units, Oral, Daily         Current Antimicrobial  Therapy:  Anti-Infectives (From admission, onward)      Ordered     Dose/Rate Route Frequency Start Stop    12/20/24 0935  cefTRIAXone (ROCEPHIN) 2,000 mg in sodium chloride 0.9 % 100 mL IVPB-VTB        Ordering Provider: Thien Reardon DO    2,000 mg  200 mL/hr over 30 Minutes Intravenous Every 24 Hours 12/20/24 1700 12/24/24 1758    12/19/24 1555  cefTRIAXone (ROCEPHIN) 2,000 mg in sodium chloride 0.9 % 100 mL IVPB-VTB        Ordering Provider: Mark Vasquez DO    2,000 mg  200 mL/hr over 30 Minutes Intravenous Once 12/19/24 1610 12/19/24 1813          Current Diuretic Therapy:  Diuretics (From admission, onward)      None          ----------------------------------------------------------------------------------------------------------------------  Vital Signs:  Temp:  [97.7 °F (36.5 °C)-98.4 °F (36.9 °C)] 98.4 °F (36.9 °C)  Heart Rate:  [67-79] 67  Resp:  [16-18] 16  BP: (101-128)/(63-74) 101/63  SpO2:  [98 %] 98 %  on   ;   Device (Oxygen Therapy): room air  Body mass index is 56.25 kg/m².    Wt Readings from Last 3 Encounters:   12/19/24 (!) 153 kg (338 lb)   10/24/24 116 kg (256 lb 6.4 oz)   10/11/24 (!) 164 kg (362 lb)     Intake & Output (last 3 days)         12/21 0701  12/22 0700 12/22 0701  12/23 0700 12/23 0701  12/24 0700    P.O. 1035 1300 960    I.V. (mL/kg)  0 (0) 203 (1.3)    IV Piggyback  100     Total Intake(mL/kg) 1035 (6.8) 1400 (9.2) 1163 (7.6)    Urine (mL/kg/hr) 650 (0.2) 250 (0.1) 200 (0.1)    Stool 0      Total Output 650 250 200    Net +385 +1150 +963           Urine Unmeasured Occurrence  2 x 2 x    Stool Unmeasured Occurrence 1 x            Diet: Cardiac; Healthy Heart (2-3 Na+); Fluid Consistency: Thin (IDDSI 0)  ----------------------------------------------------------------------------------------------------------------------  Physical exam:  Constitutional: Early obese female, nontoxic, Well-developed and well-nourished, resting comfortably in bed, no acute distress.    "   HENT:  Head:  Normocephalic and atraumatic.  Mouth:  Moist mucous membranes.  Eyes:  Conjunctivae and EOM are normal. No scleral icterus.   Cardiovascular:  Normal rate, regular rhythm and normal heart sounds with no murmur. No JVD.   Pulmonary/Chest:  No respiratory distress, no wheezes, no crackles, with normal breath sounds and good air movement. Unlabored. No accessory muscle use.  Abdominal:  Soft. No distension and no tenderness.  Bowel sounds present. No rebound or guarding.   Musculoskeletal:  No tenderness, and no deformity.  No red or swollen joints anywhere.    Neurological:  Alert and oriented to person, place, and time. Nonfocal, weakness in upper and lower extremities bilaterally  Skin:  Skin is warm and dry.  Morbilliform rash on upper and lower extremities, trunk .no pallor.   Peripheral vascular:  No clubbing, no cyanosis, bilateral venous stasis.      ----------------------------------------------------------------------------------------------------------------------  Results from last 7 days   Lab Units 12/25/24  1615 12/25/24  0116 12/24/24  0015 12/21/24  0019   CRP mg/dL 10.07*  --   --   --    WBC 10*3/mm3  --  9.63 8.45 6.90   HEMOGLOBIN g/dL  --  12.1 11.2* 11.3*   HEMATOCRIT %  --  38.5 37.1 35.6   MCV fL  --  99.2* 101.6* 98.1*   MCHC g/dL  --  31.4* 30.2* 31.7   PLATELETS 10*3/mm3  --  154 138* 158   INR   --   --  1.33*  --          Results from last 7 days   Lab Units 12/24/24  0015 12/21/24  0019   SODIUM mmol/L 132* 140   POTASSIUM mmol/L 4.0 3.8   CHLORIDE mmol/L 101 105   CO2 mmol/L 22.0 24.7   BUN mg/dL 10 18   CREATININE mg/dL 1.05* 1.26*   CALCIUM mg/dL 8.2* 8.4*   GLUCOSE mg/dL 104* 127*   ALBUMIN g/dL 2.4*  --    BILIRUBIN mg/dL 0.5  --    ALK PHOS U/L 97  --    AST (SGOT) U/L 18  --    ALT (SGPT) U/L 16  --    Estimated Creatinine Clearance: 86.9 mL/min (A) (by C-G formula based on SCr of 1.05 mg/dL (H)).  No results found for: \"AMMONIA\"            Results from last 7 days " "  Lab Units 12/25/24  1615   CHOLESTEROL mg/dL 94   TRIGLYCERIDES mg/dL 114   HDL CHOL mg/dL 32*   LDL CHOL mg/dL 41     No results found for: \"HGBA1C\", \"POCGLU\"  Lab Results   Component Value Date    TSH 3.320 12/19/2024    FREET4 1.18 03/21/2022     No results found for: \"PREGTESTUR\", \"PREGSERUM\", \"HCG\", \"HCGQUANT\"  Pain Management Panel  More data may exist         Latest Ref Rng & Units 12/19/2024 6/22/2023   Pain Management Panel   Creatinine, Urine mg/dL  mg/dL - 152.9  152.9    Amphetamine, Urine Qual Negative Negative  -   Barbiturates Screen, Urine Negative Negative  -   Benzodiazepine Screen, Urine Negative Negative  -   Buprenorphine, Screen, Urine Negative Negative  -   Cocaine Screen, Urine Negative Negative  -   Fentanyl, Urine Negative Negative  -   Methadone Screen , Urine Negative Negative  -   Methamphetamine, Ur Negative Negative  -      Details          Multiple values from one day are sorted in reverse-chronological order             Brief Urine Lab Results  (Last result in the past 365 days)        Color   Clarity   Blood   Leuk Est   Nitrite   Protein   CREAT   Urine HCG        12/19/24 1439 Red  Comment: Dipstick results may be inaccurate due to color interference.       Turbid   Moderate (2+)   Large (3+)   Positive   100 mg/dL (2+)                 Blood Culture   Date Value Ref Range Status   12/19/2024 No growth at 4 days  Preliminary   12/19/2024 No growth at 4 days  Preliminary     Urine Culture   Date Value Ref Range Status   12/19/2024 >100,000 CFU/mL Mixed [place]a Isolated  Final     No results found for: \"WOUNDCX\"  No results found for: \"STOOLCX\"  No results found for: \"RESPCX\"  No results found for: \"AFBCX\"  Results from last 7 days   Lab Units 12/25/24  1615   SED RATE mm/hr 39*   CRP mg/dL 10.07*       I have personally looked at the labs and they are summarized " above.  ----------------------------------------------------------------------------------------------------------------------  Detailed radiology reports for the last 24 hours:  Imaging Results (Last 24 Hours)       Procedure Component Value Units Date/Time    MRI Pituitary With & Without Contrast [[id number]] Collected: 12/27/24 0933     Updated: 12/27/24 0952    Narrative:      EXAM:    MR Head Without and With Intravenous Contrast, Sella Protocol     EXAM DATE:    12/26/2024 10:24 PM     CLINICAL HISTORY:    r/o lesion/adenoma; N39.0-Urinary tract infection, site not specified;  R31.9-Hematuria, unspecified; R53.1-Weakness; R79.89-Other specified  abnormal findings of blood chemistry     TECHNIQUE:    Magnetic resonance images of the head/brain without and with  intravenous contrast in multiple planes using pituitary protocol.     COMPARISON:    12/19/2024     FINDINGS:    Sella:  Homogeneous enhancement pattern of the pituitary gland with no  focal lesions identified. Pituitary gland is within normal limits for  size and configuration.  Infundibulum is midline.  No sellar or  suprasellar mass.    Cavernous sinuses:  Unremarkable.    Optic chiasm:  Unremarkable.  No mass effect on the optic chiasm.    Orbits:  Unremarkable.    Brain and extra-axial spaces:  Increased cortical T1 signal in the  right insular region compatible with sequela of remote infarct.  Stable  equivocal leptomeningeal thickening with enhancement without nodular  features can be seen if patient has had prior or even remote spinal  procedure. No shay/subarachnoid enhancement is noted.  T2 signal  abnormality cerebral white matter most consistent with mild-moderate  chronic small vessel ischemic disease.  No hemorrhage.  No acute infarct  identified.  No enhancing brain parenchymal lesions.    Bones/joints:  Unremarkable.  No acute fracture.    Sinuses:  Unremarkable as visualized.  No acute sinusitis.    Mastoid air cells:  Unremarkable as  visualized.  No mastoid effusion.       Impression:      1.  Increased cortical T1 signal in the right insular region compatible  with sequela of remote infarct.  2.  No acute infarct identified.  3.  No enhancing brain parenchymal lesions.  4.  Stable equivocal leptomeningeal thickening with enhancement without  nodular features can be seen if patient has had prior or even remote  spinal procedure. No shay/subarachnoid enhancement is noted.   Intracranial hypotension and meningeal inflammation may also be  considered although appearance on MRI is less likely. Correlate with  patient's history.  5.  Homogeneous enhancement pattern of the pituitary gland with no focal  lesions identified. Pituitary gland is within normal limits for size and  configuration.  6.  T2 signal abnormality cerebral white matter most consistent with  mild-moderate chronic small vessel ischemic disease.     This report was finalized on 12/27/2024 9:49 AM by Dr. Dano Mccann MD.             Assessment & Plan      Patient is a 59-year-old female with history significant for atrial fibrillation, CKD and prior stroke who came to the ER with reports of gross hematuria and weakness.    #Acute on chronic debility  #Bilateral lower extremity weakness, multifactorial  --PT/OT, has not done well with therapy, notes updated, IPR re-consulted, case management following    #Adrenal insufficiency, suspect central process  #Elevated testosterone, unclear etiology  --Cortisol low, ACTH low, testosterone elevated, DHEA-sulfate low, prolactin normal  --Estradiol elevated, FSH/LH low  --MRI brain with/without unremarkable for lesions  --CT abdomen pelvis with contrast negative for ovarian/adrenal tumor  --Pelvic ultrasound negative for ovarian lesions  --MRI pituitary protocol with and without without abnormal pituitary lesion  --I am not sure if the above is playing a role in her overall weakness but have reordered labs and will follow-up pending those  results  --Continue Solu-Cortef 10 mg daily with breakfast, 5 mg daily with dinner  --I discussed with endocrinology prior to admission, keep outpatient endocrinology follow-up    #Acute urinary tract infection the E. coli  #Left staghorn calculus  --Urine culture positive for E. Coli  --Completed course of Rocephin  --Hematuria likely due to large UPJ calculus exacerbated by Eliquis  --Urology evaluated, recommend outpatient follow-up    #Atrial fibrillation, rate controlled  #Prior ischemic infarct  --MRI of the brain noted old infarcts but nothing acute  --Continue flecainide, Eliquis  --Follow-up outpatient    #Chronic venous stasis, wound care  #Morbid obesity, BMI 56    Copied portions of this report have been reviewed and are accurate as of 24     CHECKLIST:  Abx: None  VTE: Eliquis   GI ppx: PPI  Diet: Consistent carb  Code: CPR, full  Dispo: Patient is hemodynamically stable, did not do well with PT, IPR following. Agreeable to SNF as well. Case management following.     Mark Vasquez DO  [facility] Hospitalist  24  11:55 EST      Electronically signed by Mark Vasquez DO at 24 1356          Physical Therapy Notes (most recent note)        Armida Odell PT at 24 1435  Version 1 of 1         Acute Care - Physical Therapy Treatment Note  [facility]     Patient Name: Ana Maradiaga  : 1965  MRN: [id number]  Today's Date: 2024   Onset of Illness/Injury or Date of Surgery: 24  Visit Dx:     ICD-10-CM ICD-9-CM   1. Urinary tract infection with hematuria, site unspecified  N39.0 599.0    R31.9 599.70   2. Weakness  R53.1 780.79   3. High serum testosterone  R79.89 790.99     Patient Active Problem List   Diagnosis    Type 2 diabetes mellitus with hyperglycemia, without long-term current use of insulin    Acquired hypothyroidism    Hirsutism    Elevated testosterone level in female    Complicated UTI (urinary tract infection)     Past  Medical History:   Diagnosis Date    Anemia     Arrhythmia 2018    Afib    Arthritis     Atrial fibrillation 2018    Bronchitis     Chronic kidney disease 2018    Gout     Hypertension     Stroke November 2021     History reviewed. No pertinent surgical history.  PT Assessment (Last 12 Hours)       PT Evaluation and Treatment       Row Name 12/26/24 1347          Physical Therapy Time and Intention    Document Type therapy note (daily note)  -     Mode of Treatment physical therapy  -     Patient Effort good  -     Symptoms Noted During/After Treatment fatigue  -     Comment Pt seen for treatment this date, pleasant and cooperative with therapy. Pt participated in ambulation at bedside, grossly CGA x1-2; some LE TE while sitting in chair. Encouraged to not get up without assist.  -       Row Name 12/26/24 1347          Bed Mobility    Bed Mobility supine-sit  -     Supine-Sit [place] (Bed Mobility) contact guard;minimum assist (75% patient effort)  -[facility] Name 12/26/24 1347          Transfers    Transfers sit-stand transfer;stand-sit transfer  -[facility] Name 12/26/24 1347          Sit-Stand Transfer    Sit-Stand [place] (Transfers) contact guard;minimum assist (75% patient effort);2 person assist  -     Assistive Device (Sit-Stand Transfers) walker, front-wheeled  -[facility] Name 12/26/24 1347          Stand-Sit Transfer    Stand-Sit [place] (Transfers) contact guard  -     Assistive Device (Stand-Sit Transfers) walker, front-wheeled  -[facility] Name 12/26/24 1347          Gait/Stairs (Locomotion)    Gait/Stairs Locomotion gait/ambulation assistive device  -     [place] Level (Gait) contact guard  -     Assistive Device (Gait) walker, front-wheeled  -     Distance in Feet (Gait) 7  7'x2  -[facility] Name 12/26/24 1347          Motor Skills    Therapeutic Exercise knee;ankle  LAQ x10, grossly x8' ; ankle pumps grossly 2x8, x4 BL  -[facility] Name 12/26/24  1347          Positioning and Restraints    Pre-Treatment Position in bed  -     Post Treatment Position chair  -     In Chair reclined;call light within reach;encouraged to call for assist  -       Row Name 12/26/24 1347          Progress Summary (PT)    Daily Progress Summary (PT) Pt ambulated grossly 2x7' with RW, grossly CGA x1-2 (2nd person assit for pt and staff safety). Pt may benefit from therapy interventions, unsure if pt could tolerate IPR however pt seems willing. No change in POC, prognosis fair.  -               User Key  (r) = Recorded By, (t) = Taken By, (c) = Cosigned By      Initials Name Provider Type    Armida Andrews, PT Physical Therapist                    Physical Therapy Education       Title: PT OT SLP Therapies (Done)       Topic: Physical Therapy (Done)       Point: Mobility training (Done)       Learning Progress Summary            Patient Acceptance, E,TB, VU by  at 12/26/2024 1222    Acceptance, E,TB, VU by  at 12/24/2024 0905    Acceptance, E, VU by SC at 12/24/2024 0331    Acceptance, E,D, VU,NR by  at 12/23/2024 1249                      Point: Home exercise program (Done)       Learning Progress Summary            Patient Acceptance, E,TB, VU by  at 12/26/2024 1222    Acceptance, E,TB, VU by  at 12/24/2024 0905    Acceptance, E, VU by SC at 12/24/2024 0331    Acceptance, E,D, VU,NR by  at 12/23/2024 1249                      Point: Body mechanics (Done)       Learning Progress Summary            Patient Acceptance, E,TB, VU by  at 12/26/2024 1222    Acceptance, E,TB, VU by  at 12/24/2024 0905    Acceptance, E, VU by SC at 12/24/2024 0331    Acceptance, E,D, VU,NR by AG at 12/23/2024 1249                      Point: Precautions (Done)       Learning Progress Summary            Patient Acceptance, E,TB, VU by  at 12/26/2024 1222    Acceptance, E,TB, VU by  at 12/24/2024 0905    Acceptance, E, VU by SC at 12/24/2024 0331    Acceptance, E,D, VU,NR by AG  at 2024 1249                                      User Key       Initials Effective Dates Name Provider Type Discipline    AG 21 -  Elizabeth Aquino, PT Physical Therapist PT    CF 24 -  Ebonie Rangel, RN Registered Nurse Nurse    SC 24 -  Naya Lowry, RN Registered Nurse Nurse                  PT Recommendation and Plan  Anticipated Discharge Disposition (PT): sub acute care setting, skilled nursing facility  Progress Summary (PT)  Daily Progress Summary (PT): Pt ambulated grossly 2x7' with RW, grossly CGA x1-2 (2nd person assit for pt and staff safety). Pt may benefit from therapy interventions, unsure if pt could tolerate IPR however pt seems willing. No change in POC, prognosis fair.       Time Calculation:    PT Charges       Row Name 24 1435             Time Calculation    Start Time 1347  -KH      PT Received On 24  -         Time Calculation- PT    Total Timed Code Minutes- PT 23 minute(s)  -                User Key  (r) = Recorded By, (t) = Taken By, (c) = Cosigned By      Initials Name Provider Type     Armida Odell, PT Physical Therapist                  Therapy Charges for Today       Code Description Service Date Service Provider Modifiers Qty    54722387919 HC PT THER PROC EA 15 MIN 2024 Armida Odell, PT GP 1    13408326580 HC PT THERAPEUTIC ACT EA 15 MIN 2024 Armida Odell, PT GP 1            PT G-Codes  AM-PAC 6 Clicks Score (PT): 12    Armida Odell PT  2024      Electronically signed by Armida Odell PT at 24 1436          Occupational Therapy Notes (most recent note)        Dano Samuel, OT at 24 1041          Acute Care - Occupational Therapy Initial Evaluation   [facility]     Patient Name: Ana Maradiaga  : 1965  MRN: [id number]  Today's Date: 2024             Admit Date: 2024       ICD-10-CM ICD-9-CM   1. Urinary tract infection with hematuria, site unspecified  N39.0 599.0     R31.9 599.70   2. Weakness  R53.1 780.79   3. High serum testosterone  R79.89 790.99     Patient Active Problem List   Diagnosis    Type 2 diabetes mellitus with hyperglycemia, without long-term current use of insulin    Acquired hypothyroidism    Hirsutism    Elevated testosterone level in female    Complicated UTI (urinary tract infection)     Past Medical History:   Diagnosis Date    Anemia     Arrhythmia 2018    Afib    Arthritis     Atrial fibrillation 2018    Bronchitis     Chronic kidney disease 2018    Gout     Hypertension     Stroke November 2021     History reviewed. No pertinent surgical history.      OT ASSESSMENT FLOWSHEET (Last 12 Hours)       OT Evaluation and Treatment       Row Name 12/20/24 1030                   OT Time and Intention    Subjective Information complains of;weakness  -KR        Document Type evaluation  -KR        Mode of Treatment occupational therapy  -KR        Patient Effort adequate  -KR        Symptoms Noted During/After Treatment fatigue  -KR           General Information    General Observations of Patient alert/cooperative  -KR        Prior Level of Function mod assist:  -KR           Living Environment    Current Living Arrangements home  -KR        People in Home child(woody), adult  -KR        Primary Care Provided by self;child(woody)  -KR           Home Use of Assistive/Adaptive Equipment    Equipment Currently Used at Home rollator  -KR           Cognition    Affect/Mental Status (Cognition) WFL  -KR        Orientation Status (Cognition) oriented x 3  -KR        Follows Commands (Cognition) WFL  -KR           Range of Motion Comprehensive    Comment, General Range of Motion BUE WFL  -KR           Strength Comprehensive (MMT)    Comment, General Manual Muscle Testing (MMT) Assessment BUE 3-/5  -KR           Activities of Daily Living    BADL Assessment/Intervention bathing;upper body dressing;lower body dressing;grooming;feeding;toileting  -KR           Bathing  Assessment/Intervention    [place] Level (Bathing) bathing skills;maximum assist (25% patient effort)  -KR           Upper Body Dressing Assessment/Training    [place] Level (Upper Body Dressing) upper body dressing skills;maximum assist (25% patient effort)  -KR           Lower Body Dressing Assessment/Training    [place] Level (Lower Body Dressing) lower body dressing skills;maximum assist (25% patient effort)  -KR           Grooming Assessment/Training    [place] Level (Grooming) grooming skills;moderate assist (50% patient effort)  -KR           Self-Feeding Assessment/Training    [place] Level (Feeding) feeding skills;set up  -KR           Toileting Assessment/Training    [place] Level (Toileting) toileting skills;maximum assist (25% patient effort);dependent (less than 25% patient effort)  -KR           Plan of Care Review    Plan of Care Reviewed With patient  -KR           Therapy Assessment/Plan (OT)    Planned Therapy Interventions (OT) activity tolerance training;adaptive equipment training;BADL retraining;strengthening exercise  -KR           Therapy Plan Review/Discharge Plan (OT)    Anticipated Discharge Disposition (OT) inpatient rehabilitation facility;home  -KR           OT Goals    Dressing Goal Selection (OT) dressing, OT goal 1  -KR        Strength Goal Selection (OT) strength, OT goal 1  -KR        Activity Tolerance Goal Selection (OT) activity tolerance, OT goal 1  -KR           Dressing Goal 1 (OT)    Activity/Device (Dressing Goal 1, OT) dressing skills, all  -KR        [place]/Cues Needed (Dressing Goal 1, OT) minimum assist (75% or more patient effort)  -KR        Time Frame (Dressing Goal 1, OT) by discharge  -KR           Strength Goal 1 (OT)    Strength Goal 1 (OT) BUE increase x 1 to improve mobility/self care  -KR        Time Frame (Strength Goal 1, OT) by discharge  -KR            Activity Tolerance Goal 1 (OT)    Activity Tolerance Goal 1 (OT)  Increase to enhance ADL performance  -KR        Activity Level (Endurance Goal 1, OT) 15 min activity  -KR        Time Frame (Activity Tolerance Goal 1, OT) by discharge  -KR           Patient Education Goal (OT)    Activity (Patient Education Goal, OT) AE/DME training to enhance safety/independence in home environment  -KR        [place]/Cues/Accuracy (Memory Goal 2, OT) verbalizes understanding  -KR        Time Frame (Patient Education Goal, OT) by discharge  -KR                  User Key  (r) = Recorded By, (t) = Taken By, (c) = Cosigned By      Initials Name Effective Dates    Dano Bruno OT 06/16/21 -                      Occupational Therapy Education        No education to display                      OT Recommendation and Plan  Planned Therapy Interventions (OT): activity tolerance training, adaptive equipment training, BADL retraining, strengthening exercise  Plan of Care Review  Plan of Care Reviewed With: patient  Plan of Care Reviewed With: patient        Time Calculation:     Therapy Charges for Today       Code Description Service Date Service Provider Modifiers Qty    39717850258 HC OT EVAL HIGH COMPLEXITY 4 12/20/2024 Dano Samuel OT GO 1                 Dano Samuel OT  12/20/2024    Electronically signed by Dano Samuel OT at 12/20/24 1042       Speech Language Pathology Notes (most recent note)    No notes exist for this encounter.       ADL Documentation (most recent)      Flowsheet Row Most Recent Value   Transferring 2 - assistive person   Toileting 3 - assistive equipment and person   Bathing 3 - assistive equipment and person   Dressing 2 - assistive person   Eating 0 - independent   Communication 0 - understands/communicates without difficulty   Swallowing 0 - swallows foods/liquids without difficulty   Equipment Currently Used at Home rollator            Discharge Summary    No notes of this type exist for this encounter.       Discharge Order (From admission, onward)       None

## 2024-12-28 ENCOUNTER — APPOINTMENT (OUTPATIENT)
Dept: MRI IMAGING | Facility: HOSPITAL | Age: 59
End: 2024-12-28
Payer: MEDICARE

## 2024-12-28 LAB — T3FREE SERPL-MCNC: 1.77 PG/ML (ref 2–4.4)

## 2024-12-28 PROCEDURE — 74181 MRI ABDOMEN W/O CONTRAST: CPT | Performed by: RADIOLOGY

## 2024-12-28 PROCEDURE — 94799 UNLISTED PULMONARY SVC/PX: CPT

## 2024-12-28 PROCEDURE — 72197 MRI PELVIS W/O & W/DYE: CPT

## 2024-12-28 PROCEDURE — 99233 SBSQ HOSP IP/OBS HIGH 50: CPT | Performed by: STUDENT IN AN ORGANIZED HEALTH CARE EDUCATION/TRAINING PROGRAM

## 2024-12-28 PROCEDURE — 63710000001 DIPHENHYDRAMINE PER 50 MG

## 2024-12-28 PROCEDURE — 74181 MRI ABDOMEN W/O CONTRAST: CPT

## 2024-12-28 PROCEDURE — 25510000002 GADOBENATE DIMEGLUMINE 529 MG/ML SOLUTION: Performed by: STUDENT IN AN ORGANIZED HEALTH CARE EDUCATION/TRAINING PROGRAM

## 2024-12-28 PROCEDURE — A9577 INJ MULTIHANCE: HCPCS | Performed by: STUDENT IN AN ORGANIZED HEALTH CARE EDUCATION/TRAINING PROGRAM

## 2024-12-28 RX ORDER — DIPHENHYDRAMINE HCL 25 MG
25 CAPSULE ORAL ONCE
Status: COMPLETED | OUTPATIENT
Start: 2024-12-28 | End: 2024-12-28

## 2024-12-28 RX ADMIN — LEVOTHYROXINE SODIUM 125 MCG: 0.12 TABLET ORAL at 09:06

## 2024-12-28 RX ADMIN — NYSTATIN: 100000 POWDER TOPICAL at 09:07

## 2024-12-28 RX ADMIN — GADOBENATE DIMEGLUMINE 20 ML: 529 INJECTION, SOLUTION INTRAVENOUS at 19:39

## 2024-12-28 RX ADMIN — ASPIRIN 81 MG: 81 TABLET, COATED ORAL at 09:06

## 2024-12-28 RX ADMIN — HYDROCORTISONE 1 APPLICATION: 1 CREAM TOPICAL at 05:49

## 2024-12-28 RX ADMIN — FOLIC ACID 1 MG: 1 TABLET ORAL at 09:06

## 2024-12-28 RX ADMIN — AMMONIUM LACTATE 1 APPLICATION: 120 CREAM TOPICAL at 09:07

## 2024-12-28 RX ADMIN — APIXABAN 5 MG: 5 TABLET, FILM COATED ORAL at 09:06

## 2024-12-28 RX ADMIN — FLECAINIDE ACETATE 100 MG: 50 TABLET ORAL at 21:06

## 2024-12-28 RX ADMIN — HYDROCORTISONE 10 MG: 10 TABLET ORAL at 14:15

## 2024-12-28 RX ADMIN — HYDROCORTISONE 1 APPLICATION: 1 CREAM TOPICAL at 14:15

## 2024-12-28 RX ADMIN — APIXABAN 5 MG: 5 TABLET, FILM COATED ORAL at 21:06

## 2024-12-28 RX ADMIN — Medication 400 MG: at 09:06

## 2024-12-28 RX ADMIN — HYDROCORTISONE 15 MG: 10 TABLET ORAL at 09:06

## 2024-12-28 RX ADMIN — HYDROCORTISONE 1 APPLICATION: 1 CREAM TOPICAL at 21:10

## 2024-12-28 RX ADMIN — Medication 1000 MCG: at 09:07

## 2024-12-28 RX ADMIN — Medication 5000 UNITS: at 09:06

## 2024-12-28 RX ADMIN — FLECAINIDE ACETATE 100 MG: 50 TABLET ORAL at 09:06

## 2024-12-28 RX ADMIN — ATORVASTATIN CALCIUM 40 MG: 40 TABLET, FILM COATED ORAL at 21:07

## 2024-12-28 RX ADMIN — Medication 10 ML: at 21:10

## 2024-12-28 RX ADMIN — DIPHENHYDRAMINE HYDROCHLORIDE 25 MG: 25 CAPSULE ORAL at 21:59

## 2024-12-28 RX ADMIN — NYSTATIN: 100000 POWDER TOPICAL at 21:10

## 2024-12-28 NOTE — PLAN OF CARE
Goal Outcome Evaluation:  Plan of Care Reviewed With: patient        Progress: improving  Outcome Evaluation: Patient resting in bed at this time. A&O. VSS on RA. No acute changes noted. Patient refused ambulation/ sitting in chair with staff this shift, educated on importance of physical activity. Wound care completed. Patient refused bath this shift, educated on importnace of daily bathing and personal hygiene for infection prevention, continues to refuse. No requests or complaints at this time. Will continue with POC.

## 2024-12-28 NOTE — PROGRESS NOTES
Adult Nutrition  Assessment/PES    Patient Name:  Ana Maradiaga  YOB: 1965  MRN: [id number]  Admit Date:  12/19/2024    Assessment Date:  12/27/2024    Comments:  LOS    PO intake 69% ave of meals.    Will cont to follow and monitor     Reason for Assessment       Row Name 12/27/24 2251          Reason for Assessment    Reason For Assessment per organizational policy  LOS; remote [place], [place]     Diagnosis infection/sepsis  Acute on chronic debility, adrenal insufficiency, UTI, afib                    Nutrition/Diet History       Row Name 12/27/24 2253          Nutrition/Diet History    Typical Intake (Food/Fluid/EN/PN) varying po intakes                    Labs/Tests/Procedures/Meds       Row Name 12/27/24 2253          Labs/Procedures/Meds    Lab Results Reviewed reviewed        Diagnostic Tests/Procedures    Diagnostic Test/Procedure Reviewed reviewed        Medications    Pertinent Medications Reviewed reviewed     Pertinent Medications Comments B12, Vitamin D, magox, folic acid                      Estimated/Assessed Needs - Anthropometrics       Row Name 12/27/24 2253          Anthropometrics    Calculation Weight 153 kg (337 lb 4.9 oz)        Estimated/Assessed Needs    Additional Documentation Estimated Calorie Needs (Group);Fluid Requirements (Group);Protein Requirements (Group)        Estimated Calorie Needs    Estimated Calorie Require (kcal/day) 2034 kcal ( mifflin 1.2 minus 500 kcal )     Estimated Calorie Need Method [place]-St Jeor        Protein Requirements    Est Protein Requirement Amount (gms/kg) 0.8 gm protein  122 gm pro        Fluid Requirements    Estimated Fluid Requirement Method RDA Method  2034 ml                    Nutrition Prescription Ordered       Row Name 12/27/24 2255          Nutrition Prescription PO    Common Modifiers Cardiac                    Evaluation of Received Nutrient/Fluid Intake       Row Name 12/27/24 2255          Fluid Intake Evaluation     Oral Fluid (mL) 1180  ave x 5        PO Evaluation    Number of Meals 9     % PO Intake 69                       Problem/Interventions:   Problem 1       Row Name 12/27/24 2255          Nutrition Diagnoses Problem 1    Problem 1 Other (comment)  Obesity related to debility, lifestyle as evidenced by BMI                          Intervention Goal       Row Name 12/27/24 2256          Intervention Goal    General Meet nutritional needs for age/condition     PO Continue positive trend;PO intake (%)     PO Intake % 75 %                    Nutrition Intervention       Row Name 12/27/24 2256          Nutrition Intervention    RD/Tech Action Follow Tx progress                      Education/Evaluation       Row Name 12/27/24 2256          Education    Education Education not appropriate at this time        Monitor/Evaluation    Monitor Per protocol;I&O;PO intake;Pertinent labs;Weight                     Electronically signed by:  Nancy Ngo RD  12/27/24 22:56 EST

## 2024-12-28 NOTE — PLAN OF CARE
Goal Outcome Evaluation:  Plan of Care Reviewed With: patient        Progress: no change  Outcome Evaluation: Patient resting in bed. Patient is alert and oriented. VSS on RA. Wound care completed per orders. No acute changes or complaints. Will continue with plan of care.

## 2024-12-28 NOTE — PROGRESS NOTES
[facility] HOSPITALIST PROGRESS NOTE     Patient Identification:  Name:  Ana Maradiaga  Age:  59 y.o.  Sex:  female  :  1965  MRN:  [id number]  Visit Number:  [id number]  ROOM: [address] [place] [address]     Primary Care Provider:  Val Purcell PA    Length of stay in inpatient status:  9    Subjective     Chief Compliant:    Chief Complaint   Patient presents with    Weakness - Generalized    Blood in Urine       History of Presenting Illness:    Patient seen in follow-up for UTI and overall weakness.  Patient states she feels about the same since she is admitted.  Has been afebrile.  Continue to work with PT/OT, agreeable to SNF.  No adverse events noted overnight.    Objective     Current Hospital Meds:ammonium lactate, 1 Application, Topical, Daily  apixaban, 5 mg, Oral, Q12H  aspirin, 81 mg, Oral, Daily  atorvastatin, 40 mg, Oral, Nightly  flecainide, 100 mg, Oral, BID  folic acid, 1 mg, Oral, Daily  hydrocortisone, 10 mg, Oral, Daily  hydrocortisone, 15 mg, Oral, Daily With Breakfast  hydrocortisone, 1 Application, Topical, Q8H  levothyroxine, 125 mcg, Oral, Daily  magnesium oxide, 400 mg, Oral, Daily  nystatin, , Topical, Q12H  sodium chloride, 10 mL, Intravenous, Q12H  vitamin B-12, 1,000 mcg, Oral, Daily  vitamin D3, 5,000 Units, Oral, Daily         Current Antimicrobial Therapy:  Anti-Infectives (From admission, onward)      Ordered     Dose/Rate Route Frequency Start Stop    24 0935  cefTRIAXone (ROCEPHIN) 2,000 mg in sodium chloride 0.9 % 100 mL IVPB-VTB        Ordering Provider: Thien Reardon DO    2,000 mg  200 mL/hr over 30 Minutes Intravenous Every 24 Hours 24 1700 24 1758    24 1555  cefTRIAXone (ROCEPHIN) 2,000 mg in sodium chloride 0.9 % 100 mL IVPB-VTB        Ordering Provider: Mark Vasquez DO    2,000 mg  200 mL/hr over 30 Minutes Intravenous Once 24 1610 24 1813          Current Diuretic Therapy:  Diuretics (From admission, onward)       None          ----------------------------------------------------------------------------------------------------------------------  Vital Signs:  Temp:  [97.6 °F (36.4 °C)-98.1 °F (36.7 °C)] 97.9 °F (36.6 °C)  Heart Rate:  [67-69] 68  Resp:  [16-18] 18  BP: (104-128)/(52-66) 128/66  SpO2:  [95 %-97 %] 97 %  on   ;   Device (Oxygen Therapy): room air  Body mass index is 56.25 kg/m².    Wt Readings from Last 3 Encounters:   12/19/24 (!) 153 kg (338 lb)   10/24/24 116 kg (256 lb 6.4 oz)   10/11/24 (!) 164 kg (362 lb)     Intake & Output (last 3 days)         12/21 0701  12/22 0700 12/22 0701 12/23 0700 12/23 0701  12/24 0700    P.O. 1035 1300 960    I.V. (mL/kg)  0 (0) 203 (1.3)    IV Piggyback  100     Total Intake(mL/kg) 1035 (6.8) 1400 (9.2) 1163 (7.6)    Urine (mL/kg/hr) 650 (0.2) 250 (0.1) 200 (0.1)    Stool 0      Total Output 650 250 200    Net +385 +1150 +963           Urine Unmeasured Occurrence  2 x 2 x    Stool Unmeasured Occurrence 1 x            Diet: Cardiac; Healthy Heart (2-3 Na+); Fluid Consistency: Thin (IDDSI 0)  ----------------------------------------------------------------------------------------------------------------------  Physical exam:  Constitutional: Early obese female, nontoxic, Well-developed and well-nourished, resting comfortably in bed, no acute distress.      HENT:  Head:  Normocephalic and atraumatic.  Mouth:  Moist mucous membranes.  Eyes:  Conjunctivae and EOM are normal. No scleral icterus.   Cardiovascular:  Normal rate, regular rhythm and normal heart sounds with no murmur. No JVD.   Pulmonary/Chest:  No respiratory distress, no wheezes, no crackles, with normal breath sounds and good air movement. Unlabored. No accessory muscle use.  Abdominal:  Soft. No distension and no tenderness.  Bowel sounds present. No rebound or guarding.   Musculoskeletal:  No tenderness, and no deformity.  No red or swollen joints anywhere.    Neurological:  Alert and oriented to person,  "place, and time. Nonfocal, weakness in upper and lower extremities bilaterally  Skin:  Skin is warm and dry.  Morbilliform rash on upper and lower extremities, trunk .no pallor.   Peripheral vascular:  No clubbing, no cyanosis, bilateral venous stasis.      ----------------------------------------------------------------------------------------------------------------------  Results from last 7 days   Lab Units 12/25/24  1615 12/25/24  0116 12/24/24  0015   CRP mg/dL 10.07*  --   --    WBC 10*3/mm3  --  9.63 8.45   HEMOGLOBIN g/dL  --  12.1 11.2*   HEMATOCRIT %  --  38.5 37.1   MCV fL  --  99.2* 101.6*   MCHC g/dL  --  31.4* 30.2*   PLATELETS 10*3/mm3  --  154 138*   INR   --   --  1.33*         Results from last 7 days   Lab Units 12/24/24  0015   SODIUM mmol/L 132*   POTASSIUM mmol/L 4.0   CHLORIDE mmol/L 101   CO2 mmol/L 22.0   BUN mg/dL 10   CREATININE mg/dL 1.05*   CALCIUM mg/dL 8.2*   GLUCOSE mg/dL 104*   ALBUMIN g/dL 2.4*   BILIRUBIN mg/dL 0.5   ALK PHOS U/L 97   AST (SGOT) U/L 18   ALT (SGPT) U/L 16   Estimated Creatinine Clearance: 86.9 mL/min (A) (by C-G formula based on SCr of 1.05 mg/dL (H)).  No results found for: \"AMMONIA\"            Results from last 7 days   Lab Units 12/25/24  1615   CHOLESTEROL mg/dL 94   TRIGLYCERIDES mg/dL 114   HDL CHOL mg/dL 32*   LDL CHOL mg/dL 41     No results found for: \"HGBA1C\", \"POCGLU\"  Lab Results   Component Value Date    TSH 3.320 12/19/2024    FREET4 1.30 12/27/2024     No results found for: \"PREGTESTUR\", \"PREGSERUM\", \"HCG\", \"HCGQUANT\"  Pain Management Panel  More data may exist         Latest Ref Rng & Units 12/19/2024 6/22/2023   Pain Management Panel   Creatinine, Urine mg/dL  mg/dL - 152.9  152.9    Amphetamine, Urine Qual Negative Negative  -   Barbiturates Screen, Urine Negative Negative  -   Benzodiazepine Screen, Urine Negative Negative  -   Buprenorphine, Screen, Urine Negative Negative  -   Cocaine Screen, Urine Negative Negative  -   Fentanyl, Urine " "Negative Negative  -   Methadone Screen , Urine Negative Negative  -   Methamphetamine, Ur Negative Negative  -      Details          Multiple values from one day are sorted in reverse-chronological order             Brief Urine Lab Results  (Last result in the past 365 days)        Color   Clarity   Blood   Leuk Est   Nitrite   Protein   CREAT   Urine HCG        12/19/24 1439 Red  Comment: Dipstick results may be inaccurate due to color interference.       Turbid   Moderate (2+)   Large (3+)   Positive   100 mg/dL (2+)                 Blood Culture   Date Value Ref Range Status   12/19/2024 No growth at 4 days  Preliminary   12/19/2024 No growth at 4 days  Preliminary     Urine Culture   Date Value Ref Range Status   12/19/2024 >100,000 CFU/mL Mixed [place] Isolated  Final     No results found for: \"WOUNDCX\"  No results found for: \"STOOLCX\"  No results found for: \"RESPCX\"  No results found for: \"AFBCX\"  Results from last 7 days   Lab Units 12/25/24  1615   SED RATE mm/hr 39*   CRP mg/dL 10.07*       I have personally looked at the labs and they are summarized above.  ----------------------------------------------------------------------------------------------------------------------  Detailed radiology reports for the last 24 hours:  Imaging Results (Last 24 Hours)       ** No results found for the last 24 hours. **          Assessment & Plan      Patient is a 59-year-old female with history significant for atrial fibrillation, CKD and prior stroke who came to the ER with reports of gross hematuria and weakness.    #Acute on chronic debility  #Bilateral lower extremity weakness, multifactorial  --PT/OT, has not done well with therapy, notes updated, IPR re-consulted, case management following    #Adrenal insufficiency, suspect central process  #Elevated testosterone, unclear etiology  --Cortisol low, ACTH low, testosterone elevated, DHEA-sulfate low, prolactin normal  --Estradiol elevated, FSH/LH low  --MRI brain " with/without unremarkable for lesions  --CT abdomen pelvis with contrast negative for ovarian/adrenal tumor  --Pelvic ultrasound negative for ovarian lesions  --MRI pituitary protocol with and without without abnormal pituitary lesion  --After discussion with endocrinology, increase Solu-Cortef to 15 mg daily with breakfast, 10 mg daily at 2 PM  --I discussed with endocrinology again on 12/27, working diagnosis remains a suspected small ovarian tumor, given the above images have been unremarkable, with MRI of the abdomen/pelvis with/without contrast, if this is negative, we will continue treatment plan and have her follow-up outpatient    #Acute urinary tract infection the E. coli  #Left staghorn calculus  --Urine culture positive for E. Coli  --Completed course of Rocephin  --Hematuria likely due to large UPJ calculus exacerbated by Eliquis  --Urology evaluated, recommend outpatient follow-up    #Atrial fibrillation, rate controlled  #Prior ischemic infarct  --MRI of the brain noted old infarcts but nothing acute  --Continue flecainide, Eliquis  --Follow-up outpatient    #Chronic venous stasis, wound care  #Morbid obesity, BMI 56    Copied portions of this report have been reviewed and are accurate as of 12/28/24     CHECKLIST:  Abx: None  VTE: Eliquis   GI ppx: PPI  Diet: Consistent carb  Code: CPR, full  Dispo: Patient is hemodynamically stable, has not done well with PT, IPR following. Agreeable to SNF as well. Case management following.     Mark Vasquez DO  [facility]  12/28/24  11:48 EST

## 2024-12-29 LAB
ALBUMIN SERPL-MCNC: 2.5 G/DL (ref 3.5–5.2)
ALBUMIN/GLOB SERPL: 0.7 G/DL
ALP SERPL-CCNC: 138 U/L (ref 39–117)
ALT SERPL W P-5'-P-CCNC: 28 U/L (ref 1–33)
ANDROST SERPL-MCNC: 12 NG/DL (ref 41–262)
ANION GAP SERPL CALCULATED.3IONS-SCNC: 10.9 MMOL/L (ref 5–15)
AST SERPL-CCNC: 33 U/L (ref 1–32)
BASOPHILS # BLD AUTO: 0.05 10*3/MM3 (ref 0–0.2)
BASOPHILS NFR BLD AUTO: 0.5 % (ref 0–1.5)
BILIRUB SERPL-MCNC: 0.6 MG/DL (ref 0–1.2)
BUN SERPL-MCNC: 15 MG/DL (ref 6–20)
BUN/CREAT SERPL: 14.7 (ref 7–25)
CALCIUM SPEC-SCNC: 8.3 MG/DL (ref 8.6–10.5)
CHLORIDE SERPL-SCNC: 101 MMOL/L (ref 98–107)
CO2 SERPL-SCNC: 25.1 MMOL/L (ref 22–29)
CREAT SERPL-MCNC: 1.02 MG/DL (ref 0.57–1)
CRP SERPL-MCNC: 1.91 MG/DL (ref 0–0.5)
DEPRECATED RDW RBC AUTO: 58.3 FL (ref 37–54)
EGFRCR SERPLBLD CKD-EPI 2021: 63.5 ML/MIN/1.73
EOSINOPHIL # BLD AUTO: 0.54 10*3/MM3 (ref 0–0.4)
EOSINOPHIL NFR BLD AUTO: 5.9 % (ref 0.3–6.2)
ERYTHROCYTE [DISTWIDTH] IN BLOOD BY AUTOMATED COUNT: 16.3 % (ref 12.3–15.4)
ERYTHROCYTE [SEDIMENTATION RATE] IN BLOOD: 54 MM/HR (ref 0–30)
GLOBULIN UR ELPH-MCNC: 3.4 GM/DL
GLUCOSE SERPL-MCNC: 136 MG/DL (ref 65–99)
HCT VFR BLD AUTO: 35.3 % (ref 34–46.6)
HGB BLD-MCNC: 11 G/DL (ref 12–15.9)
IMM GRANULOCYTES # BLD AUTO: 0.36 10*3/MM3 (ref 0–0.05)
IMM GRANULOCYTES NFR BLD AUTO: 3.9 % (ref 0–0.5)
LYMPHOCYTES # BLD AUTO: 1.86 10*3/MM3 (ref 0.7–3.1)
LYMPHOCYTES NFR BLD AUTO: 20.2 % (ref 19.6–45.3)
MCH RBC QN AUTO: 30.3 PG (ref 26.6–33)
MCHC RBC AUTO-ENTMCNC: 31.2 G/DL (ref 31.5–35.7)
MCV RBC AUTO: 97.2 FL (ref 79–97)
MONOCYTES # BLD AUTO: 0.76 10*3/MM3 (ref 0.1–0.9)
MONOCYTES NFR BLD AUTO: 8.2 % (ref 5–12)
NEUTROPHILS NFR BLD AUTO: 5.66 10*3/MM3 (ref 1.7–7)
NEUTROPHILS NFR BLD AUTO: 61.3 % (ref 42.7–76)
NRBC BLD AUTO-RTO: 0 /100 WBC (ref 0–0.2)
PLATELET # BLD AUTO: 210 10*3/MM3 (ref 140–450)
PMV BLD AUTO: 10.5 FL (ref 6–12)
POTASSIUM SERPL-SCNC: 4.3 MMOL/L (ref 3.5–5.2)
PROCALCITONIN SERPL-MCNC: 0.07 NG/ML (ref 0–0.25)
PROT SERPL-MCNC: 5.9 G/DL (ref 6–8.5)
RBC # BLD AUTO: 3.63 10*6/MM3 (ref 3.77–5.28)
SODIUM SERPL-SCNC: 137 MMOL/L (ref 136–145)
WBC NRBC COR # BLD AUTO: 9.23 10*3/MM3 (ref 3.4–10.8)

## 2024-12-29 PROCEDURE — 94799 UNLISTED PULMONARY SVC/PX: CPT

## 2024-12-29 PROCEDURE — 86140 C-REACTIVE PROTEIN: CPT | Performed by: STUDENT IN AN ORGANIZED HEALTH CARE EDUCATION/TRAINING PROGRAM

## 2024-12-29 PROCEDURE — 99232 SBSQ HOSP IP/OBS MODERATE 35: CPT | Performed by: STUDENT IN AN ORGANIZED HEALTH CARE EDUCATION/TRAINING PROGRAM

## 2024-12-29 PROCEDURE — 84145 PROCALCITONIN (PCT): CPT | Performed by: STUDENT IN AN ORGANIZED HEALTH CARE EDUCATION/TRAINING PROGRAM

## 2024-12-29 PROCEDURE — 85025 COMPLETE CBC W/AUTO DIFF WBC: CPT | Performed by: STUDENT IN AN ORGANIZED HEALTH CARE EDUCATION/TRAINING PROGRAM

## 2024-12-29 PROCEDURE — 85652 RBC SED RATE AUTOMATED: CPT | Performed by: STUDENT IN AN ORGANIZED HEALTH CARE EDUCATION/TRAINING PROGRAM

## 2024-12-29 PROCEDURE — 80053 COMPREHEN METABOLIC PANEL: CPT | Performed by: STUDENT IN AN ORGANIZED HEALTH CARE EDUCATION/TRAINING PROGRAM

## 2024-12-29 PROCEDURE — 72197 MRI PELVIS W/O & W/DYE: CPT | Performed by: RADIOLOGY

## 2024-12-29 RX ADMIN — Medication 400 MG: at 08:01

## 2024-12-29 RX ADMIN — ATORVASTATIN CALCIUM 40 MG: 40 TABLET, FILM COATED ORAL at 20:54

## 2024-12-29 RX ADMIN — HYDROCORTISONE 1 APPLICATION: 1 CREAM TOPICAL at 21:02

## 2024-12-29 RX ADMIN — HYDROCORTISONE 1 APPLICATION: 1 CREAM TOPICAL at 13:58

## 2024-12-29 RX ADMIN — FLECAINIDE ACETATE 100 MG: 50 TABLET ORAL at 08:01

## 2024-12-29 RX ADMIN — Medication 10 ML: at 08:06

## 2024-12-29 RX ADMIN — Medication 1000 MCG: at 08:01

## 2024-12-29 RX ADMIN — AMMONIUM LACTATE 1 APPLICATION: 120 CREAM TOPICAL at 08:06

## 2024-12-29 RX ADMIN — ASPIRIN 81 MG: 81 TABLET, COATED ORAL at 08:01

## 2024-12-29 RX ADMIN — HYDROCORTISONE 10 MG: 10 TABLET ORAL at 13:57

## 2024-12-29 RX ADMIN — NYSTATIN: 100000 POWDER TOPICAL at 08:06

## 2024-12-29 RX ADMIN — HYDROCORTISONE 15 MG: 10 TABLET ORAL at 08:01

## 2024-12-29 RX ADMIN — Medication 10 ML: at 20:16

## 2024-12-29 RX ADMIN — APIXABAN 5 MG: 5 TABLET, FILM COATED ORAL at 08:01

## 2024-12-29 RX ADMIN — FLECAINIDE ACETATE 100 MG: 50 TABLET ORAL at 20:54

## 2024-12-29 RX ADMIN — LEVOTHYROXINE SODIUM 125 MCG: 0.12 TABLET ORAL at 08:01

## 2024-12-29 RX ADMIN — APIXABAN 5 MG: 5 TABLET, FILM COATED ORAL at 20:54

## 2024-12-29 RX ADMIN — Medication 5000 UNITS: at 08:01

## 2024-12-29 RX ADMIN — FOLIC ACID 1 MG: 1 TABLET ORAL at 08:01

## 2024-12-29 RX ADMIN — NYSTATIN: 100000 POWDER TOPICAL at 20:55

## 2024-12-29 RX ADMIN — HYDROCORTISONE 1 APPLICATION: 1 CREAM TOPICAL at 05:16

## 2024-12-29 NOTE — PLAN OF CARE
Goal Outcome Evaluation:              Outcome Evaluation: Patient resting in bed at this time. VSS on room air. Patient got a one time dose of benadryl. Patient refused to ambulate this shift, educated on the importance of physical activity. Patient voices no concerns or complaints at this time. Will continue plan of care.

## 2024-12-29 NOTE — PLAN OF CARE
Goal Outcome Evaluation:  Plan of Care Reviewed With: patient        Progress: no change  Outcome Evaluation: Pt resting in bed at this time. Pt has been up and ambulated to chair this shift. Pt has had no concerns or complaints noted at this time. Pt was bathed this shift. No acute changes at this time. Plan of care ongoing.

## 2024-12-29 NOTE — PROGRESS NOTES
[facility] HOSPITALIST PROGRESS NOTE     Patient Identification:  Name:  Ana Maradiaga  Age:  59 y.o.  Sex:  female  :  1965  MRN:  [id number]  Visit Number:  [id number]  ROOM: [address] [place] [address]     Primary Care Provider:  Val Purcell PA    Length of stay in inpatient status:  10    Subjective     Chief Compliant:    Chief Complaint   Patient presents with    Weakness - Generalized    Blood in Urine       History of Presenting Illness:    Patient seen in follow-up for UTI and overall weakness.  Patient states she feels about the same since she is admitted.  Has been afebrile.  Continue to work with PT/OT, agreeable to SNF.  No adverse events noted overnight.    Objective     Current Hospital Meds:ammonium lactate, 1 Application, Topical, Daily  apixaban, 5 mg, Oral, Q12H  aspirin, 81 mg, Oral, Daily  atorvastatin, 40 mg, Oral, Nightly  flecainide, 100 mg, Oral, BID  folic acid, 1 mg, Oral, Daily  hydrocortisone, 10 mg, Oral, Daily  hydrocortisone, 15 mg, Oral, Daily With Breakfast  hydrocortisone, 1 Application, Topical, Q8H  levothyroxine, 125 mcg, Oral, Daily  magnesium oxide, 400 mg, Oral, Daily  nystatin, , Topical, Q12H  sodium chloride, 10 mL, Intravenous, Q12H  vitamin B-12, 1,000 mcg, Oral, Daily  vitamin D3, 5,000 Units, Oral, Daily         Current Antimicrobial Therapy:  Anti-Infectives (From admission, onward)      Ordered     Dose/Rate Route Frequency Start Stop    24 0935  cefTRIAXone (ROCEPHIN) 2,000 mg in sodium chloride 0.9 % 100 mL IVPB-VTB        Ordering Provider: Thien Reardon DO    2,000 mg  200 mL/hr over 30 Minutes Intravenous Every 24 Hours 24 1700 24 1758    24 1555  cefTRIAXone (ROCEPHIN) 2,000 mg in sodium chloride 0.9 % 100 mL IVPB-VTB        Ordering Provider: Mark Vasquez DO    2,000 mg  200 mL/hr over 30 Minutes Intravenous Once 24 1610 24 1813          Current Diuretic Therapy:  Diuretics (From admission, onward)       None          ----------------------------------------------------------------------------------------------------------------------  Vital Signs:  Temp:  [97.5 °F (36.4 °C)-98.6 °F (37 °C)] 97.9 °F (36.6 °C)  Heart Rate:  [68-80] 68  Resp:  [16-18] 16  BP: (120-146)/(66-78) 141/68  SpO2:  [97 %] 97 %  on   ;   Device (Oxygen Therapy): room air  Body mass index is 56.25 kg/m².    Wt Readings from Last 3 Encounters:   12/19/24 (!) 153 kg (338 lb)   10/24/24 116 kg (256 lb 6.4 oz)   10/11/24 (!) 164 kg (362 lb)     Intake & Output (last 3 days)         12/21 0701  12/22 0700 12/22 0701  12/23 0700 12/23 0701  12/24 0700    P.O. 1035 1300 960    I.V. (mL/kg)  0 (0) 203 (1.3)    IV Piggyback  100     Total Intake(mL/kg) 1035 (6.8) 1400 (9.2) 1163 (7.6)    Urine (mL/kg/hr) 650 (0.2) 250 (0.1) 200 (0.1)    Stool 0      Total Output 650 250 200    Net +385 +1150 +963           Urine Unmeasured Occurrence  2 x 2 x    Stool Unmeasured Occurrence 1 x            Diet: Cardiac; Healthy Heart (2-3 Na+); Fluid Consistency: Thin (IDDSI 0)  ----------------------------------------------------------------------------------------------------------------------  Physical exam:  Constitutional: Early obese female, nontoxic, Well-developed and well-nourished, resting comfortably in bed, no acute distress.      HENT:  Head:  Normocephalic and atraumatic.  Mouth:  Moist mucous membranes.  Eyes:  Conjunctivae and EOM are normal. No scleral icterus.   Cardiovascular:  Normal rate, regular rhythm and normal heart sounds with no murmur. No JVD.   Pulmonary/Chest:  No respiratory distress, no wheezes, no crackles, with normal breath sounds and good air movement. Unlabored. No accessory muscle use.  Abdominal:  Soft. No distension and no tenderness.  Bowel sounds present. No rebound or guarding.   Musculoskeletal:  No tenderness, and no deformity.  No red or swollen joints anywhere.    Neurological:  Alert and oriented to person, place, and  "time. Nonfocal, weakness in upper and lower extremities bilaterally  Skin:  Skin is warm and dry.  Morbilliform rash on upper and lower extremities, trunk .no pallor.   Peripheral vascular:  No clubbing, no cyanosis, bilateral venous stasis.      ----------------------------------------------------------------------------------------------------------------------  Results from last 7 days   Lab Units 12/29/24  1024 12/25/24  1615 12/25/24  0116 12/24/24  0015   CRP mg/dL  --  10.07*  --   --    WBC 10*3/mm3 9.23  --  9.63 8.45   HEMOGLOBIN g/dL 11.0*  --  12.1 11.2*   HEMATOCRIT % 35.3  --  38.5 37.1   MCV fL 97.2*  --  99.2* 101.6*   MCHC g/dL 31.2*  --  31.4* 30.2*   PLATELETS 10*3/mm3 210  --  154 138*   INR   --   --   --  1.33*         Results from last 7 days   Lab Units 12/24/24  0015   SODIUM mmol/L 132*   POTASSIUM mmol/L 4.0   CHLORIDE mmol/L 101   CO2 mmol/L 22.0   BUN mg/dL 10   CREATININE mg/dL 1.05*   CALCIUM mg/dL 8.2*   GLUCOSE mg/dL 104*   ALBUMIN g/dL 2.4*   BILIRUBIN mg/dL 0.5   ALK PHOS U/L 97   AST (SGOT) U/L 18   ALT (SGPT) U/L 16   Estimated Creatinine Clearance: 86.9 mL/min (A) (by C-G formula based on SCr of 1.05 mg/dL (H)).  No results found for: \"AMMONIA\"            Results from last 7 days   Lab Units 12/25/24  1615   CHOLESTEROL mg/dL 94   TRIGLYCERIDES mg/dL 114   HDL CHOL mg/dL 32*   LDL CHOL mg/dL 41     No results found for: \"HGBA1C\", \"POCGLU\"  Lab Results   Component Value Date    TSH 3.320 12/19/2024    FREET4 1.30 12/27/2024     No results found for: \"PREGTESTUR\", \"PREGSERUM\", \"HCG\", \"HCGQUANT\"  Pain Management Panel  More data may exist         Latest Ref Rng & Units 12/19/2024 6/22/2023   Pain Management Panel   Creatinine, Urine mg/dL  mg/dL - 152.9  152.9    Amphetamine, Urine Qual Negative Negative  -   Barbiturates Screen, Urine Negative Negative  -   Benzodiazepine Screen, Urine Negative Negative  -   Buprenorphine, Screen, Urine Negative Negative  -   Cocaine Screen, Urine " "Negative Negative  -   Fentanyl, Urine Negative Negative  -   Methadone Screen , Urine Negative Negative  -   Methamphetamine, Ur Negative Negative  -      Details          Multiple values from one day are sorted in reverse-chronological order             Brief Urine Lab Results  (Last result in the past 365 days)        Color   Clarity   Blood   Leuk Est   Nitrite   Protein   CREAT   Urine HCG        12/19/24 1439 Red  Comment: Dipstick results may be inaccurate due to color interference.       Turbid   Moderate (2+)   Large (3+)   Positive   100 mg/dL (2+)                 Blood Culture   Date Value Ref Range Status   12/19/2024 No growth at 4 days  Preliminary   12/19/2024 No growth at 4 days  Preliminary     Urine Culture   Date Value Ref Range Status   12/19/2024 >100,000 CFU/mL Mixed [place]a Isolated  Final     No results found for: \"WOUNDCX\"  No results found for: \"STOOLCX\"  No results found for: \"RESPCX\"  No results found for: \"AFBCX\"  Results from last 7 days   Lab Units 12/25/24  1615   SED RATE mm/hr 39*   CRP mg/dL 10.07*       I have personally looked at the labs and they are summarized above.  ----------------------------------------------------------------------------------------------------------------------  Detailed radiology reports for the last 24 hours:  Imaging Results (Last 24 Hours)       Procedure Component Value Units Date/Time    MRI Pelvis With & Without Contrast [[id number]] Resulted: 12/28/24 1903     Updated: 12/28/24 1940    MRI Abdomen Without Contrast [[id number]] Resulted: 12/28/24 1830     Updated: 12/28/24 1903          Assessment & Plan      Patient is a 59-year-old female with history significant for atrial fibrillation, CKD and prior stroke who came to the ER with reports of gross hematuria and weakness.    #Acute on chronic debility  #Bilateral lower extremity weakness, multifactorial  --PT/OT, has not done well with therapy, notes updated, IPR re-consulted, case management " following    #Adrenal insufficiency, suspect central process  #Elevated testosterone, unclear etiology  --Cortisol low, ACTH low, testosterone elevated, DHEA-sulfate low, prolactin normal  --Estradiol elevated, FSH/LH low  --MRI brain with/without unremarkable for lesions  --CT abdomen pelvis with contrast negative for ovarian/adrenal tumor  --Pelvic ultrasound negative for ovarian lesions  --MRI pituitary protocol with and without without abnormal pituitary lesion  --After discussion with endocrinology, increase Solu-Cortef to 15 mg daily with breakfast, 10 mg daily at 2 PM  --I discussed with endocrinology again on 12/27, working diagnosis remains a suspected small ovarian tumor, given the above images have been unremarkable, with MRI of the abdomen/pelvis with/without contrast, if this is negative, we will continue treatment plan and have her follow-up outpatient    #Acute urinary tract infection the E. coli  #Left staghorn calculus  --Urine culture positive for E. Coli  --Completed course of Rocephin  --Hematuria likely due to large UPJ calculus exacerbated by Eliquis  --Urology evaluated, recommend outpatient follow-up    #Atrial fibrillation, rate controlled  #Prior ischemic infarct  --MRI of the brain noted old infarcts but nothing acute  --Continue flecainide, Eliquis  --Follow-up outpatient    #Chronic venous stasis, wound care  #Morbid obesity, BMI 56    Copied portions of this report have been reviewed and are accurate as of 12/29/24     CHECKLIST:  Abx: None  VTE: Eliquis   GI ppx: PPI  Diet: Consistent carb  Code: CPR, full  Dispo: Patient is hemodynamically stable, has not done well with PT, IPR following. Agreeable to SNF as well. Case management following.     Mark Vasquez DO  [facility] Hospitalist  12/29/24  11:12 EST

## 2024-12-30 LAB — 17OHP SERPL-MCNC: <10 NG/DL

## 2024-12-30 PROCEDURE — 99232 SBSQ HOSP IP/OBS MODERATE 35: CPT | Performed by: FAMILY MEDICINE

## 2024-12-30 RX ADMIN — LEVOTHYROXINE SODIUM 125 MCG: 0.12 TABLET ORAL at 08:28

## 2024-12-30 RX ADMIN — NYSTATIN: 100000 POWDER TOPICAL at 08:30

## 2024-12-30 RX ADMIN — ATORVASTATIN CALCIUM 40 MG: 40 TABLET, FILM COATED ORAL at 21:56

## 2024-12-30 RX ADMIN — FLECAINIDE ACETATE 100 MG: 50 TABLET ORAL at 21:56

## 2024-12-30 RX ADMIN — HYDROCORTISONE 15 MG: 10 TABLET ORAL at 08:29

## 2024-12-30 RX ADMIN — APIXABAN 5 MG: 5 TABLET, FILM COATED ORAL at 08:28

## 2024-12-30 RX ADMIN — Medication 400 MG: at 08:29

## 2024-12-30 RX ADMIN — Medication 10 ML: at 21:56

## 2024-12-30 RX ADMIN — ASPIRIN 81 MG: 81 TABLET, COATED ORAL at 08:28

## 2024-12-30 RX ADMIN — Medication 5000 UNITS: at 08:28

## 2024-12-30 RX ADMIN — Medication 10 ML: at 08:29

## 2024-12-30 RX ADMIN — FLECAINIDE ACETATE 100 MG: 50 TABLET ORAL at 08:28

## 2024-12-30 RX ADMIN — HYDROCORTISONE 1 APPLICATION: 1 CREAM TOPICAL at 21:56

## 2024-12-30 RX ADMIN — NYSTATIN: 100000 POWDER TOPICAL at 21:56

## 2024-12-30 RX ADMIN — Medication 1000 MCG: at 08:29

## 2024-12-30 RX ADMIN — AMMONIUM LACTATE 1 APPLICATION: 120 CREAM TOPICAL at 08:27

## 2024-12-30 RX ADMIN — HYDROCORTISONE 1 APPLICATION: 1 CREAM TOPICAL at 05:43

## 2024-12-30 RX ADMIN — FOLIC ACID 1 MG: 1 TABLET ORAL at 08:28

## 2024-12-30 RX ADMIN — HYDROCORTISONE 10 MG: 10 TABLET ORAL at 13:44

## 2024-12-30 RX ADMIN — APIXABAN 5 MG: 5 TABLET, FILM COATED ORAL at 21:56

## 2024-12-30 RX ADMIN — HYDROCORTISONE 1 APPLICATION: 1 CREAM TOPICAL at 13:44

## 2024-12-30 NOTE — DISCHARGE PLACEMENT REQUEST
"Ana Maradiaga (59 y.o. Female)       Date of Birth   1965    Social Security Number       Address   [address] [place] [address]    Home Phone   [phone]    MRN   [id number]       [place]ai   Confucianist    Marital Status   Single                            Admission Date   24    Admission Type   Emergency    Admitting Provider   Thien Reardon DO    Attending Provider   Thien Reardon DO    Department, Room/Bed   [address], 3336/       Discharge Date       Discharge Disposition       Discharge Destination                                 Attending Provider: Thien Reardon DO    Allergies: Vancomycin, Cefepime, Cephalosporins    Isolation: None   Infection: None   Code Status: CPR    Ht: 165.1 cm (65\")   Wt: 153 kg (338 lb)    Admission Cmt: None   Principal Problem: Complicated UTI (urinary tract infection) [N39.0]                   Active Insurance as of 2024       Primary Coverage       Payor Plan Insurance Group Employer/Plan Group    ANTHEM MEDICARE REPLACEMENT ANTHEM MEDICARE ADVANTAGE [id number]       Payor Plan Address Payor Plan Phone Number Payor Plan Fax Number Effective Dates    [address] [phone]  2024 - None Entered    [facility] [id number]         Subscriber Name Subscriber Birth Date Member ID       ANA MARADIAGA 1965 [id number]                     Emergency Contacts        (Rel.) Home Phone Work Phone Mobile Phone    BLANCA MARADIAGA (Son) -- -- [phone]    MARADIAGACYDNEY HUTSON (Relative) -- -- [phone]                 History & Physical        Thien Reardon DO at 24 [facility]7          [facility]   HISTORY AND PHYSICAL    Patient Name: Ana Maradaiga  : 1965  MRN: [id number]  Primary Care Physician:  Val Purcell PA  Date of admission: 2024    Subjective  Subjective     Chief Complaint: Hematuria leg weakness    History of Present Illness  Patient is a 59-year-old female with " past medical history of anemia, atrial fibrillation, chronic kidney disease, hypertension, gout, and a history of a CVA.  Patient presents to the emergency department with a couple of different complaints.  Patient says she started noticing some hematuria for the last couple of days.  Patient had a CT scan of her abdomen to evaluate for possible intra-abdominal tumors.  The patient has had very high levels of testosterone and they were looking to rule out hormone secreting tumors.  They did not discover tumors but did find a staghorn calculus in her left kidney.  Patient says she had had occasional dysuria but all of it has been much worse today.  Patient is also been experiencing gradual but significant lower extremity weakness.  Patient says that it is not on focal but over the last several weeks she has had more and more difficulty ambulating throughout the house.  Patient says the only thing that is really different in her routine is that she was started on Mounjaro.  She has lost 20 pounds and is having no GI difficulties.  Here in the emergency department the patient was found to have a significant nitrite positive urinary tract infection with large blood.  Her blood work shows an increased creatinine at 1.55, her white blood cell count is greater than 10 and her LDH is elevated.  Review of Systems   As stated above in his present illness all other systems were reviewed and subsequently negative  Personal History     Past Medical History:   Diagnosis Date    Anemia     Arrhythmia 2018    Afib    Arthritis     Atrial fibrillation 2018    Bronchitis     Chronic kidney disease 2018    Gout     Hypertension     Stroke November 2021       No past surgical history on file.    Family History: family history includes Arthritis in her father; Breast cancer in her paternal cousin; Heart disease in her mother; Hypertension in her father and sister; Stroke in her mother. Otherwise pertinent FHx was reviewed and not  pertinent to current issue.    Social History:  reports that she has never smoked. She has never used smokeless tobacco. She reports that she does not drink alcohol and does not use drugs.    Home Medications:  Tirzepatide, amLODIPine, apixaban, aspirin, atorvastatin, bumetanide, carvedilol, flecainide, levothyroxine, magnesium oxide, vitamin B-12, and vitamin D3    Allergies:  Allergies   Allergen Reactions    Vancomycin Hives    Cefepime Rash    Cephalosporins Rash       Objective   Objective     Vitals:   Temp:  [97.9 °F (36.6 °C)] 97.9 °F (36.6 °C)  Heart Rate:  [66-69] 69  Resp:  [17] 17  BP: (127-159)/(74-92) 149/81    Physical Exam  Constitutional:  Well-developed and well-nourished.  No acute distress.      HENT:  Head:  Normocephalic and atraumatic.  Mouth:  Moist mucous membranes.    Eyes:  Conjunctivae and EOM are normal. No scleral icterus.    Neck:  Neck supple.  No JVD present.    Cardiovascular:  Normal rate, regular rhythm and normal heart sounds with no murmur.  Pulmonary/Chest:  No respiratory distress, no wheezes, no crackles, with normal breath sounds and good air movement.  Abdominal:  Soft. No distension and no tenderness.   Musculoskeletal:  No tenderness, and no deformity.  No red or swollen joints anywhere.    Neurological:  Alert and oriented to person, place, and time.  No cranial nerve deficit.    Skin:  Skin is warm and dry. No rash noted. No pallor.   Peripheral vascular:  No clubbing, no cyanosis, no edema.  Psychiatric: Appropriate mood and affect  :    Result Review   Result Review:  I have personally reviewed the results from the time of this admission to 12/19/2024 18:37 EST and agree with these findings:  []  Laboratory list / accordion  []  Microbiology  []  Radiology  []  EKG/Telemetry   []  Cardiology/Vascular   []  Pathology  []  Old records  []  Other:  Most notable findings include:       Assessment & Plan  Assessment / Plan     Brief Patient Summary:  Ana Maradiaga is a  59 y.o. female who presents to the ED with complaints of bilateral lower extremity weakness and hematuria.  She is found to have a significant urinary tract infection and the workup for her lower extremity weakness has began    Active Hospital Problems:  Complicated UTI  - Patient was started on Rocephin in the ED which we will continue on the floor  - Gentle hydration overnight  - Await urine culture    Bilateral lower extremity weakness  -MRI of the brain  - PT and OT evaluation  - Consider neurology consultation  -    VTE Prophylaxis:  Mechanical VTE prophylaxis orders are signed & held.          CODE STATUS:    Code Status (Patient has no pulse and is not breathing): CPR (Attempt to Resuscitate)  Medical Interventions (Patient has pulse or is breathing): Full Support    Admission Status:  I believe this patient meets inpatient status.    Thien Reardon DO    Electronically signed by Thien Reardon DO at 12/19/24 1901       Vital Signs (last day)       Date/Time Temp Temp src Pulse Resp BP Patient Position SpO2    12/30/24 1400 98.2 (36.8) Oral 67 16 139/75 Lying --    12/30/24 1000 97.9 (36.6) Oral 67 18 126/70 Lying --    12/30/24 0600 97.8 (36.6) Oral 67 18 133/67 Lying --    12/30/24 0300 98 (36.7) Oral 62 18 128/58 Lying 97    12/29/24 2300 97.8 (36.6) Oral 66 18 113/59 Lying 96    12/29/24 1929 -- -- -- -- -- -- 97    12/29/24 1900 98 (36.7) Oral 68 18 123/65 Lying 96    12/29/24 1400 97.7 (36.5) Oral 75 18 160/87 Sitting --    12/29/24 1000 97.9 (36.6) Oral 68 16 141/68 Lying --    12/29/24 0600 97.5 (36.4) Oral 69 18 146/70 Lying --    12/29/24 0300 98.2 (36.8) Oral 78 18 126/76 Lying --          Lines, Drains & Airways       Active LDAs       Name Placement date Placement time Site Days    Peripheral IV 12/24/24 1909 Posterior;Right Wrist 12/24/24 1909  Wrist  5                  Current Facility-Administered Medications   Medication Dose Route Frequency Provider Last Rate Last Admin    ammonium lactate  (AMLACTIN) 12 % cream 1 Application  1 Application Topical Daily Ema Paris PA-C   1 Application at 12/30/24 0827    apixaban (ELIQUIS) tablet 5 mg  5 mg Oral Q12H Thien Reardon DO   5 mg at 12/30/24 0828    aspirin EC tablet 81 mg  81 mg Oral Daily Thien Reardon DO   81 mg at 12/30/24 0828    atorvastatin (LIPITOR) tablet 40 mg  40 mg Oral Nightly Thien Reardon DO   40 mg at 12/29/24 2054    sennosides-docusate (PERICOLACE) 8.6-50 MG per tablet 2 tablet  2 tablet Oral BID PRN Thien Reardon DO        And    polyethylene glycol (MIRALAX) packet 17 g  17 g Oral Daily PRN Thien Reardon DO   17 g at 12/20/24 1035    And    bisacodyl (DULCOLAX) EC tablet 5 mg  5 mg Oral Daily PRN Thien Rearodn DO        And    bisacodyl (DULCOLAX) suppository 10 mg  10 mg Rectal Daily PRN Thien Reardon DO        Calcium Replacement - Follow Nurse / BPA Driven Protocol   Not Applicable PRN Thien Reardon DO        flecainide (TAMBOCOR) tablet 100 mg  100 mg Oral BID Thien Reardon DO   100 mg at 12/30/24 0828    folic acid (FOLVITE) tablet 1 mg  1 mg Oral Daily Mark Vasquez DO   1 mg at 12/30/24 0828    HYDROcodone-acetaminophen (NORCO) 5-325 MG per tablet 1 tablet  1 tablet Oral Q6H PRN Mark Vasquez DO        hydrocortisone (CORTEF) tablet 10 mg  10 mg Oral Daily Mark Vasquez DO   10 mg at 12/30/24 1344    hydrocortisone (CORTEF) tablet 15 mg  15 mg Oral Daily With Breakfast Mark Vasquez DO   15 mg at 12/30/24 0829    hydrocortisone 1 % cream 1 Application  1 Application Topical Q8H Mark Vasquez DO   1 Application at 12/30/24 1344    levothyroxine (SYNTHROID, LEVOTHROID) tablet 125 mcg  125 mcg Oral Daily Thien Reardon DO   125 mcg at 12/30/24 0828    magnesium oxide (MAG-OX) tablet 400 mg  400 mg Oral Daily Thien Reardon DO   400 mg at 12/30/24 0829    Magnesium Standard Dose Replacement - Follow Nurse / BPA Driven Protocol   Not Applicable PRN  Thien Reardon DO        nystatin (MYCOSTATIN) powder   Topical Q12H Mark Vasquez DO   Given at 24 0830    ondansetron ODT (ZOFRAN-ODT) disintegrating tablet 4 mg  4 mg Oral Q6H PRN Thien Reardon DO        Or    ondansetron (ZOFRAN) injection 4 mg  4 mg Intravenous Q6H PRN Thien Reardon DO        Phosphorus Replacement - Follow Nurse / BPA Driven Protocol   Not Applicable PRN Thien Reardon DO        Potassium Replacement - Follow Nurse / BPA Driven Protocol   Not Applicable PRN Thien Reardon DO        sodium chloride 0.9 % flush 10 mL  10 mL Intravenous PRN Mark Vasquez DO        sodium chloride 0.9 % flush 10 mL  10 mL Intravenous Q12H Thien Reardon DO   10 mL at 24 0829    sodium chloride 0.9 % flush 10 mL  10 mL Intravenous PRN Thien Reardon DO        sodium chloride 0.9 % infusion 40 mL  40 mL Intravenous PRN Thien Reardon DO        vitamin B-12 (CYANOCOBALAMIN) tablet 1,000 mcg  1,000 mcg Oral Daily Thien Reardon DO   1,000 mcg at 24 0829    vitamin D3 capsule 5,000 Units  5,000 Units Oral Daily Thien Reardon DO   5,000 Units at 24 0828     Operative/Procedure Notes (most recent note)    No notes of this type exist for this encounter.          Physician Progress Notes (most recent note)        Thien Reardon DO at 24 1633              [facility]     Patient Identification:  Name:  Ana Maradiaga  Age:  59 y.o.  Sex:  female  :  1965  MRN:  [id number]  Visit Number:  [id number]  ROOM: [address] [place] [address]     Primary Care Provider:  Val Purcell PA     Date of Admission: 2024    Length of stay in inpatient status:  11    Subjective     Chief Compliant:    Chief Complaint   Patient presents with    Weakness - Generalized    Blood in Urine     History of Presenting Illness:    Patient was seen and examined face-to-face.  Patient says that with help from PT and OT she has been able to walk to the  chair and sat for approximately 2 hours but remains extremely weak  Objective     Current Hospital Meds:  ammonium lactate, 1 Application, Topical, Daily  apixaban, 5 mg, Oral, Q12H  aspirin, 81 mg, Oral, Daily  atorvastatin, 40 mg, Oral, Nightly  flecainide, 100 mg, Oral, BID  folic acid, 1 mg, Oral, Daily  hydrocortisone, 10 mg, Oral, Daily  hydrocortisone, 15 mg, Oral, Daily With Breakfast  hydrocortisone, 1 Application, Topical, Q8H  levothyroxine, 125 mcg, Oral, Daily  magnesium oxide, 400 mg, Oral, Daily  nystatin, , Topical, Q12H  sodium chloride, 10 mL, Intravenous, Q12H  vitamin B-12, 1,000 mcg, Oral, Daily  vitamin D3, 5,000 Units, Oral, Daily       Current Antimicrobial Therapy:  Anti-Infectives (From admission, onward)      Ordered     Dose/Rate Route Frequency Start Stop    12/20/24 0935  cefTRIAXone (ROCEPHIN) 2,000 mg in sodium chloride 0.9 % 100 mL IVPB-VTB        Ordering Provider: Thien Reardon DO    2,000 mg  200 mL/hr over 30 Minutes Intravenous Every 24 Hours 12/20/24 1700 12/24/24 1758    12/19/24 1555  cefTRIAXone (ROCEPHIN) 2,000 mg in sodium chloride 0.9 % 100 mL IVPB-VTB        Ordering Provider: Mark Vasquez DO    2,000 mg  200 mL/hr over 30 Minutes Intravenous Once 12/19/24 1610 12/19/24 1813          Current Diuretic Therapy:  Diuretics (From admission, onward)      None          ----------------------------------------------------------------------------------------------------------------------  Vital Signs:  Temp:  [97.8 °F (36.6 °C)-98.2 °F (36.8 °C)] 98.2 °F (36.8 °C)  Heart Rate:  [62-68] 67  Resp:  [16-18] 16  BP: (113-139)/(58-75) 139/75  SpO2:  [96 %-97 %] 97 %  on   ;   Device (Oxygen Therapy): room air  Body mass index is 56.25 kg/m².    Wt Readings from Last 3 Encounters:   12/19/24 (!) 153 kg (338 lb)   10/24/24 116 kg (256 lb 6.4 oz)   10/11/24 (!) 164 kg (362 lb)     Intake & Output (last 3 days)         12/17 0701  12/18 0700 12/18 0701  12/19 0700 12/19  0701  12/20 0700 12/20 0701  12/21 0700    P.O.   500 360    I.V. (mL/kg)   686 (4.5)     IV Piggyback   100     Total Intake(mL/kg)   1286 (8.4) 360 (2.4)    Urine (mL/kg/hr)   250     Stool   0     Total Output   250     Net   +1036 +360            Stool Unmeasured Occurrence   0 x           Diet: Cardiac; Healthy Heart (2-3 Na+); Fluid Consistency: Thin (IDDSI 0)  ----------------------------------------------------------------------------------------------------------------------  Physical Exam  Constitutional:  Well-developed and well-nourished.  No acute distress.      HENT:  Head:  Normocephalic and atraumatic.  Mouth:  Moist mucous membranes.    Eyes:  Conjunctivae and EOM are normal. No scleral icterus.    Neck:  Neck supple.  No JVD present.    Cardiovascular:  Normal rate, regular rhythm and normal heart sounds with no murmur.  Pulmonary/Chest:  No respiratory distress, no wheezes, no crackles, with normal breath sounds and good air movement.  Abdominal:  Soft. No distension and no tenderness.   Musculoskeletal:  No tenderness, and no deformity.  No red or swollen joints anywhere.    Neurological:  Alert and oriented to person, place, and time.  No cranial nerve deficit.    Skin: Dry appearing redness of her face.  Peripheral vascular:  No clubbing, no cyanosis, no edema.  Psychiatric: Appropriate mood and affect  : Pure wick in place with grossly bloody urine in the tubing and canister    ----------------------------------------------------------------------------------------------------------------------  Tele:    ----------------------------------------------------------------------------------------------------------------------  LABS:    CBC and coagulation:  Results from last 7 days   Lab Units 12/29/24  1024 12/25/24  1615 12/25/24  0116 12/24/24  0015   PROCALCITONIN ng/mL 0.07  --   --   --    SED RATE mm/hr 54* 39*  --   --    CRP mg/dL 1.91* 10.07*  --   --    WBC 10*3/mm3 9.23  --  9.63 8.45  "  HEMOGLOBIN g/dL 11.0*  --  12.1 11.2*   HEMATOCRIT % 35.3  --  38.5 37.1   MCV fL 97.2*  --  99.2* 101.6*   MCHC g/dL 31.2*  --  31.4* 30.2*   PLATELETS 10*3/mm3 210  --  154 138*   INR   --   --   --  1.33*     Acid/base balance:      Renal and electrolytes:  Results from last 7 days   Lab Units 12/29/24  1024 12/24/24  0015   SODIUM mmol/L 137 132*   POTASSIUM mmol/L 4.3 4.0   CHLORIDE mmol/L 101 101   CO2 mmol/L 25.1 22.0   BUN mg/dL 15 10   CREATININE mg/dL 1.02* 1.05*   CALCIUM mg/dL 8.3* 8.2*   GLUCOSE mg/dL 136* 104*     Estimated Creatinine Clearance: 89.4 mL/min (A) (by C-G formula based on SCr of 1.02 mg/dL (H)).    Liver and pancreatic function:  Results from last 7 days   Lab Units 12/29/24  1024 12/24/24  0015   ALBUMIN g/dL 2.5* 2.4*   BILIRUBIN mg/dL 0.6 0.5   ALK PHOS U/L 138* 97   AST (SGOT) U/L 33* 18   ALT (SGPT) U/L 28 16     Endocrine function:  No results found for: \"HGBA1C\"  Point of care bedside glucose levels:      Glucose levels from the [facility]:  Results from last 7 days   Lab Units 12/29/24  1024 12/24/24  0015   GLUCOSE mg/dL 136* 104*     Lab Results   Component Value Date    TSH 3.320 12/19/2024    FREET4 1.30 12/27/2024     Cardiac:        Results from last 7 days   Lab Units 12/25/24  1615   CHOLESTEROL mg/dL 94   TRIGLYCERIDES mg/dL 114   HDL CHOL mg/dL 32*   LDL CHOL mg/dL 41     Cultures:  Lab Results   Component Value Date    COLORU Red (A) 12/19/2024    CLARITYU Turbid (A) 12/19/2024    PHUR <=5.0 12/19/2024    PROTEINUR 50.7 06/22/2023    GLUCOSEU Negative 12/19/2024    KETONESU Negative 12/19/2024    BLOODU Moderate (2+) (A) 12/19/2024    NITRITEU Positive (A) 12/19/2024    LEUKOCYTESUR Large (3+) (A) 12/19/2024    BILIRUBINUR Moderate (2+) (A) 12/19/2024    UROBILINOGEN 0.2 E.U./dL 12/19/2024    RBCUA Too Numerous to Count (A) 12/19/2024    WBCUA Too Numerous to Count (A) 12/19/2024    BACTERIA 3+ (A) 12/19/2024     Microbiology Results (last 10 days)       ** No results found " "for the last 240 hours. **            No results found for: \"PREGTESTUR\", \"PREGSERUM\", \"HCG\", \"HCGQUANT\"  Pain Management Panel  More data may exist         Latest Ref Rng & Units 12/19/2024 6/22/2023   Pain Management Panel   Creatinine, Urine mg/dL  mg/dL - 152.9  152.9    Amphetamine, Urine Qual Negative Negative  -   Barbiturates Screen, Urine Negative Negative  -   Benzodiazepine Screen, Urine Negative Negative  -   Buprenorphine, Screen, Urine Negative Negative  -   Cocaine Screen, Urine Negative Negative  -   Fentanyl, Urine Negative Negative  -   Methadone Screen , Urine Negative Negative  -   Methamphetamine, Ur Negative Negative  -      Details          Multiple values from one day are sorted in reverse-chronological order               I have personally looked at the labs and they are summarized above.  ----------------------------------------------------------------------------------------------------------------------  Detailed radiology reports for the last 24 hours:    Imaging Results (Last 24 Hours)       ** No results found for the last 24 hours. **          I have personally looked at the radiology images and I have read the available final and preliminary reports.    Assessment & Plan    Complicated UTI with gross hematuria  -This appears to have completely resolved  - We will monitor for any new  complaints    Bilateral lower extremity weakness  Chronic debility  Adrenal insufficiency  - Solu-Cortef has been started and increased  - MRI of the pelvis rules out ovarian mass  -MRI of the brain within normal limits  - PT and OT evaluation  - Consider neurology consultation if her strength does not improve  -Consultation for inpatient rehab  Rosacea  -I will start the patient on MetroGel if available  VTE Prophylaxis:   Active VTE Prophylaxis  Pharmacologic:        Start     Dose Route Frequency Stop    12/20/24 1430  apixaban (ELIQUIS) tablet 5 mg         5 mg PO Every 12 Hours Scheduled --      "             Mechanical:        Start        24  Maintain Sequential Compression Device  Continuous                          Any portions of the note copied forward were evaluated for relevancy to today's visit 2024  Disposition: Discharge to home early next week if she is not approved for inpatient rehab    Thien Reardon DO  [facility] Hospitalist  24  16:34 EST     Electronically signed by Thien Reardon DO at 24 1636          Consult Notes (most recent note)        Ema Paris PA-C at 24 1544        Consult Orders    1. Inpatient Wound APRN Consult [[id number]] ordered by Mark Vasquez DO at 24 0956              Attestation signed by Jun Eastman MD at 24 0933    I was available for assistance.  I have reviewed this documentation and agree.                    Consult Note     Patient Identification:  Name:  Ana Maradiaga  Age:  59 y.o.  Sex:  female  :  1965  MRN:  [id number]   Visit Number:  [id number]  Primary Care Physician:  Val Purcell PA     Subjective     Chief complaint:   Chief Complaint   Patient presents with    Weakness - Generalized    Blood in Urine       History of presenting illness:   Patient is a 59 y.o. female with past medical history significant for anemia, CKD, atrial fibrillation, hypertension, gout and CVA that presented to the [facility] Emergency Department for complaints of hematuria. Wound care consulted to evaluate bilateral lower extremities. She reports she has had lymphedema for several years now but has not undergone any therapy or treatment. She states she has dealt with recurrent cellulitis which led to hospitalization. She currently does not appear to have cellulitis but has a purple/red discoloration that she reports has been chronic after her last recurrence of cellulitis. Denies pain or open wounds. No fever or chills. The patients nurse, Dia, was at bedside during  her exam.    ---------------------------------------------------------------------------------------------------------------------   Review of Systems:  Review of Systems   Constitutional: Negative.    Respiratory: Negative.     Cardiovascular:  Positive for leg swelling.   Endocrine: Negative.    Skin:  Positive for color change.        ---------------------------------------------------------------------------------------------------------------------   Past Medical History:   Diagnosis Date    Anemia     Arrhythmia 2018    Afib    Arthritis     Atrial fibrillation 2018    Bronchitis     Chronic kidney disease 2018    Gout     Hypertension     Stroke November 2021     History reviewed. No pertinent surgical history.  Family History   Problem Relation Age of Onset    Heart disease Mother     Stroke Mother     Hypertension Father     Arthritis Father     Hypertension Sister     Breast cancer Paternal Cousin      Social History     Socioeconomic History    Marital status: Single   Tobacco Use    Smoking status: Never    Smokeless tobacco: Never   Vaping Use    Vaping status: Never Used   Substance and Sexual Activity    Alcohol use: Never    Drug use: Never    Sexual activity: Not Currently     Partners: Male     ---------------------------------------------------------------------------------------------------------------------   Allergies:  Vancomycin, Cefepime, and Cephalosporins  ---------------------------------------------------------------------------------------------------------------------   Medications below are reported home medications pulling from within the system; at this time, these medications have not been reconciled unless otherwise specified and are in the verification process for further verifcation as current home medications.    Prior to Admission Medications       Prescriptions Last Dose Informant Patient Reported? Taking?    amLODIPine (NORVASC) 5 MG tablet Past Week Self, Other No Yes     TAKE 1 TABLET BY MOUTH DAILY    Aspirin Low Dose 81 MG EC tablet 12/19/2024 Self, Other Yes Yes    Take 1 tablet by mouth Daily.    atorvastatin (LIPITOR) 40 MG tablet Past Week Self, Other Yes Yes    Take 1 tablet by mouth every night at bedtime.    bumetanide (BUMEX) 1 MG tablet 12/19/2024 Self, Other Yes Yes    Take 2 tablets by mouth Daily.    carvedilol (COREG) 6.25 MG tablet 12/19/2024 Self, Other No Yes    TAKE 1 TABLET BY MOUTH EVERY 12 HOURS    Eliquis 5 MG tablet tablet 12/19/2024 Self, Other No Yes    Take 1 tablet by mouth Every 12 (Twelve) Hours.    flecainide (TAMBOCOR) 100 MG tablet 12/19/2024 Self, Other No Yes    TAKE 1 TABLET BY MOUTH TWICE A DAY    levothyroxine (SYNTHROID, LEVOTHROID) 125 MCG tablet 12/19/2024 Self, Other Yes Yes    Take 1 tablet by mouth Daily.    magnesium oxide (MAG-OX) 400 MG tablet 12/19/2024 Self Yes Yes    Take 1 tablet by mouth Daily.    Tirzepatide (Mounjaro) 5 MG/0.5ML solution auto-injector 12/13/2024 Self, Other No No    Inject 0.5 mL under the skin into the appropriate area as directed 1 (One) Time Per Week.    vitamin B-12 (CYANOCOBALAMIN) 1000 MCG tablet 12/19/2024 Self Yes Yes    Take 1 tablet by mouth Daily.    vitamin D3 125 MCG (5000 UT) capsule capsule 12/19/2024 Self Yes Yes    Take 1 capsule by mouth Daily.          ---------------------------------------------------------------------------------------------------------------------    Objective     Hospital Scheduled Meds:  amLODIPine, 5 mg, Oral, Daily  apixaban, 5 mg, Oral, Q12H  aspirin, 81 mg, Oral, Daily  atorvastatin, 40 mg, Oral, Nightly  carvedilol, 6.25 mg, Oral, Q12H  cefTRIAXone, 2,000 mg, Intravenous, Q24H  flecainide, 100 mg, Oral, BID  hydrocortisone, 1 Application, Topical, Q8H  levothyroxine, 125 mcg, Oral, Daily  magnesium oxide, 400 mg, Oral, Daily  nystatin, , Topical, Q12H  sodium chloride, 10 mL, Intravenous, Q12H  vitamin B-12, 1,000 mcg, Oral, Daily  vitamin D3, 5,000 Units, Oral,  Daily           Current listed hospital scheduled medications may not yet reflect those currently placed in orders that are signed and held, awaiting patient's arrival to floor/unit.    ---------------------------------------------------------------------------------------------------------------------   Vital Signs:  Temp:  [98.2 °F (36.8 °C)-98.6 °F (37 °C)] 98.2 °F (36.8 °C)  Heart Rate:  [64-77] 73  Resp:  [16-18] 16  BP: ()/(54-76) 108/60  Mean Arterial Pressure (Non-Invasive) for the past 24 hrs (Last 3 readings):   Noninvasive MAP (mmHg)   24 0300 81   24 0003 79   24 1900 97     SpO2 Percentage    24 0653 24 1100 24 1500   SpO2: 96% 94% 96%     SpO2:  [94 %-96 %] 96 %  on   ;   Device (Oxygen Therapy): room air    Body mass index is 56.25 kg/m².  Wt Readings from Last 3 Encounters:   24 (!) 153 kg (338 lb)   10/24/24 116 kg (256 lb 6.4 oz)   10/11/24 (!) 164 kg (362 lb)       ---------------------------------------------------------------------------------------------------------------------   Physical Exam:    B/L lower extremities with evidence of edema, dryness and discoloration. No significant warmth or erythema noted.     Assessment & Plan      Assessment     Lymphedema  Xerosis    Plan:     Apply ammonium lactate lotion to the bilateral lower extremities QD/PRN. Patient would benefit from compression but will order B/L ABIs before initiating. She is agreeable to this plan.     She would benefit from outpatient lymphedema treatment as well.       Ema Paris PA-C  [facility]    24  15:44 EST      Electronically signed by Jun Eastman MD at 24 0964          Physical Therapy Notes (most recent note)        Armida Odell, PT at 24 1435  Version 1 of 1         Acute Care - Physical Therapy Treatment Note  [facility]     Patient Name: Ana Maradiaga  : 1965  MRN: [id number]  Today's Date:  12/26/2024   Onset of Illness/Injury or Date of Surgery: 12/19/24  Visit Dx:     ICD-10-CM ICD-9-CM   1. Urinary tract infection with hematuria, site unspecified  N39.0 599.0    R31.9 599.70   2. Weakness  R53.1 780.79   3. High serum testosterone  R79.89 790.99     Patient Active Problem List   Diagnosis    Type 2 diabetes mellitus with hyperglycemia, without long-term current use of insulin    Acquired hypothyroidism    Hirsutism    Elevated testosterone level in female    Complicated UTI (urinary tract infection)     Past Medical History:   Diagnosis Date    Anemia     Arrhythmia 2018    Afib    Arthritis     Atrial fibrillation 2018    Bronchitis     Chronic kidney disease 2018    Gout     Hypertension     Stroke November 2021     History reviewed. No pertinent surgical history.  PT Assessment (Last 12 Hours)       PT Evaluation and Treatment       Row Name 12/26/24 1347          Physical Therapy Time and Intention    Document Type therapy note (daily note)  -     Mode of Treatment physical therapy  -     Patient Effort good  -     Symptoms Noted During/After Treatment fatigue  -     Comment Pt seen for treatment this date, pleasant and cooperative with therapy. Pt participated in ambulation at bedside, grossly CGA x1-2; some LE TE while sitting in chair. Encouraged to not get up without assist.  -       Row Name 12/26/24 1347          Bed Mobility    Bed Mobility supine-sit  -     Supine-Sit [place] (Bed Mobility) contact guard;minimum assist (75% patient effort)  -       Row Name 12/26/24 1344          Transfers    Transfers sit-stand transfer;stand-sit transfer  -       Row Name 12/26/24 1345          Sit-Stand Transfer    Sit-Stand [place] (Transfers) contact guard;minimum assist (75% patient effort);2 person assist  -     Assistive Device (Sit-Stand Transfers) walker, front-wheeled  -       Row Name 12/26/24 1342          Stand-Sit Transfer    Stand-Sit [place] (Transfers)  contact guard  -     Assistive Device (Stand-Sit Transfers) walker, front-wheeled  -       Row Name 12/26/24 1347          Gait/Stairs (Locomotion)    Gait/Stairs Locomotion gait/ambulation assistive device  -     [place]eller Level (Gait) contact guard  -     Assistive Device (Gait) walker, front-wheeled  -     Distance in Feet (Gait) 7  7'x2  -       Row Name 12/26/24 1347          Motor Skills    Therapeutic Exercise knee;ankle  LAQ x10, grossly x8' ; ankle pumps grossly 2x8, x4 BL  -       Row Name 12/26/24 1347          Positioning and Restraints    Pre-Treatment Position in bed  -     Post Treatment Position chair  -     In Chair reclined;call light within reach;encouraged to call for assist  -       Row Name 12/26/24 1347          Progress Summary (PT)    Daily Progress Summary (PT) Pt ambulated grossly 2x7' with RW, grossly CGA x1-2 (2nd person assit for pt and staff safety). Pt may benefit from therapy interventions, unsure if pt could tolerate IPR however pt seems willing. No change in POC, prognosis fair.  -               User Key  (r) = Recorded By, (t) = Taken By, (c) = Cosigned By      Initials Name Provider Type    Armida Andrews, PT Physical Therapist                    Physical Therapy Education       Title: PT OT SLP Therapies (Done)       Topic: Physical Therapy (Done)       Point: Mobility training (Done)       Learning Progress Summary            Patient Acceptance, E,TB, VU by  at 12/26/2024 1222    Acceptance, E,TB, VU by  at 12/24/2024 0905    Acceptance, E, VU by SC at 12/24/2024 0331    Acceptance, E,D, VU,NR by  at 12/23/2024 1249                      Point: Home exercise program (Done)       Learning Progress Summary            Patient Acceptance, E,TB, VU by  at 12/26/2024 1222    Acceptance, E,TB, VU by  at 12/24/2024 0905    Acceptance, E, VU by SC at 12/24/2024 0331    Acceptance, E,D, VU,NR by  at 12/23/2024 1249                      Point: Body  mechanics (Done)       Learning Progress Summary            Patient Acceptance, E,TB, VU by  at 12/26/2024 1222    Acceptance, E,TB, VU by  at 12/24/2024 0905    Acceptance, E, VU by SC at 12/24/2024 0331    Acceptance, E,D, VU,NR by  at 12/23/2024 1249                      Point: Precautions (Done)       Learning Progress Summary            Patient Acceptance, E,TB, VU by  at 12/26/2024 1222    Acceptance, E,TB, VU by  at 12/24/2024 0905    Acceptance, E, VU by SC at 12/24/2024 0331    Acceptance, E,D, VU,NR by  at 12/23/2024 1249                                      User Key       Initials Effective Dates Name Provider Type Discipline     06/16/21 -  Elizabeth Aquino, PT Physical Therapist PT     06/25/24 -  Ebonie Rangel, RN Registered Nurse Nurse    SC 09/11/24 -  Naya Lowry, RN Registered Nurse Nurse                  PT Recommendation and Plan  Anticipated Discharge Disposition (PT): sub acute care setting, skilled nursing facility  Progress Summary (PT)  Daily Progress Summary (PT): Pt ambulated grossly 2x7' with RW, grossly CGA x1-2 (2nd person assit for pt and staff safety). Pt may benefit from therapy interventions, unsure if pt could tolerate IPR however pt seems willing. No change in POC, prognosis fair.       Time Calculation:    PT Charges       Row Name 12/26/24 1435             Time Calculation    Start Time 1347  -      PT Received On 12/26/24  -         Time Calculation- PT    Total Timed Code Minutes- PT 23 minute(s)  -                User Key  (r) = Recorded By, (t) = Taken By, (c) = Cosigned By      Initials Name Provider Type     Armida Odell, PT Physical Therapist                  Therapy Charges for Today       Code Description Service Date Service Provider Modifiers Qty    25308437478 HC PT THER PROC EA 15 MIN 12/26/2024 Armida Odell, PT GP 1    21406214207 HC PT THERAPEUTIC ACT EA 15 MIN 12/26/2024 Amrida Odell, PT GP 1            PT G-Codes  AM-PAC  6 Clicks Score (PT): 12    Armida Odell, PT  2024      Electronically signed by Armida Odell, PT at 24 1436          Occupational Therapy Notes (most recent note)        Dnao Samuel, OT at 24 1041          Acute Care - Occupational Therapy Initial Evaluation  [facility]     Patient Name: Ana Maradiaga  : 1965  MRN: [id number]  Today's Date: 2024             Admit Date: 2024       ICD-10-CM ICD-9-CM   1. Urinary tract infection with hematuria, site unspecified  N39.0 599.0    R31.9 599.70   2. Weakness  R53.1 780.79   3. High serum testosterone  R79.89 790.99     Patient Active Problem List   Diagnosis    Type 2 diabetes mellitus with hyperglycemia, without long-term current use of insulin    Acquired hypothyroidism    Hirsutism    Elevated testosterone level in female    Complicated UTI (urinary tract infection)     Past Medical History:   Diagnosis Date    Anemia     Arrhythmia     Afib    Arthritis     Atrial fibrillation     Bronchitis     Chronic kidney disease     Gout     Hypertension     Stroke 2021     History reviewed. No pertinent surgical history.      OT ASSESSMENT FLOWSHEET (Last 12 Hours)       OT Evaluation and Treatment       Row Name 24 1030                   OT Time and Intention    Subjective Information complains of;weakness  -KR        Document Type evaluation  -KR        Mode of Treatment occupational therapy  -KR        Patient Effort adequate  -KR        Symptoms Noted During/After Treatment fatigue  -KR           General Information    General Observations of Patient alert/cooperative  -KR        Prior Level of Function mod assist:  -KR           Living Environment    Current Living Arrangements home  -KR        People in Home child(woody), adult  -KR        Primary Care Provided by self;child(woody)  -KR           Home Use of Assistive/Adaptive Equipment    Equipment Currently Used at Home rollator  -KR            Cognition    Affect/Mental Status (Cognition) WFL  -KR        Orientation Status (Cognition) oriented x 3  -KR        Follows Commands (Cognition) WFL  -KR           Range of Motion Comprehensive    Comment, General Range of Motion BUE WFL  -KR           Strength Comprehensive (MMT)    Comment, General Manual Muscle Testing (MMT) Assessment BUE 3-/5  -KR           Activities of Daily Living    BADL Assessment/Intervention bathing;upper body dressing;lower body dressing;grooming;feeding;toileting  -KR           Bathing Assessment/Intervention    [place] Level (Bathing) bathing skills;maximum assist (25% patient effort)  -KR           Upper Body Dressing Assessment/Training    [place] Level (Upper Body Dressing) upper body dressing skills;maximum assist (25% patient effort)  -KR           Lower Body Dressing Assessment/Training    [place] Level (Lower Body Dressing) lower body dressing skills;maximum assist (25% patient effort)  -KR           Grooming Assessment/Training    [place] Level (Grooming) grooming skills;moderate assist (50% patient effort)  -KR           Self-Feeding Assessment/Training    [place] Level (Feeding) feeding skills;set up  -KR           Toileting Assessment/Training    [place] Level (Toileting) toileting skills;maximum assist (25% patient effort);dependent (less than 25% patient effort)  -KR           Plan of Care Review    Plan of Care Reviewed With patient  -KR           Therapy Assessment/Plan (OT)    Planned Therapy Interventions (OT) activity tolerance training;adaptive equipment training;BADL retraining;strengthening exercise  -KR           Therapy Plan Review/Discharge Plan (OT)    Anticipated Discharge Disposition (OT) inpatient rehabilitation facility;home  -KR           OT Goals    Dressing Goal Selection (OT) dressing, OT goal 1  -KR        Strength Goal Selection (OT) strength, OT goal 1  -KR        Activity Tolerance Goal Selection (OT) activity  tolerance, OT goal 1  -KR           Dressing Goal 1 (OT)    Activity/Device (Dressing Goal 1, OT) dressing skills, all  -KR        [place]/Cues Needed (Dressing Goal 1, OT) minimum assist (75% or more patient effort)  -KR        Time Frame (Dressing Goal 1, OT) by discharge  -KR           Strength Goal 1 (OT)    Strength Goal 1 (OT) BUE increase x 1 to improve mobility/self care  -KR        Time Frame (Strength Goal 1, OT) by discharge  -KR            Activity Tolerance Goal 1 (OT)    Activity Tolerance Goal 1 (OT) Increase to enhance ADL performance  -KR        Activity Level (Endurance Goal 1, OT) 15 min activity  -KR        Time Frame (Activity Tolerance Goal 1, OT) by discharge  -KR           Patient Education Goal (OT)    Activity (Patient Education Goal, OT) AE/DME training to enhance safety/independence in home environment  -KR        [place]/Cues/Accuracy (Memory Goal 2, OT) verbalizes understanding  -KR        Time Frame (Patient Education Goal, OT) by discharge  -KR                  User Key  (r) = Recorded By, (t) = Taken By, (c) = Cosigned By      Initials Name Effective Dates    Dano Bruno OT 06/16/21 -                      Occupational Therapy Education        No education to display                      OT Recommendation and Plan  Planned Therapy Interventions (OT): activity tolerance training, adaptive equipment training, BADL retraining, strengthening exercise  Plan of Care Review  Plan of Care Reviewed With: patient  Plan of Care Reviewed With: patient        Time Calculation:     Therapy Charges for Today       Code Description Service Date Service Provider Modifiers Qty    66405652444  OT EVAL HIGH COMPLEXITY 4 12/20/2024 Dano Samuel OT GO 1                 Dano Samuel OT  12/20/2024    Electronically signed by Dano Samuel OT at 12/20/24 1042

## 2024-12-30 NOTE — CASE MANAGEMENT/SOCIAL WORK
Discharge Planning Assessment  [facility]     Patient Name: Ana Maradiaga  MRN: [id number]  Today's Date: 12/30/2024    Admit Date: 12/19/2024          Discharge Plan       Row Name 12/30/24 1224       Plan    Plan SS spoke with  [facility] who states referral was not received. SS to re-fax via [facility] on this date. SS to follow.    17:48pm: SS notified by [facility] that Pt is over their weight limit of 250 pounds. SS spoke with Pt and made her aware. Pt is agreeable to other SNF referrals being sent. SS notified Pt that [facility] and [facility] ([facility] have accepted bariatric patients in the past. Pt has requested that SS also call her son Dano and discuss both locations. SS faxed referrals to both [facility] per Mary and [facility] ([facility] per Padmini. SS left message for son Dano to return SS call. SS to follow.                     MELIDA PintoW

## 2024-12-30 NOTE — PROGRESS NOTES
[facility] HOSPITALIST PROGRESS NOTE     Patient Identification:  Name:  Ana Maradiaga  Age:  59 y.o.  Sex:  female  :  1965  MRN:  [id number]  Visit Number:  [id number]  ROOM: [address] [place] [address]     Primary Care Provider:  Val Purcell PA     Date of Admission: 2024    Length of stay in inpatient status:  11    Subjective     Chief Compliant:    Chief Complaint   Patient presents with    Weakness - Generalized    Blood in Urine     History of Presenting Illness:    Patient was seen and examined face-to-face.  Patient says that with help from PT and OT she has been able to walk to the chair and sat for approximately 2 hours but remains extremely weak  Objective     Current Hospital Meds:  ammonium lactate, 1 Application, Topical, Daily  apixaban, 5 mg, Oral, Q12H  aspirin, 81 mg, Oral, Daily  atorvastatin, 40 mg, Oral, Nightly  flecainide, 100 mg, Oral, BID  folic acid, 1 mg, Oral, Daily  hydrocortisone, 10 mg, Oral, Daily  hydrocortisone, 15 mg, Oral, Daily With Breakfast  hydrocortisone, 1 Application, Topical, Q8H  levothyroxine, 125 mcg, Oral, Daily  magnesium oxide, 400 mg, Oral, Daily  nystatin, , Topical, Q12H  sodium chloride, 10 mL, Intravenous, Q12H  vitamin B-12, 1,000 mcg, Oral, Daily  vitamin D3, 5,000 Units, Oral, Daily       Current Antimicrobial Therapy:  Anti-Infectives (From admission, onward)      Ordered     Dose/Rate Route Frequency Start Stop    24 0935  cefTRIAXone (ROCEPHIN) 2,000 mg in sodium chloride 0.9 % 100 mL IVPB-VTB        Ordering Provider: Thien Reardon DO    2,000 mg  200 mL/hr over 30 Minutes Intravenous Every 24 Hours 24 1700 24 1758    24 1555  cefTRIAXone (ROCEPHIN) 2,000 mg in sodium chloride 0.9 % 100 mL IVPB-VTB        Ordering Provider: Mark Vasquez DO    2,000 mg  200 mL/hr over 30 Minutes Intravenous Once 24 1610 24 1813          Current Diuretic Therapy:  Diuretics (From admission, onward)       None          ----------------------------------------------------------------------------------------------------------------------  Vital Signs:  Temp:  [97.8 °F (36.6 °C)-98.2 °F (36.8 °C)] 98.2 °F (36.8 °C)  Heart Rate:  [62-68] 67  Resp:  [16-18] 16  BP: (113-139)/(58-75) 139/75  SpO2:  [96 %-97 %] 97 %  on   ;   Device (Oxygen Therapy): room air  Body mass index is 56.25 kg/m².    Wt Readings from Last 3 Encounters:   12/19/24 (!) 153 kg (338 lb)   10/24/24 116 kg (256 lb 6.4 oz)   10/11/24 (!) 164 kg (362 lb)     Intake & Output (last 3 days)         12/17 0701  12/18 0700 12/18 0701  12/19 0700 12/19 0701  12/20 0700 12/20 0701  12/21 0700    P.O.   500 360    I.V. (mL/kg)   686 (4.5)     IV Piggyback   100     Total Intake(mL/kg)   1286 (8.4) 360 (2.4)    Urine (mL/kg/hr)   250     Stool   0     Total Output   250     Net   +1036 +360            Stool Unmeasured Occurrence   0 x           Diet: Cardiac; Healthy Heart (2-3 Na+); Fluid Consistency: Thin (IDDSI 0)  ----------------------------------------------------------------------------------------------------------------------  Physical Exam  Constitutional:  Well-developed and well-nourished.  No acute distress.      HENT:  Head:  Normocephalic and atraumatic.  Mouth:  Moist mucous membranes.    Eyes:  Conjunctivae and EOM are normal. No scleral icterus.    Neck:  Neck supple.  No JVD present.    Cardiovascular:  Normal rate, regular rhythm and normal heart sounds with no murmur.  Pulmonary/Chest:  No respiratory distress, no wheezes, no crackles, with normal breath sounds and good air movement.  Abdominal:  Soft. No distension and no tenderness.   Musculoskeletal:  No tenderness, and no deformity.  No red or swollen joints anywhere.    Neurological:  Alert and oriented to person, place, and time.  No cranial nerve deficit.    Skin: Dry appearing redness of her face.  Peripheral vascular:  No clubbing, no cyanosis, no edema.  Psychiatric: Appropriate  "mood and affect  : Pure wick in place with grossly bloody urine in the tubing and canister    ----------------------------------------------------------------------------------------------------------------------  Tele:    ----------------------------------------------------------------------------------------------------------------------  LABS:    CBC and coagulation:  Results from last 7 days   Lab Units 12/29/24  1024 12/25/24  1615 12/25/24  0116 12/24/24  0015   PROCALCITONIN ng/mL 0.07  --   --   --    SED RATE mm/hr 54* 39*  --   --    CRP mg/dL 1.91* 10.07*  --   --    WBC 10*3/mm3 9.23  --  9.63 8.45   HEMOGLOBIN g/dL 11.0*  --  12.1 11.2*   HEMATOCRIT % 35.3  --  38.5 37.1   MCV fL 97.2*  --  99.2* 101.6*   MCHC g/dL 31.2*  --  31.4* 30.2*   PLATELETS 10*3/mm3 210  --  154 138*   INR   --   --   --  1.33*     Acid/base balance:      Renal and electrolytes:  Results from last 7 days   Lab Units 12/29/24  1024 12/24/24  0015   SODIUM mmol/L 137 132*   POTASSIUM mmol/L 4.3 4.0   CHLORIDE mmol/L 101 101   CO2 mmol/L 25.1 22.0   BUN mg/dL 15 10   CREATININE mg/dL 1.02* 1.05*   CALCIUM mg/dL 8.3* 8.2*   GLUCOSE mg/dL 136* 104*     Estimated Creatinine Clearance: 89.4 mL/min (A) (by C-G formula based on SCr of 1.02 mg/dL (H)).    Liver and pancreatic function:  Results from last 7 days   Lab Units 12/29/24  1024 12/24/24  0015   ALBUMIN g/dL 2.5* 2.4*   BILIRUBIN mg/dL 0.6 0.5   ALK PHOS U/L 138* 97   AST (SGOT) U/L 33* 18   ALT (SGPT) U/L 28 16     Endocrine function:  No results found for: \"HGBA1C\"  Point of care bedside glucose levels:      Glucose levels from the [facility]:  Results from last 7 days   Lab Units 12/29/24  1024 12/24/24  0015   GLUCOSE mg/dL 136* 104*     Lab Results   Component Value Date    TSH 3.320 12/19/2024    FREET4 1.30 12/27/2024     Cardiac:        Results from last 7 days   Lab Units 12/25/24  1615   CHOLESTEROL mg/dL 94   TRIGLYCERIDES mg/dL 114   HDL CHOL mg/dL 32*   LDL CHOL mg/dL 41 " "    Cultures:  Lab Results   Component Value Date    COLORU Red (A) 12/19/2024    CLARITYU Turbid (A) 12/19/2024    PHUR <=5.0 12/19/2024    PROTEINUR 50.7 06/22/2023    GLUCOSEU Negative 12/19/2024    KETONESU Negative 12/19/2024    BLOODU Moderate (2+) (A) 12/19/2024    NITRITEU Positive (A) 12/19/2024    LEUKOCYTESUR Large (3+) (A) 12/19/2024    BILIRUBINUR Moderate (2+) (A) 12/19/2024    UROBILINOGEN 0.2 E.U./dL 12/19/2024    RBCUA Too Numerous to Count (A) 12/19/2024    WBCUA Too Numerous to Count (A) 12/19/2024    BACTERIA 3+ (A) 12/19/2024     Microbiology Results (last 10 days)       ** No results found for the last 240 hours. **            No results found for: \"PREGTESTUR\", \"PREGSERUM\", \"HCG\", \"HCGQUANT\"  Pain Management Panel  More data may exist         Latest Ref Rng & Units 12/19/2024 6/22/2023   Pain Management Panel   Creatinine, Urine mg/dL  mg/dL - 152.9  152.9    Amphetamine, Urine Qual Negative Negative  -   Barbiturates Screen, Urine Negative Negative  -   Benzodiazepine Screen, Urine Negative Negative  -   Buprenorphine, Screen, Urine Negative Negative  -   Cocaine Screen, Urine Negative Negative  -   Fentanyl, Urine Negative Negative  -   Methadone Screen , Urine Negative Negative  -   Methamphetamine, Ur Negative Negative  -      Details          Multiple values from one day are sorted in reverse-chronological order               I have personally looked at the labs and they are summarized above.  ----------------------------------------------------------------------------------------------------------------------  Detailed radiology reports for the last 24 hours:    Imaging Results (Last 24 Hours)       ** No results found for the last 24 hours. **          I have personally looked at the radiology images and I have read the available final and preliminary reports.    Assessment & Plan    Complicated UTI with gross hematuria  -This appears to have completely resolved  - We will monitor for " any new  complaints    Bilateral lower extremity weakness  Chronic debility  Adrenal insufficiency  - Solu-Cortef has been started and increased  - MRI of the pelvis rules out ovarian mass  -MRI of the brain within normal limits  - PT and OT evaluation  - Consider neurology consultation if her strength does not improve  -Consultation for inpatient rehab  Rosacea  -I will start the patient on MetroGel if available  VTE Prophylaxis:   Active VTE Prophylaxis  Pharmacologic:        Start     Dose Route Frequency Stop    12/20/24 1430  apixaban (ELIQUIS) tablet 5 mg         5 mg PO Every 12 Hours Scheduled --                  Mechanical:        Start        12/19/24 1932  Maintain Sequential Compression Device  Continuous                          Any portions of the note copied forward were evaluated for relevancy to today's visit 12/30/2024  Disposition: Discharge to home early next week if she is not approved for inpatient rehab    Thien Reardon DO  [facility] Hospitalist  12/30/24  16:34 EST

## 2024-12-30 NOTE — DISCHARGE PLACEMENT REQUEST
"Ana Maradiaga (59 y.o. Female)       Date of Birth   1965    Social Security Number       Address   [address] [place] [address]    Home Phone   [phone]    MRN   [id number]       [employer]   [employer]    Marital Status   Single                            Admission Date   24    Admission Type   Emergency    Admitting Provider   Thien Reardon DO    Attending Provider   Thien Reardon DO    Department, Room/Bed   [address], 3336/       Discharge Date       Discharge Disposition       Discharge Destination                                 Attending Provider: Thien Reardon DO    Allergies: Vancomycin, Cefepime, Cephalosporins    Isolation: None   Infection: None   Code Status: CPR    Ht: 165.1 cm (65\")   Wt: 153 kg (338 lb)    Admission Cmt: None   Principal Problem: Complicated UTI (urinary tract infection) [N39.0]                   Active Insurance as of 2024       Primary Coverage       Payor Plan Insurance Group Employer/Plan Group    ANTHEM MEDICARE REPLACEMENT ANTHEM MEDICARE ADVANTAGE [id number]       Payor Plan Address Payor Plan Phone Number Payor Plan Fax Number Effective Dates    [address] [phone]  2024 - None Entered    [facility] [id number]         Subscriber Name Subscriber Birth Date Member ID       ANA MARADIAGA 1965 [id number]                     Emergency Contacts        (Rel.) Home Phone Work Phone Mobile Phone    BLANCA MARADIAGA (Son) -- -- [phone]    MARADIAGACYDNEY HUTSON (Relative) -- -- [phone]                 History & Physical        Thien Reardon DO at 24 [facility]          [facility]   HISTORY AND PHYSICAL    Patient Name: Ana Maradiaga  : 1965  MRN: [id number]  Primary Care Physician:  Val Purcell PA  Date of admission: 2024    Subjective  Subjective     Chief Complaint: Hematuria leg weakness    History of Present Illness  Patient is a 59-year-old female with " past medical history of anemia, atrial fibrillation, chronic kidney disease, hypertension, gout, and a history of a CVA.  Patient presents to the emergency department with a couple of different complaints.  Patient says she started noticing some hematuria for the last couple of days.  Patient had a CT scan of her abdomen to evaluate for possible intra-abdominal tumors.  The patient has had very high levels of testosterone and they were looking to rule out hormone secreting tumors.  They did not discover tumors but did find a staghorn calculus in her left kidney.  Patient says she had had occasional dysuria but all of it has been much worse today.  Patient is also been experiencing gradual but significant lower extremity weakness.  Patient says that it is not on focal but over the last several weeks she has had more and more difficulty ambulating throughout the house.  Patient says the only thing that is really different in her routine is that she was started on Mounjaro.  She has lost 20 pounds and is having no GI difficulties.  Here in the emergency department the patient was found to have a significant nitrite positive urinary tract infection with large blood.  Her blood work shows an increased creatinine at 1.55, her white blood cell count is greater than 10 and her LDH is elevated.  Review of Systems   As stated above in his present illness all other systems were reviewed and subsequently negative  Personal History     Past Medical History:   Diagnosis Date    Anemia     Arrhythmia 2018    Afib    Arthritis     Atrial fibrillation 2018    Bronchitis     Chronic kidney disease 2018    Gout     Hypertension     Stroke November 2021       No past surgical history on file.    Family History: family history includes Arthritis in her father; Breast cancer in her paternal cousin; Heart disease in her mother; Hypertension in her father and sister; Stroke in her mother. Otherwise pertinent FHx was reviewed and not  pertinent to current issue.    Social History:  reports that she has never smoked. She has never used smokeless tobacco. She reports that she does not drink alcohol and does not use drugs.    Home Medications:  Tirzepatide, amLODIPine, apixaban, aspirin, atorvastatin, bumetanide, carvedilol, flecainide, levothyroxine, magnesium oxide, vitamin B-12, and vitamin D3    Allergies:  Allergies   Allergen Reactions    Vancomycin Hives    Cefepime Rash    Cephalosporins Rash       Objective   Objective     Vitals:   Temp:  [97.9 °F (36.6 °C)] 97.9 °F (36.6 °C)  Heart Rate:  [66-69] 69  Resp:  [17] 17  BP: (127-159)/(74-92) 149/81    Physical Exam  Constitutional:  Well-developed and well-nourished.  No acute distress.      HENT:  Head:  Normocephalic and atraumatic.  Mouth:  Moist mucous membranes.    Eyes:  Conjunctivae and EOM are normal. No scleral icterus.    Neck:  Neck supple.  No JVD present.    Cardiovascular:  Normal rate, regular rhythm and normal heart sounds with no murmur.  Pulmonary/Chest:  No respiratory distress, no wheezes, no crackles, with normal breath sounds and good air movement.  Abdominal:  Soft. No distension and no tenderness.   Musculoskeletal:  No tenderness, and no deformity.  No red or swollen joints anywhere.    Neurological:  Alert and oriented to person, place, and time.  No cranial nerve deficit.    Skin:  Skin is warm and dry. No rash noted. No pallor.   Peripheral vascular:  No clubbing, no cyanosis, no edema.  Psychiatric: Appropriate mood and affect  :    Result Review   Result Review:  I have personally reviewed the results from the time of this admission to 12/19/2024 18:37 EST and agree with these findings:  []  Laboratory list / accordion  []  Microbiology  []  Radiology  []  EKG/Telemetry   []  Cardiology/Vascular   []  Pathology  []  Old records  []  Other:  Most notable findings include:       Assessment & Plan  Assessment / Plan     Brief Patient Summary:  Ana Maradiaag is a  59 y.o. female who presents to the ED with complaints of bilateral lower extremity weakness and hematuria.  She is found to have a significant urinary tract infection and the workup for her lower extremity weakness has began    Active Hospital Problems:  Complicated UTI  - Patient was started on Rocephin in the ED which we will continue on the floor  - Gentle hydration overnight  - Await urine culture    Bilateral lower extremity weakness  -MRI of the brain  - PT and OT evaluation  - Consider neurology consultation  -    VTE Prophylaxis:  Mechanical VTE prophylaxis orders are signed & held.          CODE STATUS:    Code Status (Patient has no pulse and is not breathing): CPR (Attempt to Resuscitate)  Medical Interventions (Patient has pulse or is breathing): Full Support    Admission Status:  I believe this patient meets inpatient status.    Thien Reardon DO    Electronically signed by Thien Reardon DO at 12/19/24 1901       Vital Signs (last day)       Date/Time Temp Temp src Pulse Resp BP Patient Position SpO2    12/30/24 1000 97.9 (36.6) Oral 67 18 126/70 Lying --    12/30/24 0600 97.8 (36.6) Oral 67 18 133/67 Lying --    12/30/24 0300 98 (36.7) Oral 62 18 128/58 Lying 97    12/29/24 2300 97.8 (36.6) Oral 66 18 113/59 Lying 96    12/29/24 1929 -- -- -- -- -- -- 97    12/29/24 1900 98 (36.7) Oral 68 18 123/65 Lying 96    12/29/24 1400 97.7 (36.5) Oral 75 18 160/87 Sitting --    12/29/24 1000 97.9 (36.6) Oral 68 16 141/68 Lying --    12/29/24 0600 97.5 (36.4) Oral 69 18 146/70 Lying --    12/29/24 0300 98.2 (36.8) Oral 78 18 126/76 Lying --          Lines, Drains & Airways       Active LDAs       Name Placement date Placement time Site Days    Peripheral IV 12/24/24 1909 Posterior;Right Wrist 12/24/24 1909  Wrist  5                  Current Facility-Administered Medications   Medication Dose Route Frequency Provider Last Rate Last Admin    ammonium lactate (AMLACTIN) 12 % cream 1 Application  1 Application  Topical Daily Ema Paris PA-C   1 Application at 12/30/24 0827    apixaban (ELIQUIS) tablet 5 mg  5 mg Oral Q12H Thien Reardon DO   5 mg at 12/30/24 0828    aspirin EC tablet 81 mg  81 mg Oral Daily Thien Reardon DO   81 mg at 12/30/24 0828    atorvastatin (LIPITOR) tablet 40 mg  40 mg Oral Nightly Thien Reardon DO   40 mg at 12/29/24 2054    sennosides-docusate (PERICOLACE) 8.6-50 MG per tablet 2 tablet  2 tablet Oral BID PRN Thien Reardon DO        And    polyethylene glycol (MIRALAX) packet 17 g  17 g Oral Daily PRN Thien Reardon DO   17 g at 12/20/24 1035    And    bisacodyl (DULCOLAX) EC tablet 5 mg  5 mg Oral Daily PRN Thien Reardon DO        And    bisacodyl (DULCOLAX) suppository 10 mg  10 mg Rectal Daily PRN Thien Reardon DO        Calcium Replacement - Follow Nurse / BPA Driven Protocol   Not Applicable PRN Thien Reardon DO        flecainide (TAMBOCOR) tablet 100 mg  100 mg Oral BID Thien Reardon DO   100 mg at 12/30/24 0828    folic acid (FOLVITE) tablet 1 mg  1 mg Oral Daily Mark Vasquez DO   1 mg at 12/30/24 0828    HYDROcodone-acetaminophen (NORCO) 5-325 MG per tablet 1 tablet  1 tablet Oral Q6H PRN Mark Vasquez DO        hydrocortisone (CORTEF) tablet 10 mg  10 mg Oral Daily Mark Vasquez DO   10 mg at 12/29/24 1357    hydrocortisone (CORTEF) tablet 15 mg  15 mg Oral Daily With Breakfast Mark Vasquez DO   15 mg at 12/30/24 0829    hydrocortisone 1 % cream 1 Application  1 Application Topical Q8H Mark Vasquez DO   1 Application at 12/30/24 0543    levothyroxine (SYNTHROID, LEVOTHROID) tablet 125 mcg  125 mcg Oral Daily Thien Reardon DO   125 mcg at 12/30/24 0828    magnesium oxide (MAG-OX) tablet 400 mg  400 mg Oral Daily Thien Reardon DO   400 mg at 12/30/24 0829    Magnesium Standard Dose Replacement - Follow Nurse / BPA Driven Protocol   Not Applicable PRN Thien Reardon DO        nystatin (MYCOSTATIN)  powder   Topical Q12H Mark Vasquez DO   Given at 24 0830    ondansetron ODT (ZOFRAN-ODT) disintegrating tablet 4 mg  4 mg Oral Q6H PRN Thien Reardon DO        Or    ondansetron (ZOFRAN) injection 4 mg  4 mg Intravenous Q6H PRN Thien Reardon DO        Phosphorus Replacement - Follow Nurse / BPA Driven Protocol   Not Applicable PRN Thien Reardon DO        Potassium Replacement - Follow Nurse / BPA Driven Protocol   Not Applicable PRN Thien Reardon DO        sodium chloride 0.9 % flush 10 mL  10 mL Intravenous PRN Mark Vasquez DO        sodium chloride 0.9 % flush 10 mL  10 mL Intravenous Q12H Thien Reardon DO   10 mL at 24 0829    sodium chloride 0.9 % flush 10 mL  10 mL Intravenous PRN Thien Reardon DO        sodium chloride 0.9 % infusion 40 mL  40 mL Intravenous PRN Thien Reardon DO        vitamin B-12 (CYANOCOBALAMIN) tablet 1,000 mcg  1,000 mcg Oral Daily Thien Reardon DO   1,000 mcg at 24 0829    vitamin D3 capsule 5,000 Units  5,000 Units Oral Daily Thien Reardon DO   5,000 Units at 24 0828     Operative/Procedure Notes (most recent note)    No notes of this type exist for this encounter.          Physician Progress Notes (most recent note)        Mark Vasquez DO at 24 1112              [facility]     Patient Identification:  Name:  Ana Maradiaga  Age:  59 y.o.  Sex:  female  :  1965  MRN:  [id number]  Visit Number:  [id number]  ROOM: [address] [place] [address]     Primary Care Provider:  Val Purcell PA    Length of stay in inpatient status:  10    Subjective     Chief Compliant:    Chief Complaint   Patient presents with    Weakness - Generalized    Blood in Urine       History of Presenting Illness:    Patient seen in follow-up for UTI and overall weakness.  Patient states she feels about the same since she is admitted.  Has been afebrile.  Continue to work with PT/OT, agreeable to SNF.  No  adverse events noted overnight.    Objective     Current Hospital Meds:ammonium lactate, 1 Application, Topical, Daily  apixaban, 5 mg, Oral, Q12H  aspirin, 81 mg, Oral, Daily  atorvastatin, 40 mg, Oral, Nightly  flecainide, 100 mg, Oral, BID  folic acid, 1 mg, Oral, Daily  hydrocortisone, 10 mg, Oral, Daily  hydrocortisone, 15 mg, Oral, Daily With Breakfast  hydrocortisone, 1 Application, Topical, Q8H  levothyroxine, 125 mcg, Oral, Daily  magnesium oxide, 400 mg, Oral, Daily  nystatin, , Topical, Q12H  sodium chloride, 10 mL, Intravenous, Q12H  vitamin B-12, 1,000 mcg, Oral, Daily  vitamin D3, 5,000 Units, Oral, Daily         Current Antimicrobial Therapy:  Anti-Infectives (From admission, onward)      Ordered     Dose/Rate Route Frequency Start Stop    12/20/24 0935  cefTRIAXone (ROCEPHIN) 2,000 mg in sodium chloride 0.9 % 100 mL IVPB-VTB        Ordering Provider: Thien Reardon DO    2,000 mg  200 mL/hr over 30 Minutes Intravenous Every 24 Hours 12/20/24 1700 12/24/24 1758    12/19/24 1555  cefTRIAXone (ROCEPHIN) 2,000 mg in sodium chloride 0.9 % 100 mL IVPB-VTB        Ordering Provider: Mark Vasquez DO    2,000 mg  200 mL/hr over 30 Minutes Intravenous Once 12/19/24 1610 12/19/24 1813          Current Diuretic Therapy:  Diuretics (From admission, onward)      None          ----------------------------------------------------------------------------------------------------------------------  Vital Signs:  Temp:  [97.5 °F (36.4 °C)-98.6 °F (37 °C)] 97.9 °F (36.6 °C)  Heart Rate:  [68-80] 68  Resp:  [16-18] 16  BP: (120-146)/(66-78) 141/68  SpO2:  [97 %] 97 %  on   ;   Device (Oxygen Therapy): room air  Body mass index is 56.25 kg/m².    Wt Readings from Last 3 Encounters:   12/19/24 (!) 153 kg (338 lb)   10/24/24 116 kg (256 lb 6.4 oz)   10/11/24 (!) 164 kg (362 lb)     Intake & Output (last 3 days)         12/21 0701  12/22 0700 12/22 0701  12/23 0700 12/23 0701  12/24 0700    P.O. 1035 1300 960     I.V. (mL/kg)  0 (0) 203 (1.3)    IV Piggyback  100     Total Intake(mL/kg) 1035 (6.8) 1400 (9.2) 1163 (7.6)    Urine (mL/kg/hr) 650 (0.2) 250 (0.1) 200 (0.1)    Stool 0      Total Output 650 250 200    Net +385 +1150 +963           Urine Unmeasured Occurrence  2 x 2 x    Stool Unmeasured Occurrence 1 x            Diet: Cardiac; Healthy Heart (2-3 Na+); Fluid Consistency: Thin (IDDSI 0)  ----------------------------------------------------------------------------------------------------------------------  Physical exam:  Constitutional: Early obese female, nontoxic, Well-developed and well-nourished, resting comfortably in bed, no acute distress.      HENT:  Head:  Normocephalic and atraumatic.  Mouth:  Moist mucous membranes.  Eyes:  Conjunctivae and EOM are normal. No scleral icterus.   Cardiovascular:  Normal rate, regular rhythm and normal heart sounds with no murmur. No JVD.   Pulmonary/Chest:  No respiratory distress, no wheezes, no crackles, with normal breath sounds and good air movement. Unlabored. No accessory muscle use.  Abdominal:  Soft. No distension and no tenderness.  Bowel sounds present. No rebound or guarding.   Musculoskeletal:  No tenderness, and no deformity.  No red or swollen joints anywhere.    Neurological:  Alert and oriented to person, place, and time. Nonfocal, weakness in upper and lower extremities bilaterally  Skin:  Skin is warm and dry.  Morbilliform rash on upper and lower extremities, trunk .no pallor.   Peripheral vascular:  No clubbing, no cyanosis, bilateral venous stasis.      ----------------------------------------------------------------------------------------------------------------------  Results from last 7 days   Lab Units 12/29/24  1024 12/25/24  1615 12/25/24  0116 12/24/24  0015   CRP mg/dL  --  10.07*  --   --    WBC 10*3/mm3 9.23  --  9.63 8.45   HEMOGLOBIN g/dL 11.0*  --  12.1 11.2*   HEMATOCRIT % 35.3  --  38.5 37.1   MCV fL 97.2*  --  99.2* 101.6*   MCHC g/dL  "31.2*  --  31.4* 30.2*   PLATELETS 10*3/mm3 210  --  154 138*   INR   --   --   --  1.33*         Results from last 7 days   Lab Units 12/24/24  0015   SODIUM mmol/L 132*   POTASSIUM mmol/L 4.0   CHLORIDE mmol/L 101   CO2 mmol/L 22.0   BUN mg/dL 10   CREATININE mg/dL 1.05*   CALCIUM mg/dL 8.2*   GLUCOSE mg/dL 104*   ALBUMIN g/dL 2.4*   BILIRUBIN mg/dL 0.5   ALK PHOS U/L 97   AST (SGOT) U/L 18   ALT (SGPT) U/L 16   Estimated Creatinine Clearance: 86.9 mL/min (A) (by C-G formula based on SCr of 1.05 mg/dL (H)).  No results found for: \"AMMONIA\"            Results from last 7 days   Lab Units 12/25/24  1615   CHOLESTEROL mg/dL 94   TRIGLYCERIDES mg/dL 114   HDL CHOL mg/dL 32*   LDL CHOL mg/dL 41     No results found for: \"HGBA1C\", \"POCGLU\"  Lab Results   Component Value Date    TSH 3.320 12/19/2024    FREET4 1.30 12/27/2024     No results found for: \"PREGTESTUR\", \"PREGSERUM\", \"HCG\", \"HCGQUANT\"  Pain Management Panel  More data may exist         Latest Ref Rng & Units 12/19/2024 6/22/2023   Pain Management Panel   Creatinine, Urine mg/dL  mg/dL - 152.9  152.9    Amphetamine, Urine Qual Negative Negative  -   Barbiturates Screen, Urine Negative Negative  -   Benzodiazepine Screen, Urine Negative Negative  -   Buprenorphine, Screen, Urine Negative Negative  -   Cocaine Screen, Urine Negative Negative  -   Fentanyl, Urine Negative Negative  -   Methadone Screen , Urine Negative Negative  -   Methamphetamine, Ur Negative Negative  -      Details          Multiple values from one day are sorted in reverse-chronological order             Brief Urine Lab Results  (Last result in the past 365 days)        Color   Clarity   Blood   Leuk Est   Nitrite   Protein   CREAT   Urine HCG        12/19/24 1439 Red  Comment: Dipstick results may be inaccurate due to color interference.       Turbid   Moderate (2+)   Large (3+)   Positive   100 mg/dL (2+)                 Blood Culture   Date Value Ref Range Status   12/19/2024 No growth at 4 " "days  Preliminary   12/19/2024 No growth at 4 days  Preliminary     Urine Culture   Date Value Ref Range Status   12/19/2024 >100,000 CFU/mL Mixed Toya Isolated  Final     No results found for: \"WOUNDCX\"  No results found for: \"STOOLCX\"  No results found for: \"RESPCX\"  No results found for: \"AFBCX\"  Results from last 7 days   Lab Units 12/25/24  1615   SED RATE mm/hr 39*   CRP mg/dL 10.07*       I have personally looked at the labs and they are summarized above.  ----------------------------------------------------------------------------------------------------------------------  Detailed radiology reports for the last 24 hours:  Imaging Results (Last 24 Hours)       Procedure Component Value Units Date/Time    MRI Pelvis With & Without Contrast [[id number]] Resulted: 12/28/24 1903     Updated: 12/28/24 1940    MRI Abdomen Without Contrast [[id number]] Resulted: 12/28/24 1830     Updated: 12/28/24 1903          Assessment & Plan      Patient is a 59-year-old female with history significant for atrial fibrillation, CKD and prior stroke who came to the ER with reports of gross hematuria and weakness.    #Acute on chronic debility  #Bilateral lower extremity weakness, multifactorial  --PT/OT, has not done well with therapy, notes updated, IPR re-consulted, case management following    #Adrenal insufficiency, suspect central process  #Elevated testosterone, unclear etiology  --Cortisol low, ACTH low, testosterone elevated, DHEA-sulfate low, prolactin normal  --Estradiol elevated, FSH/LH low  --MRI brain with/without unremarkable for lesions  --CT abdomen pelvis with contrast negative for ovarian/adrenal tumor  --Pelvic ultrasound negative for ovarian lesions  --MRI pituitary protocol with and without without abnormal pituitary lesion  --After discussion with endocrinology, increase Solu-Cortef to 15 mg daily with breakfast, 10 mg daily at 2 PM  --I discussed with endocrinology again on 12/27, working diagnosis remains a " suspected small ovarian tumor, given the above images have been unremarkable, with MRI of the abdomen/pelvis with/without contrast, if this is negative, we will continue treatment plan and have her follow-up outpatient    #Acute urinary tract infection the E. coli  #Left staghorn calculus  --Urine culture positive for E. Coli  --Completed course of Rocephin  --Hematuria likely due to large UPJ calculus exacerbated by Eliquis  --Urology evaluated, recommend outpatient follow-up    #Atrial fibrillation, rate controlled  #Prior ischemic infarct  --MRI of the brain noted old infarcts but nothing acute  --Continue flecainide, Eliquis  --Follow-up outpatient    #Chronic venous stasis, wound care  #Morbid obesity, BMI 56    Copied portions of this report have been reviewed and are accurate as of 24     CHECKLIST:  Abx: None  VTE: Eliquis   GI ppx: PPI  Diet: Consistent carb  Code: CPR, full  Dispo: Patient is hemodynamically stable, has not done well with PT, IPR following. Agreeable to SNF as well. Case management following.     Mark Vasquez DO  [facility]  24  11:12 EST      Electronically signed by Mark Vasquez DO at 24 1113          Consult Notes (most recent note)        Ema Paris PA-C at 24 1544        Consult Orders    1. Inpatient Wound APRN Consult [[id number]] ordered by Mark Vasquez DO at 24 0956              Attestation signed by Jun Eastman MD at 24 0989    I was available for assistance.  I have reviewed this documentation and agree.                    Consult Note     Patient Identification:  Name:  Ana Maradiaga  Age:  59 y.o.  Sex:  female  :  1965  MRN:  [id number]   Visit Number:  [id number]  Primary Care Physician:  Val Purcell PA     Subjective     Chief complaint:   Chief Complaint   Patient presents with    Weakness - Generalized    Blood in Urine       History of presenting  illness:   Patient is a 59 y.o. female with past medical history significant for anemia, CKD, atrial fibrillation, hypertension, gout and CVA that presented to the [facility] Emergency Department for complaints of hematuria. Wound care consulted to evaluate bilateral lower extremities. She reports she has had lymphedema for several years now but has not undergone any therapy or treatment. She states she has dealt with recurrent cellulitis which led to hospitalization. She currently does not appear to have cellulitis but has a purple/red discoloration that she reports has been chronic after her last recurrence of cellulitis. Denies pain or open wounds. No fever or chills. The patients nurse, Dia, was at bedside during her exam.    ---------------------------------------------------------------------------------------------------------------------   Review of Systems:  Review of Systems   Constitutional: Negative.    Respiratory: Negative.     Cardiovascular:  Positive for leg swelling.   Endocrine: Negative.    Skin:  Positive for color change.        ---------------------------------------------------------------------------------------------------------------------   Past Medical History:   Diagnosis Date    Anemia     Arrhythmia 2018    Afib    Arthritis     Atrial fibrillation 2018    Bronchitis     Chronic kidney disease 2018    Gout     Hypertension     Stroke November 2021     History reviewed. No pertinent surgical history.  Family History   Problem Relation Age of Onset    Heart disease Mother     Stroke Mother     Hypertension Father     Arthritis Father     Hypertension Sister     Breast cancer Paternal Cousin      Social History     Socioeconomic History    Marital status: Single   Tobacco Use    Smoking status: Never    Smokeless tobacco: Never   Vaping Use    Vaping status: Never Used   Substance and Sexual Activity    Alcohol use: Never    Drug use: Never    Sexual activity: Not Currently      Partners: Male     ---------------------------------------------------------------------------------------------------------------------   Allergies:  Vancomycin, Cefepime, and Cephalosporins  ---------------------------------------------------------------------------------------------------------------------   Medications below are reported home medications pulling from within the system; at this time, these medications have not been reconciled unless otherwise specified and are in the verification process for further verifcation as current home medications.    Prior to Admission Medications       Prescriptions Last Dose Informant Patient Reported? Taking?    amLODIPine (NORVASC) 5 MG tablet Past Week Self, Other No Yes    TAKE 1 TABLET BY MOUTH DAILY    Aspirin Low Dose 81 MG EC tablet 12/19/2024 Self, Other Yes Yes    Take 1 tablet by mouth Daily.    atorvastatin (LIPITOR) 40 MG tablet Past Week Self, Other Yes Yes    Take 1 tablet by mouth every night at bedtime.    bumetanide (BUMEX) 1 MG tablet 12/19/2024 Self, Other Yes Yes    Take 2 tablets by mouth Daily.    carvedilol (COREG) 6.25 MG tablet 12/19/2024 Self, Other No Yes    TAKE 1 TABLET BY MOUTH EVERY 12 HOURS    Eliquis 5 MG tablet tablet 12/19/2024 Self, Other No Yes    Take 1 tablet by mouth Every 12 (Twelve) Hours.    flecainide (TAMBOCOR) 100 MG tablet 12/19/2024 Self, Other No Yes    TAKE 1 TABLET BY MOUTH TWICE A DAY    levothyroxine (SYNTHROID, LEVOTHROID) 125 MCG tablet 12/19/2024 Self, Other Yes Yes    Take 1 tablet by mouth Daily.    magnesium oxide (MAG-OX) 400 MG tablet 12/19/2024 Self Yes Yes    Take 1 tablet by mouth Daily.    Tirzepatide (Mounjaro) 5 MG/0.5ML solution auto-injector 12/13/2024 Self, Other No No    Inject 0.5 mL under the skin into the appropriate area as directed 1 (One) Time Per Week.    vitamin B-12 (CYANOCOBALAMIN) 1000 MCG tablet 12/19/2024 Self Yes Yes    Take 1 tablet by mouth Daily.    vitamin D3 125 MCG (5000 UT)  capsule capsule 12/19/2024 Self Yes Yes    Take 1 capsule by mouth Daily.          ---------------------------------------------------------------------------------------------------------------------    Objective     Hospital Scheduled Meds:  amLODIPine, 5 mg, Oral, Daily  apixaban, 5 mg, Oral, Q12H  aspirin, 81 mg, Oral, Daily  atorvastatin, 40 mg, Oral, Nightly  carvedilol, 6.25 mg, Oral, Q12H  cefTRIAXone, 2,000 mg, Intravenous, Q24H  flecainide, 100 mg, Oral, BID  hydrocortisone, 1 Application, Topical, Q8H  levothyroxine, 125 mcg, Oral, Daily  magnesium oxide, 400 mg, Oral, Daily  nystatin, , Topical, Q12H  sodium chloride, 10 mL, Intravenous, Q12H  vitamin B-12, 1,000 mcg, Oral, Daily  vitamin D3, 5,000 Units, Oral, Daily           Current listed hospital scheduled medications may not yet reflect those currently placed in orders that are signed and held, awaiting patient's arrival to floor/unit.    ---------------------------------------------------------------------------------------------------------------------   Vital Signs:  Temp:  [98.2 °F (36.8 °C)-98.6 °F (37 °C)] 98.2 °F (36.8 °C)  Heart Rate:  [64-77] 73  Resp:  [16-18] 16  BP: ()/(54-76) 108/60  Mean Arterial Pressure (Non-Invasive) for the past 24 hrs (Last 3 readings):   Noninvasive MAP (mmHg)   12/23/24 0300 81   12/23/24 0003 79   12/22/24 1900 97     SpO2 Percentage    12/23/24 0653 12/23/24 1100 12/23/24 1500   SpO2: 96% 94% 96%     SpO2:  [94 %-96 %] 96 %  on   ;   Device (Oxygen Therapy): room air    Body mass index is 56.25 kg/m².  Wt Readings from Last 3 Encounters:   12/19/24 (!) 153 kg (338 lb)   10/24/24 116 kg (256 lb 6.4 oz)   10/11/24 (!) 164 kg (362 lb)       ---------------------------------------------------------------------------------------------------------------------   Physical Exam:    B/L lower extremities with evidence of edema, dryness and discoloration. No significant warmth or erythema noted.     Assessment &  Plan      Assessment     Lymphedema  Xerosis    Plan:     Apply ammonium lactate lotion to the bilateral lower extremities QD/PRN. Patient would benefit from compression but will order B/L ABIs before initiating. She is agreeable to this plan.     She would benefit from outpatient lymphedema treatment as well.       Ema Paris PA-C  [facility]    24  15:44 EST      Electronically signed by Jun Eastman MD at 24 0933          Physical Therapy Notes (most recent note)        Armida Odell, PT at 24 1435  Version 1 of 1         [facility] - Physical Therapy Treatment Note  [facility]     Patient Name: Ana Maradiaga  : 1965  MRN: [id number]  Today's Date: 2024   Onset of Illness/Injury or Date of Surgery: 24  Visit Dx:     ICD-10-CM ICD-9-CM   1. Urinary tract infection with hematuria, site unspecified  N39.0 599.0    R31.9 599.70   2. Weakness  R53.1 780.79   3. High serum testosterone  R79.89 790.99     Patient Active Problem List   Diagnosis    Type 2 diabetes mellitus with hyperglycemia, without long-term current use of insulin    Acquired hypothyroidism    Hirsutism    Elevated testosterone level in female    Complicated UTI (urinary tract infection)     Past Medical History:   Diagnosis Date    Anemia     Arrhythmia     Afib    Arthritis     Atrial fibrillation     Bronchitis     Chronic kidney disease     Gout     Hypertension     Stroke 2021     History reviewed. No pertinent surgical history.  PT Assessment (Last 12 Hours)       PT Evaluation and Treatment       Row Name 24 1347          Physical Therapy Time and Intention    Document Type therapy note (daily note)  -KH     Mode of Treatment physical therapy  -KH     Patient Effort good  -KH     Symptoms Noted During/After Treatment fatigue  -KH     Comment Pt seen for treatment this date, pleasant and cooperative with therapy. Pt participated in  ambulation at bedside, grossly CGA x1-2; some LE TE while sitting in chair. Encouraged to not get up without assist.  -       Row Name 12/26/24 1347          Bed Mobility    Bed Mobility supine-sit  -     Supine-Sit [place] (Bed Mobility) contact guard;minimum assist (75% patient effort)  -       Row Name 12/26/24 1347          Transfers    Transfers sit-stand transfer;stand-sit transfer  -       Row Name 12/26/24 1347          Sit-Stand Transfer    Sit-Stand [place] (Transfers) contact guard;minimum assist (75% patient effort);2 person assist  -     Assistive Device (Sit-Stand Transfers) walker, front-wheeled  -       Row Name 12/26/24 1347          Stand-Sit Transfer    Stand-Sit [place] (Transfers) contact guard  -     Assistive Device (Stand-Sit Transfers) walker, front-wheeled  -       Row Name 12/26/24 1347          Gait/Stairs (Locomotion)    Gait/Stairs Locomotion gait/ambulation assistive device  -     [place] Level (Gait) contact guard  -     Assistive Device (Gait) walker, front-wheeled  -     Distance in Feet (Gait) 7  7'x2  -       Row Name 12/26/24 1347          Motor Skills    Therapeutic Exercise knee;ankle  LAQ x10, grossly x8' ; ankle pumps grossly 2x8, x4 BL  -       Row Name 12/26/24 1347          Positioning and Restraints    Pre-Treatment Position in bed  -     Post Treatment Position chair  -     In Chair reclined;call light within reach;encouraged to call for assist  -       Row Name 12/26/24 1347          Progress Summary (PT)    Daily Progress Summary (PT) Pt ambulated grossly 2x7' with RW, grossly CGA x1-2 (2nd person assit for pt and staff safety). Pt may benefit from therapy interventions, unsure if pt could tolerate IPR however pt seems willing. No change in POC, prognosis fair.  -               User Key  (r) = Recorded By, (t) = Taken By, (c) = Cosigned By      Initials Name Provider Type    Armida Andrews, PT Physical  Therapist                    Physical Therapy Education       Title: PT OT SLP Therapies (Done)       Topic: Physical Therapy (Done)       Point: Mobility training (Done)       Learning Progress Summary            Patient Acceptance, E,TB, VU by  at 12/26/2024 1222    Acceptance, E,TB, VU by  at 12/24/2024 0905    Acceptance, E, VU by SC at 12/24/2024 0331    Acceptance, E,D, VU,NR by  at 12/23/2024 1249                      Point: Home exercise program (Done)       Learning Progress Summary            Patient Acceptance, E,TB, VU by  at 12/26/2024 1222    Acceptance, E,TB, VU by  at 12/24/2024 0905    Acceptance, E, VU by SC at 12/24/2024 0331    Acceptance, E,D, VU,NR by  at 12/23/2024 1249                      Point: Body mechanics (Done)       Learning Progress Summary            Patient Acceptance, E,TB, VU by  at 12/26/2024 1222    Acceptance, E,TB, VU by  at 12/24/2024 0905    Acceptance, E, VU by SC at 12/24/2024 0331    Acceptance, E,D, VU,NR by  at 12/23/2024 1249                      Point: Precautions (Done)       Learning Progress Summary            Patient Acceptance, E,TB, VU by  at 12/26/2024 1222    Acceptance, E,TB, VU by  at 12/24/2024 0905    Acceptance, E, VU by SC at 12/24/2024 0331    Acceptance, E,D, VU,NR by  at 12/23/2024 1249                                      User Key       Initials Effective Dates Name Provider Type Discipline     06/16/21 -  Elizabeth Aquino, PT Physical Therapist PT     06/25/24 -  Ebonie Rangel, RN Registered Nurse Nurse    SC 09/11/24 -  Naya Lowry, RN Registered Nurse Nurse                  PT Recommendation and Plan  Anticipated Discharge Disposition (PT): sub acute care setting, skilled nursing facility  Progress Summary (PT)  Daily Progress Summary (PT): Pt ambulated grossly 2x7' with RW, grossly CGA x1-2 (2nd person assit for pt and staff safety). Pt may benefit from therapy interventions, unsure if pt could tolerate IPR  however pt seems willing. No change in POC, prognosis fair.       Time Calculation:    PT Charges       Row Name 24 1435             Time Calculation    Start Time 1347  -KH      PT Received On 24  -         Time Calculation- PT    Total Timed Code Minutes- PT 23 minute(s)  -                User Key  (r) = Recorded By, (t) = Taken By, (c) = Cosigned By      Initials Name Provider Type     Armida Odell, PT Physical Therapist                  Therapy Charges for Today       Code Description Service Date Service Provider Modifiers Qty    91668589035 HC PT THER PROC EA 15 MIN 2024 Armida Odell, PT GP 1    03062971632 HC PT THERAPEUTIC ACT EA 15 MIN 2024 Armida Odell, PT GP 1            PT G-Codes  AM-PAC 6 Clicks Score (PT): 12    Armida Odell PT  2024      Electronically signed by Armida Odell PT at 24 1436          Occupational Therapy Notes (most recent note)        Dano Samuel, OT at 24 1041          Acute Care - Occupational Therapy Initial Evaluation  [facility]     Patient Name: Ana Maradiaga  : 1965  MRN: [id number]  Today's Date: 2024             Admit Date: 2024       ICD-10-CM ICD-9-CM   1. Urinary tract infection with hematuria, site unspecified  N39.0 599.0    R31.9 599.70   2. Weakness  R53.1 780.79   3. High serum testosterone  R79.89 790.99     Patient Active Problem List   Diagnosis    Type 2 diabetes mellitus with hyperglycemia, without long-term current use of insulin    Acquired hypothyroidism    Hirsutism    Elevated testosterone level in female    Complicated UTI (urinary tract infection)     Past Medical History:   Diagnosis Date    Anemia     Arrhythmia 2018    Afib    Arthritis     Atrial fibrillation 2018    Bronchitis     Chronic kidney disease 2018    Gout     Hypertension     Stroke 2021     History reviewed. No pertinent surgical history.      OT ASSESSMENT FLOWSHEET (Last 12 Hours)       OT  Evaluation and Treatment       Row Name 12/20/24 1030                   OT Time and Intention    Subjective Information complains of;weakness  -KR        Document Type evaluation  -KR        Mode of Treatment occupational therapy  -KR        Patient Effort adequate  -KR        Symptoms Noted During/After Treatment fatigue  -KR           General Information    General Observations of Patient alert/cooperative  -KR        Prior Level of Function mod assist:  -KR           Living Environment    Current Living Arrangements home  -KR        People in Home child(woody), adult  -KR        Primary Care Provided by self;child(woody)  -KR           Home Use of Assistive/Adaptive Equipment    Equipment Currently Used at Home rollator  -KR           Cognition    Affect/Mental Status (Cognition) WFL  -KR        Orientation Status (Cognition) oriented x 3  -KR        Follows Commands (Cognition) WFL  -KR           Range of Motion Comprehensive    Comment, General Range of Motion BUE WFL  -KR           Strength Comprehensive (MMT)    Comment, General Manual Muscle Testing (MMT) Assessment BUE 3-/5  -KR           Activities of Daily Living    BADL Assessment/Intervention bathing;upper body dressing;lower body dressing;grooming;feeding;toileting  -KR           Bathing Assessment/Intervention    [place] Level (Bathing) bathing skills;maximum assist (25% patient effort)  -KR           Upper Body Dressing Assessment/Training    [place] Level (Upper Body Dressing) upper body dressing skills;maximum assist (25% patient effort)  -KR           Lower Body Dressing Assessment/Training    [place] Level (Lower Body Dressing) lower body dressing skills;maximum assist (25% patient effort)  -KR           Grooming Assessment/Training    [place] Level (Grooming) grooming skills;moderate assist (50% patient effort)  -KR           Self-Feeding Assessment/Training    [place] Level (Feeding) feeding skills;set up  -KR            Toileting Assessment/Training    [place] Level (Toileting) toileting skills;maximum assist (25% patient effort);dependent (less than 25% patient effort)  -KR           Plan of Care Review    Plan of Care Reviewed With patient  -KR           Therapy Assessment/Plan (OT)    Planned Therapy Interventions (OT) activity tolerance training;adaptive equipment training;BADL retraining;strengthening exercise  -KR           Therapy Plan Review/Discharge Plan (OT)    Anticipated Discharge Disposition (OT) inpatient rehabilitation facility;home  -KR           OT Goals    Dressing Goal Selection (OT) dressing, OT goal 1  -KR        Strength Goal Selection (OT) strength, OT goal 1  -KR        Activity Tolerance Goal Selection (OT) activity tolerance, OT goal 1  -KR           Dressing Goal 1 (OT)    Activity/Device (Dressing Goal 1, OT) dressing skills, all  -KR        [place]/Cues Needed (Dressing Goal 1, OT) minimum assist (75% or more patient effort)  -KR        Time Frame (Dressing Goal 1, OT) by discharge  -KR           Strength Goal 1 (OT)    Strength Goal 1 (OT) BUE increase x 1 to improve mobility/self care  -KR        Time Frame (Strength Goal 1, OT) by discharge  -KR            Activity Tolerance Goal 1 (OT)    Activity Tolerance Goal 1 (OT) Increase to enhance ADL performance  -KR        Activity Level (Endurance Goal 1, OT) 15 min activity  -KR        Time Frame (Activity Tolerance Goal 1, OT) by discharge  -KR           Patient Education Goal (OT)    Activity (Patient Education Goal, OT) AE/DME training to enhance safety/independence in home environment  -KR        [place]/Cues/Accuracy (Memory Goal 2, OT) verbalizes understanding  -KR        Time Frame (Patient Education Goal, OT) by discharge  -KR                  User Key  (r) = Recorded By, (t) = Taken By, (c) = Cosigned By      Initials Name Effective Dates    KR Dano Samuel, OT 06/16/21 -                      Occupational Therapy Education         No education to display                      OT Recommendation and Plan  Planned Therapy Interventions (OT): activity tolerance training, adaptive equipment training, BADL retraining, strengthening exercise  Plan of Care Review  Plan of Care Reviewed With: patient  Plan of Care Reviewed With: patient        Time Calculation:     Therapy Charges for Today       Code Description Service Date Service Provider Modifiers Qty    41331298896 HC OT EVAL HIGH COMPLEXITY 4 12/20/2024 Dano Samuel, OT GO 1                 Dano Samuel OT  12/20/2024    Electronically signed by Dano Samuel OT at 12/20/24 1042

## 2024-12-30 NOTE — PLAN OF CARE
Goal Outcome Evaluation:   Pt resting in bed. Pt ambulated in room this shift. Pt sat in chair. No acute changes. VSS. Will continue plan of care.

## 2024-12-30 NOTE — PLAN OF CARE
Goal Outcome Evaluation:  Plan of Care Reviewed With: patient        Progress: no change          No acute changes at this time will continue to monitor and follow current plan of care

## 2024-12-31 LAB — PREG SERPL-MCNC: <10 NG/DL

## 2024-12-31 PROCEDURE — 97530 THERAPEUTIC ACTIVITIES: CPT

## 2024-12-31 PROCEDURE — 97116 GAIT TRAINING THERAPY: CPT

## 2024-12-31 PROCEDURE — 99232 SBSQ HOSP IP/OBS MODERATE 35: CPT | Performed by: FAMILY MEDICINE

## 2024-12-31 PROCEDURE — 97110 THERAPEUTIC EXERCISES: CPT

## 2024-12-31 RX ADMIN — APIXABAN 5 MG: 5 TABLET, FILM COATED ORAL at 21:34

## 2024-12-31 RX ADMIN — Medication 400 MG: at 08:40

## 2024-12-31 RX ADMIN — NYSTATIN: 100000 POWDER TOPICAL at 21:36

## 2024-12-31 RX ADMIN — ASPIRIN 81 MG: 81 TABLET, COATED ORAL at 08:39

## 2024-12-31 RX ADMIN — FLECAINIDE ACETATE 100 MG: 50 TABLET ORAL at 21:34

## 2024-12-31 RX ADMIN — HYDROCORTISONE 1 APPLICATION: 1 CREAM TOPICAL at 15:04

## 2024-12-31 RX ADMIN — FOLIC ACID 1 MG: 1 TABLET ORAL at 08:40

## 2024-12-31 RX ADMIN — HYDROCORTISONE 15 MG: 10 TABLET ORAL at 08:40

## 2024-12-31 RX ADMIN — ATORVASTATIN CALCIUM 40 MG: 40 TABLET, FILM COATED ORAL at 21:34

## 2024-12-31 RX ADMIN — HYDROCORTISONE 1 APPLICATION: 1 CREAM TOPICAL at 21:35

## 2024-12-31 RX ADMIN — Medication 10 ML: at 21:35

## 2024-12-31 RX ADMIN — Medication 5000 UNITS: at 08:40

## 2024-12-31 RX ADMIN — FLECAINIDE ACETATE 100 MG: 50 TABLET ORAL at 08:40

## 2024-12-31 RX ADMIN — HYDROCORTISONE 10 MG: 10 TABLET ORAL at 15:04

## 2024-12-31 RX ADMIN — LEVOTHYROXINE SODIUM 125 MCG: 0.12 TABLET ORAL at 08:40

## 2024-12-31 RX ADMIN — APIXABAN 5 MG: 5 TABLET, FILM COATED ORAL at 08:40

## 2024-12-31 RX ADMIN — Medication 1000 MCG: at 08:39

## 2024-12-31 RX ADMIN — AMMONIUM LACTATE 1 APPLICATION: 120 CREAM TOPICAL at 08:43

## 2024-12-31 RX ADMIN — Medication 10 ML: at 08:44

## 2024-12-31 RX ADMIN — NYSTATIN: 100000 POWDER TOPICAL at 08:43

## 2024-12-31 NOTE — PLAN OF CARE
Goal Outcome Evaluation:  Plan of Care Reviewed With: patient        Progress: no change  Outcome Evaluation: Pt resting in bed at this time. VSS on RA. Pt has ambulated to the BSC x2 assist. No acute changes. No concerns or requests at this time. Will continue plan of care.

## 2024-12-31 NOTE — THERAPY TREATMENT NOTE
Acute Care - Occupational Therapy Treatment Note   [facility]     Patient Name: Ana Maradiaga  : 1965  MRN: [id number]  Today's Date: 2024  Onset of Illness/Injury or Date of Surgery: 24     Referring Physician: Dr. Reardon    Admit Date: 2024       ICD-10-CM ICD-9-CM   1. Urinary tract infection with hematuria, site unspecified  N39.0 599.0    R31.9 599.70   2. Weakness  R53.1 780.79   3. High serum testosterone  R79.89 790.99     Patient Active Problem List   Diagnosis    Type 2 diabetes mellitus with hyperglycemia, without long-term current use of insulin    Acquired hypothyroidism    Hirsutism    Elevated testosterone level in female    Complicated UTI (urinary tract infection)     Past Medical History:   Diagnosis Date    Anemia     Arrhythmia     Afib    Arthritis     Atrial fibrillation     Bronchitis     Chronic kidney disease     Gout     Hypertension     Stroke 2021     History reviewed. No pertinent surgical history.      OT ASSESSMENT FLOWSHEET (Last 12 Hours)       OT Evaluation and Treatment       Row Name 24 1034                   OT Time and Intention    Document Type therapy note (daily note)  -KR        Mode of Treatment occupational therapy  -KR        Patient Effort adequate  -KR           General Information    General Observations of Patient alert/cooperative  -KR           Living Environment    Current Living Arrangements home  -KR        People in Home child(woody), adult  -KR        Primary Care Provided by self;child(woody)  -KR           Cognition    Affect/Mental Status (Cognition) WFL  -KR        Orientation Status (Cognition) oriented x 3  -KR        Follows Commands (Cognition) WFL  -KR           Bathing Assessment/Intervention    [place] Level (Bathing) bathing skills;moderate assist (50% patient effort)  -KR           Upper Body Dressing Assessment/Training    [place] Level (Upper Body Dressing) upper body dressing  skills;moderate assist (50% patient effort)  -KR           Lower Body Dressing Assessment/Training    [place] Level (Lower Body Dressing) lower body dressing skills;moderate assist (50% patient effort)  -KR           Grooming Assessment/Training    [place] Level (Grooming) grooming skills;set up  -KR           Self-Feeding Assessment/Training    [place] Level (Feeding) feeding skills;set up  -KR           Toileting Assessment/Training    [place] Level (Toileting) toileting skills;moderate assist (50% patient effort)  -KR           Motor Skills    Therapeutic Exercise shoulder;elbow/forearm;hand  -KR           Shoulder (Therapeutic Exercise)    Shoulder (Therapeutic Exercise) AROM (active range of motion);AAROM (active assistive range of motion)  -KR        Shoulder AROM (Therapeutic Exercise) bilateral;flexion;extension;sitting  -KR        Shoulder AAROM (Therapeutic Exercise) bilateral;flexion;extension;sitting  -KR           Elbow/Forearm (Therapeutic Exercise)    Elbow/Forearm (Therapeutic Exercise) AROM (active range of motion)  -KR        Elbow/Forearm AROM (Therapeutic Exercise) bilateral;flexion;extension  -KR           Hand (Therapeutic Exercise)    Hand (Therapeutic Exercise) AROM (active range of motion)  -KR        Hand AROM/AAROM (Therapeutic Exercise) bilateral;finger flexion;finger extension  -KR           Plan of Care Review    Plan of Care Reviewed With patient;daughter  -KR           Therapy Assessment/Plan (OT)    Therapy Assessment/Plan (OT) Pt could benefit from continued rehabilitation to enhance safety/independence in home environment  -KR           Therapy Plan Review/Discharge Plan (OT)    Anticipated Discharge Disposition (OT) inpatient rehabilitation facility;home  -KR           Dressing Goal 1 (OT)    Progress/Outcome (Dressing Goal 1, OT) continuing progress toward goal  -KR           Strength Goal 1 (OT)    Progress/Outcome (Strength Goal 1, OT) continuing progress  toward goal  -KR            Activity Tolerance Goal 1 (OT)    Progress/Outcome (Activity Tolerance Goal 1, OT) continuing progress toward goal  -KR                  User Key  (r) = Recorded By, (t) = Taken By, (c) = Cosigned By      Initials Name Effective Dates    Dano Bruno OT 06/16/21 -                      Occupational Therapy Education        No education to display                      OT Recommendation and Plan  Planned Therapy Interventions (OT): activity tolerance training, adaptive equipment training, BADL retraining, strengthening exercise  Plan of Care Review  Plan of Care Reviewed With: patient, daughter  Plan of Care Reviewed With: patient, daughter        Time Calculation:     Therapy Charges for Today       Code Description Service Date Service Provider Modifiers Qty    43061764951  OT THERAPEUTIC ACT EA 15 MIN 12/31/2024 Dano Samuel OT GO 1                 Dano Samuel OT  12/31/2024

## 2024-12-31 NOTE — CASE MANAGEMENT/SOCIAL WORK
Discharge Planning Assessment  [facility]     Patient Name: Ana Maradiaga  MRN: [id number]  Today's Date: 12/31/2024    Admit Date: 12/19/2024          Discharge Plan       Row Name 12/31/24 1809       Plan    Plan SS left messages for [facility] per Mary and [facility] per Marvel to check on status of referrals but was not successful. SS to follow up on 01/02. Pt requested for SS to follow up with her son about placement. SS attempted to speak with Pt's son by phone but was not successful. SS to follow.                       MELIDA PintoW

## 2024-12-31 NOTE — PROGRESS NOTES
[facility] HOSPITALIST PROGRESS NOTE     Patient Identification:  Name:  Ana Maradiaga  Age:  59 y.o.  Sex:  female  :  1965  MRN:  [id number]  Visit Number:  [id number]  ROOM: [address] [place] [address]     Primary Care Provider:  Val Purcell PA     Date of Admission: 2024    Length of stay in inpatient status:  12    Subjective     Chief Compliant:    Chief Complaint   Patient presents with    Weakness - Generalized    Blood in Urine     History of Presenting Illness:    Patient was seen and examined face-to-face.  Patient was able to walk a great deal more today.  She walked around her room several times before sitting down to rest.  This is a huge improvement from her admission ambulatory ability k  Objective     Current Hospital Meds:  ammonium lactate, 1 Application, Topical, Daily  apixaban, 5 mg, Oral, Q12H  aspirin, 81 mg, Oral, Daily  atorvastatin, 40 mg, Oral, Nightly  flecainide, 100 mg, Oral, BID  folic acid, 1 mg, Oral, Daily  hydrocortisone, 10 mg, Oral, Daily  hydrocortisone, 15 mg, Oral, Daily With Breakfast  hydrocortisone, 1 Application, Topical, Q8H  levothyroxine, 125 mcg, Oral, Daily  magnesium oxide, 400 mg, Oral, Daily  nystatin, , Topical, Q12H  sodium chloride, 10 mL, Intravenous, Q12H  Tirzepatide, 0.5 mL, Subcutaneous, Weekly  vitamin B-12, 1,000 mcg, Oral, Daily  vitamin D3, 5,000 Units, Oral, Daily       Current Antimicrobial Therapy:  Anti-Infectives (From admission, onward)      Ordered     Dose/Rate Route Frequency Start Stop    24 0935  cefTRIAXone (ROCEPHIN) 2,000 mg in sodium chloride 0.9 % 100 mL IVPB-VTB        Ordering Provider: Thien Reardon DO    2,000 mg  200 mL/hr over 30 Minutes Intravenous Every 24 Hours 24 1700 24 1758    24 1555  cefTRIAXone (ROCEPHIN) 2,000 mg in sodium chloride 0.9 % 100 mL IVPB-VTB        Ordering Provider: Mark Vasquez DO    2,000 mg  200 mL/hr over 30 Minutes Intravenous Once 24 1610  12/19/24 1813          Current Diuretic Therapy:  Diuretics (From admission, onward)      None          ----------------------------------------------------------------------------------------------------------------------  Vital Signs:  Temp:  [97.6 °F (36.4 °C)-98.5 °F (36.9 °C)] 97.6 °F (36.4 °C)  Heart Rate:  [66-94] 70  Resp:  [18-20] 18  BP: (113-144)/(62-91) 132/66  SpO2:  [96 %-98 %] 98 %  on   ;   Device (Oxygen Therapy): room air  Body mass index is 56.25 kg/m².    Wt Readings from Last 3 Encounters:   12/19/24 (!) 153 kg (338 lb)   10/24/24 116 kg (256 lb 6.4 oz)   10/11/24 (!) 164 kg (362 lb)     Intake & Output (last 3 days)         12/17 0701  12/18 0700 12/18 0701  12/19 0700 12/19 0701  12/20 0700 12/20 0701  12/21 0700    P.O.   500 360    I.V. (mL/kg)   686 (4.5)     IV Piggyback   100     Total Intake(mL/kg)   1286 (8.4) 360 (2.4)    Urine (mL/kg/hr)   250     Stool   0     Total Output   250     Net   +1036 +360            Stool Unmeasured Occurrence   0 x           Diet: Cardiac; Healthy Heart (2-3 Na+); Fluid Consistency: Thin (IDDSI 0)  ----------------------------------------------------------------------------------------------------------------------  Physical Exam  Constitutional:  Well-developed and well-nourished.  No acute distress.      HENT:  Head:  Normocephalic and atraumatic.  Mouth:  Moist mucous membranes.    Eyes:  Conjunctivae and EOM are normal. No scleral icterus.    Neck:  Neck supple.  No JVD present.    Cardiovascular:  Normal rate, regular rhythm and normal heart sounds with no murmur.  Pulmonary/Chest:  No respiratory distress, no wheezes, no crackles, with normal breath sounds and good air movement.  Abdominal:  Soft. No distension and no tenderness.   Musculoskeletal:  No tenderness, and no deformity.  No red or swollen joints anywhere.    Neurological:  Alert and oriented to person, place, and time.  No cranial nerve deficit.    Skin: Dry appearing redness of her  "face.  Peripheral vascular:  No clubbing, no cyanosis, no edema.  Psychiatric: Appropriate mood and affect  : Pure wick in place with grossly bloody urine in the tubing and canister    ----------------------------------------------------------------------------------------------------------------------  Tele:    ----------------------------------------------------------------------------------------------------------------------  LABS:    CBC and coagulation:  Results from last 7 days   Lab Units 12/29/24  1024 12/25/24  1615 12/25/24  0116   PROCALCITONIN ng/mL 0.07  --   --    SED RATE mm/hr 54* 39*  --    CRP mg/dL 1.91* 10.07*  --    WBC 10*3/mm3 9.23  --  9.63   HEMOGLOBIN g/dL 11.0*  --  12.1   HEMATOCRIT % 35.3  --  38.5   MCV fL 97.2*  --  99.2*   MCHC g/dL 31.2*  --  31.4*   PLATELETS 10*3/mm3 210  --  154     Acid/base balance:      Renal and electrolytes:  Results from last 7 days   Lab Units 12/29/24  1024   SODIUM mmol/L 137   POTASSIUM mmol/L 4.3   CHLORIDE mmol/L 101   CO2 mmol/L 25.1   BUN mg/dL 15   CREATININE mg/dL 1.02*   CALCIUM mg/dL 8.3*   GLUCOSE mg/dL 136*     Estimated Creatinine Clearance: 89.4 mL/min (A) (by C-G formula based on SCr of 1.02 mg/dL (H)).    Liver and pancreatic function:  Results from last 7 days   Lab Units 12/29/24  1024   ALBUMIN g/dL 2.5*   BILIRUBIN mg/dL 0.6   ALK PHOS U/L 138*   AST (SGOT) U/L 33*   ALT (SGPT) U/L 28     Endocrine function:  No results found for: \"HGBA1C\"  Point of care bedside glucose levels:      Glucose levels from the [facility]:  Results from last 7 days   Lab Units 12/29/24  1024   GLUCOSE mg/dL 136*     Lab Results   Component Value Date    TSH 3.320 12/19/2024    FREET4 1.30 12/27/2024     Cardiac:        Results from last 7 days   Lab Units 12/25/24  1615   CHOLESTEROL mg/dL 94   TRIGLYCERIDES mg/dL 114   HDL CHOL mg/dL 32*   LDL CHOL mg/dL 41     Cultures:  Lab Results   Component Value Date    COLORU Red (A) 12/19/2024    CLARITYU Turbid (A) " "12/19/2024    PHUR <=5.0 12/19/2024    PROTEINUR 50.7 06/22/2023    GLUCOSEU Negative 12/19/2024    KETONESU Negative 12/19/2024    BLOODU Moderate (2+) (A) 12/19/2024    NITRITEU Positive (A) 12/19/2024    LEUKOCYTESUR Large (3+) (A) 12/19/2024    BILIRUBINUR Moderate (2+) (A) 12/19/2024    UROBILINOGEN 0.2 E.U./dL 12/19/2024    RBCUA Too Numerous to Count (A) 12/19/2024    WBCUA Too Numerous to Count (A) 12/19/2024    BACTERIA 3+ (A) 12/19/2024     Microbiology Results (last 10 days)       ** No results found for the last 240 hours. **            No results found for: \"PREGTESTUR\", \"PREGSERUM\", \"HCG\", \"HCGQUANT\"  Pain Management Panel  More data may exist         Latest Ref Rng & Units 12/19/2024 6/22/2023   Pain Management Panel   Creatinine, Urine mg/dL  mg/dL - 152.9  152.9    Amphetamine, Urine Qual Negative Negative  -   Barbiturates Screen, Urine Negative Negative  -   Benzodiazepine Screen, Urine Negative Negative  -   Buprenorphine, Screen, Urine Negative Negative  -   Cocaine Screen, Urine Negative Negative  -   Fentanyl, Urine Negative Negative  -   Methadone Screen , Urine Negative Negative  -   Methamphetamine, Ur Negative Negative  -      Details          Multiple values from one day are sorted in reverse-chronological order               I have personally looked at the labs and they are summarized above.  ----------------------------------------------------------------------------------------------------------------------  Detailed radiology reports for the last 24 hours:    Imaging Results (Last 24 Hours)       ** No results found for the last 24 hours. **          I have personally looked at the radiology images and I have read the available final and preliminary reports.    Assessment & Plan    Complicated UTI with gross hematuria  -This appears to have completely resolved  - We will monitor for any new  complaints  -Repeat another urine analysis    Bilateral lower extremity weakness  Chronic " debility  Adrenal insufficiency  - Solu-Cortef has been started and increased  - MRI of the pelvis rules out ovarian mass  -MRI of the brain within normal limits  - PT and OT are both working with her she feels like she is improving  - Patient not a candidate for nursing home placement where she wanted to because of her weight being greater than 250 pounds  - Consider neurology consultation if her strength does not improve  -Consultation for inpatient rehab  Rosacea  -I will start the patient on [employer] if available  VTE Prophylaxis:   Active VTE Prophylaxis  Pharmacologic:        Start     Dose Route Frequency Stop    12/20/24 1430  apixaban (ELIQUIS) tablet 5 mg         5 mg PO Every 12 Hours Scheduled --                  Mechanical:        Start        12/19/24 1932  Maintain Sequential Compression Device  Continuous                          Any portions of the note copied forward were evaluated for relevancy to today's visit 12/30/2024  Disposition: Discharge to home early next week if she is not approved for inpatient rehab    Thien Reardon DO  [facility] Hospitalist  12/31/24  15:42 EST

## 2024-12-31 NOTE — THERAPY TREATMENT NOTE
Acute Care - Physical Therapy Treatment Note   Elijah     Patient Name: Ana Maradiaga  : 1965  MRN: [id number]  Today's Date: 2024   Onset of Illness/Injury or Date of Surgery: 24  Visit Dx:     ICD-10-CM ICD-9-CM   1. Urinary tract infection with hematuria, site unspecified  N39.0 599.0    R31.9 599.70   2. Weakness  R53.1 780.79   3. High serum testosterone  R79.89 790.99     Patient Active Problem List   Diagnosis    Type 2 diabetes mellitus with hyperglycemia, without long-term current use of insulin    Acquired hypothyroidism    Hirsutism    Elevated testosterone level in female    Complicated UTI (urinary tract infection)     Past Medical History:   Diagnosis Date    Anemia     Arrhythmia     Afib    Arthritis     Atrial fibrillation     Bronchitis     Chronic kidney disease     Gout     Hypertension     Stroke 2021     History reviewed. No pertinent surgical history.  PT Assessment (Last 12 Hours)       PT Evaluation and Treatment       Row Name 24 1037          Physical Therapy Time and Intention    Subjective Information complains of;weakness;swelling  -LL     Document Type therapy note (daily note)  -LL     Mode of Treatment physical therapy;individual therapy  -LL     Patient Effort good  -LL     Comment Patient seen for PT this AM and was cooperative with all activities.  She required CGA/min A for supine > sit, sit > stand from raised bed with CGA/min A & ambulated ~15' with RW and CGA.  Patient then performed seated ex's with rest breaks.  -LL       Row Name 24 1037          Cognition    Affect/Mental Status (Cognition) WFL  -LL     Orientation Status (Cognition) oriented x 3  -LL     Follows Commands (Cognition) WFL  -LL     Personal Safety Interventions fall prevention program maintained;gait belt;nonskid shoes/slippers when out of bed;supervised activity  -LL       Row Name 24 1037          Bed Mobility    Bed Mobility supine-sit  -LL      Supine-Sit [place] (Bed Mobility) contact guard;minimum assist (75% patient effort)  -LL     Assistive Device (Bed Mobility) bed rails  -LL       Row Name 12/31/24 1037          Transfers    Transfers sit-stand transfer;stand-sit transfer  -LL       Row Name 12/31/24 1037          Sit-Stand Transfer    Sit-Stand [place] (Transfers) contact guard;minimum assist (75% patient effort)  -LL     Assistive Device (Sit-Stand Transfers) walker, front-wheeled  -LL       Row Name 12/31/24 1037          Stand-Sit Transfer    Stand-Sit [place] (Transfers) contact guard  -LL     Assistive Device (Stand-Sit Transfers) walker, front-wheeled  -LL       Row Name 12/31/24 1037          Gait/Stairs (Locomotion)    Gait/Stairs Locomotion gait/ambulation assistive device  -LL     [place] Level (Gait) contact guard  -LL     Assistive Device (Gait) walker, front-wheeled  -LL     Distance in Feet (Gait) 15  -LL     Pattern (Gait) step-to  -LL       Row Name 12/31/24 1037          Motor Skills    Therapeutic Exercise --  Seated: LAQ, marching, pillow squeeze, AP  -LL       Row Name 12/31/24 1037          Positioning and Restraints    Pre-Treatment Position in bed  -LL     Post Treatment Position chair  -LL     In Chair sitting;call light within reach;encouraged to call for assist;with family/caregiver  -LL               User Key  (r) = Recorded By, (t) = Taken By, (c) = Cosigned By      Initials Name Provider Type    Chaparrita Gracia PTA Physical Therapist Assistant                    Physical Therapy Education       Title: PT OT SLP Therapies (Done)       Topic: Physical Therapy (Done)       Point: Mobility training (Done)       Learning Progress Summary            Patient Acceptance, TB,E, VU by RG at 12/29/2024 1000    Acceptance, E,TB, VU by MILLY at 12/27/2024 2313    Acceptance, E,TB, VU by CF at 12/26/2024 1222    Acceptance, E,TB, VU by CF at 12/24/2024 0905    Acceptance, E, VU by SC at 12/24/2024 0331     Acceptance, E,D, VU,NR by AG at 12/23/2024 1249                      Point: Home exercise program (Done)       Learning Progress Summary            Patient Acceptance, TB,E, VU by RG at 12/29/2024 1000    Acceptance, E,TB, VU by AD at 12/27/2024 2313    Acceptance, E,TB, VU by CF at 12/26/2024 1222    Acceptance, E,TB, VU by  at 12/24/2024 0905    Acceptance, E, VU by SC at 12/24/2024 0331    Acceptance, E,D, VU,NR by  at 12/23/2024 1249                      Point: Body mechanics (Done)       Learning Progress Summary            Patient Acceptance, TB,E, VU by RG at 12/29/2024 1000    Acceptance, E,TB, VU by AD at 12/27/2024 2313    Acceptance, E,TB, VU by CF at 12/26/2024 1222    Acceptance, E,TB, VU by  at 12/24/2024 0905    Acceptance, E, VU by SC at 12/24/2024 0331    Acceptance, E,D, VU,NR by  at 12/23/2024 1249                      Point: Precautions (Done)       Learning Progress Summary            Patient Acceptance, TB,E, VU by RG at 12/29/2024 1000    Acceptance, E,TB, VU by AD at 12/27/2024 2313    Acceptance, E,TB, VU by CF at 12/26/2024 1222    Acceptance, E,TB, VU by  at 12/24/2024 0905    Acceptance, E, VU by SC at 12/24/2024 0331    Acceptance, E,D, VU,NR by  at 12/23/2024 1249                                      User Key       Initials Effective Dates Name Provider Type Discipline    AD 06/06/23 -  Kelle Pinto, RN Registered Nurse Nurse    AG 06/16/21 -  Elizabeth Aquino, PT Physical Therapist PT    RG 06/06/23 -  Robert Engle, RN Registered Nurse Nurse     06/25/24 -  Ebonie Rangel, RN Registered Nurse Nurse    SC 09/11/24 -  Naya Lowry, RN Registered Nurse Nurse                  PT Recommendation and Plan             Time Calculation:    PT Charges       Row Name 12/31/24 1047             Time Calculation    PT Received On 12/31/24  -LL         Time Calculation- PT    Total Timed Code Minutes- PT 40 minute(s)  -LL                User Key  (r) = Recorded By, (t) = Taken  By, (c) = Cosigned By      Initials Name Provider Type    LL Chaparrita Basilio PTA Physical Therapist Assistant                  Therapy Charges for Today       Code Description Service Date Service Provider Modifiers Qty    90334715621 HC GAIT TRAINING EA 15 MIN 12/31/2024 Chaparrita Basilio, ITZ GP, CQ 1    42101995858 HC PT THERAPEUTIC ACT EA 15 MIN 12/31/2024 Chaparrita Basilio PTA GP, CQ 1    07031857837 HC PT THER PROC EA 15 MIN 12/31/2024 Chaparrita Basilio PTA GP, CQ 1            PT G-Codes  AM-PAC 6 Clicks Score (PT): 12    Chaparrita. ITZ Basilio  12/31/2024

## 2024-12-31 NOTE — PLAN OF CARE
Goal Outcome Evaluation:           Progress: no change  Outcome Evaluation: Pts VSS on RA. No acute changes. Pt ambulated to BSC with x2 assist. Pt voices no complaints or concerns. Will cont with POC.

## 2025-01-01 LAB
BACTERIA UR QL AUTO: ABNORMAL /HPF
BILIRUB UR QL STRIP: NEGATIVE
CLARITY UR: ABNORMAL
COLOR UR: ABNORMAL
GLUCOSE UR STRIP-MCNC: NEGATIVE MG/DL
HGB UR QL STRIP.AUTO: ABNORMAL
HYALINE CASTS UR QL AUTO: ABNORMAL /LPF
KETONES UR QL STRIP: NEGATIVE
LEUKOCYTE ESTERASE UR QL STRIP.AUTO: ABNORMAL
NITRITE UR QL STRIP: NEGATIVE
PH UR STRIP.AUTO: 6 [PH] (ref 5–8)
PROT UR QL STRIP: ABNORMAL
RBC # UR STRIP: ABNORMAL /HPF
REF LAB TEST METHOD: ABNORMAL
SP GR UR STRIP: 1.02 (ref 1–1.03)
SQUAMOUS #/AREA URNS HPF: ABNORMAL /HPF
UROBILINOGEN UR QL STRIP: ABNORMAL
WBC # UR STRIP: ABNORMAL /HPF

## 2025-01-01 PROCEDURE — 97116 GAIT TRAINING THERAPY: CPT

## 2025-01-01 PROCEDURE — 99233 SBSQ HOSP IP/OBS HIGH 50: CPT | Performed by: FAMILY MEDICINE

## 2025-01-01 PROCEDURE — 97530 THERAPEUTIC ACTIVITIES: CPT

## 2025-01-01 PROCEDURE — 81001 URINALYSIS AUTO W/SCOPE: CPT | Performed by: FAMILY MEDICINE

## 2025-01-01 PROCEDURE — 97110 THERAPEUTIC EXERCISES: CPT

## 2025-01-01 RX ADMIN — HYDROCORTISONE 10 MG: 10 TABLET ORAL at 13:50

## 2025-01-01 RX ADMIN — Medication 1000 MCG: at 09:13

## 2025-01-01 RX ADMIN — FOLIC ACID 1 MG: 1 TABLET ORAL at 09:14

## 2025-01-01 RX ADMIN — ATORVASTATIN CALCIUM 40 MG: 40 TABLET, FILM COATED ORAL at 20:35

## 2025-01-01 RX ADMIN — FLECAINIDE ACETATE 100 MG: 50 TABLET ORAL at 20:35

## 2025-01-01 RX ADMIN — APIXABAN 5 MG: 5 TABLET, FILM COATED ORAL at 09:14

## 2025-01-01 RX ADMIN — Medication 5000 UNITS: at 09:14

## 2025-01-01 RX ADMIN — HYDROCORTISONE 1 APPLICATION: 1 CREAM TOPICAL at 21:14

## 2025-01-01 RX ADMIN — ASPIRIN 81 MG: 81 TABLET, COATED ORAL at 09:14

## 2025-01-01 RX ADMIN — NYSTATIN: 100000 POWDER TOPICAL at 20:37

## 2025-01-01 RX ADMIN — HYDROCORTISONE 15 MG: 10 TABLET ORAL at 09:13

## 2025-01-01 RX ADMIN — Medication 400 MG: at 09:13

## 2025-01-01 RX ADMIN — HYDROCORTISONE 1 APPLICATION: 1 CREAM TOPICAL at 13:50

## 2025-01-01 RX ADMIN — FLECAINIDE ACETATE 100 MG: 50 TABLET ORAL at 09:14

## 2025-01-01 RX ADMIN — LEVOTHYROXINE SODIUM 125 MCG: 0.12 TABLET ORAL at 09:14

## 2025-01-01 RX ADMIN — AMMONIUM LACTATE 1 APPLICATION: 120 CREAM TOPICAL at 09:14

## 2025-01-01 RX ADMIN — APIXABAN 5 MG: 5 TABLET, FILM COATED ORAL at 20:35

## 2025-01-01 RX ADMIN — Medication 10 ML: at 20:37

## 2025-01-01 RX ADMIN — NYSTATIN: 100000 POWDER TOPICAL at 09:14

## 2025-01-01 NOTE — PLAN OF CARE
Goal Outcome Evaluation:  Plan of Care Reviewed With: patient        Progress: improving  Outcome Evaluation: Pt resting in bed at this time. VSS on RA. No acute changes. No concerns or requests at this time. Will continue plan of care.

## 2025-01-01 NOTE — THERAPY TREATMENT NOTE
Acute Care - Physical Therapy Treatment Note   Elijah     Patient Name: Ana Maradiaga  : 1965  MRN: [id number]  Today's Date: 2025   Onset of Illness/Injury or Date of Surgery: 24  Visit Dx:     ICD-10-CM ICD-9-CM   1. Urinary tract infection with hematuria, site unspecified  N39.0 599.0    R31.9 599.70   2. Weakness  R53.1 780.79   3. High serum testosterone  R79.89 790.99     Patient Active Problem List   Diagnosis    Type 2 diabetes mellitus with hyperglycemia, without long-term current use of insulin    Acquired hypothyroidism    Hirsutism    Elevated testosterone level in female    Complicated UTI (urinary tract infection)     Past Medical History:   Diagnosis Date    Anemia     Arrhythmia     Afib    Arthritis     Atrial fibrillation     Bronchitis     Chronic kidney disease     Gout     Hypertension     Stroke 2021     History reviewed. No pertinent surgical history.  PT Assessment (Last 12 Hours)       PT Evaluation and Treatment       Row Name 25 1408          Physical Therapy Time and Intention    Subjective Information no complaints  -LL     Document Type therapy note (daily note)  -LL     Mode of Treatment physical therapy;individual therapy  -LL     Patient Effort good  -LL     Comment Patient & RN in agreement for participation with PT this date.  Family present throughout session.  -LL       Row Name 25 1408          Cognition    Affect/Mental Status (Cognition) WFL  -LL     Orientation Status (Cognition) oriented x 3  -LL     Follows Commands (Cognition) WFL  -LL     Personal Safety Interventions fall prevention program maintained;gait belt;nonskid shoes/slippers when out of bed;supervised activity  -LL       Row Name 25 1408          Bed Mobility    Bed Mobility supine-sit  -LL     Supine-Sit [place] (Bed Mobility) contact guard;minimum assist (75% patient effort)  -LL     Assistive Device (Bed Mobility) bed rails  -LL       Row Name  01/01/25 1408          Transfers    Transfers sit-stand transfer;stand-sit transfer  -LL       Row Name 01/01/25 1408          Sit-Stand Transfer    Sit-Stand [place] (Transfers) contact guard  -LL     Assistive Device (Sit-Stand Transfers) walker, front-wheeled  -LL       Row Name 01/01/25 1408          Stand-Sit Transfer    Stand-Sit [place] (Transfers) contact guard  -LL     Assistive Device (Stand-Sit Transfers) walker, front-wheeled  -LL       Row Name 01/01/25 1408          Stand Pivot/Stand Step Transfer    Stand Pivot/Stand Step [place] (Transfers) contact guard  -LL     Assistive Device (Stand Pivot Stand Step Transfer) walker, front-wheeled  -LL       Row Name 01/01/25 1408          Gait/Stairs (Locomotion)    Gait/Stairs Locomotion gait/ambulation assistive device  -LL     [place] Level (Gait) contact guard  -LL     Assistive Device (Gait) walker, front-wheeled  -LL     Distance in Feet (Gait) 20  -LL     Pattern (Gait) step-to  -LL       Row Name 01/01/25 1408          Motor Skills    Therapeutic Exercise --  Seated: LAQ, marching, AP, pillow squeeze  -LL       Row Name 01/01/25 1408          Positioning and Restraints    Pre-Treatment Position in bed  -LL     Post Treatment Position chair  -LL     In Chair sitting;call light within reach;encouraged to call for assist;with family/caregiver  -LL               User Key  (r) = Recorded By, (t) = Taken By, (c) = Cosigned By      Initials Name Provider Type    Chaparrita Gracia PTA Physical Therapist Assistant                    Physical Therapy Education       Title: PT OT SLP Therapies (Done)       Topic: Physical Therapy (Done)       Point: Mobility training (Done)       Learning Progress Summary            Patient Acceptance, TB,E, VU by RG at 12/29/2024 1000    Acceptance, E,TB, VU by AD at 12/27/2024 2313    Acceptance, E,TB, VU by CF at 12/26/2024 1222    Acceptance, E,TB, VU by CF at 12/24/2024 0905    Acceptance, E, VU by SC at  12/24/2024 0331    Acceptance, E,D, VU,NR by AG at 12/23/2024 1249                      Point: Home exercise program (Done)       Learning Progress Summary            Patient Acceptance, TB,E, VU by RG at 12/29/2024 1000    Acceptance, E,TB, VU by AD at 12/27/2024 2313    Acceptance, E,TB, VU by CF at 12/26/2024 1222    Acceptance, E,TB, VU by  at 12/24/2024 0905    Acceptance, E, VU by SC at 12/24/2024 0331    Acceptance, E,D, VU,NR by  at 12/23/2024 1249                      Point: Body mechanics (Done)       Learning Progress Summary            Patient Acceptance, TB,E, VU by RG at 12/29/2024 1000    Acceptance, E,TB, VU by AD at 12/27/2024 2313    Acceptance, E,TB, VU by CF at 12/26/2024 1222    Acceptance, E,TB, VU by  at 12/24/2024 0905    Acceptance, E, VU by SC at 12/24/2024 0331    Acceptance, E,D, VU,NR by  at 12/23/2024 1249                      Point: Precautions (Done)       Learning Progress Summary            Patient Acceptance, TB,E, VU by RG at 12/29/2024 1000    Acceptance, E,TB, VU by AD at 12/27/2024 2313    Acceptance, E,TB, VU by  at 12/26/2024 1222    Acceptance, E,TB, VU by  at 12/24/2024 0905    Acceptance, E, VU by SC at 12/24/2024 0331    Acceptance, E,D, VU,NR by  at 12/23/2024 1249                                      User Key       Initials Effective Dates Name Provider Type Discipline    AD 06/06/23 -  Kelle Pinto, RN Registered Nurse Nurse    AG 06/16/21 -  Elizabeth Aquino, PT Physical Therapist PT    RG 06/06/23 -  Robert Engle, RN Registered Nurse Nurse     06/25/24 -  Ebonie Rangel, RN Registered Nurse Nurse    SC 09/11/24 -  Naya Lowry, RN Registered Nurse Nurse                  PT Recommendation and Plan             Time Calculation:    PT Charges       Row Name 01/01/25 1410             Time Calculation    PT Received On 01/01/25  -LL         Time Calculation- PT    Total Timed Code Minutes- PT 40 minute(s)  -LL                User Key  (r) =  Recorded By, (t) = Taken By, (c) = Cosigned By      Initials Name Provider Type     Chaparrita Basilio PTA Physical Therapist Assistant                  Therapy Charges for Today       Code Description Service Date Service Provider Modifiers Qty    43111980904 HC GAIT TRAINING EA 15 MIN 12/31/2024 Chaparrita Basilio, PTA GP, CQ 1    05583897779 HC PT THERAPEUTIC ACT EA 15 MIN 12/31/2024 Chaparrita Basilio, PTA GP, CQ 1    28978373419 HC PT THER PROC EA 15 MIN 12/31/2024 Chaparrita Basilio, PTA GP, CQ 1    26300206649 HC GAIT TRAINING EA 15 MIN 1/1/2025 Chaparrita Basilio, PTA GP, CQ 1    69796932111 HC PT THERAPEUTIC ACT EA 15 MIN 1/1/2025 Chaparrita Basilio, PTA GP, CQ 1    12688773012 HC PT THER PROC EA 15 MIN 1/1/2025 Chaparrita Basilio, PTA GP, CQ 1            PT G-Codes  AM-PAC 6 Clicks Score (PT): 13    Meño Basilio PTA  1/1/2025

## 2025-01-01 NOTE — PAYOR COMM NOTE
"[facility]  NPI:[id number]    Utilization Review  Contact: Ny Oodm RN  Phone: [phone]  Fax:[phone]               Ana Maradiaga (59 y.o. Female)       Date of Birth   1965    Social Security Number       Address   [address] [place] [address]    Home Phone   [phone]    MRN   [id number]       Hoahaoism   Buddhism    Marital Status   Single                            Admission Date   12/19/24    Admission Type   Emergency    Admitting Provider   Thien Raerdon DO    Attending Provider   Thien Reardon DO    Department, Room/Bed   [address], 3336/       Discharge Date       Discharge Disposition       Discharge Destination                                 Attending Provider: Thein Reardon DO    Allergies: Vancomycin, Cefepime, Cephalosporins    Isolation: None   Infection: None   Code Status: CPR    Ht: 165.1 cm (65\")   Wt: 153 kg (338 lb)    Admission Cmt: None   Principal Problem: Complicated UTI (urinary tract infection) [N39.0]                   Active Insurance as of 12/19/2024       Primary Coverage       Payor Plan Insurance Group Employer/Plan Group    ANTHEM MEDICARE REPLACEMENT ANTHEM MEDICARE ADVANTAGE [id number]       Payor Plan Address Payor Plan Phone Number Payor Plan Fax Number Effective Dates    [address] [phone]  4/1/2024 - None Entered    [facility] [id number]         Subscriber Name Subscriber Birth Date Member ID       ANA MARADIAGA 1965 [id number]                     Emergency Contacts        (Rel.) Home Phone Work Phone Mobile Phone    CAMPOSBLANCA (Son) -- -- [phone]    CAMPOSCYDNEY (Relative) -- -- [phone]           Contact info per fax request  Elizabeth Larios PH: [phone]  Fax: [phone]          Elizabeth Larios BSW     Case Management     Case Management/Social Work     Addendum     Date of Service: 12/31/24 1810  Creation Time: 12/31/24 1810   "     Discharge Planning Assessment  [facility]     Patient Name: Ana Maradiaga            MRN: [id number]  Today's Date: 12/31/2024              Admit Date: 12/19/2024              Discharge Plan         Row Name 12/31/24 1809           Plan     Plan SS left messages for [facility] per Mary and [facility] per Marvel to check on status of referrals but was not successful. SS to follow up on 01/02. Pt requested for SS to follow up with her son about placement. SS attempted to speak with Pt's son by phone but was not successful. SS to follow.                              ARMANDO Pinto                       Revision History        --

## 2025-01-01 NOTE — NURSING NOTE
Patient estevan wallace medication returned to family to take home this shift. Mounjaro with 1 injection left.

## 2025-01-01 NOTE — PLAN OF CARE
Goal Outcome Evaluation:  Plan of Care Reviewed With: patient        Progress: improving  Outcome Evaluation: Patient restingh in bed at this time. A&O. VSS on RA. Patient worked with PT this shift. Ambulated in room and up to bedside commode and sat up in chair this shift. Had bath this shift. Wound care completed per orders. Urinalysis collected this shift, see result. No other requests or complaints at this time. Will continue with POC.

## 2025-01-01 NOTE — PROGRESS NOTES
[facility] HOSPITALIST PROGRESS NOTE     Patient Identification:  Name:  Ana Maradiaga  Age:  59 y.o.  Sex:  female  :  1965  MRN:  [id number]  Visit Number:  [id number]  ROOM: [address] [place] [address]     Primary Care Provider:  Val Purcell PA     Date of Admission: 2024    Length of stay in inpatient status:  13    Subjective     Chief Compliant:    Chief Complaint   Patient presents with    Weakness - Generalized    Blood in Urine     History of Presenting Illness:    Patient was seen and examined face-to-face.  Patient again was able to to walk around the room with a little more strength.  She is very concerned because it appears that we have misplaced 1 injection of her Mounjaro      objective     Current Hospital Meds:  ammonium lactate, 1 Application, Topical, Daily  apixaban, 5 mg, Oral, Q12H  aspirin, 81 mg, Oral, Daily  atorvastatin, 40 mg, Oral, Nightly  flecainide, 100 mg, Oral, BID  folic acid, 1 mg, Oral, Daily  hydrocortisone, 10 mg, Oral, Daily  hydrocortisone, 15 mg, Oral, Daily With Breakfast  hydrocortisone, 1 Application, Topical, Q8H  levothyroxine, 125 mcg, Oral, Daily  magnesium oxide, 400 mg, Oral, Daily  nystatin, , Topical, Q12H  sodium chloride, 10 mL, Intravenous, Q12H  Tirzepatide, 0.5 mL, Subcutaneous, Weekly  vitamin B-12, 1,000 mcg, Oral, Daily  vitamin D3, 5,000 Units, Oral, Daily       Current Antimicrobial Therapy:  Anti-Infectives (From admission, onward)      Ordered     Dose/Rate Route Frequency Start Stop    24 0935  cefTRIAXone (ROCEPHIN) 2,000 mg in sodium chloride 0.9 % 100 mL IVPB-VTB        Ordering Provider: Thien Reardon DO    2,000 mg  200 mL/hr over 30 Minutes Intravenous Every 24 Hours 24 1700 24 1758    24 1555  cefTRIAXone (ROCEPHIN) 2,000 mg in sodium chloride 0.9 % 100 mL IVPB-VTB        Ordering Provider: Mark Vasquez DO    2,000 mg  200 mL/hr over 30 Minutes Intravenous Once 24 1610 24 1813           Current Diuretic Therapy:  Diuretics (From admission, onward)      None          ----------------------------------------------------------------------------------------------------------------------  Vital Signs:  Temp:  [97.7 °F (36.5 °C)-98.2 °F (36.8 °C)] 98 °F (36.7 °C)  Heart Rate:  [] 78  Resp:  [17-18] 18  BP: (115-159)/(59-82) 147/77  SpO2:  [98 %] 98 %  on   ;   Device (Oxygen Therapy): room air  Body mass index is 56.25 kg/m².    Wt Readings from Last 3 Encounters:   12/19/24 (!) 153 kg (338 lb)   10/24/24 116 kg (256 lb 6.4 oz)   10/11/24 (!) 164 kg (362 lb)     Intake & Output (last 3 days)         12/17 0701  12/18 0700 12/18 0701  12/19 0700 12/19 0701  12/20 0700 12/20 0701  12/21 0700    P.O.   500 360    I.V. (mL/kg)   686 (4.5)     IV Piggyback   100     Total Intake(mL/kg)   1286 (8.4) 360 (2.4)    Urine (mL/kg/hr)   250     Stool   0     Total Output   250     Net   +1036 +360            Stool Unmeasured Occurrence   0 x           Diet: Cardiac; Healthy Heart (2-3 Na+); Fluid Consistency: Thin (IDDSI 0)  ----------------------------------------------------------------------------------------------------------------------  Physical Exam  Constitutional:  Well-developed and well-nourished.  No acute distress.      HENT:  Head:  Normocephalic and atraumatic.  Mouth:  Moist mucous membranes.    Eyes:  Conjunctivae and EOM are normal. No scleral icterus.    Neck:  Neck supple.  No JVD present.    Cardiovascular:  Normal rate, regular rhythm and normal heart sounds with no murmur.  Pulmonary/Chest:  No respiratory distress, no wheezes, no crackles, with normal breath sounds and good air movement.  Abdominal:  Soft. No distension and no tenderness.   Musculoskeletal:  No tenderness, and no deformity.  No red or swollen joints anywhere.    Neurological:  Alert and oriented to person, place, and time.  No cranial nerve deficit.    Skin: Dry appearing redness of her face.  Peripheral  "vascular:  No clubbing, no cyanosis, no edema.  Psychiatric: Appropriate mood and affect  : Pure wick in place with grossly bloody urine in the tubing and canister    ----------------------------------------------------------------------------------------------------------------------  Tele:    ----------------------------------------------------------------------------------------------------------------------  LABS:    CBC and coagulation:  Results from last 7 days   Lab Units 12/29/24  1024 12/25/24  1615   PROCALCITONIN ng/mL 0.07  --    SED RATE mm/hr 54* 39*   CRP mg/dL 1.91* 10.07*   WBC 10*3/mm3 9.23  --    HEMOGLOBIN g/dL 11.0*  --    HEMATOCRIT % 35.3  --    MCV fL 97.2*  --    MCHC g/dL 31.2*  --    PLATELETS 10*3/mm3 210  --      Acid/base balance:      Renal and electrolytes:  Results from last 7 days   Lab Units 12/29/24  1024   SODIUM mmol/L 137   POTASSIUM mmol/L 4.3   CHLORIDE mmol/L 101   CO2 mmol/L 25.1   BUN mg/dL 15   CREATININE mg/dL 1.02*   CALCIUM mg/dL 8.3*   GLUCOSE mg/dL 136*     Estimated Creatinine Clearance: 89.4 mL/min (A) (by C-G formula based on SCr of 1.02 mg/dL (H)).    Liver and pancreatic function:  Results from last 7 days   Lab Units 12/29/24  1024   ALBUMIN g/dL 2.5*   BILIRUBIN mg/dL 0.6   ALK PHOS U/L 138*   AST (SGOT) U/L 33*   ALT (SGPT) U/L 28     Endocrine function:  No results found for: \"HGBA1C\"  Point of care bedside glucose levels:      Glucose levels from the [facility]:  Results from last 7 days   Lab Units 12/29/24  1024   GLUCOSE mg/dL 136*     Lab Results   Component Value Date    TSH 3.320 12/19/2024    FREET4 1.30 12/27/2024     Cardiac:        Results from last 7 days   Lab Units 12/25/24  1615   CHOLESTEROL mg/dL 94   TRIGLYCERIDES mg/dL 114   HDL CHOL mg/dL 32*   LDL CHOL mg/dL 41     Cultures:  Lab Results   Component Value Date    COLORU Red (A) 01/01/2025    CLARITYU Turbid (A) 01/01/2025    PHUR 6.0 01/01/2025    PROTEINUR 50.7 06/22/2023    GLUCOSEU " "Negative 01/01/2025    KETONESU Negative 01/01/2025    BLOODU Large (3+) (A) 01/01/2025    NITRITEU Negative 01/01/2025    LEUKOCYTESUR Trace (A) 01/01/2025    BILIRUBINUR Negative 01/01/2025    UROBILINOGEN 0.2 E.U./dL 01/01/2025    RBCUA Too Numerous to Count (A) 01/01/2025    WBCUA 3-5 (A) 01/01/2025    BACTERIA Trace (A) 01/01/2025     Microbiology Results (last 10 days)       ** No results found for the last 240 hours. **            No results found for: \"PREGTESTUR\", \"PREGSERUM\", \"HCG\", \"HCGQUANT\"  Pain Management Panel  More data may exist         Latest Ref Rng & Units 12/19/2024 6/22/2023   Pain Management Panel   Creatinine, Urine mg/dL  mg/dL - 152.9  152.9    Amphetamine, Urine Qual Negative Negative  -   Barbiturates Screen, Urine Negative Negative  -   Benzodiazepine Screen, Urine Negative Negative  -   Buprenorphine, Screen, Urine Negative Negative  -   Cocaine Screen, Urine Negative Negative  -   Fentanyl, Urine Negative Negative  -   Methadone Screen , Urine Negative Negative  -   Methamphetamine, Ur Negative Negative  -      Details          Multiple values from one day are sorted in reverse-chronological order               I have personally looked at the labs and they are summarized above.  ----------------------------------------------------------------------------------------------------------------------  Detailed radiology reports for the last 24 hours:    Imaging Results (Last 24 Hours)       ** No results found for the last 24 hours. **          I have personally looked at the radiology images and I have read the available final and preliminary reports.    Assessment & Plan    Complicated UTI with gross hematuria  -This appears to have completely resolved  - We will monitor for any new  complaints  -Repeat another urine analysis    Bilateral lower extremity weakness  Chronic debility  Adrenal insufficiency  - Solu-Cortef has been started and increased  - MRI of the pelvis rules out " ovarian mass  -MRI of the brain within normal limits  - PT and OT are both working with her she feels like she is improving  - Patient not a candidate for nursing home placement where she wanted to because of her weight being greater than 250 pounds  - Consider neurology consultation if her strength does not improve  -Consultation for inpatient rehab  Rosacea  -I will start the patient on MetroGel if available  VTE Prophylaxis:   Active VTE Prophylaxis  Pharmacologic:        Start     Dose Route Frequency Stop    12/20/24 1430  apixaban (ELIQUIS) tablet 5 mg         5 mg PO Every 12 Hours Scheduled --                  Mechanical:        Start        12/19/24 1932  Maintain Sequential Compression Device  Continuous                          Any portions of the note copied forward were evaluated for relevancy to today's visit 12/30/2024  Disposition: Discharge to home early next week if she is not approved for inpatient rehab    Thien Reardon DO  [facility] Hospitalist  01/01/25  14:50 EST

## 2025-01-02 LAB
ALDOST SERPL-MCNC: 48 NG/DL (ref 0–30)
ANION GAP SERPL CALCULATED.3IONS-SCNC: 7.9 MMOL/L (ref 5–15)
BASOPHILS # BLD AUTO: 0.03 10*3/MM3 (ref 0–0.2)
BASOPHILS NFR BLD AUTO: 0.3 % (ref 0–1.5)
BUN SERPL-MCNC: 16 MG/DL (ref 6–20)
BUN/CREAT SERPL: 16 (ref 7–25)
CALCIUM SPEC-SCNC: 8.4 MG/DL (ref 8.6–10.5)
CHLORIDE SERPL-SCNC: 102 MMOL/L (ref 98–107)
CO2 SERPL-SCNC: 26.1 MMOL/L (ref 22–29)
CREAT SERPL-MCNC: 1 MG/DL (ref 0.57–1)
DEPRECATED RDW RBC AUTO: 62.3 FL (ref 37–54)
EGFRCR SERPLBLD CKD-EPI 2021: 65 ML/MIN/1.73
EOSINOPHIL # BLD AUTO: 0.21 10*3/MM3 (ref 0–0.4)
EOSINOPHIL NFR BLD AUTO: 2.2 % (ref 0.3–6.2)
ERYTHROCYTE [DISTWIDTH] IN BLOOD BY AUTOMATED COUNT: 17 % (ref 12.3–15.4)
GLUCOSE SERPL-MCNC: 117 MG/DL (ref 65–99)
HCT VFR BLD AUTO: 33.4 % (ref 34–46.6)
HGB BLD-MCNC: 10.5 G/DL (ref 12–15.9)
IMM GRANULOCYTES # BLD AUTO: 0.37 10*3/MM3 (ref 0–0.05)
IMM GRANULOCYTES NFR BLD AUTO: 3.9 % (ref 0–0.5)
LYMPHOCYTES # BLD AUTO: 1.66 10*3/MM3 (ref 0.7–3.1)
LYMPHOCYTES NFR BLD AUTO: 17.5 % (ref 19.6–45.3)
MCH RBC QN AUTO: 31.3 PG (ref 26.6–33)
MCHC RBC AUTO-ENTMCNC: 31.4 G/DL (ref 31.5–35.7)
MCV RBC AUTO: 99.4 FL (ref 79–97)
MONOCYTES # BLD AUTO: 0.71 10*3/MM3 (ref 0.1–0.9)
MONOCYTES NFR BLD AUTO: 7.5 % (ref 5–12)
NEUTROPHILS NFR BLD AUTO: 6.48 10*3/MM3 (ref 1.7–7)
NEUTROPHILS NFR BLD AUTO: 68.6 % (ref 42.7–76)
NRBC BLD AUTO-RTO: 0 /100 WBC (ref 0–0.2)
PLATELET # BLD AUTO: 198 10*3/MM3 (ref 140–450)
PMV BLD AUTO: 9.7 FL (ref 6–12)
POTASSIUM SERPL-SCNC: 4.2 MMOL/L (ref 3.5–5.2)
RBC # BLD AUTO: 3.36 10*6/MM3 (ref 3.77–5.28)
SODIUM SERPL-SCNC: 136 MMOL/L (ref 136–145)
WBC NRBC COR # BLD AUTO: 9.46 10*3/MM3 (ref 3.4–10.8)

## 2025-01-02 PROCEDURE — 97530 THERAPEUTIC ACTIVITIES: CPT

## 2025-01-02 PROCEDURE — 97110 THERAPEUTIC EXERCISES: CPT | Performed by: OCCUPATIONAL THERAPIST

## 2025-01-02 PROCEDURE — 97116 GAIT TRAINING THERAPY: CPT

## 2025-01-02 PROCEDURE — 99232 SBSQ HOSP IP/OBS MODERATE 35: CPT | Performed by: FAMILY MEDICINE

## 2025-01-02 PROCEDURE — 85025 COMPLETE CBC W/AUTO DIFF WBC: CPT | Performed by: FAMILY MEDICINE

## 2025-01-02 PROCEDURE — 97110 THERAPEUTIC EXERCISES: CPT

## 2025-01-02 PROCEDURE — 80048 BASIC METABOLIC PNL TOTAL CA: CPT | Performed by: FAMILY MEDICINE

## 2025-01-02 RX ADMIN — Medication 2 EACH: at 14:43

## 2025-01-02 RX ADMIN — FLECAINIDE ACETATE 100 MG: 50 TABLET ORAL at 08:51

## 2025-01-02 RX ADMIN — HYDROCORTISONE 15 MG: 10 TABLET ORAL at 08:50

## 2025-01-02 RX ADMIN — ATORVASTATIN CALCIUM 40 MG: 40 TABLET, FILM COATED ORAL at 21:02

## 2025-01-02 RX ADMIN — LEVOTHYROXINE SODIUM 125 MCG: 0.12 TABLET ORAL at 08:50

## 2025-01-02 RX ADMIN — ASPIRIN 81 MG: 81 TABLET, COATED ORAL at 08:51

## 2025-01-02 RX ADMIN — Medication 400 MG: at 08:51

## 2025-01-02 RX ADMIN — Medication 1000 MCG: at 08:51

## 2025-01-02 RX ADMIN — APIXABAN 5 MG: 5 TABLET, FILM COATED ORAL at 08:50

## 2025-01-02 RX ADMIN — FLECAINIDE ACETATE 100 MG: 50 TABLET ORAL at 21:02

## 2025-01-02 RX ADMIN — FOLIC ACID 1 MG: 1 TABLET ORAL at 08:51

## 2025-01-02 RX ADMIN — HYDROCORTISONE 1 APPLICATION: 1 CREAM TOPICAL at 21:03

## 2025-01-02 RX ADMIN — HYDROCORTISONE 1 APPLICATION: 1 CREAM TOPICAL at 05:07

## 2025-01-02 RX ADMIN — HYDROCORTISONE 10 MG: 10 TABLET ORAL at 13:24

## 2025-01-02 RX ADMIN — NYSTATIN: 100000 POWDER TOPICAL at 21:03

## 2025-01-02 RX ADMIN — HYDROCORTISONE 1 APPLICATION: 1 CREAM TOPICAL at 13:24

## 2025-01-02 RX ADMIN — APIXABAN 5 MG: 5 TABLET, FILM COATED ORAL at 21:03

## 2025-01-02 RX ADMIN — Medication 5000 UNITS: at 08:51

## 2025-01-02 NOTE — PROGRESS NOTES
[facility] HOSPITALIST PROGRESS NOTE     Patient Identification:  Name:  Ana Maradiaga  Age:  59 y.o.  Sex:  female  :  1965  MRN:  [id number]  Visit Number:  [id number]  ROOM: [address] [place] [address]     Primary Care Provider:  Val Purcell PA     Date of Admission: 2024    Length of stay in inpatient status:  14    Subjective     Chief Compliant:    Chief Complaint   Patient presents with    Weakness - Generalized    Blood in Urine     History of Presenting Illness:    Patient was seen and evaluated.  No significant complaints.  Still trying to get her placed in a nursing home.  At this point the nursing home that may take her is unable to provide her Mounjaro      objective     Current Hospital Meds:  ammonium lactate, 1 Application, Topical, Daily  apixaban, 5 mg, Oral, Q12H  aspirin, 81 mg, Oral, Daily  atorvastatin, 40 mg, Oral, Nightly  flecainide, 100 mg, Oral, BID  folic acid, 1 mg, Oral, Daily  Gelocast Unnas boot, 2 each, Topical, Q3 Days  hydrocortisone, 10 mg, Oral, Daily  hydrocortisone, 15 mg, Oral, Daily With Breakfast  hydrocortisone, 1 Application, Topical, Q8H  levothyroxine, 125 mcg, Oral, Daily  magnesium oxide, 400 mg, Oral, Daily  nystatin, , Topical, Q12H  sodium chloride, 10 mL, Intravenous, Q12H  Tirzepatide, 0.5 mL, Subcutaneous, Weekly  vitamin B-12, 1,000 mcg, Oral, Daily  vitamin D3, 5,000 Units, Oral, Daily       Current Antimicrobial Therapy:  Anti-Infectives (From admission, onward)      Ordered     Dose/Rate Route Frequency Start Stop    24 0935  cefTRIAXone (ROCEPHIN) 2,000 mg in sodium chloride 0.9 % 100 mL IVPB-VTB        Ordering Provider: Thien Reardon DO    2,000 mg  200 mL/hr over 30 Minutes Intravenous Every 24 Hours 24 1700 24 1758    24 1555  cefTRIAXone (ROCEPHIN) 2,000 mg in sodium chloride 0.9 % 100 mL IVPB-VTB        Ordering Provider: Mark Vasquez DO    2,000 mg  200 mL/hr over 30 Minutes Intravenous Once  12/19/24 1610 12/19/24 1813          Current Diuretic Therapy:  Diuretics (From admission, onward)      None          ----------------------------------------------------------------------------------------------------------------------  Vital Signs:  Temp:  [97.6 °F (36.4 °C)-98.6 °F (37 °C)] 97.7 °F (36.5 °C)  Heart Rate:  [71-86] 76  Resp:  [16-20] 20  BP: (123-154)/(62-80) 144/74     on   ;   Device (Oxygen Therapy): room air  Body mass index is 56.25 kg/m².    Wt Readings from Last 3 Encounters:   12/19/24 (!) 153 kg (338 lb)   10/24/24 116 kg (256 lb 6.4 oz)   10/11/24 (!) 164 kg (362 lb)     Intake & Output (last 3 days)         12/17 0701  12/18 0700 12/18 0701  12/19 0700 12/19 0701  12/20 0700 12/20 0701  12/21 0700    P.O.   500 360    I.V. (mL/kg)   686 (4.5)     IV Piggyback   100     Total Intake(mL/kg)   1286 (8.4) 360 (2.4)    Urine (mL/kg/hr)   250     Stool   0     Total Output   250     Net   +1036 +360            Stool Unmeasured Occurrence   0 x           Diet: Cardiac; Healthy Heart (2-3 Na+); Fluid Consistency: Thin (IDDSI 0)  ----------------------------------------------------------------------------------------------------------------------  Physical Exam  Constitutional:  Well-developed and well-nourished.  No acute distress.      HENT:  Head:  Normocephalic and atraumatic.  Mouth:  Moist mucous membranes.    Eyes:  Conjunctivae and EOM are normal. No scleral icterus.    Neck:  Neck supple.  No JVD present.    Cardiovascular:  Normal rate, regular rhythm and normal heart sounds with no murmur.  Pulmonary/Chest:  No respiratory distress, no wheezes, no crackles, with normal breath sounds and good air movement.  Abdominal:  Soft. No distension and no tenderness.   Musculoskeletal:  No tenderness, and no deformity.  No red or swollen joints anywhere.    Neurological:  Alert and oriented to person, place, and time.  No cranial nerve deficit.    Skin: Dry appearing redness of her  "face.  Peripheral vascular:  No clubbing, no cyanosis, no edema.  Psychiatric: Appropriate mood and affect  : Pure wick in place with grossly bloody urine in the tubing and canister    ----------------------------------------------------------------------------------------------------------------------  Tele:    ----------------------------------------------------------------------------------------------------------------------  LABS:    CBC and coagulation:  Results from last 7 days   Lab Units 01/02/25  0149 12/29/24  1024   PROCALCITONIN ng/mL  --  0.07   SED RATE mm/hr  --  54*   CRP mg/dL  --  1.91*   WBC 10*3/mm3 9.46 9.23   HEMOGLOBIN g/dL 10.5* 11.0*   HEMATOCRIT % 33.4* 35.3   MCV fL 99.4* 97.2*   MCHC g/dL 31.4* 31.2*   PLATELETS 10*3/mm3 198 210     Acid/base balance:      Renal and electrolytes:  Results from last 7 days   Lab Units 01/02/25 0149 12/29/24  1024   SODIUM mmol/L 136 137   POTASSIUM mmol/L 4.2 4.3   CHLORIDE mmol/L 102 101   CO2 mmol/L 26.1 25.1   BUN mg/dL 16 15   CREATININE mg/dL 1.00 1.02*   CALCIUM mg/dL 8.4* 8.3*   GLUCOSE mg/dL 117* 136*     Estimated Creatinine Clearance: 91.2 mL/min (by C-G formula based on SCr of 1 mg/dL).    Liver and pancreatic function:  Results from last 7 days   Lab Units 12/29/24  1024   ALBUMIN g/dL 2.5*   BILIRUBIN mg/dL 0.6   ALK PHOS U/L 138*   AST (SGOT) U/L 33*   ALT (SGPT) U/L 28     Endocrine function:  No results found for: \"HGBA1C\"  Point of care bedside glucose levels:      Glucose levels from the [facility]:  Results from last 7 days   Lab Units 01/02/25  0149 12/29/24  1024   GLUCOSE mg/dL 117* 136*     Lab Results   Component Value Date    TSH 3.320 12/19/2024    FREET4 1.30 12/27/2024     Cardiac:              Cultures:  Lab Results   Component Value Date    COLORU Red (A) 01/01/2025    CLARITYU Turbid (A) 01/01/2025    PHUR 6.0 01/01/2025    PROTEINUR 50.7 06/22/2023    GLUCOSEU Negative 01/01/2025    KETONESU Negative 01/01/2025    BLOODU Large " "(3+) (A) 01/01/2025    NITRITEU Negative 01/01/2025    LEUKOCYTESUR Trace (A) 01/01/2025    BILIRUBINUR Negative 01/01/2025    UROBILINOGEN 0.2 E.U./dL 01/01/2025    RBCUA Too Numerous to Count (A) 01/01/2025    WBCUA 3-5 (A) 01/01/2025    BACTERIA Trace (A) 01/01/2025     Microbiology Results (last 10 days)       ** No results found for the last 240 hours. **            No results found for: \"PREGTESTUR\", \"PREGSERUM\", \"HCG\", \"HCGQUANT\"  Pain Management Panel  More data may exist         Latest Ref Rng & Units 12/19/2024 6/22/2023   Pain Management Panel   Creatinine, Urine mg/dL  mg/dL - 152.9  152.9    Amphetamine, Urine Qual Negative Negative  -   Barbiturates Screen, Urine Negative Negative  -   Benzodiazepine Screen, Urine Negative Negative  -   Buprenorphine, Screen, Urine Negative Negative  -   Cocaine Screen, Urine Negative Negative  -   Fentanyl, Urine Negative Negative  -   Methadone Screen , Urine Negative Negative  -   Methamphetamine, Ur Negative Negative  -      Details          Multiple values from one day are sorted in reverse-chronological order               I have personally looked at the labs and they are summarized above.  ----------------------------------------------------------------------------------------------------------------------  Detailed radiology reports for the last 24 hours:    Imaging Results (Last 24 Hours)       ** No results found for the last 24 hours. **          I have personally looked at the radiology images and I have read the available final and preliminary reports.    Assessment & Plan    Complicated UTI with gross hematuria  -Patient is once again having gross hematuria  - According to urology there is no acute treatment necessary  - If patient starts to show signs and symptoms of hydronephrosis either classic lithotripsy versus urostomy to will need to be completed.  - We will monitor for any new  complaints      Bilateral lower extremity weakness  Chronic " debility  Adrenal insufficiency  - Solu-Cortef has been started and increased  - MRI of the pelvis rules out ovarian mass  -MRI of the brain within normal limits  - PT and OT are both working with her she feels like she is improving  - Patient not a candidate for nursing home placement where she wanted to because of her weight being greater than 250 pounds  - Consider neurology consultation if her strength does not improve  -Consultation for inpatient rehab    Rosacea  -I will start the patient on MetroGel if available    VTE Prophylaxis:   Active VTE Prophylaxis  Pharmacologic:        Start     Dose Route Frequency Stop    12/20/24 1430  apixaban (ELIQUIS) tablet 5 mg         5 mg PO Every 12 Hours Scheduled --                  Mechanical:        Start        12/19/24 1932  Maintain Sequential Compression Device  Continuous                          Any portions of the note copied forward were evaluated for relevancy to today's visit 12/30/2024  Disposition: Discharge to home early next week if she is not approved for inpatient rehab    Thien Reardon DO  [facility] Hospitalist  01/02/25  16:48 EST

## 2025-01-02 NOTE — CASE MANAGEMENT/SOCIAL WORK
Discharge Planning Assessment  [facility]     Patient Name: Ana Maradiaga  MRN: [id number]  Today's Date: 1/2/2025    Admit Date: 12/19/2024            Discharge Plan       Row Name 01/02/25 1112       Plan    Plan SS spoke with [facility] per Tim who states Pt has been declined. SS spoke with [facility] per Mary who states NH director of nursing is reviewing Pt's referral. SS to follow.    15:53pm: SS notified by [facility] per Mary that SNF cannot accept Pt on mounjaro, due to the Nh's cost being too expensive. SS notified Provider. Provider willing to discontinue medication. SS to follow.                     MELIDA PintoW

## 2025-01-02 NOTE — THERAPY TREATMENT NOTE
Acute Care - Occupational Therapy Treatment Note   [facility]     Patient Name: Ana Maradiaga  : 1965  MRN: [id number]  Today's Date: 2025  Onset of Illness/Injury or Date of Surgery: 24     Referring Physician: Dr. Reardon    Admit Date: 2024       ICD-10-CM ICD-9-CM   1. Urinary tract infection with hematuria, site unspecified  N39.0 599.0    R31.9 599.70   2. Weakness  R53.1 780.79   3. High serum testosterone  R79.89 790.99     Patient Active Problem List   Diagnosis    Type 2 diabetes mellitus with hyperglycemia, without long-term current use of insulin    Acquired hypothyroidism    Hirsutism    Elevated testosterone level in female    Complicated UTI (urinary tract infection)     Past Medical History:   Diagnosis Date    Anemia     Arrhythmia     Afib    Arthritis     Atrial fibrillation     Bronchitis     Chronic kidney disease     Gout     Hypertension     Stroke 2021     History reviewed. No pertinent surgical history.      OT ASSESSMENT FLOWSHEET (Last 12 Hours)       OT Evaluation and Treatment       Row Name 25 1154                   OT Time and Intention    Subjective Information no complaints  -BF        Document Type therapy note (daily note)  -BF        Mode of Treatment occupational therapy  -BF        Patient Effort good  -BF        Comment Pt agreeable to OT, continues to require mod assist with L/B dressing and toileting.  -BF           General Information    Existing Precautions/Restrictions fall  -BF           Cognition    Orientation Status (Cognition) oriented x 4  -BF        Follows Commands (Cognition) WFL  -BF           Motor Skills    Motor Skills motor control/coordination interventions  -BF        Motor Control/Coordination Interventions gross motor coordination activities;therapeutic exercise/ROM  BUE C therex, strengthenig w/ theraband  -BF           Positioning and Restraints    In Bed fowlers;call light within reach;encouraged to  call for assist;exit alarm on  -                  User Key  (r) = Recorded By, (t) = Taken By, (c) = Cosigned By      Initials Name Effective Dates    Ca Holguin OT 07/11/23 -                      Occupational Therapy Education        No education to display                      OT Recommendation and Plan              Time Calculation:    Time Calculation- OT       Row Name 01/02/25 1158             Time Calculation- OT    Total Timed Code Minutes- OT 25 minute(s)  -                User Key  (r) = Recorded By, (t) = Taken By, (c) = Cosigned By      Initials Name Provider Type    Ca Holguin OT Occupational Therapist                  Therapy Charges for Today       Code Description Service Date Service Provider Modifiers Qty    12858456257 HC OT THER PROC EA 15 MIN 1/2/2025 Ca Quintanilla OT GO 2                 Ca Quintanilla OT  1/2/2025

## 2025-01-02 NOTE — THERAPY TREATMENT NOTE
Acute Care - Physical Therapy Treatment Note   [facility]     Patient Name: Ana Maradiaga  : 1965  MRN: [id number]  Today's Date: 2025   Onset of Illness/Injury or Date of Surgery: 24  Visit Dx:     ICD-10-CM ICD-9-CM   1. Urinary tract infection with hematuria, site unspecified  N39.0 599.0    R31.9 599.70   2. Weakness  R53.1 780.79   3. High serum testosterone  R79.89 790.99     Patient Active Problem List   Diagnosis    Type 2 diabetes mellitus with hyperglycemia, without long-term current use of insulin    Acquired hypothyroidism    Hirsutism    Elevated testosterone level in female    Complicated UTI (urinary tract infection)     Past Medical History:   Diagnosis Date    Anemia     Arrhythmia     Afib    Arthritis     Atrial fibrillation     Bronchitis     Chronic kidney disease     Gout     Hypertension     Stroke 2021     History reviewed. No pertinent surgical history.  PT Assessment (Last 12 Hours)       PT Evaluation and Treatment       Row Name 25 1109          Physical Therapy Time and Intention    Subjective Information no complaints  -LL     Document Type therapy note (daily note)  -LL     Mode of Treatment physical therapy;individual therapy  -LL     Patient Effort good  -LL     Comment Patient & RN in agreement for participation with PT.  Patient ambulated further distance this date and is compliant with all activities asked of her.  Patient sat up in chair to perform ex's and then returned to bed per her request.  -LL       Row Name 25 1109          Cognition    Affect/Mental Status (Cognition) WFL  -LL     Orientation Status (Cognition) oriented x 4  -LL     Follows Commands (Cognition) WFL  -LL     Personal Safety Interventions fall prevention program maintained;gait belt;nonskid shoes/slippers when out of bed;supervised activity  -LL       Row Name 25 1109          Bed Mobility    Bed Mobility supine-sit;rolling left;rolling right  -LL      Rolling Left [place] (Bed Mobility) modified independence  -LL     Rolling Right [place] (Bed Mobility) modified independence  -LL     Supine-Sit [place] (Bed Mobility) contact guard;minimum assist (75% patient effort)  -LL     Sit-Supine [place] (Bed Mobility) moderate assist (50% patient effort);maximum assist (25% patient effort)  Required assistance lifting legs into bed  -LL     Assistive Device (Bed Mobility) bed rails  -LL       Row Name 01/02/25 1109          Transfers    Transfers sit-stand transfer;stand-sit transfer  -LL       Row Name 01/02/25 1109          Sit-Stand Transfer    Sit-Stand [place] (Transfers) contact guard;standby assist  -LL     Assistive Device (Sit-Stand Transfers) walker, front-wheeled  -LL       Row Name 01/02/25 1109          Stand-Sit Transfer    Stand-Sit [place] (Transfers) contact guard;standby assist  -LL     Assistive Device (Stand-Sit Transfers) walker, front-wheeled  -LL       Row Name 01/02/25 1109          Stand Pivot/Stand Step Transfer    Stand Pivot/Stand Step [place] (Transfers) contact guard;standby assist  -LL     Assistive Device (Stand Pivot Stand Step Transfer) walker, front-wheeled  -LL       Row Name 01/02/25 1109          Gait/Stairs (Locomotion)    Gait/Stairs Locomotion gait/ambulation assistive device  -LL     [place] Level (Gait) contact guard  -LL     Assistive Device (Gait) walker, front-wheeled  -LL     Distance in Feet (Gait) --  30', 15', 5'  -LL     Pattern (Gait) step-to  -LL       Row Name 01/02/25 1109          Motor Skills    Therapeutic Exercise --  Seated: LAQ, marching, pillow squeeze, AP  -LL       Row Name 01/02/25 1109          Positioning and Restraints    Pre-Treatment Position in bed  -LL     Post Treatment Position bed  -LL     In Bed fowlers;call light within reach;encouraged to call for assist;exit alarm on;side rails up x3  -LL               User Key  (r) = Recorded By, (t) = Taken By, (c) =  Cosigned By      Initials Name Provider Type    Chaparrita Gracia PTA Physical Therapist Assistant                    Physical Therapy Education       Title: PT OT SLP Therapies (Done)       Topic: Physical Therapy (Done)       Point: Mobility training (Done)       Learning Progress Summary            Patient Acceptance, TB,E, VU by RG at 12/29/2024 1000    Acceptance, E,TB, VU by AD at 12/27/2024 2313    Acceptance, E,TB, VU by CF at 12/26/2024 1222    Acceptance, E,TB, VU by CF at 12/24/2024 0905    Acceptance, E, VU by SC at 12/24/2024 0331    Acceptance, E,D, VU,NR by AG at 12/23/2024 1249                      Point: Home exercise program (Done)       Learning Progress Summary            Patient Acceptance, TB,E, VU by RG at 12/29/2024 1000    Acceptance, E,TB, VU by AD at 12/27/2024 2313    Acceptance, E,TB, VU by CF at 12/26/2024 1222    Acceptance, E,TB, VU by  at 12/24/2024 0905    Acceptance, E, VU by SC at 12/24/2024 0331    Acceptance, E,D, VU,NR by  at 12/23/2024 1249                      Point: Body mechanics (Done)       Learning Progress Summary            Patient Acceptance, TB,E, VU by RG at 12/29/2024 1000    Acceptance, E,TB, VU by AD at 12/27/2024 2313    Acceptance, E,TB, VU by CF at 12/26/2024 1222    Acceptance, E,TB, VU by CF at 12/24/2024 0905    Acceptance, E, VU by SC at 12/24/2024 0331    Acceptance, E,D, VU,NR by AG at 12/23/2024 1249                      Point: Precautions (Done)       Learning Progress Summary            Patient Acceptance, TB,E, VU by RG at 12/29/2024 1000    Acceptance, E,TB, VU by AD at 12/27/2024 2313    Acceptance, E,TB, VU by CF at 12/26/2024 1222    Acceptance, E,TB, VU by CF at 12/24/2024 0905    Acceptance, E, VU by SC at 12/24/2024 0331    Acceptance, E,D, VU,NR by AG at 12/23/2024 1249                                      User Key       Initials Effective Dates Name Provider Type Discipline    AD 06/06/23 -  Kelle Pinto, RN Registered Nurse  Nurse    AG 06/16/21 -  Elizabeth Aquino, PT Physical Therapist PT    RG 06/06/23 -  oRbert Engle, RN Registered Nurse Nurse    CF 06/25/24 -  Ebonei Rangel, RN Registered Nurse Nurse    SC 09/11/24 -  Naya Lowry, RN Registered Nurse Nurse                  PT Recommendation and Plan             Time Calculation:    PT Charges       Row Name 01/02/25 1114             Time Calculation    PT Received On 01/02/25  -LL         Time Calculation- PT    Total Timed Code Minutes- PT 53 minute(s)  -LL                User Key  (r) = Recorded By, (t) = Taken By, (c) = Cosigned By      Initials Name Provider Type    LL Chaparrita Basilio, PTA Physical Therapist Assistant                  Therapy Charges for Today       Code Description Service Date Service Provider Modifiers Qty    63977335208 HC GAIT TRAINING EA 15 MIN 1/1/2025 Chaparrita Basilio, PTA GP, CQ 1    97481187507 HC PT THERAPEUTIC ACT EA 15 MIN 1/1/2025 Chaparrita Basilio, PTA GP, CQ 1    85841274038 HC PT THER PROC EA 15 MIN 1/1/2025 Chaparrita Basilio, PTA GP, CQ 1    97901032401 HC GAIT TRAINING EA 15 MIN 1/2/2025 Chaparrita Basilio, PTA GP, CQ 2    63750154702 HC PT THERAPEUTIC ACT EA 15 MIN 1/2/2025 Chaparrita Basilio, PTA GP, CQ 1    02712845443 HC PT THER PROC EA 15 MIN 1/2/2025 Chaparrita Basilio, PTA GP, CQ 1            PT G-Codes  AM-PAC 6 Clicks Score (PT): 13    Chaparrita. ITZ Basilio  1/2/2025

## 2025-01-02 NOTE — PLAN OF CARE
Goal Outcome Evaluation:           Progress: no change  Outcome Evaluation: pt resting in bed, jose boots applied by wound care RN, pt refuses ambulation at this time, will continue plan of care.

## 2025-01-02 NOTE — PLAN OF CARE
"Goal Outcome Evaluation:           Progress: no change  Outcome Evaluation: pt resting in bed awake with no complaints or concerns. Pt refused to ambulate stating \"i will work with PT in the morning\" educated pt. no acute changes this shift. VSS on room air. will continue plan of care                             "

## 2025-01-02 NOTE — NURSING NOTE
Received consult for Unna's boots application.    History of lymphedema.  Bilat legs edematous, red, dry.  No open lesions.  Per order, applied ammonium lactate lotion to bilat lower legs.  Unna's boots applied and secured with ace wraps.  Patient tolerated well.       Spoke c pt. Informed pt of 500 a.m. arrival time for 12/19/24 surgery at the [facility]. Reminded pt of NPO status & PO appt. Confirmed no scrates, scrapes, open wounds on the skin. Pt expressed understanding & was thankful.

## 2025-01-03 LAB
ANION GAP SERPL CALCULATED.3IONS-SCNC: 9.9 MMOL/L (ref 5–15)
BASOPHILS # BLD AUTO: 0.05 10*3/MM3 (ref 0–0.2)
BASOPHILS NFR BLD AUTO: 0.6 % (ref 0–1.5)
BUN SERPL-MCNC: 15 MG/DL (ref 6–20)
BUN/CREAT SERPL: 16.1 (ref 7–25)
CALCIUM SPEC-SCNC: 8.1 MG/DL (ref 8.6–10.5)
CHLORIDE SERPL-SCNC: 104 MMOL/L (ref 98–107)
CO2 SERPL-SCNC: 24.1 MMOL/L (ref 22–29)
CREAT SERPL-MCNC: 0.93 MG/DL (ref 0.57–1)
DEPRECATED RDW RBC AUTO: 62.3 FL (ref 37–54)
EGFRCR SERPLBLD CKD-EPI 2021: 70.9 ML/MIN/1.73
EOSINOPHIL # BLD AUTO: 0.18 10*3/MM3 (ref 0–0.4)
EOSINOPHIL NFR BLD AUTO: 2.1 % (ref 0.3–6.2)
ERYTHROCYTE [DISTWIDTH] IN BLOOD BY AUTOMATED COUNT: 17.1 % (ref 12.3–15.4)
GLUCOSE SERPL-MCNC: 116 MG/DL (ref 65–99)
HCT VFR BLD AUTO: 30.7 % (ref 34–46.6)
HGB BLD-MCNC: 9.7 G/DL (ref 12–15.9)
IMM GRANULOCYTES # BLD AUTO: 0.21 10*3/MM3 (ref 0–0.05)
IMM GRANULOCYTES NFR BLD AUTO: 2.4 % (ref 0–0.5)
LYMPHOCYTES # BLD AUTO: 1.59 10*3/MM3 (ref 0.7–3.1)
LYMPHOCYTES NFR BLD AUTO: 18.4 % (ref 19.6–45.3)
MCH RBC QN AUTO: 31.2 PG (ref 26.6–33)
MCHC RBC AUTO-ENTMCNC: 31.6 G/DL (ref 31.5–35.7)
MCV RBC AUTO: 98.7 FL (ref 79–97)
MONOCYTES # BLD AUTO: 0.77 10*3/MM3 (ref 0.1–0.9)
MONOCYTES NFR BLD AUTO: 8.9 % (ref 5–12)
NEUTROPHILS NFR BLD AUTO: 5.86 10*3/MM3 (ref 1.7–7)
NEUTROPHILS NFR BLD AUTO: 67.6 % (ref 42.7–76)
NRBC BLD AUTO-RTO: 0 /100 WBC (ref 0–0.2)
PLATELET # BLD AUTO: 185 10*3/MM3 (ref 140–450)
PMV BLD AUTO: 9.9 FL (ref 6–12)
POTASSIUM SERPL-SCNC: 4.2 MMOL/L (ref 3.5–5.2)
RBC # BLD AUTO: 3.11 10*6/MM3 (ref 3.77–5.28)
SODIUM SERPL-SCNC: 138 MMOL/L (ref 136–145)
WBC NRBC COR # BLD AUTO: 8.66 10*3/MM3 (ref 3.4–10.8)

## 2025-01-03 PROCEDURE — 97110 THERAPEUTIC EXERCISES: CPT

## 2025-01-03 PROCEDURE — 99233 SBSQ HOSP IP/OBS HIGH 50: CPT | Performed by: INTERNAL MEDICINE

## 2025-01-03 PROCEDURE — 97530 THERAPEUTIC ACTIVITIES: CPT

## 2025-01-03 PROCEDURE — 97116 GAIT TRAINING THERAPY: CPT

## 2025-01-03 PROCEDURE — 85025 COMPLETE CBC W/AUTO DIFF WBC: CPT | Performed by: FAMILY MEDICINE

## 2025-01-03 PROCEDURE — 80048 BASIC METABOLIC PNL TOTAL CA: CPT | Performed by: FAMILY MEDICINE

## 2025-01-03 RX ADMIN — HYDROCORTISONE 10 MG: 10 TABLET ORAL at 14:34

## 2025-01-03 RX ADMIN — FLECAINIDE ACETATE 100 MG: 50 TABLET ORAL at 09:39

## 2025-01-03 RX ADMIN — Medication 1000 MCG: at 09:40

## 2025-01-03 RX ADMIN — Medication 5000 UNITS: at 09:44

## 2025-01-03 RX ADMIN — Medication 400 MG: at 09:40

## 2025-01-03 RX ADMIN — FLECAINIDE ACETATE 100 MG: 50 TABLET ORAL at 20:52

## 2025-01-03 RX ADMIN — HYDROCORTISONE 1 APPLICATION: 1 CREAM TOPICAL at 14:34

## 2025-01-03 RX ADMIN — NYSTATIN: 100000 POWDER TOPICAL at 09:38

## 2025-01-03 RX ADMIN — ATORVASTATIN CALCIUM 40 MG: 40 TABLET, FILM COATED ORAL at 20:52

## 2025-01-03 RX ADMIN — ASPIRIN 81 MG: 81 TABLET, COATED ORAL at 09:39

## 2025-01-03 RX ADMIN — LEVOTHYROXINE SODIUM 125 MCG: 0.12 TABLET ORAL at 09:40

## 2025-01-03 RX ADMIN — HYDROCORTISONE 1 APPLICATION: 1 CREAM TOPICAL at 05:11

## 2025-01-03 RX ADMIN — APIXABAN 5 MG: 5 TABLET, FILM COATED ORAL at 20:52

## 2025-01-03 RX ADMIN — HYDROCORTISONE 15 MG: 10 TABLET ORAL at 09:39

## 2025-01-03 RX ADMIN — APIXABAN 5 MG: 5 TABLET, FILM COATED ORAL at 09:39

## 2025-01-03 RX ADMIN — FOLIC ACID 1 MG: 1 TABLET ORAL at 09:40

## 2025-01-03 RX ADMIN — HYDROCORTISONE 1 APPLICATION: 1 CREAM TOPICAL at 21:01

## 2025-01-03 NOTE — PLAN OF CARE
Goal Outcome Evaluation:  Plan of Care Reviewed With: patient           Outcome Evaluation: patient has been up in chair all day vss on RA patient advised to not wear external cath due to helping prevent UTI she has had it off all day just went back to bed and applied external felix. Nurse manager gave patient her mounjor shot to replace on that was lost it was sent to the pharmacy until her family comes today to pick it up will continue with plan of care

## 2025-01-03 NOTE — PLAN OF CARE
Goal Outcome Evaluation:  Plan of Care Reviewed With: patient        Progress: improving  Outcome Evaluation: Patient resting in bed at this time. VSS on RA. Patient refuses to ambulate this shift. Educated on the importance of ambulation. Patient voices no concerns at this time. Will continue with plan of care.

## 2025-01-03 NOTE — CASE MANAGEMENT/SOCIAL WORK
Discharge Planning Assessment  [facility]     Patient Name: Ana Maradiaga  MRN: [id number]  Today's Date: 1/3/2025    Admit Date: 12/19/2024    Plan: SS followed up with [facility] per [facility] on this date. Pt not able to received Mounjaro while at NH due to medication cost. SS placed new med list in Epic. SS to follow.     Discharge Plan       Row Name 01/03/25 1757       Plan    Plan SS followed up with [facility] per [facility] on this date. Pt not able to received Mounjaro while at NH due to medication cost. SS placed new med list in Epic. SS to follow.        Continued Care and Services - Admitted Since 12/19/2024       Destination       Service Provider Request Status Services Address Phone Fax Patient Preferred    [facility] Pending - Request Sent -- [address] [address] [phone] --    [facility] Declined  over weight limit -- [address] [address] [phone] --    [facility] Declined  Bed not available -- [address] [address] [phone] --            Expected Discharge Date and Time       Expected Discharge Date Expected Discharge Time    Jan 2, 2025      ARMANDO Sands

## 2025-01-03 NOTE — THERAPY TREATMENT NOTE
Acute Care - Physical Therapy Treatment Note   [facility]     Patient Name: Ana Maradiaga  : 1965  MRN: [id number]  Today's Date: 1/3/2025   Onset of Illness/Injury or Date of Surgery: 24  Visit Dx:     ICD-10-CM ICD-9-CM   1. Urinary tract infection with hematuria, site unspecified  N39.0 599.0    R31.9 599.70   2. Weakness  R53.1 780.79   3. High serum testosterone  R79.89 790.99     Patient Active Problem List   Diagnosis    Type 2 diabetes mellitus with hyperglycemia, without long-term current use of insulin    Acquired hypothyroidism    Hirsutism    Elevated testosterone level in female    Complicated UTI (urinary tract infection)     Past Medical History:   Diagnosis Date    Anemia     Arrhythmia     Afib    Arthritis     Atrial fibrillation     Bronchitis     Chronic kidney disease     Gout     Hypertension     Stroke 2021     History reviewed. No pertinent surgical history.  PT Assessment (Last 12 Hours)       PT Evaluation and Treatment       Row Name 25 1106          Physical Therapy Time and Intention    Subjective Information no complaints  -LL     Document Type therapy note (daily note)  -LL     Mode of Treatment physical therapy;individual therapy  -LL     Patient Effort good  -LL     Comment Patient & RN in agreement for participation with therapy.  -LL       Row Name 25 1106          Cognition    Affect/Mental Status (Cognition) WFL  -LL     Orientation Status (Cognition) oriented x 4  -LL     Follows Commands (Cognition) WFL  -LL     Personal Safety Interventions fall prevention program maintained;gait belt;nonskid shoes/slippers when out of bed;supervised activity  -LL       Row Name 25 1106          Bed Mobility    Bed Mobility supine-sit;rolling left;rolling right  -LL     Rolling Left [place] (Bed Mobility) modified independence  -LL     Rolling Right [place] (Bed Mobility) modified independence  -LL     Supine-Sit [place] (Bed  Mobility) contact guard;minimum assist (75% patient effort)  -LL     Assistive Device (Bed Mobility) bed rails  -LL       Row Name 01/03/25 1106          Transfers    Transfers sit-stand transfer;stand-sit transfer  -LL       Row Name 01/03/25 1106          Sit-Stand Transfer    Sit-Stand [place] (Transfers) contact guard;standby assist  -LL     Assistive Device (Sit-Stand Transfers) walker, front-wheeled  -LL     Comment, (Sit-Stand Transfer) x 2 trials  -LL       Row Name 01/03/25 1106          Stand-Sit Transfer    Stand-Sit [place] (Transfers) contact guard;standby assist  -LL     Assistive Device (Stand-Sit Transfers) walker, front-wheeled  -LL     Comment, (Stand-Sit Transfer) x 2 trials  -LL       Row Name 01/03/25 1106          Stand Pivot/Stand Step Transfer    Stand Pivot/Stand Step [place] (Transfers) contact guard;standby assist  -LL     Assistive Device (Stand Pivot Stand Step Transfer) walker, front-wheeled  -LL       Row Name 01/03/25 1106          Gait/Stairs (Locomotion)    Gait/Stairs Locomotion gait/ambulation assistive device  -LL     [place] Level (Gait) contact guard;standby assist  -LL     Assistive Device (Gait) walker, front-wheeled  -LL     Distance in Feet (Gait) --  35', 5'  -LL     Pattern (Gait) step-to  -LL       Row Name 01/03/25 1106          Motor Skills    Therapeutic Exercise --  Seated: LAQ, pillow squeeze, marching, AP  -LL       Row Name 01/03/25 1106          Positioning and Restraints    Pre-Treatment Position in bed  -LL     Post Treatment Position chair  -LL     In Chair reclined;call light within reach;encouraged to call for assist  With tray table within reach  -LL               User Key  (r) = Recorded By, (t) = Taken By, (c) = Cosigned By      Initials Name Provider Type    Chaparrita Gracia PTA Physical Therapist Assistant                    Physical Therapy Education       Title: PT OT SLP Therapies (Done)       Topic: Physical Therapy (Done)        Point: Mobility training (Done)       Learning Progress Summary            Patient Acceptance, TB,E, VU by RG at 12/29/2024 1000    Acceptance, E,TB, VU by AD at 12/27/2024 2313    Acceptance, E,TB, VU by CF at 12/26/2024 1222    Acceptance, E,TB, VU by CF at 12/24/2024 0905    Acceptance, E, VU by SC at 12/24/2024 0331    Acceptance, E,D, VU,NR by  at 12/23/2024 1249                      Point: Home exercise program (Done)       Learning Progress Summary            Patient Acceptance, TB,E, VU by RG at 12/29/2024 1000    Acceptance, E,TB, VU by AD at 12/27/2024 2313    Acceptance, E,TB, VU by CF at 12/26/2024 1222    Acceptance, E,TB, VU by CF at 12/24/2024 0905    Acceptance, E, VU by SC at 12/24/2024 0331    Acceptance, E,D, VU,NR by  at 12/23/2024 1249                      Point: Body mechanics (Done)       Learning Progress Summary            Patient Acceptance, TB,E, VU by RG at 12/29/2024 1000    Acceptance, E,TB, VU by AD at 12/27/2024 2313    Acceptance, E,TB, VU by CF at 12/26/2024 1222    Acceptance, E,TB, VU by  at 12/24/2024 0905    Acceptance, E, VU by SC at 12/24/2024 0331    Acceptance, E,D, VU,NR by  at 12/23/2024 1249                      Point: Precautions (Done)       Learning Progress Summary            Patient Acceptance, TB,E, VU by RG at 12/29/2024 1000    Acceptance, E,TB, VU by AD at 12/27/2024 2313    Acceptance, E,TB, VU by CF at 12/26/2024 1222    Acceptance, E,TB, VU by CF at 12/24/2024 0905    Acceptance, E, VU by SC at 12/24/2024 0331    Acceptance, E,D, VU,NR by  at 12/23/2024 1249                                      User Key       Initials Effective Dates Name Provider Type Discipline    AD 06/06/23 -  Kelle Pinto, RN Registered Nurse Nurse     06/16/21 -  Elizabeth Aquino, PT Physical Therapist PT    RG 06/06/23 -  Robert Engle, RN Registered Nurse Nurse    CF 06/25/24 -  Ebonie Rangel, RN Registered Nurse Nurse    SC 09/11/24 -  Naya Lowry, RN  Registered Nurse Nurse                  PT Recommendation and Plan             Time Calculation:    PT Charges       Row Name 01/03/25 1111             Time Calculation    PT Received On 01/03/25  -LL         Time Calculation- PT    Total Timed Code Minutes- PT 40 minute(s)  -LL                User Key  (r) = Recorded By, (t) = Taken By, (c) = Cosigned By      Initials Name Provider Type    LL Chaparrita Basilio PTA Physical Therapist Assistant                  Therapy Charges for Today       Code Description Service Date Service Provider Modifiers Qty    55809830067 HC GAIT TRAINING EA 15 MIN 1/2/2025 Chaparrita Basilio, ITZ GP, CQ 2    94399213247 HC PT THERAPEUTIC ACT EA 15 MIN 1/2/2025 Chaparrita Basilio, ITZ GP, CQ 1    60180267276 HC PT THER PROC EA 15 MIN 1/2/2025 Chaparrita Basilio, ITZ GP, CQ 1    80801921252 HC GAIT TRAINING EA 15 MIN 1/3/2025 Chaparrita Basilio, ITZ GP 1    76366723339 HC PT THERAPEUTIC ACT EA 15 MIN 1/3/2025 Chaparrita Basilio, PTA GP 1    67126204705 HC PT THER PROC EA 15 MIN 1/3/2025 Chaparrita Basilio, ITZ GP 1            PT G-Codes  AM-PAC 6 Clicks Score (PT): 13    Chaparrita. ITZ Basilio  1/3/2025

## 2025-01-03 NOTE — PROGRESS NOTES
[facility] HOSPITALIST PROGRESS NOTE        History:   Ana Maradiaga is a 59 y.o. female admitted on 12/19/2024 secondary to Complicated UTI (urinary tract infection)     Patient seen and examined at bedside.    History taken from: patient, chart, and RN.  Patient resting comfortably in recliner, eating lunch.      Objective     Vital Signs  Temp:  [97.6 °F (36.4 °C)-98 °F (36.7 °C)] 98 °F (36.7 °C)  Heart Rate:  [63-76] 75  Resp:  [18-20] 18  BP: (121-145)/(59-84) 145/84    Intake/Output Summary (Last 24 hours) at 1/3/2025 1215  Last data filed at 1/3/2025 0700  Gross per 24 hour   Intake 1100 ml   Output 2550 ml   Net -1450 ml         Physical Exam:  General:    Awake, alert, in no acute distress   Heart:      Normal S1 and S2. Regular rate and rhythm. No significant murmur, rubs or gallops appreciated.   Lungs:     Respirations regular, even and unlabored. Lungs clear to auscultation B/L. No wheezes, rales or rhonchi.   Abdomen:   Soft and nontender. No guarding, rebound tenderness or  organomegaly noted. Bowel sounds present x 4.   Extremities:  No clubbing, cyanosis or edema noted. Moves UE and LE equally B/L.     Results Review:    Results from last 7 days   Lab Units 01/03/25  0004 01/02/25  0149 12/29/24  1024   WBC 10*3/mm3 8.66 9.46 9.23   HEMOGLOBIN g/dL 9.7* 10.5* 11.0*   PLATELETS 10*3/mm3 185 198 210     Results from last 7 days   Lab Units 01/03/25  0004 01/02/25  0149 12/29/24  1024   SODIUM mmol/L 138 136 137   POTASSIUM mmol/L 4.2 4.2 4.3   CHLORIDE mmol/L 104 102 101   CO2 mmol/L 24.1 26.1 25.1   BUN mg/dL 15 16 15   CREATININE mg/dL 0.93 1.00 1.02*   CALCIUM mg/dL 8.1* 8.4* 8.3*   GLUCOSE mg/dL 116* 117* 136*     Results from last 7 days   Lab Units 12/29/24  1024   BILIRUBIN mg/dL 0.6   ALK PHOS U/L 138*   AST (SGOT) U/L 33*   ALT (SGPT) U/L 28                   Imaging Results (Last 24 Hours)       ** No results found for the last 24 hours. **               Medications:  ammonium lactate, 1 Application, Topical, Daily  apixaban, 5 mg, Oral, Q12H  aspirin, 81 mg, Oral, Daily  atorvastatin, 40 mg, Oral, Nightly  flecainide, 100 mg, Oral, BID  folic acid, 1 mg, Oral, Daily  Gelocast Unnas boot, 2 each, Topical, Q3 Days  hydrocortisone, 10 mg, Oral, Daily  hydrocortisone, 15 mg, Oral, Daily With Breakfast  hydrocortisone, 1 Application, Topical, Q8H  levothyroxine, 125 mcg, Oral, Daily  magnesium oxide, 400 mg, Oral, Daily  nystatin, , Topical, Q12H  sodium chloride, 10 mL, Intravenous, Q12H  Tirzepatide, 0.5 mL, Subcutaneous, Weekly  vitamin B-12, 1,000 mcg, Oral, Daily  vitamin D3, 5,000 Units, Oral, Daily               Assessment & Plan   Complicated UTI with gross hematuria  -Patient is once again having gross hematuria  - According to urology there is no acute treatment necessary  - If patient starts to show signs and symptoms of hydronephrosis either classic lithotripsy versus urostomy to will need to be completed.  - We will monitor for any new  complaints        Bilateral lower extremity weakness  Chronic debility  Adrenal insufficiency  - Solu-Cortef has been started and increased  - MRI of the pelvis rules out ovarian mass  -MRI of the brain within normal limits  - PT and OT are both working with her she feels like she is improving  - Patient not a candidate for nursing home placement where she wanted to because of her weight being greater than 250 pounds  - Consider neurology consultation if her strength does not improve  -Consultation for inpatient rehab     Rosacea      VTEppx:eliquis/scd  Code Status: Code Status and Medical Interventions: CPR (Attempt to Resuscitate); Full Support  Diet: Diet: Cardiac; Healthy Heart (2-3 Na+); Fluid Consistency: Thin (IDDSI 0)  Disposition: Pending IPR approval    Michael Torre DO  01/03/25  12:15 EST

## 2025-01-03 NOTE — THERAPY TREATMENT NOTE
Acute Care - Occupational Therapy Treatment Note   [facility]     Patient Name: Ana Maradiaga  : 1965  MRN: [id number]  Today's Date: 1/3/2025  Onset of Illness/Injury or Date of Surgery: 24     Referring Physician: Dr. Reardon    Admit Date: 2024       ICD-10-CM ICD-9-CM   1. Urinary tract infection with hematuria, site unspecified  N39.0 599.0    R31.9 599.70   2. Weakness  R53.1 780.79   3. High serum testosterone  R79.89 790.99     Patient Active Problem List   Diagnosis    Type 2 diabetes mellitus with hyperglycemia, without long-term current use of insulin    Acquired hypothyroidism    Hirsutism    Elevated testosterone level in female    Complicated UTI (urinary tract infection)     Past Medical History:   Diagnosis Date    Anemia     Arrhythmia     Afib    Arthritis     Atrial fibrillation     Bronchitis     Chronic kidney disease     Gout     Hypertension     Stroke 2021     History reviewed. No pertinent surgical history.      OT ASSESSMENT FLOWSHEET (Last 12 Hours)       OT Evaluation and Treatment       Row Name 25 1112                   OT Time and Intention    Document Type therapy note (daily note)  -KR        Mode of Treatment occupational therapy  -KR        Patient Effort good  -KR           Cognition    Affect/Mental Status (Cognition) WFL  -KR        Orientation Status (Cognition) oriented x 4  -KR        Follows Commands (Cognition) WFL  -KR           Bathing Assessment/Intervention    [place] Level (Bathing) bathing skills;moderate assist (50% patient effort)  -KR           Upper Body Dressing Assessment/Training    [place] Level (Upper Body Dressing) upper body dressing skills;moderate assist (50% patient effort)  -KR           Lower Body Dressing Assessment/Training    [place] Level (Lower Body Dressing) lower body dressing skills;moderate assist (50% patient effort)  -KR           Grooming Assessment/Training    [place]  Level (Grooming) grooming skills;moderate assist (50% patient effort)  -KR           Self-Feeding Assessment/Training    [place] Level (Feeding) feeding skills;moderate assist (50% patient effort)  -KR           Toileting Assessment/Training    [place] Level (Toileting) toileting skills;moderate assist (50% patient effort)  -KR           Shoulder (Therapeutic Exercise)    Shoulder AROM (Therapeutic Exercise) bilateral;flexion;extension;sitting  -KR        Shoulder AAROM (Therapeutic Exercise) bilateral;flexion;extension;sitting  -KR           Elbow/Forearm (Therapeutic Exercise)    Elbow/Forearm AROM (Therapeutic Exercise) bilateral;flexion;extension;sitting  -KR           Hand (Therapeutic Exercise)    Hand AROM/AAROM (Therapeutic Exercise) bilateral;finger flexion;finger extension  -KR           Plan of Care Review    Plan of Care Reviewed With patient  -KR        Progress improving  -KR           Therapy Assessment/Plan (OT)    Therapy Assessment/Plan (OT) Pt seen on this date for continued ther ex to enhance activity tolerance, improve fxl performance of self care.  -KR           Therapy Plan Review/Discharge Plan (OT)    Anticipated Discharge Disposition (OT) inpatient rehabilitation facility  -KR           Dressing Goal 1 (OT)    Progress/Outcome (Dressing Goal 1, OT) continuing progress toward goal  -KR           Strength Goal 1 (OT)    Progress/Outcome (Strength Goal 1, OT) continuing progress toward goal  -KR            Activity Tolerance Goal 1 (OT)    Progress/Outcome (Activity Tolerance Goal 1, OT) continuing progress toward goal  -KR                  User Key  (r) = Recorded By, (t) = Taken By, (c) = Cosigned By      Initials Name Effective Dates    KR Dano Samuel, OT 06/16/21 -                      Occupational Therapy Education        No education to display                      OT Recommendation and Plan  Planned Therapy Interventions (OT): activity tolerance training, adaptive equipment  training, BADL retraining, strengthening exercise  Plan of Care Review  Plan of Care Reviewed With: patient  Progress: improving  Plan of Care Reviewed With: patient        Time Calculation:     Therapy Charges for Today       Code Description Service Date Service Provider Modifiers Qty    36798624115  OT THERAPEUTIC ACT EA 15 MIN 1/3/2025 Dano Samuel, OT GO 1                 Dano Samuel OT  1/3/2025

## 2025-01-04 LAB
21HYDROXYLASE AB SER QL: NEGATIVE
ALDOST SERPL-MCNC: 59.4 NG/DL (ref 0–30)
ALDOST/RENIN PLAS-RTO: 2.6 {RATIO} (ref 0–30)
ANION GAP SERPL CALCULATED.3IONS-SCNC: 8.5 MMOL/L (ref 5–15)
BASOPHILS # BLD AUTO: 0.05 10*3/MM3 (ref 0–0.2)
BASOPHILS NFR BLD AUTO: 0.5 % (ref 0–1.5)
BUN SERPL-MCNC: 14 MG/DL (ref 6–20)
BUN/CREAT SERPL: 16.1 (ref 7–25)
CALCIUM SPEC-SCNC: 8.2 MG/DL (ref 8.6–10.5)
CHLORIDE SERPL-SCNC: 104 MMOL/L (ref 98–107)
CO2 SERPL-SCNC: 24.5 MMOL/L (ref 22–29)
CREAT SERPL-MCNC: 0.87 MG/DL (ref 0.57–1)
DEPRECATED RDW RBC AUTO: 63.5 FL (ref 37–54)
EGFRCR SERPLBLD CKD-EPI 2021: 76.9 ML/MIN/1.73
EOSINOPHIL # BLD AUTO: 0.23 10*3/MM3 (ref 0–0.4)
EOSINOPHIL NFR BLD AUTO: 2.5 % (ref 0.3–6.2)
ERYTHROCYTE [DISTWIDTH] IN BLOOD BY AUTOMATED COUNT: 17.1 % (ref 12.3–15.4)
GLUCOSE SERPL-MCNC: 99 MG/DL (ref 65–99)
HCT VFR BLD AUTO: 31.9 % (ref 34–46.6)
HGB BLD-MCNC: 9.7 G/DL (ref 12–15.9)
IMM GRANULOCYTES # BLD AUTO: 0.18 10*3/MM3 (ref 0–0.05)
IMM GRANULOCYTES NFR BLD AUTO: 1.9 % (ref 0–0.5)
LYMPHOCYTES # BLD AUTO: 1.83 10*3/MM3 (ref 0.7–3.1)
LYMPHOCYTES NFR BLD AUTO: 19.6 % (ref 19.6–45.3)
MAGNESIUM SERPL-MCNC: 2 MG/DL (ref 1.6–2.6)
MCH RBC QN AUTO: 30.7 PG (ref 26.6–33)
MCHC RBC AUTO-ENTMCNC: 30.4 G/DL (ref 31.5–35.7)
MCV RBC AUTO: 100.9 FL (ref 79–97)
MONOCYTES # BLD AUTO: 0.79 10*3/MM3 (ref 0.1–0.9)
MONOCYTES NFR BLD AUTO: 8.5 % (ref 5–12)
NEUTROPHILS NFR BLD AUTO: 6.26 10*3/MM3 (ref 1.7–7)
NEUTROPHILS NFR BLD AUTO: 67 % (ref 42.7–76)
NRBC BLD AUTO-RTO: 0.2 /100 WBC (ref 0–0.2)
PLATELET # BLD AUTO: 183 10*3/MM3 (ref 140–450)
PMV BLD AUTO: 9.9 FL (ref 6–12)
POTASSIUM SERPL-SCNC: 4.4 MMOL/L (ref 3.5–5.2)
RBC # BLD AUTO: 3.16 10*6/MM3 (ref 3.77–5.28)
RENIN PLAS-CCNC: 23.02 NG/ML/HR (ref 0.17–5.38)
SODIUM SERPL-SCNC: 137 MMOL/L (ref 136–145)
WBC NRBC COR # BLD AUTO: 9.34 10*3/MM3 (ref 3.4–10.8)

## 2025-01-04 PROCEDURE — 83735 ASSAY OF MAGNESIUM: CPT | Performed by: INTERNAL MEDICINE

## 2025-01-04 PROCEDURE — 85025 COMPLETE CBC W/AUTO DIFF WBC: CPT | Performed by: INTERNAL MEDICINE

## 2025-01-04 PROCEDURE — 80048 BASIC METABOLIC PNL TOTAL CA: CPT | Performed by: INTERNAL MEDICINE

## 2025-01-04 PROCEDURE — 99233 SBSQ HOSP IP/OBS HIGH 50: CPT | Performed by: INTERNAL MEDICINE

## 2025-01-04 RX ADMIN — ATORVASTATIN CALCIUM 40 MG: 40 TABLET, FILM COATED ORAL at 21:27

## 2025-01-04 RX ADMIN — AMMONIUM LACTATE 1 APPLICATION: 120 CREAM TOPICAL at 08:32

## 2025-01-04 RX ADMIN — FOLIC ACID 1 MG: 1 TABLET ORAL at 08:32

## 2025-01-04 RX ADMIN — APIXABAN 5 MG: 5 TABLET, FILM COATED ORAL at 21:27

## 2025-01-04 RX ADMIN — Medication 1000 MCG: at 08:32

## 2025-01-04 RX ADMIN — HYDROCORTISONE 10 MG: 10 TABLET ORAL at 14:45

## 2025-01-04 RX ADMIN — Medication 400 MG: at 08:32

## 2025-01-04 RX ADMIN — HYDROCORTISONE 1 APPLICATION: 1 CREAM TOPICAL at 14:45

## 2025-01-04 RX ADMIN — HYDROCORTISONE 15 MG: 10 TABLET ORAL at 08:32

## 2025-01-04 RX ADMIN — LEVOTHYROXINE SODIUM 125 MCG: 0.12 TABLET ORAL at 08:32

## 2025-01-04 RX ADMIN — HYDROCORTISONE 1 APPLICATION: 1 CREAM TOPICAL at 21:31

## 2025-01-04 RX ADMIN — Medication 5000 UNITS: at 08:32

## 2025-01-04 RX ADMIN — APIXABAN 5 MG: 5 TABLET, FILM COATED ORAL at 08:32

## 2025-01-04 RX ADMIN — FLECAINIDE ACETATE 100 MG: 50 TABLET ORAL at 21:27

## 2025-01-04 RX ADMIN — ASPIRIN 81 MG: 81 TABLET, COATED ORAL at 08:32

## 2025-01-04 RX ADMIN — FLECAINIDE ACETATE 100 MG: 50 TABLET ORAL at 08:32

## 2025-01-04 RX ADMIN — HYDROCORTISONE 1 APPLICATION: 1 CREAM TOPICAL at 05:33

## 2025-01-04 NOTE — PROGRESS NOTES
[facility] HOSPITALIST PROGRESS NOTE        History:   Ana Maradiaga is a 59 y.o. female admitted on 12/19/2024 secondary to Complicated UTI (urinary tract infection)     Patient seen and examined at bedside.    History taken from: patient, chart, and RN.  Patient resting comfortably with no complaints      Objective     Vital Signs  Temp:  [97.6 °F (36.4 °C)-98.6 °F (37 °C)] 98.6 °F (37 °C)  Heart Rate:  [65-83] 75  Resp:  [18-20] 18  BP: (110-144)/(58-84) 144/84    Intake/Output Summary (Last 24 hours) at 1/4/2025 1247  Last data filed at 1/4/2025 0900  Gross per 24 hour   Intake 1680 ml   Output 1300 ml   Net 380 ml         Physical Exam:  General:    Awake, alert, in no acute distress   Heart:      Normal S1 and S2. Regular rate and rhythm. No significant murmur, rubs or gallops appreciated.   Lungs:     Respirations regular, even and unlabored. Lungs clear to auscultation B/L. No wheezes, rales or rhonchi.   Abdomen:   Soft and nontender. No guarding, rebound tenderness or  organomegaly noted. Bowel sounds present x 4.   Extremities:  No clubbing, cyanosis or edema noted. Moves UE and LE equally B/L.     Results Review:    Results from last 7 days   Lab Units 01/04/25  0303 01/03/25  0004 01/02/25 0149 12/29/24  1024   WBC 10*3/mm3 9.34 8.66 9.46 9.23   HEMOGLOBIN g/dL 9.7* 9.7* 10.5* 11.0*   PLATELETS 10*3/mm3 183 185 198 210     Results from last 7 days   Lab Units 01/04/25  0303 01/03/25  0004 01/02/25  0149 12/29/24  1024   SODIUM mmol/L 137 138 136 137   POTASSIUM mmol/L 4.4 4.2 4.2 4.3   CHLORIDE mmol/L 104 104 102 101   CO2 mmol/L 24.5 24.1 26.1 25.1   BUN mg/dL 14 15 16 15   CREATININE mg/dL 0.87 0.93 1.00 1.02*   CALCIUM mg/dL 8.2* 8.1* 8.4* 8.3*   GLUCOSE mg/dL 99 116* 117* 136*     Results from last 7 days   Lab Units 12/29/24  1024   BILIRUBIN mg/dL 0.6   ALK PHOS U/L 138*   AST (SGOT) U/L 33*   ALT (SGPT) U/L 28     Results from last 7 days   Lab Units 01/04/25  0303   MAGNESIUM  mg/dL 2.0               Imaging Results (Last 24 Hours)       ** No results found for the last 24 hours. **              Medications:  ammonium lactate, 1 Application, Topical, Daily  apixaban, 5 mg, Oral, Q12H  aspirin, 81 mg, Oral, Daily  atorvastatin, 40 mg, Oral, Nightly  flecainide, 100 mg, Oral, BID  folic acid, 1 mg, Oral, Daily  Gelocast Unnas boot, 2 each, Topical, Q3 Days  hydrocortisone, 10 mg, Oral, Daily  hydrocortisone, 15 mg, Oral, Daily With Breakfast  hydrocortisone, 1 Application, Topical, Q8H  levothyroxine, 125 mcg, Oral, Daily  magnesium oxide, 400 mg, Oral, Daily  sodium chloride, 10 mL, Intravenous, Q12H  Tirzepatide, 0.5 mL, Subcutaneous, Weekly  vitamin B-12, 1,000 mcg, Oral, Daily  vitamin D3, 5,000 Units, Oral, Daily               Assessment & Plan   Complicated UTI with gross hematuria  -Patient is once again having gross hematuria  - According to urology there is no acute treatment necessary  - If patient starts to show signs and symptoms of hydronephrosis either classic lithotripsy versus urostomy to will need to be completed.  - We will monitor for any new  complaints        Bilateral lower extremity weakness  Chronic debility  Adrenal insufficiency  - Solu-Cortef has been started and increased  - MRI of the pelvis rules out ovarian mass  -MRI of the brain within normal limits  - PT and OT are both working with her she feels like she is improving  - Patient not a candidate for nursing home placement where she wanted to because of her weight being greater than 250 pounds  - Consider neurology consultation if her strength does not improve  -Consultation for inpatient rehab     Rosacea        VTEppx:eliquis/scd  Code Status: Code Status and Medical Interventions: CPR (Attempt to Resuscitate); Full Support  Diet: Diet: Cardiac; Healthy Heart (2-3 Na+); Fluid Consistency: Thin (IDDSI 0)  Disposition: Pending IPR approval    Michael Torre DO  01/04/25  12:47 EST

## 2025-01-04 NOTE — PLAN OF CARE
Goal Outcome Evaluation:  Plan of Care Reviewed With: patient        Progress: no change  Outcome Evaluation: Pt's resting in bed, A&O, stable on RA, has no c/o, no s/s of distress noted. Pt sat up in chair, ambulated to the chair, didn't use external cath during the day. Con't with POC.

## 2025-01-04 NOTE — PLAN OF CARE
Goal Outcome Evaluation:           Progress: no change  Outcome Evaluation: pt resting in bed with no complaints or concerns. pt refused to turn, educated pt. VSS on room air. will continue plan of care.

## 2025-01-05 LAB
11DOC SERPL-MCNC: 2.9 NG/DL
ANION GAP SERPL CALCULATED.3IONS-SCNC: 9 MMOL/L (ref 5–15)
BASOPHILS # BLD AUTO: 0.04 10*3/MM3 (ref 0–0.2)
BASOPHILS NFR BLD AUTO: 0.5 % (ref 0–1.5)
BUN SERPL-MCNC: 15 MG/DL (ref 6–20)
BUN/CREAT SERPL: 15.6 (ref 7–25)
CALCIUM SPEC-SCNC: 8.2 MG/DL (ref 8.6–10.5)
CHLORIDE SERPL-SCNC: 103 MMOL/L (ref 98–107)
CO2 SERPL-SCNC: 26 MMOL/L (ref 22–29)
CREAT SERPL-MCNC: 0.96 MG/DL (ref 0.57–1)
DEPRECATED RDW RBC AUTO: 64.3 FL (ref 37–54)
EGFRCR SERPLBLD CKD-EPI 2021: 68.3 ML/MIN/1.73
EOSINOPHIL # BLD AUTO: 0.15 10*3/MM3 (ref 0–0.4)
EOSINOPHIL NFR BLD AUTO: 1.8 % (ref 0.3–6.2)
ERYTHROCYTE [DISTWIDTH] IN BLOOD BY AUTOMATED COUNT: 17.2 % (ref 12.3–15.4)
GLUCOSE SERPL-MCNC: 117 MG/DL (ref 65–99)
HCT VFR BLD AUTO: 32 % (ref 34–46.6)
HGB BLD-MCNC: 9.8 G/DL (ref 12–15.9)
IMM GRANULOCYTES # BLD AUTO: 0.15 10*3/MM3 (ref 0–0.05)
IMM GRANULOCYTES NFR BLD AUTO: 1.8 % (ref 0–0.5)
LYMPHOCYTES # BLD AUTO: 1.26 10*3/MM3 (ref 0.7–3.1)
LYMPHOCYTES NFR BLD AUTO: 14.8 % (ref 19.6–45.3)
MAGNESIUM SERPL-MCNC: 2 MG/DL (ref 1.6–2.6)
MCH RBC QN AUTO: 31.1 PG (ref 26.6–33)
MCHC RBC AUTO-ENTMCNC: 30.6 G/DL (ref 31.5–35.7)
MCV RBC AUTO: 101.6 FL (ref 79–97)
MONOCYTES # BLD AUTO: 0.69 10*3/MM3 (ref 0.1–0.9)
MONOCYTES NFR BLD AUTO: 8.1 % (ref 5–12)
NEUTROPHILS NFR BLD AUTO: 6.22 10*3/MM3 (ref 1.7–7)
NEUTROPHILS NFR BLD AUTO: 73 % (ref 42.7–76)
NRBC BLD AUTO-RTO: 0 /100 WBC (ref 0–0.2)
PLATELET # BLD AUTO: 175 10*3/MM3 (ref 140–450)
PMV BLD AUTO: 9.6 FL (ref 6–12)
POTASSIUM SERPL-SCNC: 4.6 MMOL/L (ref 3.5–5.2)
RBC # BLD AUTO: 3.15 10*6/MM3 (ref 3.77–5.28)
SODIUM SERPL-SCNC: 138 MMOL/L (ref 136–145)
WBC NRBC COR # BLD AUTO: 8.51 10*3/MM3 (ref 3.4–10.8)

## 2025-01-05 PROCEDURE — 83735 ASSAY OF MAGNESIUM: CPT | Performed by: INTERNAL MEDICINE

## 2025-01-05 PROCEDURE — 80048 BASIC METABOLIC PNL TOTAL CA: CPT | Performed by: INTERNAL MEDICINE

## 2025-01-05 PROCEDURE — 85025 COMPLETE CBC W/AUTO DIFF WBC: CPT | Performed by: INTERNAL MEDICINE

## 2025-01-05 PROCEDURE — 99232 SBSQ HOSP IP/OBS MODERATE 35: CPT | Performed by: INTERNAL MEDICINE

## 2025-01-05 RX ADMIN — FOLIC ACID 1 MG: 1 TABLET ORAL at 09:37

## 2025-01-05 RX ADMIN — Medication 1000 MCG: at 09:37

## 2025-01-05 RX ADMIN — APIXABAN 5 MG: 5 TABLET, FILM COATED ORAL at 09:37

## 2025-01-05 RX ADMIN — HYDROCORTISONE 15 MG: 10 TABLET ORAL at 09:38

## 2025-01-05 RX ADMIN — Medication 5000 UNITS: at 09:38

## 2025-01-05 RX ADMIN — FLECAINIDE ACETATE 100 MG: 50 TABLET ORAL at 20:46

## 2025-01-05 RX ADMIN — ASPIRIN 81 MG: 81 TABLET, COATED ORAL at 09:37

## 2025-01-05 RX ADMIN — FLECAINIDE ACETATE 100 MG: 50 TABLET ORAL at 09:38

## 2025-01-05 RX ADMIN — HYDROCORTISONE 10 MG: 10 TABLET ORAL at 15:19

## 2025-01-05 RX ADMIN — ATORVASTATIN CALCIUM 40 MG: 40 TABLET, FILM COATED ORAL at 20:46

## 2025-01-05 RX ADMIN — Medication 400 MG: at 09:37

## 2025-01-05 RX ADMIN — APIXABAN 5 MG: 5 TABLET, FILM COATED ORAL at 20:46

## 2025-01-05 RX ADMIN — LEVOTHYROXINE SODIUM 125 MCG: 0.12 TABLET ORAL at 09:37

## 2025-01-05 NOTE — PLAN OF CARE
Goal Outcome Evaluation:           Progress: no change  Outcome Evaluation: pt resting in bed at this time with no complaints or concerns. VSS on room air. pt refused to ambulate and turn this shift, pt was floated with pillows. educated pt the importance. no acute changes this shift. will continue plan of care.

## 2025-01-05 NOTE — PROGRESS NOTES
[facility] HOSPITALIST PROGRESS NOTE        History:   Ana Maradiaga is a 59 y.o. female admitted on 12/19/2024 secondary to Complicated UTI (urinary tract infection)     Patient seen and examined at bedside.    History taken from: patient, chart, and RN.  Pt resting comfortably.     Objective     Vital Signs  Temp:  [97.5 °F (36.4 °C)-98.6 °F (37 °C)] 98 °F (36.7 °C)  Heart Rate:  [69-76] 74  Resp:  [16-20] 18  BP: (132-154)/(60-84) 144/73    Intake/Output Summary (Last 24 hours) at 1/5/2025 1059  Last data filed at 1/5/2025 0500  Gross per 24 hour   Intake 2330 ml   Output 1400 ml   Net 930 ml         Physical Exam:  General:    Awake, alert, in no acute distress   Heart:      Normal S1 and S2. Regular rate and rhythm. No significant murmur, rubs or gallops appreciated.   Lungs:     Respirations regular, even and unlabored. Lungs clear to auscultation B/L. No wheezes, rales or rhonchi.   Abdomen:   Soft and nontender. No guarding, rebound tenderness or  organomegaly noted. Bowel sounds present x 4.   Extremities:  No clubbing, cyanosis or edema noted. Moves UE and LE equally B/L.     Results Review:    Results from last 7 days   Lab Units 01/05/25  0002 01/04/25  0303 01/03/25  0004 01/02/25  0149   WBC 10*3/mm3 8.51 9.34 8.66 9.46   HEMOGLOBIN g/dL 9.8* 9.7* 9.7* 10.5*   PLATELETS 10*3/mm3 175 183 185 198     Results from last 7 days   Lab Units 01/05/25  0002 01/04/25  0303 01/03/25  0004 01/02/25  0149   SODIUM mmol/L 138 137 138 136   POTASSIUM mmol/L 4.6 4.4 4.2 4.2   CHLORIDE mmol/L 103 104 104 102   CO2 mmol/L 26.0 24.5 24.1 26.1   BUN mg/dL 15 14 15 16   CREATININE mg/dL 0.96 0.87 0.93 1.00   CALCIUM mg/dL 8.2* 8.2* 8.1* 8.4*   GLUCOSE mg/dL 117* 99 116* 117*         Results from last 7 days   Lab Units 01/05/25  0002 01/04/25  0303   MAGNESIUM mg/dL 2.0 2.0               Imaging Results (Last 24 Hours)       ** No results found for the last 24 hours. **               Medications:  ammonium lactate, 1 Application, Topical, Daily  apixaban, 5 mg, Oral, Q12H  aspirin, 81 mg, Oral, Daily  atorvastatin, 40 mg, Oral, Nightly  flecainide, 100 mg, Oral, BID  folic acid, 1 mg, Oral, Daily  Gelocast Unnas boot, 2 each, Topical, Q3 Days  hydrocortisone, 10 mg, Oral, Daily  hydrocortisone, 15 mg, Oral, Daily With Breakfast  hydrocortisone, 1 Application, Topical, Q8H  levothyroxine, 125 mcg, Oral, Daily  magnesium oxide, 400 mg, Oral, Daily  sodium chloride, 10 mL, Intravenous, Q12H  Tirzepatide, 0.5 mL, Subcutaneous, Weekly  vitamin B-12, 1,000 mcg, Oral, Daily  vitamin D3, 5,000 Units, Oral, Daily               Assessment & Plan   Complicated UTI with gross hematuria  -Patient is once again having hematuria  - According to urology there is no acute treatment necessary  - If patient starts to show signs and symptoms of hydronephrosis either classic lithotripsy versus urostomy to will need to be completed.  - We will monitor for any new  complaints        Bilateral lower extremity weakness  Chronic debility  Adrenal insufficiency  - Solu-Cortef has been started and increased  - MRI of the pelvis rules out ovarian mass  -MRI of the brain within normal limits  - PT and OT are both working with her she feels like she is improving  - Patient not a candidate for nursing home placement where she wanted to because of her weight being greater than 250 pounds  - Consider neurology consultation if her strength does not improve  -Consultation for inpatient rehab     Rosacea        VTEppx:eliquis/scd  Code Status: Code Status and Medical Interventions: CPR (Attempt to Resuscitate); Full Support  Diet: Diet: Cardiac; Healthy Heart (2-3 Na+); Fluid Consistency: Thin (IDDSI 0)  Disposition: Pending IPR approval    Michael Torre DO  01/05/25  10:59 EST

## 2025-01-05 NOTE — PLAN OF CARE
Goal Outcome Evaluation:  Plan of Care Reviewed With: patient           Outcome Evaluation: patient has been in bed all day refused to get up said she is letting her legs rest she also refused a bath vss no acute changes jose boots were due to be changed today but wound care to do them on monday. will continue plan of care

## 2025-01-06 LAB
ANION GAP SERPL CALCULATED.3IONS-SCNC: 7.6 MMOL/L (ref 5–15)
BASOPHILS # BLD AUTO: 0.04 10*3/MM3 (ref 0–0.2)
BASOPHILS NFR BLD AUTO: 0.4 % (ref 0–1.5)
BUN SERPL-MCNC: 16 MG/DL (ref 6–20)
BUN/CREAT SERPL: 17 (ref 7–25)
CALCIUM SPEC-SCNC: 8.4 MG/DL (ref 8.6–10.5)
CHLORIDE SERPL-SCNC: 103 MMOL/L (ref 98–107)
CO2 SERPL-SCNC: 25.4 MMOL/L (ref 22–29)
CREAT SERPL-MCNC: 0.94 MG/DL (ref 0.57–1)
DEPRECATED RDW RBC AUTO: 62 FL (ref 37–54)
EGFRCR SERPLBLD CKD-EPI 2021: 70 ML/MIN/1.73
EOSINOPHIL # BLD AUTO: 0.21 10*3/MM3 (ref 0–0.4)
EOSINOPHIL NFR BLD AUTO: 2.2 % (ref 0.3–6.2)
ERYTHROCYTE [DISTWIDTH] IN BLOOD BY AUTOMATED COUNT: 17.2 % (ref 12.3–15.4)
GLUCOSE SERPL-MCNC: 133 MG/DL (ref 65–99)
HCT VFR BLD AUTO: 31.4 % (ref 34–46.6)
HGB BLD-MCNC: 9.8 G/DL (ref 12–15.9)
IMM GRANULOCYTES # BLD AUTO: 0.12 10*3/MM3 (ref 0–0.05)
IMM GRANULOCYTES NFR BLD AUTO: 1.2 % (ref 0–0.5)
LYMPHOCYTES # BLD AUTO: 1.41 10*3/MM3 (ref 0.7–3.1)
LYMPHOCYTES NFR BLD AUTO: 14.6 % (ref 19.6–45.3)
MAGNESIUM SERPL-MCNC: 1.8 MG/DL (ref 1.6–2.6)
MCH RBC QN AUTO: 30.9 PG (ref 26.6–33)
MCHC RBC AUTO-ENTMCNC: 31.2 G/DL (ref 31.5–35.7)
MCV RBC AUTO: 99.1 FL (ref 79–97)
MONOCYTES # BLD AUTO: 0.75 10*3/MM3 (ref 0.1–0.9)
MONOCYTES NFR BLD AUTO: 7.8 % (ref 5–12)
NEUTROPHILS NFR BLD AUTO: 7.12 10*3/MM3 (ref 1.7–7)
NEUTROPHILS NFR BLD AUTO: 73.8 % (ref 42.7–76)
NRBC BLD AUTO-RTO: 0 /100 WBC (ref 0–0.2)
PLATELET # BLD AUTO: 167 10*3/MM3 (ref 140–450)
PMV BLD AUTO: 9.4 FL (ref 6–12)
POTASSIUM SERPL-SCNC: 4.2 MMOL/L (ref 3.5–5.2)
RBC # BLD AUTO: 3.17 10*6/MM3 (ref 3.77–5.28)
SODIUM SERPL-SCNC: 136 MMOL/L (ref 136–145)
WBC NRBC COR # BLD AUTO: 9.65 10*3/MM3 (ref 3.4–10.8)

## 2025-01-06 PROCEDURE — 97530 THERAPEUTIC ACTIVITIES: CPT

## 2025-01-06 PROCEDURE — 85025 COMPLETE CBC W/AUTO DIFF WBC: CPT | Performed by: INTERNAL MEDICINE

## 2025-01-06 PROCEDURE — 97116 GAIT TRAINING THERAPY: CPT

## 2025-01-06 PROCEDURE — 80048 BASIC METABOLIC PNL TOTAL CA: CPT | Performed by: INTERNAL MEDICINE

## 2025-01-06 PROCEDURE — 83735 ASSAY OF MAGNESIUM: CPT | Performed by: INTERNAL MEDICINE

## 2025-01-06 PROCEDURE — 97110 THERAPEUTIC EXERCISES: CPT

## 2025-01-06 PROCEDURE — 99231 SBSQ HOSP IP/OBS SF/LOW 25: CPT | Performed by: STUDENT IN AN ORGANIZED HEALTH CARE EDUCATION/TRAINING PROGRAM

## 2025-01-06 RX ADMIN — HYDROCORTISONE 15 MG: 10 TABLET ORAL at 09:03

## 2025-01-06 RX ADMIN — FLECAINIDE ACETATE 100 MG: 50 TABLET ORAL at 09:03

## 2025-01-06 RX ADMIN — APIXABAN 5 MG: 5 TABLET, FILM COATED ORAL at 21:20

## 2025-01-06 RX ADMIN — FOLIC ACID 1 MG: 1 TABLET ORAL at 09:03

## 2025-01-06 RX ADMIN — HYDROCORTISONE 10 MG: 10 TABLET ORAL at 15:45

## 2025-01-06 RX ADMIN — FLECAINIDE ACETATE 100 MG: 50 TABLET ORAL at 21:20

## 2025-01-06 RX ADMIN — LEVOTHYROXINE SODIUM 125 MCG: 0.12 TABLET ORAL at 09:03

## 2025-01-06 RX ADMIN — Medication 5000 UNITS: at 09:02

## 2025-01-06 RX ADMIN — ASPIRIN 81 MG: 81 TABLET, COATED ORAL at 09:02

## 2025-01-06 RX ADMIN — Medication 1000 MCG: at 09:03

## 2025-01-06 RX ADMIN — ATORVASTATIN CALCIUM 40 MG: 40 TABLET, FILM COATED ORAL at 21:19

## 2025-01-06 RX ADMIN — Medication 400 MG: at 09:03

## 2025-01-06 RX ADMIN — HYDROCORTISONE 1 APPLICATION: 1 CREAM TOPICAL at 15:45

## 2025-01-06 RX ADMIN — APIXABAN 5 MG: 5 TABLET, FILM COATED ORAL at 09:03

## 2025-01-06 NOTE — PLAN OF CARE
Goal Outcome Evaluation:  Plan of Care Reviewed With: patient           Outcome Evaluation: Patient resting in bed. VSS on RA. Pt refused full bath care, but allowed to be wiped down. Pt ambulated to chair. No concerns or complaints at this time. Will continue with plan of care.

## 2025-01-06 NOTE — PLAN OF CARE
Goal Outcome Evaluation:  Plan of Care Reviewed With: patient           Outcome Evaluation: patient has done well today worked with pt today also had a skin tear orders put in she was on toliet and was wiping herself pt was with her when it happened wound care done per Evelio Anton jose boots left off will re-evaluate in am will continue plan of care

## 2025-01-06 NOTE — PROGRESS NOTES
[facility] HOSPITALIST PROGRESS NOTE     Patient Identification:  Name:  Ana Maradiaga  Age:  59 y.o.  Sex:  female  :  1965  MRN:  [id number]  Visit Number:  [id number]  ROOM: [address] [place] [address]     Primary Care Provider:  Val Purcell PA    Length of stay in inpatient status:  18    Subjective     Chief Compliant:    Chief Complaint   Patient presents with    Weakness - Generalized    Blood in Urine       History of Presenting Illness:    Patient seen in follow-up for UTI and overall weakness.  Patient states she does feel better than when she was admitted.  Still weak and having difficulty ambulating.  Working with PT/OT, stable for placement.    Objective     Current Hospital Meds:ammonium lactate, 1 Application, Topical, Daily  apixaban, 5 mg, Oral, Q12H  aspirin, 81 mg, Oral, Daily  atorvastatin, 40 mg, Oral, Nightly  flecainide, 100 mg, Oral, BID  folic acid, 1 mg, Oral, Daily  Gelocast Unnas boot, 2 each, Topical, Q3 Days  hydrocortisone, 10 mg, Oral, Daily  hydrocortisone, 15 mg, Oral, Daily With Breakfast  hydrocortisone, 1 Application, Topical, Q8H  levothyroxine, 125 mcg, Oral, Daily  magnesium oxide, 400 mg, Oral, Daily  sodium chloride, 10 mL, Intravenous, Q12H  Tirzepatide, 0.5 mL, Subcutaneous, Weekly  vitamin B-12, 1,000 mcg, Oral, Daily  vitamin D3, 5,000 Units, Oral, Daily         Current Antimicrobial Therapy:  Anti-Infectives (From admission, onward)      Ordered     Dose/Rate Route Frequency Start Stop    24 0935  cefTRIAXone (ROCEPHIN) 2,000 mg in sodium chloride 0.9 % 100 mL IVPB-VTB        Ordering Provider: Thien Reardon DO    2,000 mg  200 mL/hr over 30 Minutes Intravenous Every 24 Hours 24 1700 24 1758    24 1555  cefTRIAXone (ROCEPHIN) 2,000 mg in sodium chloride 0.9 % 100 mL IVPB-VTB        Ordering Provider: Mark Vasquez DO    2,000 mg  200 mL/hr over 30 Minutes Intravenous Once 24 1610 24 1813          Current  Diuretic Therapy:  Diuretics (From admission, onward)      None          ----------------------------------------------------------------------------------------------------------------------  Vital Signs:  Temp:  [97.6 °F (36.4 °C)-98.7 °F (37.1 °C)] 98.7 °F (37.1 °C)  Heart Rate:  [72-85] 72  Resp:  [16-18] 16  BP: (127-156)/(72-82) 146/82     on   ;   Device (Oxygen Therapy): room air  Body mass index is 56.25 kg/m².    Wt Readings from Last 3 Encounters:   12/19/24 (!) 153 kg (338 lb)   10/24/24 116 kg (256 lb 6.4 oz)   10/11/24 (!) 164 kg (362 lb)     Intake & Output (last 3 days)         12/21 0701  12/22 0700 12/22 0701  12/23 0700 12/23 0701  12/24 0700    P.O. 1035 1300 960    I.V. (mL/kg)  0 (0) 203 (1.3)    IV Piggyback  100     Total Intake(mL/kg) 1035 (6.8) 1400 (9.2) 1163 (7.6)    Urine (mL/kg/hr) 650 (0.2) 250 (0.1) 200 (0.1)    Stool 0      Total Output 650 250 200    Net +385 +1150 +963           Urine Unmeasured Occurrence  2 x 2 x    Stool Unmeasured Occurrence 1 x            Diet: Cardiac; Healthy Heart (2-3 Na+); Fluid Consistency: Thin (IDDSI 0)  ----------------------------------------------------------------------------------------------------------------------  Physical exam:  Constitutional: Early obese female, nontoxic, Well-developed and well-nourished, resting comfortably in bed, no acute distress.      HENT:  Head:  Normocephalic and atraumatic.  Mouth:  Moist mucous membranes.  Eyes:  Conjunctivae and EOM are normal. No scleral icterus.   Cardiovascular:  Normal rate, regular rhythm and normal heart sounds with no murmur. No JVD.   Pulmonary/Chest:  No respiratory distress, no wheezes, no crackles, with normal breath sounds and good air movement. Unlabored. No accessory muscle use.  Abdominal:  Soft. No distension and no tenderness.  Bowel sounds present. No rebound or guarding.   Musculoskeletal:  No tenderness, and no deformity.  No red or swollen joints anywhere.    Neurological:   "Alert and oriented to person, place, and time. Nonfocal, weakness in upper and lower extremities bilaterally  Skin:  Skin is warm and dry.  Morbilliform rash on upper and lower extremities, trunk .no pallor.   Peripheral vascular:  No clubbing, no cyanosis, bilateral venous stasis.      ----------------------------------------------------------------------------------------------------------------------  Results from last 7 days   Lab Units 01/06/25 0202 01/05/25  0002 01/04/25  0303   WBC 10*3/mm3 9.65 8.51 9.34   HEMOGLOBIN g/dL 9.8* 9.8* 9.7*   HEMATOCRIT % 31.4* 32.0* 31.9*   MCV fL 99.1* 101.6* 100.9*   MCHC g/dL 31.2* 30.6* 30.4*   PLATELETS 10*3/mm3 167 175 183         Results from last 7 days   Lab Units 01/06/25 0202 01/05/25 0002 01/04/25  0303   SODIUM mmol/L 136 138 137   POTASSIUM mmol/L 4.2 4.6 4.4   MAGNESIUM mg/dL 1.8 2.0 2.0   CHLORIDE mmol/L 103 103 104   CO2 mmol/L 25.4 26.0 24.5   BUN mg/dL 16 15 14   CREATININE mg/dL 0.94 0.96 0.87   CALCIUM mg/dL 8.4* 8.2* 8.2*   GLUCOSE mg/dL 133* 117* 99   Estimated Creatinine Clearance: 97 mL/min (by C-G formula based on SCr of 0.94 mg/dL).  No results found for: \"AMMONIA\"                  No results found for: \"HGBA1C\", \"POCGLU\"  Lab Results   Component Value Date    TSH 3.320 12/19/2024    FREET4 1.30 12/27/2024     No results found for: \"PREGTESTUR\", \"PREGSERUM\", \"HCG\", \"HCGQUANT\"  Pain Management Panel  More data may exist         Latest Ref Rng & Units 12/19/2024 6/22/2023   Pain Management Panel   Creatinine, Urine mg/dL  mg/dL - 152.9  152.9    Amphetamine, Urine Qual Negative Negative  -   Barbiturates Screen, Urine Negative Negative  -   Benzodiazepine Screen, Urine Negative Negative  -   Buprenorphine, Screen, Urine Negative Negative  -   Cocaine Screen, Urine Negative Negative  -   Fentanyl, Urine Negative Negative  -   Methadone Screen , Urine Negative Negative  -   Methamphetamine, Ur Negative Negative  -      Details          Multiple values " "from one day are sorted in reverse-chronological order             Brief Urine Lab Results  (Last result in the past 365 days)        Color   Clarity   Blood   Leuk Est   Nitrite   Protein   CREAT   Urine HCG        01/01/25 1249 Red  Comment: Dipstick results may be inaccurate due to color interference.       Turbid   Large (3+)   Trace   Negative   100 mg/dL (2+)                 Blood Culture   Date Value Ref Range Status   12/19/2024 No growth at 4 days  Preliminary   12/19/2024 No growth at 4 days  Preliminary     Urine Culture   Date Value Ref Range Status   12/19/2024 >100,000 CFU/mL Mixed [place] Isolated  Final     No results found for: \"WOUNDCX\"  No results found for: \"STOOLCX\"  No results found for: \"RESPCX\"  No results found for: \"AFBCX\"          I have personally looked at the labs and they are summarized above.  ----------------------------------------------------------------------------------------------------------------------  Detailed radiology reports for the last 24 hours:  Imaging Results (Last 24 Hours)       ** No results found for the last 24 hours. **          Assessment & Plan      Patient is a 59-year-old female with history significant for atrial fibrillation, CKD and prior stroke who came to the ER with reports of gross hematuria and weakness.    #Acute on chronic debility  #Bilateral lower extremity weakness, multifactorial  --PT/OT, has not done well with therapy, notes updated, IPR declined, case management following    #Adrenal insufficiency, suspect central process  #Elevated testosterone, unclear etiology  --Cortisol low, ACTH low, testosterone elevated, DHEA-sulfate low, prolactin normal  --Estradiol elevated, FSH/LH low  --MRI brain with/without unremarkable for lesions  --CT abdomen pelvis with contrast negative for ovarian/adrenal tumor  --Pelvic ultrasound negative for ovarian lesions  --MRI pituitary protocol with and without without abnormal pituitary lesion  --After discussion " with endocrinology, increase Solu-Cortef to 15 mg daily with breakfast, 10 mg daily at 2 PM  --I discussed with endocrinology again on 12/27, working diagnosis remains a suspected small ovarian tumor, given the above images have been unremarkable, with MRI of the abdomen/pelvis with/without contrast, if this is negative, we will continue treatment plan and have her follow-up outpatient    #Acute urinary tract infection the E. coli  #Left staghorn calculus  --Urine culture positive for E. Coli  --Completed course of Rocephin  --Hematuria likely due to large UPJ calculus exacerbated by Eliquis  --Urology evaluated, recommend outpatient follow-up    #Atrial fibrillation, rate controlled  #Prior ischemic infarct  --MRI of the brain noted old infarcts but nothing acute  --Continue flecainide, Eliquis  --Follow-up outpatient    #Chronic venous stasis, wound care  #Morbid obesity, BMI 56    Copied portions of this report have been reviewed and are accurate as of 01/06/25     CHECKLIST:  Abx: None  VTE: Eliquis   GI ppx: PPI  Diet: Consistent carb  Code: CPR, full  Dispo: Patient is hemodynamically stable, has not done well with PT, declined by IPR Agreeable to SNF as well. Case management following.     Mark Vasquez DO  [facility] Hospitalist  01/06/25  17:55 EST

## 2025-01-06 NOTE — CASE MANAGEMENT/SOCIAL WORK
Discharge Planning Assessment  [facility]     Patient Name: Ana Maradiaga  MRN: [id number]  Today's Date: 1/6/2025    Admit Date: 12/19/2024    Plan: [facility] [place] on this date to follow up on referral. Per Leia, DON and admissions coordinator are not at facility on this date. She spoke with  who states referral is still being reviewed at this time. SS to follow.       Discharge Plan       Row Name 01/06/25 1605       Plan    Plan SS contacted [facility] per [place] on this date to follow up on referral. Per Leia, ÁLVARO and admissions coordinator are not at facility on this date. She spoke with  who states referral is still being reviewed at this time. SS to follow.        Continued Care and Services - Admitted Since 12/19/2024       Destination       Service Provider Request Status Services Address Phone Fax Patient Preferred    [facility] - Request Sent -- [address] [address] [phone] --    [facility] Declined  over weight limit -- [address] [address] [phone] --    [facility] Declined  Bed not available -- [address] [address] [phone] --        Expected Discharge Date and Time       Expected Discharge Date Expected Discharge Time    Jan 7, 2025          ARMANDO Sands

## 2025-01-06 NOTE — THERAPY TREATMENT NOTE
Acute Care - Physical Therapy Treatment Note   [facility]     Patient Name: Ana Maradiaga  : 1965  MRN: [id number]  Today's Date: 2025   Onset of Illness/Injury or Date of Surgery: 24  Visit Dx:     ICD-10-CM ICD-9-CM   1. Urinary tract infection with hematuria, site unspecified  N39.0 599.0    R31.9 599.70   2. Weakness  R53.1 780.79   3. High serum testosterone  R79.89 790.99     Patient Active Problem List   Diagnosis    Type 2 diabetes mellitus with hyperglycemia, without long-term current use of insulin    Acquired hypothyroidism    Hirsutism    Elevated testosterone level in female    Complicated UTI (urinary tract infection)     Past Medical History:   Diagnosis Date    Anemia     Arrhythmia     Afib    Arthritis     Atrial fibrillation     Bronchitis     Chronic kidney disease     Gout     Hypertension     Stroke 2021     History reviewed. No pertinent surgical history.  PT Assessment (Last 12 Hours)       PT Evaluation and Treatment       Row Name 25 1109          Physical Therapy Time and Intention    Subjective Information no complaints  -LL     Document Type therapy note (daily note)  -LL     Mode of Treatment physical therapy;individual therapy  -LL     Patient Effort good  -LL     Comment Patient & RN in agreement for participation with PT.  -LL       Row Name 25 1109          Cognition    Affect/Mental Status (Cognition) WFL  -LL     Orientation Status (Cognition) oriented x 4  -LL     Follows Commands (Cognition) WFL  -LL     Personal Safety Interventions fall prevention program maintained;gait belt;nonskid shoes/slippers when out of bed;supervised activity  -LL       Row Name 25 1109          Bed Mobility    Bed Mobility supine-sit;rolling left;rolling right  -LL     Rolling Left [place] (Bed Mobility) modified independence  -LL     Rolling Right [place] (Bed Mobility) modified independence  -LL     Supine-Sit [place] (Bed  Mobility) contact guard;minimum assist (75% patient effort)  -LL     Assistive Device (Bed Mobility) bed rails  -LL       Row Name 01/06/25 1109          Transfers    Transfers sit-stand transfer;stand-sit transfer  -LL       Row Name 01/06/25 1109          Sit-Stand Transfer    Sit-Stand [place] (Transfers) contact guard;standby assist  -LL     Assistive Device (Sit-Stand Transfers) walker, front-wheeled  -LL       Row Name 01/06/25 1109          Stand-Sit Transfer    Stand-Sit [place] (Transfers) contact guard;standby assist  -LL     Assistive Device (Stand-Sit Transfers) walker, front-wheeled  -LL       Row Name 01/06/25 1109          Stand Pivot/Stand Step Transfer    Stand Pivot/Stand Step [place] (Transfers) contact guard;standby assist  -LL     Assistive Device (Stand Pivot Stand Step Transfer) walker, front-wheeled  -LL       Row Name 01/06/25 1109          Toilet Transfer    Type (Toilet Transfer) sit-stand;stand-sit  -LL     [place] Level (Toilet Transfer) contact guard;minimum assist (75% patient effort)  -LL     Assistive Device (Toilet Transfer) walker, front-wheeled  -LL     Comment, (Toilet Transfer) Patient required a little more assistance with transfer d/t being a low toilet.  Patient cleaned herself, and exhibited a skin tear on R forearm above wrist.  RN notified and addressing.  -LL       Row Name 01/06/25 1109          Gait/Stairs (Locomotion)    Gait/Stairs Locomotion gait/ambulation assistive device  -LL     [place] Level (Gait) contact guard;standby assist  -LL     Assistive Device (Gait) walker, front-wheeled  -LL     Distance in Feet (Gait) --  20' x 2, 35'  -LL     Pattern (Gait) step-to  -LL       Row Name 01/06/25 1109          Motor Skills    Therapeutic Exercise --  Seated: LAQ, marching, pillow squeeze, AP  -LL       Row Name 01/06/25 1109          Positioning and Restraints    Pre-Treatment Position in bed  -LL     Post Treatment Position chair  -LL     In  Chair reclined;call light within reach;encouraged to call for assist;with family/caregiver  With tray table within reach  -LL               User Key  (r) = Recorded By, (t) = Taken By, (c) = Cosigned By      Initials Name Provider Type    Chaparrita Gracia PTA Physical Therapist Assistant                    Physical Therapy Education       Title: PT OT SLP Therapies (Done)       Topic: Physical Therapy (Done)       Point: Mobility training (Done)       Learning Progress Summary            Patient Acceptance, E,TB, VU by DM at 1/5/2025 1713    Acceptance, E,TB, VU by DM at 1/3/2025 1453    Acceptance, TB,E, VU by RG at 12/29/2024 1000    Acceptance, E,TB, VU by AD at 12/27/2024 2313    Acceptance, E,TB, VU by CF at 12/26/2024 1222    Acceptance, E,TB, VU by CF at 12/24/2024 0905    Acceptance, E, VU by SC at 12/24/2024 0331    Acceptance, E,D, VU,NR by AG at 12/23/2024 1249                      Point: Home exercise program (Done)       Learning Progress Summary            Patient Acceptance, E,TB, VU by DM at 1/5/2025 1713    Acceptance, E,TB, VU by DM at 1/3/2025 1453    Acceptance, TB,E, VU by RG at 12/29/2024 1000    Acceptance, E,TB, VU by AD at 12/27/2024 2313    Acceptance, E,TB, VU by CF at 12/26/2024 1222    Acceptance, E,TB, VU by CF at 12/24/2024 0905    Acceptance, E, VU by SC at 12/24/2024 0331    Acceptance, E,D, VU,NR by AG at 12/23/2024 1249                      Point: Body mechanics (Done)       Learning Progress Summary            Patient Acceptance, E,TB, VU by DM at 1/5/2025 1713    Acceptance, E,TB, VU by DM at 1/3/2025 1453    Acceptance, TB,E, VU by RG at 12/29/2024 1000    Acceptance, E,TB, VU by AD at 12/27/2024 2313    Acceptance, E,TB, VU by CF at 12/26/2024 1222    Acceptance, E,TB, VU by CF at 12/24/2024 0905    Acceptance, BETINA, VU by SC at 12/24/2024 0331    Acceptance, E,D, VU,NR by AG at 12/23/2024 1249                      Point: Precautions (Done)       Learning Progress Summary             Patient Acceptance, E,TB, VU by DM at 1/5/2025 1713    Acceptance, E,TB, VU by DM at 1/3/2025 1453    Acceptance, TB,E, VU by RG at 12/29/2024 1000    Acceptance, E,TB, VU by AD at 12/27/2024 2313    Acceptance, E,TB, VU by CF at 12/26/2024 1222    Acceptance, E,TB, VU by CF at 12/24/2024 0905    Acceptance, E, VU by SC at 12/24/2024 0331    Acceptance, E,D, VU,NR by AG at 12/23/2024 1249                                      User Key       Initials Effective Dates Name Provider Type Discipline    AD 06/06/23 -  Kelle Pinto, RN Registered Nurse Nurse    AG 06/16/21 -  Elizabeth Aquino, PT Physical Therapist PT    DM 06/16/21 -  Moniz, Deborah, RN Registered Nurse Nurse    RG 06/06/23 -  Robert Engle, RN Registered Nurse Nurse    CF 06/25/24 -  Ebonie Rangel RN Registered Nurse Nurse    SC 09/11/24 -  Naya Lowry RN Registered Nurse Nurse                  PT Recommendation and Plan             Time Calculation:    PT Charges       Row Name 01/06/25 1113             Time Calculation    PT Received On 01/06/25  -LL         Time Calculation- PT    Total Timed Code Minutes- PT 40 minute(s)  -LL                User Key  (r) = Recorded By, (t) = Taken By, (c) = Cosigned By      Initials Name Provider Type    LL Chaparrita Basilio, ITZ Physical Therapist Assistant                  Therapy Charges for Today       Code Description Service Date Service Provider Modifiers Qty    94827563805 HC GAIT TRAINING EA 15 MIN 1/6/2025 Chaparrita Basilio PTA GP, CQ 1    32405200217 HC PT THERAPEUTIC ACT EA 15 MIN 1/6/2025 Chaparrita Basilio PTA GP, CQ 1    76868892838 HC PT THER PROC EA 15 MIN 1/6/2025 Chaparrita Basilio PTA GP, CQ 1            PT G-Codes  AM-PAC 6 Clicks Score (PT): 13    Meño Basilio PTA  1/6/2025

## 2025-01-06 NOTE — NURSING NOTE
In room to change Unna's boots per protocol.      RLE with Unna's boot loose and rolled down to mid-calf.  Ace wrap has been pushed down to mid-calf as well.  When ace and Unna's removed, patient noted to have maroon discoloration and shifting edema creating rolls.      LLE with Unna's boot and ace wrap still in place.  This was removed.  LLE pink and edematous.      Ammonium lactate lotion applied to bilat lower legs per order.  Will leave wraps off and reassess tomorrow.

## 2025-01-06 NOTE — THERAPY TREATMENT NOTE
Acute Care - Occupational Therapy Treatment Note   [facility]     Patient Name: Ana Maradiaga  : 1965  MRN: [id number]  Today's Date: 2025  Onset of Illness/Injury or Date of Surgery: 24     Referring Physician: Dr. Reardon    Admit Date: 2024       ICD-10-CM ICD-9-CM   1. Urinary tract infection with hematuria, site unspecified  N39.0 599.0    R31.9 599.70   2. Weakness  R53.1 780.79   3. High serum testosterone  R79.89 790.99     Patient Active Problem List   Diagnosis    Type 2 diabetes mellitus with hyperglycemia, without long-term current use of insulin    Acquired hypothyroidism    Hirsutism    Elevated testosterone level in female    Complicated UTI (urinary tract infection)     Past Medical History:   Diagnosis Date    Anemia     Arrhythmia     Afib    Arthritis     Atrial fibrillation     Bronchitis     Chronic kidney disease     Gout     Hypertension     Stroke 2021     History reviewed. No pertinent surgical history.      OT ASSESSMENT FLOWSHEET (Last 12 Hours)       OT Evaluation and Treatment       Row Name 25 1147                   OT Time and Intention    Document Type therapy note (daily note)  -KR        Mode of Treatment occupational therapy  -KR        Patient Effort good  -KR           Cognition    Affect/Mental Status (Cognition) WFL  -KR        Orientation Status (Cognition) oriented x 4  -KR        Follows Commands (Cognition) WFL  -KR           Bathing Assessment/Intervention    [place] Level (Bathing) bathing skills;moderate assist (50% patient effort)  -KR           Upper Body Dressing Assessment/Training    [place] Level (Upper Body Dressing) upper body dressing skills;moderate assist (50% patient effort)  -KR           Lower Body Dressing Assessment/Training    [place] Level (Lower Body Dressing) lower body dressing skills;moderate assist (50% patient effort)  -KR           Grooming Assessment/Training    [place]  Level (Grooming) grooming skills;minimum assist (75% patient effort)  -KR           Shoulder (Therapeutic Exercise)    Shoulder AROM (Therapeutic Exercise) bilateral;flexion;extension;sitting  -KR           Elbow/Forearm (Therapeutic Exercise)    Elbow/Forearm AROM (Therapeutic Exercise) bilateral;flexion;extension;sitting  -KR           Hand (Therapeutic Exercise)    Hand AROM/AAROM (Therapeutic Exercise) bilateral;finger flexion;finger extension  -KR           Plan of Care Review    Plan of Care Reviewed With patient  -KR           Dressing Goal 1 (OT)    Progress/Outcome (Dressing Goal 1, OT) continuing progress toward goal  -KR           Strength Goal 1 (OT)    Progress/Outcome (Strength Goal 1, OT) continuing progress toward goal  -KR            Activity Tolerance Goal 1 (OT)    Progress/Outcome (Activity Tolerance Goal 1, OT) continuing progress toward goal  -KR                  User Key  (r) = Recorded By, (t) = Taken By, (c) = Cosigned By      Initials Name Effective Dates    Dano Bruno OT 06/16/21 -                      Occupational Therapy Education        No education to display                      OT Recommendation and Plan  Planned Therapy Interventions (OT): activity tolerance training, adaptive equipment training, BADL retraining, strengthening exercise  Plan of Care Review  Plan of Care Reviewed With: patient  Progress: improving  Plan of Care Reviewed With: patient        Time Calculation:     Therapy Charges for Today       Code Description Service Date Service Provider Modifiers Qty    11328848753  OT THERAPEUTIC ACT EA 15 MIN 1/6/2025 Dano Samuel OT GO 1                 Dano Samuel OT  1/6/2025

## 2025-01-06 NOTE — PROGRESS NOTES
Consult Note     Patient Identification:  Name:  Ana Maradiaga  Age:  59 y.o.  Sex:  female  :  1965  MRN:  [id number]   Visit Number:  [id number]  Primary Care Physician:  Val Purcell PA     Subjective     Chief complaint:   Chief Complaint   Patient presents with    Weakness - Generalized    Blood in Urine       History of presenting illness:     Patient is a 59 y.o. female with past medical history significant for anemia, CKD, atrial fibrillation, hypertension, gout and CVA that presented to the [facility] for complaints of hematuria. Wound care consulted to evaluate bilateral lower extremities. She reports she has had lymphedema for several years now but has not undergone any therapy or treatment. She states she has dealt with recurrent cellulitis which led to hospitalization. She currently does not appear to have cellulitis but has a purple/red discoloration that she reports has been chronic after her last recurrence of cellulitis. Denies pain or open wounds. No fever or chills. The patients nurse, Dia, was at bedside during her exam.     24: The patient was seen while resting in bed. She denies any new issues. Bilateral lower extremities are showing improvement with less dry skin. Edema continues to be present. She denies fever or chills.     24: The patient was seen while resting in bedside chair. She denies any new issues. She denies fever or chills. Tolerating unna boots.    ---------------------------------------------------------------------------------------------------------------------   Review of Systems:  Review of Systems   Constitutional: Negative.    Respiratory: Negative.     Cardiovascular:  Positive for leg swelling.   Gastrointestinal: Negative.    Musculoskeletal:  Positive for gait problem.   Skin: Negative.         ---------------------------------------------------------------------------------------------------------------------    Past Medical History:   Diagnosis Date    Anemia     Arrhythmia 2018    Afib    Arthritis     Atrial fibrillation 2018    Bronchitis     Chronic kidney disease 2018    Gout     Hypertension     Stroke November 2021     History reviewed. No pertinent surgical history.  Family History   Problem Relation Age of Onset    Heart disease Mother     Stroke Mother     Hypertension Father     Arthritis Father     Hypertension Sister     Breast cancer Paternal Cousin      Social History     Socioeconomic History    Marital status: Single   Tobacco Use    Smoking status: Never    Smokeless tobacco: Never   Vaping Use    Vaping status: Never Used   Substance and Sexual Activity    Alcohol use: Never    Drug use: Never    Sexual activity: Not Currently     Partners: Male     ---------------------------------------------------------------------------------------------------------------------   Allergies:  Vancomycin, Cefepime, and Cephalosporins  ---------------------------------------------------------------------------------------------------------------------   Medications below are reported home medications pulling from within the system; at this time, these medications have not been reconciled unless otherwise specified and are in the verification process for further verifcation as current home medications.    Prior to Admission Medications       Prescriptions Last Dose Informant Patient Reported? Taking?    amLODIPine (NORVASC) 5 MG tablet Past Week Self, Other No Yes    TAKE 1 TABLET BY MOUTH DAILY    Aspirin Low Dose 81 MG EC tablet 12/19/2024 Self, Other Yes Yes    Take 1 tablet by mouth Daily.    atorvastatin (LIPITOR) 40 MG tablet Past Week Self, Other Yes Yes    Take 1 tablet by mouth every night at bedtime.    bumetanide (BUMEX) 1 MG tablet 12/19/2024 Self, Other Yes Yes    Take 2 tablets by mouth Daily.    carvedilol (COREG) 6.25 MG tablet 12/19/2024 Self, Other No Yes    TAKE 1 TABLET BY MOUTH EVERY 12 HOURS     Eliquis 5 MG tablet tablet 12/19/2024 Self, Other No Yes    Take 1 tablet by mouth Every 12 (Twelve) Hours.    flecainide (TAMBOCOR) 100 MG tablet 12/19/2024 Self, Other No Yes    TAKE 1 TABLET BY MOUTH TWICE A DAY    levothyroxine (SYNTHROID, LEVOTHROID) 125 MCG tablet 12/19/2024 Self, Other Yes Yes    Take 1 tablet by mouth Daily.    magnesium oxide (MAG-OX) 400 MG tablet 12/19/2024 Self Yes Yes    Take 1 tablet by mouth Daily.    Tirzepatide (Mounjaro) 5 MG/0.5ML solution auto-injector 12/13/2024 Self, Other No No    Inject 0.5 mL under the skin into the appropriate area as directed 1 (One) Time Per Week.    vitamin B-12 (CYANOCOBALAMIN) 1000 MCG tablet 12/19/2024 Self Yes Yes    Take 1 tablet by mouth Daily.    vitamin D3 125 MCG (5000 UT) capsule capsule 12/19/2024 Self Yes Yes    Take 1 capsule by mouth Daily.          ---------------------------------------------------------------------------------------------------------------------    Objective     Hospital Scheduled Meds:  ammonium lactate, 1 Application, Topical, Daily  apixaban, 5 mg, Oral, Q12H  aspirin, 81 mg, Oral, Daily  atorvastatin, 40 mg, Oral, Nightly  flecainide, 100 mg, Oral, BID  folic acid, 1 mg, Oral, Daily  Gelocast Unnas boot, 2 each, Topical, Q3 Days  hydrocortisone, 10 mg, Oral, Daily  hydrocortisone, 15 mg, Oral, Daily With Breakfast  hydrocortisone, 1 Application, Topical, Q8H  levothyroxine, 125 mcg, Oral, Daily  magnesium oxide, 400 mg, Oral, Daily  sodium chloride, 10 mL, Intravenous, Q12H  Tirzepatide, 0.5 mL, Subcutaneous, Weekly  vitamin B-12, 1,000 mcg, Oral, Daily  vitamin D3, 5,000 Units, Oral, Daily           Current listed hospital scheduled medications may not yet reflect those currently placed in orders that are signed and held, awaiting patient's arrival to floor/unit.    ---------------------------------------------------------------------------------------------------------------------   Vital Signs:  Temp:  [97.6 °F  (36.4 °C)-98.7 °F (37.1 °C)] 98.2 °F (36.8 °C)  Heart Rate:  [68-85] 85  Resp:  [16-18] 18  BP: (127-156)/(71-82) 144/82  Mean Arterial Pressure (Non-Invasive) for the past 24 hrs (Last 3 readings):   Noninvasive MAP (mmHg)   01/06/25 0224 95   01/05/25 2334 90   01/05/25 1912 109     SpO2 Percentage    01/04/25 2303 01/05/25 0300 01/05/25 0643   SpO2: 98% 97% 95%        on   ;   Device (Oxygen Therapy): room air    Body mass index is 56.25 kg/m².  Wt Readings from Last 3 Encounters:   12/19/24 (!) 153 kg (338 lb)   10/24/24 116 kg (256 lb 6.4 oz)   10/11/24 (!) 164 kg (362 lb)       ---------------------------------------------------------------------------------------------------------------------   Physical Exam:    B/L lower extremities with unna boots in place. Tolerating with no issues.     Assessment & Plan      Assessment     Lymphedema  Xerosis     Plan:      B/L lower extremities showing improvement.     ISABEL values within normal limits.  No evidence of peripheral arterial disease. Recommend to continue to apply ammonium lactate lotion followed by application of unna boots. This can be changed to weekly if the patient is agreeable.She is concerned about dressing changes frequency so may benefit from outpatient wound care clinic or home health if eligible.      She would benefit from outpatient lymphedema treatment as well.      Ema Paris PA-C  [facility]    01/06/25  10:22 EST

## 2025-01-07 LAB
INR PPP: 1.26 (ref 0.9–1.1)
PROTHROMBIN TIME: 15.9 SECONDS (ref 12.1–14.7)

## 2025-01-07 PROCEDURE — 97164 PT RE-EVAL EST PLAN CARE: CPT

## 2025-01-07 PROCEDURE — 85610 PROTHROMBIN TIME: CPT | Performed by: STUDENT IN AN ORGANIZED HEALTH CARE EDUCATION/TRAINING PROGRAM

## 2025-01-07 PROCEDURE — 99231 SBSQ HOSP IP/OBS SF/LOW 25: CPT | Performed by: STUDENT IN AN ORGANIZED HEALTH CARE EDUCATION/TRAINING PROGRAM

## 2025-01-07 PROCEDURE — 97530 THERAPEUTIC ACTIVITIES: CPT

## 2025-01-07 RX ORDER — WARFARIN SODIUM 5 MG/1
5 TABLET ORAL
Status: COMPLETED | OUTPATIENT
Start: 2025-01-07 | End: 2025-01-07

## 2025-01-07 RX ORDER — BUMETANIDE 1 MG/1
2 TABLET ORAL DAILY PRN
Status: DISCONTINUED | OUTPATIENT
Start: 2025-01-07 | End: 2025-01-09 | Stop reason: HOSPADM

## 2025-01-07 RX ADMIN — FOLIC ACID 1 MG: 1 TABLET ORAL at 10:01

## 2025-01-07 RX ADMIN — FLECAINIDE ACETATE 100 MG: 50 TABLET ORAL at 22:23

## 2025-01-07 RX ADMIN — ATORVASTATIN CALCIUM 40 MG: 40 TABLET, FILM COATED ORAL at 22:23

## 2025-01-07 RX ADMIN — WARFARIN SODIUM 5 MG: 5 TABLET ORAL at 22:23

## 2025-01-07 RX ADMIN — ASPIRIN 81 MG: 81 TABLET, COATED ORAL at 10:00

## 2025-01-07 RX ADMIN — Medication 5000 UNITS: at 10:00

## 2025-01-07 RX ADMIN — Medication 1000 MCG: at 10:01

## 2025-01-07 RX ADMIN — FLECAINIDE ACETATE 100 MG: 50 TABLET ORAL at 10:00

## 2025-01-07 RX ADMIN — HYDROCORTISONE 10 MG: 10 TABLET ORAL at 15:39

## 2025-01-07 RX ADMIN — BUMETANIDE 2 MG: 1 TABLET ORAL at 22:23

## 2025-01-07 RX ADMIN — APIXABAN 5 MG: 5 TABLET, FILM COATED ORAL at 10:00

## 2025-01-07 RX ADMIN — AMMONIUM LACTATE 1 APPLICATION: 120 CREAM TOPICAL at 10:01

## 2025-01-07 RX ADMIN — HYDROCORTISONE 15 MG: 10 TABLET ORAL at 10:00

## 2025-01-07 RX ADMIN — Medication 400 MG: at 10:00

## 2025-01-07 RX ADMIN — LEVOTHYROXINE SODIUM 125 MCG: 0.12 TABLET ORAL at 10:00

## 2025-01-07 NOTE — THERAPY TREATMENT NOTE
Acute Care - Occupational Therapy Treatment Note   E[facility]     Patient Name: Ana Maradiaga  : 1965  MRN: [id number]  Today's Date: 2025  Onset of Illness/Injury or Date of Surgery: 24     Referring Physician: Dr. Reardon    Admit Date: 2024       ICD-10-CM ICD-9-CM   1. Urinary tract infection with hematuria, site unspecified  N39.0 599.0    R31.9 599.70   2. Weakness  R53.1 780.79   3. High serum testosterone  R79.89 790.99     Patient Active Problem List   Diagnosis    Type 2 diabetes mellitus with hyperglycemia, without long-term current use of insulin    Acquired hypothyroidism    Hirsutism    Elevated testosterone level in female    Complicated UTI (urinary tract infection)     Past Medical History:   Diagnosis Date    Anemia     Arrhythmia     Afib    Arthritis     Atrial fibrillation     Bronchitis     Chronic kidney disease     Gout     Hypertension     Stroke 2021     History reviewed. No pertinent surgical history.      OT ASSESSMENT FLOWSHEET (Last 12 Hours)       OT Evaluation and Treatment       Row Name 25 1157                   OT Time and Intention    Document Type therapy note (daily note)  -KR        Mode of Treatment occupational therapy  -KR        Patient Effort adequate  -KR           General Information    General Observations of Patient alert/cooperative  -KR           Bathing Assessment/Intervention    [place] Level (Bathing) bathing skills;moderate assist (50% patient effort)  -KR           Upper Body Dressing Assessment/Training    [place] Level (Upper Body Dressing) upper body dressing skills;moderate assist (50% patient effort)  -KR           Lower Body Dressing Assessment/Training    [place] Level (Lower Body Dressing) lower body dressing skills;moderate assist (50% patient effort)  -KR           Grooming Assessment/Training    [place] Level (Grooming) grooming skills;set up  -KR           Self-Feeding  Assessment/Training    Hand Level (Feeding) feeding skills;set up  -KR           Toileting Assessment/Training    Hand Level (Toileting) toileting skills;moderate assist (50% patient effort)  -KR           Shoulder (Therapeutic Exercise)    Shoulder AROM (Therapeutic Exercise) bilateral;flexion;extension;sitting  -KR        Shoulder AAROM (Therapeutic Exercise) bilateral;flexion;extension;sitting  -KR           Elbow/Forearm (Therapeutic Exercise)    Elbow/Forearm AROM (Therapeutic Exercise) bilateral;flexion;extension;sitting  -KR           Hand (Therapeutic Exercise)    Hand AROM/AAROM (Therapeutic Exercise) bilateral;finger flexion;finger extension  -KR           Plan of Care Review    Plan of Care Reviewed With patient  -KR        Progress improving  -KR           Dressing Goal 1 (OT)    Progress/Outcome (Dressing Goal 1, OT) continuing progress toward goal  -KR           Strength Goal 1 (OT)    Progress/Outcome (Strength Goal 1, OT) continuing progress toward goal  -KR            Activity Tolerance Goal 1 (OT)    Progress/Outcome (Activity Tolerance Goal 1, OT) continuing progress toward goal  -KR                  User Key  (r) = Recorded By, (t) = Taken By, (c) = Cosigned By      Initials Name Effective Dates    Dano Bruno OT 06/16/21 -                      Occupational Therapy Education        No education to display                      OT Recommendation and Plan  Planned Therapy Interventions (OT): activity tolerance training, adaptive equipment training, BADL retraining, strengthening exercise  Plan of Care Review  Plan of Care Reviewed With: patient  Progress: improving  Plan of Care Reviewed With: patient        Time Calculation:     Therapy Charges for Today       Code Description Service Date Service Provider Modifiers Qty    [id number]  OT THERAPEUTIC ACT EA 15 MIN 1/6/2025 Dano Samuel OT GO 1    [id number]  OT THERAPEUTIC ACT EA 15 MIN 1/7/2025 Dano Samuel OT GO 1                  Dano Samuel, OT  1/7/2025

## 2025-01-07 NOTE — DISCHARGE PLACEMENT REQUEST
"Ana Maradiaga (59 y.o. Female)       Date of Birth   1965    Social Security Number       Address   [address] [place] [address]    Home Phone   [phone]    MRN   [id number]       Presybeterian   Lutheran    Marital Status   Single                            Admission Date   24    Admission Type   Emergency    Admitting Provider   Thien Reardon DO    Attending Provider   Mark Vasquez DO    Department, Room/Bed   [address], 3336/       Discharge Date       Discharge Disposition       Discharge Destination                                 Attending Provider: Mark Vasquez DO    Allergies: Vancomycin, Cefepime, Cephalosporins    Isolation: None   Infection: None   Code Status: CPR    Ht: 165.1 cm (65\")   Wt: 153 kg (338 lb)    Admission Cmt: None   Principal Problem: Complicated UTI (urinary tract infection) [N39.0]                   Active Insurance as of 2024       Primary Coverage       Payor Plan Insurance Group Employer/Plan Group    ANTHEM MEDICARE REPLACEMENT ANTHEM MEDICARE ADVANTAGE [id number]       Payor Plan Address Payor Plan Phone Number Payor Plan Fax Number Effective Dates    [address] [phone]  2024 - None Entered    [facility] [id number]         Subscriber Name Subscriber Birth Date Member ID       ANA MARADIAGA 1965 [id number]                     Emergency Contacts        (Rel.) Home Phone Work Phone Mobile Phone    BLANCA MARADIAGA (Son) -- -- [phone]    CAMPOSCYDNEY (Relative) -- -- [phone]                 History & Physical        Thien Reardon DO at 24 1837          [facility]   HISTORY AND PHYSICAL    Patient Name: Ana Maradiaga  : 1965  MRN: [id number]  Primary Care Physician:  Val Purcell PA  Date of admission: 2024    Subjective  Subjective     Chief Complaint: Hematuria leg weakness    History of Present Illness  Patient is a " 59-year-old female with past medical history of anemia, atrial fibrillation, chronic kidney disease, hypertension, gout, and a history of a CVA.  Patient presents to the emergency department with a couple of different complaints.  Patient says she started noticing some hematuria for the last couple of days.  Patient had a CT scan of her abdomen to evaluate for possible intra-abdominal tumors.  The patient has had very high levels of testosterone and they were looking to rule out hormone secreting tumors.  They did not discover tumors but did find a staghorn calculus in her left kidney.  Patient says she had had occasional dysuria but all of it has been much worse today.  Patient is also been experiencing gradual but significant lower extremity weakness.  Patient says that it is not on focal but over the last several weeks she has had more and more difficulty ambulating throughout the house.  Patient says the only thing that is really different in her routine is that she was started on Mounjaro.  She has lost 20 pounds and is having no GI difficulties.  Here in the emergency department the patient was found to have a significant nitrite positive urinary tract infection with large blood.  Her blood work shows an increased creatinine at 1.55, her white blood cell count is greater than 10 and her LDH is elevated.  Review of Systems   As stated above in his present illness all other systems were reviewed and subsequently negative  Personal History     Past Medical History:   Diagnosis Date    Anemia     Arrhythmia 2018    Afib    Arthritis     Atrial fibrillation 2018    Bronchitis     Chronic kidney disease 2018    Gout     Hypertension     Stroke November 2021       No past surgical history on file.    Family History: family history includes Arthritis in her father; Breast cancer in her paternal cousin; Heart disease in her mother; Hypertension in her father and sister; Stroke in her mother. Otherwise pertinent FHx was  reviewed and not pertinent to current issue.    Social History:  reports that she has never smoked. She has never used smokeless tobacco. She reports that she does not drink alcohol and does not use drugs.    Home Medications:  Tirzepatide, amLODIPine, apixaban, aspirin, atorvastatin, bumetanide, carvedilol, flecainide, levothyroxine, magnesium oxide, vitamin B-12, and vitamin D3    Allergies:  Allergies   Allergen Reactions    Vancomycin Hives    Cefepime Rash    Cephalosporins Rash       Objective   Objective     Vitals:   Temp:  [97.9 °F (36.6 °C)] 97.9 °F (36.6 °C)  Heart Rate:  [66-69] 69  Resp:  [17] 17  BP: (127-159)/(74-92) 149/81    Physical Exam  Constitutional:  Well-developed and well-nourished.  No acute distress.      HENT:  Head:  Normocephalic and atraumatic.  Mouth:  Moist mucous membranes.    Eyes:  Conjunctivae and EOM are normal. No scleral icterus.    Neck:  Neck supple.  No JVD present.    Cardiovascular:  Normal rate, regular rhythm and normal heart sounds with no murmur.  Pulmonary/Chest:  No respiratory distress, no wheezes, no crackles, with normal breath sounds and good air movement.  Abdominal:  Soft. No distension and no tenderness.   Musculoskeletal:  No tenderness, and no deformity.  No red or swollen joints anywhere.    Neurological:  Alert and oriented to person, place, and time.  No cranial nerve deficit.    Skin:  Skin is warm and dry. No rash noted. No pallor.   Peripheral vascular:  No clubbing, no cyanosis, no edema.  Psychiatric: Appropriate mood and affect  :    Result Review   Result Review:  I have personally reviewed the results from the time of this admission to 12/19/2024 18:37 EST and agree with these findings:  []  Laboratory list / accordion  []  Microbiology  []  Radiology  []  EKG/Telemetry   []  Cardiology/Vascular   []  Pathology  []  Old records  []  Other:  Most notable findings include:       Assessment & Plan  Assessment / Plan     Brief Patient  Summary:  Ana Maradiaga is a 59 y.o. female who presents to the ED with complaints of bilateral lower extremity weakness and hematuria.  She is found to have a significant urinary tract infection and the workup for her lower extremity weakness has began    Active Hospital Problems:  Complicated UTI  - Patient was started on Rocephin in the ED which we will continue on the floor  - Gentle hydration overnight  - Await urine culture    Bilateral lower extremity weakness  -MRI of the brain  - PT and OT evaluation  - Consider neurology consultation  -    VTE Prophylaxis:  Mechanical VTE prophylaxis orders are signed & held.          CODE STATUS:    Code Status (Patient has no pulse and is not breathing): CPR (Attempt to Resuscitate)  Medical Interventions (Patient has pulse or is breathing): Full Support    Admission Status:  I believe this patient meets inpatient status.    Thien Reardon DO    Electronically signed by Thien Reardon DO at 12/19/24 1901       Vital Signs (last day)       Date/Time Temp Temp src Pulse Resp BP Patient Position SpO2    01/07/25 1400 98 (36.7) Oral 81 18 138/68 Lying --    01/07/25 1030 97.8 (36.6) Oral 83 18 134/62 Lying --    01/07/25 0645 98 (36.7) Oral 75 18 146/73 Lying --    01/07/25 0242 97.8 (36.6) Oral 76 17 140/70 Lying --    01/06/25 2300 97.4 (36.3) Oral 74 17 147/71 Lying --    01/06/25 1942 98.8 (37.1) Oral 75 17 149/68 Lying --    01/06/25 1442 98.7 (37.1) Oral 72 16 146/82 Lying --    01/06/25 1020 -- -- 85 18 144/82 -- --    01/06/25 0645 98.2 (36.8) Oral 78 16 156/72 Lying --    01/06/25 0224 98.7 (37.1) Oral 74 18 141/74 Lying --          Intake & Output (last day)         01/06 0701  01/07 0700 01/07 0701  01/08 0700    P.O. 950 660    Total Intake(mL/kg) 950 (6.2) 660 (4.3)    Urine (mL/kg/hr) 1100 (0.3) 100 (0.1)    Total Output 1100 100    Net -150 +560          Urine Unmeasured Occurrence 2 x 1 x          Lines, Drains & Airways       Active LDAs       Name  Placement date Placement time Site Days    External Urinary Catheter --  --  --  --                  Current Facility-Administered Medications   Medication Dose Route Frequency Provider Last Rate Last Admin    ammonium lactate (AMLACTIN) 12 % cream 1 Application  1 Application Topical Daily Ema Paris PA-C   1 Application at 01/07/25 1001    apixaban (ELIQUIS) tablet 5 mg  5 mg Oral Q12H Thien Reardon DO   5 mg at 01/07/25 1000    aspirin EC tablet 81 mg  81 mg Oral Daily Thien Reardon DO   81 mg at 01/07/25 1000    atorvastatin (LIPITOR) tablet 40 mg  40 mg Oral Nightly Thien Reardon DO   40 mg at 01/06/25 2119    sennosides-docusate (PERICOLACE) 8.6-50 MG per tablet 2 tablet  2 tablet Oral BID PRN Thien Reardon DO        And    polyethylene glycol (MIRALAX) packet 17 g  17 g Oral Daily PRN Thien Reardon DO   17 g at 12/20/24 1035    And    bisacodyl (DULCOLAX) EC tablet 5 mg  5 mg Oral Daily PRN Thien Reardon DO        And    bisacodyl (DULCOLAX) suppository 10 mg  10 mg Rectal Daily PRN Thien Reardon DO        bumetanide (BUMEX) tablet 2 mg  2 mg Oral Daily PRN Mark Vasquez DO        Calcium Replacement - Follow Nurse / BPA Driven Protocol   Not Applicable PRN Thien Reardon DO        flecainide (TAMBOCOR) tablet 100 mg  100 mg Oral BID Thien Reardon DO   100 mg at 01/07/25 1000    folic acid (FOLVITE) tablet 1 mg  1 mg Oral Daily Mark Vasquez DO   1 mg at 01/07/25 1001    Gelocast Unnas boot 2 each  2 each Topical Q3 Days Ema Paris PA-C   2 each at 01/02/25 1443    hydrocortisone (CORTEF) tablet 10 mg  10 mg Oral Daily Mark Vasquez DO   10 mg at 01/06/25 1545    hydrocortisone (CORTEF) tablet 15 mg  15 mg Oral Daily With Breakfast Mark Vasquez DO   15 mg at 01/07/25 1000    hydrocortisone 1 % cream 1 Application  1 Application Topical Q8H Mark Vasquez DO   1 Application at 01/06/25 1545    levothyroxine  (SYNTHROID, LEVOTHROID) tablet 125 mcg  125 mcg Oral Daily Thien Reardon DO   125 mcg at 25 1000    magnesium oxide (MAG-OX) tablet 400 mg  400 mg Oral Daily Thien Reardon DO   400 mg at 25 1000    Magnesium Standard Dose Replacement - Follow Nurse / BPA Driven Protocol   Not Applicable PRN Thien Reardon DO        ondansetron ODT (ZOFRAN-ODT) disintegrating tablet 4 mg  4 mg Oral Q6H PRN Thien Reardon DO        Or    ondansetron (ZOFRAN) injection 4 mg  4 mg Intravenous Q6H PRN Thein Reardon DO        Phosphorus Replacement - Follow Nurse / BPA Driven Protocol   Not Applicable PRN Thien Reardon DO        Potassium Replacement - Follow Nurse / BPA Driven Protocol   Not Applicable PRN Thien Reardon DO        sodium chloride 0.9 % flush 10 mL  10 mL Intravenous PRN Mark Vasquez DO        sodium chloride 0.9 % flush 10 mL  10 mL Intravenous Q12H Thien Reardon DO   10 mL at 25    sodium chloride 0.9 % flush 10 mL  10 mL Intravenous PRN Thien Reardon DO        sodium chloride 0.9 % infusion 40 mL  40 mL Intravenous PRN Thien Reardon DO        Tirzepatide solution auto-injector 0.5 mL  0.5 mL Subcutaneous Weekly Thien Reardon DO   0.5 mL at 24 1540    vitamin B-12 (CYANOCOBALAMIN) tablet 1,000 mcg  1,000 mcg Oral Daily Thien Reardon DO   1,000 mcg at 25 1001    vitamin D3 capsule 5,000 Units  5,000 Units Oral Daily Thien Reardon DO   5,000 Units at 25 1000     Operative/Procedure Notes (most recent note)    No notes of this type exist for this encounter.          Physician Progress Notes (most recent note)        Mark Vasquez DO at 25 1755              [facility]     Patient Identification:  Name:  Ana Maradiaga  Age:  59 y.o.  Sex:  female  :  1965  MRN:  [id number]  Visit Number:  [id number]  ROOM: [address] [place] [address]     Primary Care Provider:  Val Purcell PA    Length of stay in  inpatient status:  18    Subjective     Chief Compliant:    Chief Complaint   Patient presents with    Weakness - Generalized    Blood in Urine       History of Presenting Illness:    Patient seen in follow-up for UTI and overall weakness.  Patient states she does feel better than when she was admitted.  Still weak and having difficulty ambulating.  Working with PT/OT, stable for placement.    Objective     Current Hospital Meds:ammonium lactate, 1 Application, Topical, Daily  apixaban, 5 mg, Oral, Q12H  aspirin, 81 mg, Oral, Daily  atorvastatin, 40 mg, Oral, Nightly  flecainide, 100 mg, Oral, BID  folic acid, 1 mg, Oral, Daily  Gelocast Unnas boot, 2 each, Topical, Q3 Days  hydrocortisone, 10 mg, Oral, Daily  hydrocortisone, 15 mg, Oral, Daily With Breakfast  hydrocortisone, 1 Application, Topical, Q8H  levothyroxine, 125 mcg, Oral, Daily  magnesium oxide, 400 mg, Oral, Daily  sodium chloride, 10 mL, Intravenous, Q12H  Tirzepatide, 0.5 mL, Subcutaneous, Weekly  vitamin B-12, 1,000 mcg, Oral, Daily  vitamin D3, 5,000 Units, Oral, Daily         Current Antimicrobial Therapy:  Anti-Infectives (From admission, onward)      Ordered     Dose/Rate Route Frequency Start Stop    12/20/24 0935  cefTRIAXone (ROCEPHIN) 2,000 mg in sodium chloride 0.9 % 100 mL IVPB-VTB        Ordering Provider: Thien Reardon DO    2,000 mg  200 mL/hr over 30 Minutes Intravenous Every 24 Hours 12/20/24 1700 12/24/24 1758    12/19/24 1555  cefTRIAXone (ROCEPHIN) 2,000 mg in sodium chloride 0.9 % 100 mL IVPB-VTB        Ordering Provider: Mark Vasquez DO    2,000 mg  200 mL/hr over 30 Minutes Intravenous Once 12/19/24 1610 12/19/24 1813          Current Diuretic Therapy:  Diuretics (From admission, onward)      None          ----------------------------------------------------------------------------------------------------------------------  Vital Signs:  Temp:  [97.6 °F (36.4 °C)-98.7 °F (37.1 °C)] 98.7 °F (37.1 °C)  Heart Rate:   [72-85] 72  Resp:  [16-18] 16  BP: (127-156)/(72-82) 146/82     on   ;   Device (Oxygen Therapy): room air  Body mass index is 56.25 kg/m².    Wt Readings from Last 3 Encounters:   12/19/24 (!) 153 kg (338 lb)   10/24/24 116 kg (256 lb 6.4 oz)   10/11/24 (!) 164 kg (362 lb)     Intake & Output (last 3 days)         12/21 0701 12/22 0700 12/22 0701 12/23 0700 12/23 0701 12/24 0700    P.O. 1035 1300 960    I.V. (mL/kg)  0 (0) 203 (1.3)    IV Piggyback  100     Total Intake(mL/kg) 1035 (6.8) 1400 (9.2) 1163 (7.6)    Urine (mL/kg/hr) 650 (0.2) 250 (0.1) 200 (0.1)    Stool 0      Total Output 650 250 200    Net +385 +1150 +963           Urine Unmeasured Occurrence  2 x 2 x    Stool Unmeasured Occurrence 1 x            Diet: Cardiac; Healthy Heart (2-3 Na+); Fluid Consistency: Thin (IDDSI 0)  ----------------------------------------------------------------------------------------------------------------------  Physical exam:  Constitutional: Early obese female, nontoxic, Well-developed and well-nourished, resting comfortably in bed, no acute distress.      HENT:  Head:  Normocephalic and atraumatic.  Mouth:  Moist mucous membranes.  Eyes:  Conjunctivae and EOM are normal. No scleral icterus.   Cardiovascular:  Normal rate, regular rhythm and normal heart sounds with no murmur. No JVD.   Pulmonary/Chest:  No respiratory distress, no wheezes, no crackles, with normal breath sounds and good air movement. Unlabored. No accessory muscle use.  Abdominal:  Soft. No distension and no tenderness.  Bowel sounds present. No rebound or guarding.   Musculoskeletal:  No tenderness, and no deformity.  No red or swollen joints anywhere.    Neurological:  Alert and oriented to person, place, and time. Nonfocal, weakness in upper and lower extremities bilaterally  Skin:  Skin is warm and dry.  Morbilliform rash on upper and lower extremities, trunk .no pallor.   Peripheral vascular:  No clubbing, no cyanosis, bilateral venous stasis.     "  ----------------------------------------------------------------------------------------------------------------------  Results from last 7 days   Lab Units 01/06/25 0202 01/05/25  0002 01/04/25 0303   WBC 10*3/mm3 9.65 8.51 9.34   HEMOGLOBIN g/dL 9.8* 9.8* 9.7*   HEMATOCRIT % 31.4* 32.0* 31.9*   MCV fL 99.1* 101.6* 100.9*   MCHC g/dL 31.2* 30.6* 30.4*   PLATELETS 10*3/mm3 167 175 183         Results from last 7 days   Lab Units 01/06/25 0202 01/05/25 0002 01/04/25 0303   SODIUM mmol/L 136 138 137   POTASSIUM mmol/L 4.2 4.6 4.4   MAGNESIUM mg/dL 1.8 2.0 2.0   CHLORIDE mmol/L 103 103 104   CO2 mmol/L 25.4 26.0 24.5   BUN mg/dL 16 15 14   CREATININE mg/dL 0.94 0.96 0.87   CALCIUM mg/dL 8.4* 8.2* 8.2*   GLUCOSE mg/dL 133* 117* 99   Estimated Creatinine Clearance: 97 mL/min (by C-G formula based on SCr of 0.94 mg/dL).  No results found for: \"AMMONIA\"                  No results found for: \"HGBA1C\", \"POCGLU\"  Lab Results   Component Value Date    TSH 3.320 12/19/2024    FREET4 1.30 12/27/2024     No results found for: \"PREGTESTUR\", \"PREGSERUM\", \"HCG\", \"HCGQUANT\"  Pain Management Panel  More data may exist         Latest Ref Rng & Units 12/19/2024 6/22/2023   Pain Management Panel   Creatinine, Urine mg/dL  mg/dL - 152.9  152.9    Amphetamine, Urine Qual Negative Negative  -   Barbiturates Screen, Urine Negative Negative  -   Benzodiazepine Screen, Urine Negative Negative  -   Buprenorphine, Screen, Urine Negative Negative  -   Cocaine Screen, Urine Negative Negative  -   Fentanyl, Urine Negative Negative  -   Methadone Screen , Urine Negative Negative  -   Methamphetamine, Ur Negative Negative  -      Details          Multiple values from one day are sorted in reverse-chronological order             Brief Urine Lab Results  (Last result in the past 365 days)        Color   Clarity   Blood   Leuk Est   Nitrite   Protein   CREAT   Urine HCG        01/01/25 1249 Red  Comment: Dipstick results may be inaccurate due to " "color interference.       Turbid   Large (3+)   Trace   Negative   100 mg/dL (2+)                 Blood Culture   Date Value Ref Range Status   12/19/2024 No growth at 4 days  Preliminary   12/19/2024 No growth at 4 days  Preliminary     Urine Culture   Date Value Ref Range Status   12/19/2024 >100,000 CFU/mL Mixed Toya Isolated  Final     No results found for: \"WOUNDCX\"  No results found for: \"STOOLCX\"  No results found for: \"RESPCX\"  No results found for: \"AFBCX\"          I have personally looked at the labs and they are summarized above.  ----------------------------------------------------------------------------------------------------------------------  Detailed radiology reports for the last 24 hours:  Imaging Results (Last 24 Hours)       ** No results found for the last 24 hours. **          Assessment & Plan      Patient is a 59-year-old female with history significant for atrial fibrillation, CKD and prior stroke who came to the ER with reports of gross hematuria and weakness.    #Acute on chronic debility  #Bilateral lower extremity weakness, multifactorial  --PT/OT, has not done well with therapy, notes updated, IPR declined, case management following    #Adrenal insufficiency, suspect central process  #Elevated testosterone, unclear etiology  --Cortisol low, ACTH low, testosterone elevated, DHEA-sulfate low, prolactin normal  --Estradiol elevated, FSH/LH low  --MRI brain with/without unremarkable for lesions  --CT abdomen pelvis with contrast negative for ovarian/adrenal tumor  --Pelvic ultrasound negative for ovarian lesions  --MRI pituitary protocol with and without without abnormal pituitary lesion  --After discussion with endocrinology, increase Solu-Cortef to 15 mg daily with breakfast, 10 mg daily at 2 PM  --I discussed with endocrinology again on 12/27, working diagnosis remains a suspected small ovarian tumor, given the above images have been unremarkable, with MRI of the abdomen/pelvis " with/without contrast, if this is negative, we will continue treatment plan and have her follow-up outpatient    #Acute urinary tract infection the E. coli  #Left staghorn calculus  --Urine culture positive for E. Coli  --Completed course of Rocephin  --Hematuria likely due to large UPJ calculus exacerbated by Eliquis  --Urology evaluated, recommend outpatient follow-up    #Atrial fibrillation, rate controlled  #Prior ischemic infarct  --MRI of the brain noted old infarcts but nothing acute  --Continue flecainide, Eliquis  --Follow-up outpatient    #Chronic venous stasis, wound care  #Morbid obesity, BMI 56    Copied portions of this report have been reviewed and are accurate as of 25     CHECKLIST:  Abx: None  VTE: Eliquis   GI ppx: PPI  Diet: Consistent carb  Code: CPR, full  Dispo: Patient is hemodynamically stable, has not done well with PT, declined by IPR Agreeable to SNF as well. Case management following.     Mark Vasquez DO  [facility]  25  17:55 EST      Electronically signed by Mark Vasquez DO at 25 1756          Consult Notes (most recent note)        Ema Paris PA-C at 24 1544        Consult Orders    1. Inpatient Wound APRN Consult [[id number]] ordered by Mark Vasquez DO at 24 0956              Attestation signed by Jun Eastman MD at 24 0933    I was available for assistance.  I have reviewed this documentation and agree.                    Consult Note     Patient Identification:  Name:  Ana Maradiaga  Age:  59 y.o.  Sex:  female  :  1965  MRN:  [id number]   Visit Number:  [id number]  Primary Care Physician:  Val Purcell PA     Subjective     Chief complaint:   Chief Complaint   Patient presents with    Weakness - Generalized    Blood in Urine       History of presenting illness:   Patient is a 59 y.o. female with past medical history significant for anemia, CKD, atrial  fibrillation, hypertension, gout and CVA that presented to the [facility] Emergency Department for complaints of hematuria. Wound care consulted to evaluate bilateral lower extremities. She reports she has had lymphedema for several years now but has not undergone any therapy or treatment. She states she has dealt with recurrent cellulitis which led to hospitalization. She currently does not appear to have cellulitis but has a purple/red discoloration that she reports has been chronic after her last recurrence of cellulitis. Denies pain or open wounds. No fever or chills. The patients nurse, iDa, was at bedside during her exam.    ---------------------------------------------------------------------------------------------------------------------   Review of Systems:  Review of Systems   Constitutional: Negative.    Respiratory: Negative.     Cardiovascular:  Positive for leg swelling.   Endocrine: Negative.    Skin:  Positive for color change.        ---------------------------------------------------------------------------------------------------------------------   Past Medical History:   Diagnosis Date    Anemia     Arrhythmia 2018    Afib    Arthritis     Atrial fibrillation 2018    Bronchitis     Chronic kidney disease 2018    Gout     Hypertension     Stroke November 2021     History reviewed. No pertinent surgical history.  Family History   Problem Relation Age of Onset    Heart disease Mother     Stroke Mother     Hypertension Father     Arthritis Father     Hypertension Sister     Breast cancer Paternal Cousin      Social History     Socioeconomic History    Marital status: Single   Tobacco Use    Smoking status: Never    Smokeless tobacco: Never   Vaping Use    Vaping status: Never Used   Substance and Sexual Activity    Alcohol use: Never    Drug use: Never    Sexual activity: Not Currently     Partners: Male      ---------------------------------------------------------------------------------------------------------------------   Allergies:  Vancomycin, Cefepime, and Cephalosporins  ---------------------------------------------------------------------------------------------------------------------   Medications below are reported home medications pulling from within the system; at this time, these medications have not been reconciled unless otherwise specified and are in the verification process for further verifcation as current home medications.    Prior to Admission Medications       Prescriptions Last Dose Informant Patient Reported? Taking?    amLODIPine (NORVASC) 5 MG tablet Past Week Self, Other No Yes    TAKE 1 TABLET BY MOUTH DAILY    Aspirin Low Dose 81 MG EC tablet 12/19/2024 Self, Other Yes Yes    Take 1 tablet by mouth Daily.    atorvastatin (LIPITOR) 40 MG tablet Past Week Self, Other Yes Yes    Take 1 tablet by mouth every night at bedtime.    bumetanide (BUMEX) 1 MG tablet 12/19/2024 Self, Other Yes Yes    Take 2 tablets by mouth Daily.    carvedilol (COREG) 6.25 MG tablet 12/19/2024 Self, Other No Yes    TAKE 1 TABLET BY MOUTH EVERY 12 HOURS    Eliquis 5 MG tablet tablet 12/19/2024 Self, Other No Yes    Take 1 tablet by mouth Every 12 (Twelve) Hours.    flecainide (TAMBOCOR) 100 MG tablet 12/19/2024 Self, Other No Yes    TAKE 1 TABLET BY MOUTH TWICE A DAY    levothyroxine (SYNTHROID, LEVOTHROID) 125 MCG tablet 12/19/2024 Self, Other Yes Yes    Take 1 tablet by mouth Daily.    magnesium oxide (MAG-OX) 400 MG tablet 12/19/2024 Self Yes Yes    Take 1 tablet by mouth Daily.    Tirzepatide (Mounjaro) 5 MG/0.5ML solution auto-injector 12/13/2024 Self, Other No No    Inject 0.5 mL under the skin into the appropriate area as directed 1 (One) Time Per Week.    vitamin B-12 (CYANOCOBALAMIN) 1000 MCG tablet 12/19/2024 Self Yes Yes    Take 1 tablet by mouth Daily.    vitamin D3 125 MCG (5000 UT) capsule capsule  12/19/2024 Self Yes Yes    Take 1 capsule by mouth Daily.          ---------------------------------------------------------------------------------------------------------------------    Objective     Hospital Scheduled Meds:  amLODIPine, 5 mg, Oral, Daily  apixaban, 5 mg, Oral, Q12H  aspirin, 81 mg, Oral, Daily  atorvastatin, 40 mg, Oral, Nightly  carvedilol, 6.25 mg, Oral, Q12H  cefTRIAXone, 2,000 mg, Intravenous, Q24H  flecainide, 100 mg, Oral, BID  hydrocortisone, 1 Application, Topical, Q8H  levothyroxine, 125 mcg, Oral, Daily  magnesium oxide, 400 mg, Oral, Daily  nystatin, , Topical, Q12H  sodium chloride, 10 mL, Intravenous, Q12H  vitamin B-12, 1,000 mcg, Oral, Daily  vitamin D3, 5,000 Units, Oral, Daily           Current listed hospital scheduled medications may not yet reflect those currently placed in orders that are signed and held, awaiting patient's arrival to floor/unit.    ---------------------------------------------------------------------------------------------------------------------   Vital Signs:  Temp:  [98.2 °F (36.8 °C)-98.6 °F (37 °C)] 98.2 °F (36.8 °C)  Heart Rate:  [64-77] 73  Resp:  [16-18] 16  BP: ()/(54-76) 108/60  Mean Arterial Pressure (Non-Invasive) for the past 24 hrs (Last 3 readings):   Noninvasive MAP (mmHg)   12/23/24 0300 81   12/23/24 0003 79   12/22/24 1900 97     SpO2 Percentage    12/23/24 0653 12/23/24 1100 12/23/24 1500   SpO2: 96% 94% 96%     SpO2:  [94 %-96 %] 96 %  on   ;   Device (Oxygen Therapy): room air    Body mass index is 56.25 kg/m².  Wt Readings from Last 3 Encounters:   12/19/24 (!) 153 kg (338 lb)   10/24/24 116 kg (256 lb 6.4 oz)   10/11/24 (!) 164 kg (362 lb)       ---------------------------------------------------------------------------------------------------------------------   Physical Exam:    B/L lower extremities with evidence of edema, dryness and discoloration. No significant warmth or erythema noted.     Assessment & Plan       Assessment     Lymphedema  Xerosis    Plan:     Apply ammonium lactate lotion to the bilateral lower extremities QD/PRN. Patient would benefit from compression but will order B/L ABIs before initiating. She is agreeable to this plan.     She would benefit from outpatient lymphedema treatment as well.       Ema Paris PA-C  [facility]    24  15:44 EST      Electronically signed by Jun Eastman MD at 24 0933          Physical Therapy Notes (most recent note)        Armida Odell, PT at 25 1106  Version 1 of 1         Goal Outcome Evaluation:              Outcome Evaluation: Pt seen for re-evaluation this date in which pt presents with deficits that may improve with continued intervention. Prognosis guarded to fair.    Anticipated Discharge Disposition (PT): sub acute care setting, skilled nursing facility                          Electronically signed by Armida Odell, PT at 25 1106          Occupational Therapy Notes (most recent note)        Dano Samuel, OT at 25 1201          Acute Care - Occupational Therapy Treatment Note  [facility]     Patient Name: Ana Maradiaga  : 1965  MRN: [id number]  Today's Date: 2025  Onset of Illness/Injury or Date of Surgery: 24     Referring Physician: Dr. Reardon    Admit Date: 2024       ICD-10-CM ICD-9-CM   1. Urinary tract infection with hematuria, site unspecified  N39.0 599.0    R31.9 599.70   2. Weakness  R53.1 780.79   3. High serum testosterone  R79.89 790.99     Patient Active Problem List   Diagnosis    Type 2 diabetes mellitus with hyperglycemia, without long-term current use of insulin    Acquired hypothyroidism    Hirsutism    Elevated testosterone level in female    Complicated UTI (urinary tract infection)     Past Medical History:   Diagnosis Date    Anemia     Arrhythmia 2018    Afib    Arthritis     Atrial fibrillation 2018    Bronchitis     Chronic kidney  disease 2018    Gout     Hypertension     Stroke November 2021     History reviewed. No pertinent surgical history.      OT ASSESSMENT FLOWSHEET (Last 12 Hours)       OT Evaluation and Treatment       Row Name 01/07/25 1157                   OT Time and Intention    Document Type therapy note (daily note)  -KR        Mode of Treatment occupational therapy  -KR        Patient Effort adequate  -KR           General Information    General Observations of Patient alert/cooperative  -KR           Bathing Assessment/Intervention    [place] Level (Bathing) bathing skills;moderate assist (50% patient effort)  -KR           Upper Body Dressing Assessment/Training    [place] Level (Upper Body Dressing) upper body dressing skills;moderate assist (50% patient effort)  -KR           Lower Body Dressing Assessment/Training    [place] Level (Lower Body Dressing) lower body dressing skills;moderate assist (50% patient effort)  -KR           Grooming Assessment/Training    [place] Level (Grooming) grooming skills;set up  -KR           Self-Feeding Assessment/Training    [place] Level (Feeding) feeding skills;set up  -KR           Toileting Assessment/Training    [place] Level (Toileting) toileting skills;moderate assist (50% patient effort)  -KR           Shoulder (Therapeutic Exercise)    Shoulder AROM (Therapeutic Exercise) bilateral;flexion;extension;sitting  -KR        Shoulder AAROM (Therapeutic Exercise) bilateral;flexion;extension;sitting  -KR           Elbow/Forearm (Therapeutic Exercise)    Elbow/Forearm AROM (Therapeutic Exercise) bilateral;flexion;extension;sitting  -KR           Hand (Therapeutic Exercise)    Hand AROM/AAROM (Therapeutic Exercise) bilateral;finger flexion;finger extension  -KR           Plan of Care Review    Plan of Care Reviewed With patient  -KR        Progress improving  -KR           Dressing Goal 1 (OT)    Progress/Outcome (Dressing Goal 1, OT) continuing progress  toward goal  -KR           Strength Goal 1 (OT)    Progress/Outcome (Strength Goal 1, OT) continuing progress toward goal  -KR            Activity Tolerance Goal 1 (OT)    Progress/Outcome (Activity Tolerance Goal 1, OT) continuing progress toward goal  -KR                  User Key  (r) = Recorded By, (t) = Taken By, (c) = Cosigned By      Initials Name Effective Dates    Dano Bruno OT 06/16/21 -                      Occupational Therapy Education        No education to display                      OT Recommendation and Plan  Planned Therapy Interventions (OT): activity tolerance training, adaptive equipment training, BADL retraining, strengthening exercise  Plan of Care Review  Plan of Care Reviewed With: patient  Progress: improving  Plan of Care Reviewed With: patient        Time Calculation:     Therapy Charges for Today       Code Description Service Date Service Provider Modifiers Qty    01788814672  OT THERAPEUTIC ACT EA 15 MIN 1/6/2025 Dano Samuel OT GO 1    [id number]  OT THERAPEUTIC ACT EA 15 MIN 1/7/2025 Dano Samuel OT GO 1                 Dano Samuel OT  1/7/2025    Electronically signed by Dano Samuel OT at 01/07/25 1202

## 2025-01-07 NOTE — THERAPY RE-EVALUATION
Acute Care - Physical Therapy Re-Evaluation   [facility]     Patient Name: Ana Maradiaga  : 1965  MRN: [id number]  Today's Date: 2025   Onset of Illness/Injury or Date of Surgery: 24  Visit Dx:     ICD-10-CM ICD-9-CM   1. Urinary tract infection with hematuria, site unspecified  N39.0 599.0    R31.9 599.70   2. Weakness  R53.1 780.79   3. High serum testosterone  R79.89 790.99     Patient Active Problem List   Diagnosis    Type 2 diabetes mellitus with hyperglycemia, without long-term current use of insulin    Acquired hypothyroidism    Hirsutism    Elevated testosterone level in female    Complicated UTI (urinary tract infection)     Past Medical History:   Diagnosis Date    Anemia     Arrhythmia     Afib    Arthritis     Atrial fibrillation     Bronchitis     Chronic kidney disease     Gout     Hypertension     Stroke 2021     History reviewed. No pertinent surgical history.  PT Assessment (Last 12 Hours)       PT Evaluation and Treatment       Row Name 25 1035          Physical Therapy Time and Intention    Document Type re-evaluation  -     Mode of Treatment physical therapy  -     Patient Effort good  -     Comment Pt seen for re-evaluation this date, pleasant and cooperative with therapy. Pt ambulated grossly 30'x2 with RW, CGA in room. MMT reassessed grossly.  -       Row Name 25 1035          Strength Comprehensive (MMT)    Comment, General Manual Muscle Testing (MMT) Assessment Gross MMT: 3/5 for DF, knee ext/flex, hip flexion < 3, hip abd/add grossly 4 or more; PF functional  -       Row Name 25 1035          Bed Mobility    Bed Mobility supine-sit  -     Supine-Sit [place] (Bed Mobility) minimum assist (75% patient effort)  Grossly with L LE getting EOB, some assist with leverage to sit upright.  -       Row Name 25 1035          Transfers    Transfers sit-stand transfer;stand-sit transfer  -       Row Name 25 1035           Sit-Stand Transfer    Sit-Stand [place] (Transfers) contact guard  -     Assistive Device (Sit-Stand Transfers) walker, front-wheeled  -STEVE       Row Name 01/07/25 1035          Stand-Sit Transfer    Stand-Sit [place] (Transfers) contact guard;standby assist;supervision  -     Assistive Device (Stand-Sit Transfers) walker, front-wheeled  -STEVE       Row Name 01/07/25 1035          Gait/Stairs (Locomotion)    Gait/Stairs Locomotion gait/ambulation assistive device  -     [place] Level (Gait) contact guard;standby assist  -     Assistive Device (Gait) walker, front-wheeled  -STEVE     Patient was able to Ambulate yes  -     Distance in Feet (Gait) 30  30x2  -       Row Name 01/07/25 1035          Plan of Care Review    Outcome Evaluation Pt seen for re-evaluation this date in which pt presents with deficits that may improve with continued intervention. Prognosis guarded to fair.  -       Row Name 01/07/25 1035          Positioning and Restraints    Pre-Treatment Position in bed  -     Post Treatment Position chair  -     In Chair reclined;call light within reach;encouraged to call for assist  notified CNA  -       Row Name 01/07/25 1035          Physical Therapy Goals    Bed Mobility Goal Selection (PT) bed mobility, PT goal 1  -     Transfer Goal Selection (PT) transfer, PT goal 1  -     Gait Training Goal Selection (PT) gait training, PT goal 1  -       Row Name 01/07/25 1035          Bed Mobility Goal 1 (PT)    Activity/Assistive Device (Bed Mobility Goal 1, PT) scooting;sit to supine;supine to sit  -     [place] Level/Cues Needed (Bed Mobility Goal 1, PT) standby assist  -     Time Frame (Bed Mobility Goal 1, PT) by discharge  -     Progress/Outcomes (Bed Mobility Goal 1, PT) goal ongoing  -       Row Name 01/07/25 1035          Transfer Goal 1 (PT)    Activity/Assistive Device (Transfer Goal 1, PT)  sit-to-stand/stand-to-sit;bed-to-chair/chair-to-bed;toilet;walker, rolling  -KH     [place] Level/Cues Needed (Transfer Goal 1, PT) standby assist  -KH     Time Frame (Transfer Goal 1, PT) by discharge  -KH     Progress/Outcome (Transfer Goal 1, PT) goal partially met  was SBA/CGA with PTA  -KH       Row Name 01/07/25 1035          Gait Training Goal 1 (PT)    Activity/Assistive Device (Gait Training Goal 1, PT) gait (walking locomotion);assistive device use;decrease fall risk;diminish gait deviation;improve balance and speed;increase endurance/gait distance;walker, rolling  -KH     [place] Level (Gait Training Goal 1, PT) standby assist  -KH     Distance (Gait Training Goal 1, PT) 30  -KH     Time Frame (Gait Training Goal 1, PT) by discharge  -KH     Progress/Outcome (Gait Training Goal 1, PT) goal partially met  ambulates 30' with SBA/CGA using RW  -KH               User Key  (r) = Recorded By, (t) = Taken By, (c) = Cosigned By      Initials Name Provider Type    Armida Andrews, PT Physical Therapist                    Physical Therapy Education       Title: PT OT SLP Therapies (Done)       Topic: Physical Therapy (Done)       Point: Mobility training (Done)       Learning Progress Summary            Patient Acceptance, E,TB, VU by DM at 1/5/2025 1713    Acceptance, E,TB, VU by DM at 1/3/2025 1453    Acceptance, TB,E, VU by RG at 12/29/2024 1000    Acceptance, E,TB, VU by AD at 12/27/2024 2313    Acceptance, E,TB, VU by CF at 12/26/2024 1222    Acceptance, E,TB, VU by CF at 12/24/2024 0905    Acceptance, E, VU by SC at 12/24/2024 0331    Acceptance, E,D, VU,NR by AG at 12/23/2024 1249                      Point: Home exercise program (Done)       Learning Progress Summary            Patient Acceptance, E,TB, VU by DM at 1/5/2025 1713    Acceptance, E,TB, VU by DM at 1/3/2025 1453    Acceptance, TB,E, VU by RG at 12/29/2024 1000    Acceptance, E,TB, VU by AD at 12/27/2024 2313    Acceptance, E,TB,  VU by CF at 12/26/2024 1222    Acceptance, E,TB, VU by CF at 12/24/2024 0905    Acceptance, E, VU by SC at 12/24/2024 0331    Acceptance, E,D, VU,NR by AG at 12/23/2024 1249                      Point: Body mechanics (Done)       Learning Progress Summary            Patient Acceptance, E,TB, VU by DM at 1/5/2025 1713    Acceptance, E,TB, VU by DM at 1/3/2025 1453    Acceptance, TB,E, VU by RG at 12/29/2024 1000    Acceptance, E,TB, VU by AD at 12/27/2024 2313    Acceptance, E,TB, VU by CF at 12/26/2024 1222    Acceptance, E,TB, VU by CF at 12/24/2024 0905    Acceptance, E, VU by SC at 12/24/2024 0331    Acceptance, E,D, VU,NR by AG at 12/23/2024 1249                      Point: Precautions (Done)       Learning Progress Summary            Patient Acceptance, E,TB, VU by DM at 1/5/2025 1713    Acceptance, E,TB, VU by DM at 1/3/2025 1453    Acceptance, TB,E, VU by RG at 12/29/2024 1000    Acceptance, E,TB, VU by AD at 12/27/2024 2313    Acceptance, E,TB, VU by CF at 12/26/2024 1222    Acceptance, E,TB, VU by CF at 12/24/2024 0905    Acceptance, E, VU by SC at 12/24/2024 0331    Acceptance, E,D, VU,NR by AG at 12/23/2024 1249                                      User Key       Initials Effective Dates Name Provider Type Discipline    AD 06/06/23 -  Kelle Pinto, RN Registered Nurse Nurse    AG 06/16/21 -  Elizabeth Aquino, PT Physical Therapist PT    DM 06/16/21 -  Moniz, Deborah, RN Registered Nurse Nurse    RG 06/06/23 -  Robert Engle, RN Registered Nurse Nurse    CF 06/25/24 -  Ebonie Rangel, RN Registered Nurse Nurse    SC 09/11/24 -  Naya Lowry, RN Registered Nurse Nurse                  PT Recommendation and Plan  Anticipated Discharge Disposition (PT): sub acute care setting, skilled nursing facility  Progress Summary (PT)  Daily Progress Summary (PT): Pt ambulated grossly 2x7' with RW, grossly CGA x1-2 (2nd person assit for pt and staff safety). Pt may benefit from therapy interventions, unsure if  pt could tolerate IPR however pt seems willing. No change in POC, prognosis fair.  Outcome Evaluation: Pt seen for re-evaluation this date in which pt presents with deficits that may improve with continued intervention. Prognosis guarded to fair.       Time Calculation:    PT Charges       Row Name 01/07/25 1105             Time Calculation    Start Time 1035  -KH      PT Received On 01/07/25  -      PT Goal Re-Cert Due Date 01/21/25  -                User Key  (r) = Recorded By, (t) = Taken By, (c) = Cosigned By      Initials Name Provider Type    Armida Andrews, PT Physical Therapist                  Therapy Charges for Today       Code Description Service Date Service Provider Modifiers Qty    64254351233 HC PT RE-EVAL ESTABLISHED PLAN 2 1/7/2025 Armida Odell, PT GP 1            PT G-Codes  AM-PAC 6 Clicks Score (PT): 13    Armida Odell PT  1/7/2025

## 2025-01-07 NOTE — PROGRESS NOTES
[facility]OSPITALIST PROGRESS NOTE     Patient Identification:  Name:  Ana Maradiaga  Age:  59 y.o.  Sex:  female  :  1965  MRN:  [id number]  Visit Number:  [id number]  ROOM: [address] [place] [address]     Primary Care Provider:  Val Purcell PA    Length of stay in inpatient status:  19    Subjective     Chief Compliant:    Chief Complaint   Patient presents with    Weakness - Generalized    Blood in Urine       History of Presenting Illness:    Patient seen in follow-up for UTI and overall weakness.  Patient states she does feel better than when she was admitted.  Still weak and having difficulty ambulating.  Working with PT/OT, stable for placement.    Objective     Current Hospital Meds:ammonium lactate, 1 Application, Topical, Daily  apixaban, 5 mg, Oral, Q12H  aspirin, 81 mg, Oral, Daily  atorvastatin, 40 mg, Oral, Nightly  flecainide, 100 mg, Oral, BID  folic acid, 1 mg, Oral, Daily  Gelocast Unnas boot, 2 each, Topical, Q3 Days  hydrocortisone, 10 mg, Oral, Daily  hydrocortisone, 15 mg, Oral, Daily With Breakfast  hydrocortisone, 1 Application, Topical, Q8H  levothyroxine, 125 mcg, Oral, Daily  magnesium oxide, 400 mg, Oral, Daily  sodium chloride, 10 mL, Intravenous, Q12H  Tirzepatide, 0.5 mL, Subcutaneous, Weekly  vitamin B-12, 1,000 mcg, Oral, Daily  vitamin D3, 5,000 Units, Oral, Daily         Current Antimicrobial Therapy:  Anti-Infectives (From admission, onward)      Ordered     Dose/Rate Route Frequency Start Stop    24 0935  cefTRIAXone (ROCEPHIN) 2,000 mg in sodium chloride 0.9 % 100 mL IVPB-VTB        Ordering Provider: Thien Reardon DO    2,000 mg  200 mL/hr over 30 Minutes Intravenous Every 24 Hours 24 1700 24 1758    24 1555  cefTRIAXone (ROCEPHIN) 2,000 mg in sodium chloride 0.9 % 100 mL IVPB-VTB        Ordering Provider: Mark Vasquez DO    2,000 mg  200 mL/hr over 30 Minutes Intravenous Once 24 1610 24 1813          Current  Diuretic Therapy:  Diuretics (From admission, onward)      Ordered     Dose/Rate Route Frequency Start Stop    01/07/25 1009  bumetanide (BUMEX) tablet 2 mg        Ordering Provider: Mark Vasquez DO    2 mg Oral Daily PRN 01/07/25 1007            ----------------------------------------------------------------------------------------------------------------------  Vital Signs:  Temp:  [97.4 °F (36.3 °C)-98.8 °F (37.1 °C)] 98 °F (36.7 °C)  Heart Rate:  [74-83] 81  Resp:  [17-18] 18  BP: (134-149)/(62-73) 138/68     on   ;   Device (Oxygen Therapy): room air  Body mass index is 56.25 kg/m².    Wt Readings from Last 3 Encounters:   12/19/24 (!) 153 kg (338 lb)   10/24/24 116 kg (256 lb 6.4 oz)   10/11/24 (!) 164 kg (362 lb)     Intake & Output (last 3 days)         12/21 0701 12/22 0700 12/22 0701 12/23 0700 12/23 0701  12/24 0700    P.O. 1035 1300 960    I.V. (mL/kg)  0 (0) 203 (1.3)    IV Piggyback  100     Total Intake(mL/kg) 1035 (6.8) 1400 (9.2) 1163 (7.6)    Urine (mL/kg/hr) 650 (0.2) 250 (0.1) 200 (0.1)    Stool 0      Total Output 650 250 200    Net +385 +1150 +963           Urine Unmeasured Occurrence  2 x 2 x    Stool Unmeasured Occurrence 1 x            Diet: Cardiac; Healthy Heart (2-3 Na+); Fluid Consistency: Thin (IDDSI 0)  ----------------------------------------------------------------------------------------------------------------------  Physical exam:  Constitutional: Early obese female, nontoxic, Well-developed and well-nourished, resting comfortably in bed, no acute distress.      HENT:  Head:  Normocephalic and atraumatic.  Mouth:  Moist mucous membranes.  Eyes:  Conjunctivae and EOM are normal. No scleral icterus.   Cardiovascular:  Normal rate, regular rhythm and normal heart sounds with no murmur. No JVD.   Pulmonary/Chest:  No respiratory distress, no wheezes, no crackles, with normal breath sounds and good air movement. Unlabored. No accessory muscle use.  Abdominal:  Soft. No  "distension and no tenderness.  Bowel sounds present. No rebound or guarding.   Musculoskeletal:  No tenderness, and no deformity.  No red or swollen joints anywhere.    Neurological:  Alert and oriented to person, place, and time. Nonfocal, weakness in upper and lower extremities bilaterally  Skin:  Skin is warm and dry.  Morbilliform rash on upper and lower extremities, trunk .no pallor.   Peripheral vascular:  No clubbing, no cyanosis, bilateral venous stasis.      ----------------------------------------------------------------------------------------------------------------------  Results from last 7 days   Lab Units 01/06/25 0202 01/05/25  0002 01/04/25  0303   WBC 10*3/mm3 9.65 8.51 9.34   HEMOGLOBIN g/dL 9.8* 9.8* 9.7*   HEMATOCRIT % 31.4* 32.0* 31.9*   MCV fL 99.1* 101.6* 100.9*   MCHC g/dL 31.2* 30.6* 30.4*   PLATELETS 10*3/mm3 167 175 183         Results from last 7 days   Lab Units 01/06/25 0202 01/05/25  0002 01/04/25  0303   SODIUM mmol/L 136 138 137   POTASSIUM mmol/L 4.2 4.6 4.4   MAGNESIUM mg/dL 1.8 2.0 2.0   CHLORIDE mmol/L 103 103 104   CO2 mmol/L 25.4 26.0 24.5   BUN mg/dL 16 15 14   CREATININE mg/dL 0.94 0.96 0.87   CALCIUM mg/dL 8.4* 8.2* 8.2*   GLUCOSE mg/dL 133* 117* 99   Estimated Creatinine Clearance: 97 mL/min (by C-G formula based on SCr of 0.94 mg/dL).  No results found for: \"AMMONIA\"                  No results found for: \"HGBA1C\", \"POCGLU\"  Lab Results   Component Value Date    TSH 3.320 12/19/2024    FREET4 1.30 12/27/2024     No results found for: \"PREGTESTUR\", \"PREGSERUM\", \"HCG\", \"HCGQUANT\"  Pain Management Panel  More data may exist         Latest Ref Rng & Units 12/19/2024 6/22/2023   Pain Management Panel   Creatinine, Urine mg/dL  mg/dL - 152.9  152.9    Amphetamine, Urine Qual Negative Negative  -   Barbiturates Screen, Urine Negative Negative  -   Benzodiazepine Screen, Urine Negative Negative  -   Buprenorphine, Screen, Urine Negative Negative  -   Cocaine Screen, Urine " "Negative Negative  -   Fentanyl, Urine Negative Negative  -   Methadone Screen , Urine Negative Negative  -   Methamphetamine, Ur Negative Negative  -      Details          Multiple values from one day are sorted in reverse-chronological order             Brief Urine Lab Results  (Last result in the past 365 days)        Color   Clarity   Blood   Leuk Est   Nitrite   Protein   CREAT   Urine HCG        01/01/25 1249 Red  Comment: Dipstick results may be inaccurate due to color interference.       Turbid   Large (3+)   Trace   Negative   100 mg/dL (2+)                 Blood Culture   Date Value Ref Range Status   12/19/2024 No growth at 4 days  Preliminary   12/19/2024 No growth at 4 days  Preliminary     Urine Culture   Date Value Ref Range Status   12/19/2024 >100,000 CFU/mL Mixed Toya Isolated  Final     No results found for: \"WOUNDCX\"  No results found for: \"STOOLCX\"  No results found for: \"RESPCX\"  No results found for: \"AFBCX\"          I have personally looked at the labs and they are summarized above.  ----------------------------------------------------------------------------------------------------------------------  Detailed radiology reports for the last 24 hours:  Imaging Results (Last 24 Hours)       ** No results found for the last 24 hours. **          Assessment & Plan      Patient is a 59-year-old female with history significant for atrial fibrillation, CKD and prior stroke who came to the ER with reports of gross hematuria and weakness.    #Acute on chronic debility  #Bilateral lower extremity weakness, multifactorial  --PT/OT, has not done well with therapy, notes updated, IPR declined, case management following    #Adrenal insufficiency, suspect central process  #Elevated testosterone, unclear etiology  --Cortisol low, ACTH low, testosterone elevated, DHEA-sulfate low, prolactin normal  --Estradiol elevated, FSH/LH low  --MRI brain with/without unremarkable for lesions  --CT abdomen pelvis with " contrast negative for ovarian/adrenal tumor  --Pelvic ultrasound negative for ovarian lesions  --MRI pituitary protocol with and without without abnormal pituitary lesion  --After discussion with endocrinology, increase Solu-Cortef to 15 mg daily with breakfast, 10 mg daily at 2 PM  --I discussed with endocrinology again on 12/27, working diagnosis remains a suspected small ovarian tumor, given the above images have been unremarkable, with MRI of the abdomen/pelvis with/without contrast, if this is negative, we will continue treatment plan and have her follow-up outpatient    #Acute urinary tract infection the E. coli  #Left staghorn calculus  --Urine culture positive for E. Coli  --Completed course of Rocephin  --Hematuria likely due to large UPJ calculus exacerbated by Eliquis  --Urology evaluated, recommend outpatient follow-up    #Atrial fibrillation, rate controlled  #Prior ischemic infarct  --MRI of the brain noted old infarcts but nothing acute  --Continue flecainide, Eliquis  --Follow-up outpatient    #Chronic venous stasis, wound care  #Morbid obesity, BMI 56    Copied portions of this report have been reviewed and are accurate as of 01/07/25     CHECKLIST:  Abx: None  VTE: Eliquis   GI ppx: PPI  Diet: Consistent carb  Code: CPR, full  Dispo: Patient is hemodynamically stable, has not done well with PT, declined by IPR Agreeable to SNF as well. Case management following.     Mark Vasquez DO  [facility] Hospitalist  01/07/25  16:29 EST

## 2025-01-07 NOTE — PLAN OF CARE
Goal Outcome Evaluation:  Plan of Care Reviewed With: patient        Progress: no change  Outcome Evaluation: Patient resting in bed. VSS on RA. Pt refused bath care, pt educated on importance. Pt refused to ambulate. No concerns or complaints at this time. Will continue with plan of care.

## 2025-01-07 NOTE — PHARMACY PATIENT ASSISTANCE
Pharmacy consulted to check cost and coverage of DOACs. Per patient's plan, 30 days of Eliquis is $396.53 and Xarelto is $389.47.  Profiled a free trial card for Eliquis for patient to use at discharge, but co-pay will likely be expensive for future fills until she meets her deductible.     Thank you,     Maribeth Medina, PharmD  01/07/25  16:57 EST

## 2025-01-07 NOTE — PLAN OF CARE
Goal Outcome Evaluation:  Plan of Care Reviewed With: patient           Outcome Evaluation: patient has done well today has been in chairost of the day vss no acute changes wound care done per order will continue plan of care

## 2025-01-07 NOTE — PLAN OF CARE
Goal Outcome Evaluation:              Outcome Evaluation: Pt seen for re-evaluation this date in which pt presents with deficits that may improve with continued intervention. Prognosis guarded to fair.    Anticipated Discharge Disposition (PT): sub acute care setting, skilled nursing facility

## 2025-01-07 NOTE — NURSING NOTE
In room to assess ble.  BLE pink and edematous.  No open lesions.  RLE continues with area of maroon discoloration that is blanchable at this time.  Ammonium lactate applied by RN per order.

## 2025-01-07 NOTE — CASE MANAGEMENT/SOCIAL WORK
Discharge Planning Assessment  [facility]     Patient Name: Ana Maradiaga  MRN: [id number]  Today's Date: 1/7/2025    Admit Date: 12/19/2024       Discharge Plan       Row Name 01/07/25 0949       Plan    Plan SS left a message with [facility] per Mary checking on status of referral. SS to follow.    17:32pm: SS notified [facility] per Mary states Pt has been approved pending pre-auth.  notified  pre-auth team of pre-auth need.  updated Provider. SS to follow.                     MELIDA PintoW

## 2025-01-08 LAB
INR PPP: 1.06 (ref 0.9–1.1)
PROTHROMBIN TIME: 13.9 SECONDS (ref 12.1–14.7)

## 2025-01-08 PROCEDURE — 97110 THERAPEUTIC EXERCISES: CPT

## 2025-01-08 PROCEDURE — 97530 THERAPEUTIC ACTIVITIES: CPT

## 2025-01-08 PROCEDURE — 85610 PROTHROMBIN TIME: CPT

## 2025-01-08 PROCEDURE — 97116 GAIT TRAINING THERAPY: CPT

## 2025-01-08 PROCEDURE — 99232 SBSQ HOSP IP/OBS MODERATE 35: CPT | Performed by: STUDENT IN AN ORGANIZED HEALTH CARE EDUCATION/TRAINING PROGRAM

## 2025-01-08 RX ORDER — WARFARIN SODIUM 4 MG/1
8 TABLET ORAL
Status: DISCONTINUED | OUTPATIENT
Start: 2025-01-08 | End: 2025-01-08

## 2025-01-08 RX ADMIN — ATORVASTATIN CALCIUM 40 MG: 40 TABLET, FILM COATED ORAL at 23:05

## 2025-01-08 RX ADMIN — HYDROCORTISONE 1 APPLICATION: 1 CREAM TOPICAL at 15:57

## 2025-01-08 RX ADMIN — ASPIRIN 81 MG: 81 TABLET, COATED ORAL at 08:53

## 2025-01-08 RX ADMIN — LEVOTHYROXINE SODIUM 125 MCG: 0.12 TABLET ORAL at 08:53

## 2025-01-08 RX ADMIN — FLECAINIDE ACETATE 100 MG: 50 TABLET ORAL at 08:53

## 2025-01-08 RX ADMIN — AMMONIUM LACTATE 1 APPLICATION: 120 CREAM TOPICAL at 08:57

## 2025-01-08 RX ADMIN — APIXABAN 5 MG: 5 TABLET, FILM COATED ORAL at 23:06

## 2025-01-08 RX ADMIN — HYDROCORTISONE 10 MG: 10 TABLET ORAL at 15:56

## 2025-01-08 RX ADMIN — FOLIC ACID 1 MG: 1 TABLET ORAL at 08:53

## 2025-01-08 RX ADMIN — APIXABAN 5 MG: 5 TABLET, FILM COATED ORAL at 15:56

## 2025-01-08 RX ADMIN — Medication 5000 UNITS: at 08:53

## 2025-01-08 RX ADMIN — Medication 1000 MCG: at 08:53

## 2025-01-08 RX ADMIN — Medication 400 MG: at 08:53

## 2025-01-08 RX ADMIN — FLECAINIDE ACETATE 100 MG: 50 TABLET ORAL at 23:05

## 2025-01-08 RX ADMIN — HYDROCORTISONE 15 MG: 10 TABLET ORAL at 08:53

## 2025-01-08 NOTE — PROGRESS NOTES
Patient Identification:  Name:  Ana Maradiaga  Age:  59 y.o.  Sex:  female  :  1965  MRN:  [id number]   Visit Number:  [id number]  Primary Care Physician:  Val Purcell PA     Subjective     Chief complaint:     Weakness, bilateral lower legs    History of presenting illness:     Patient is a 59 y.o. female with past medical history significant for anemia, CKD, atrial fibrillation, hypertension, gout and CVA that presented to the [facility] for complaints of hematuria. Wound care consulted to evaluate bilateral lower extremities. She reports she has had lymphedema for several years now but has not undergone any therapy or treatment. She states she has dealt with recurrent cellulitis which led to hospitalization. She currently does not appear to have cellulitis but has a purple/red discoloration that she reports has been chronic after her last recurrence of cellulitis. Denies pain or open wounds. No fever or chills. The patients nurse, Dia, was at bedside during her exam.      24: The patient was seen while resting in bed. She denies any new issues. Bilateral lower extremities are showing improvement with less dry skin. Edema continues to be present. She denies fever or chills.      24: The patient was seen while resting in bedside chair. She denies any new issues. She denies fever or chills. Tolerating unna boots.    2025: The patient was seen while resting in bed. She denies any new issues. Bilateral lower extremities are showing improvement with less dry skin. Edema continues to be present. She denies fever or chills.   ---------------------------------------------------------------------------------------------------------------------   Review of Systems:  Review of Systems   Constitutional:  Negative for chills and fever.   Respiratory:  Negative for cough and shortness of breath.    Cardiovascular:  Positive for leg swelling. Negative for chest  pain.   Gastrointestinal:  Negative for nausea and vomiting.   Musculoskeletal:  Positive for back pain and gait problem.   Skin:  Positive for wound.        ---------------------------------------------------------------------------------------------------------------------   Past Medical History:   Diagnosis Date    Anemia     Arrhythmia 2018    Afib    Arthritis     Atrial fibrillation 2018    Bronchitis     Chronic kidney disease 2018    Gout     Hypertension     Stroke November 2021     History reviewed. No pertinent surgical history.  Family History   Problem Relation Age of Onset    Heart disease Mother     Stroke Mother     Hypertension Father     Arthritis Father     Hypertension Sister     Breast cancer Paternal Cousin      Social History     Socioeconomic History    Marital status: Single   Tobacco Use    Smoking status: Never    Smokeless tobacco: Never   Vaping Use    Vaping status: Never Used   Substance and Sexual Activity    Alcohol use: Never    Drug use: Never    Sexual activity: Not Currently     Partners: Male     ---------------------------------------------------------------------------------------------------------------------   Allergies:  Vancomycin, Cefepime, and Cephalosporins  ---------------------------------------------------------------------------------------------------------------------   Medications below are reported home medications pulling from within the system; at this time, these medications have not been reconciled unless otherwise specified and are in the verification process for further verifcation as current home medications.    Prior to Admission Medications       Prescriptions Last Dose Informant Patient Reported? Taking?    amLODIPine (NORVASC) 5 MG tablet Past Week Self, Other No Yes    TAKE 1 TABLET BY MOUTH DAILY    Aspirin Low Dose 81 MG EC tablet 12/19/2024 Self, Other Yes Yes    Take 1 tablet by mouth Daily.    atorvastatin (LIPITOR) 40 MG tablet Past Week Self,  Other Yes Yes    Take 1 tablet by mouth every night at bedtime.    bumetanide (BUMEX) 1 MG tablet 12/19/2024 Self, Other Yes Yes    Take 2 tablets by mouth Daily.    carvedilol (COREG) 6.25 MG tablet 12/19/2024 Self, Other No Yes    TAKE 1 TABLET BY MOUTH EVERY 12 HOURS    Eliquis 5 MG tablet tablet 12/19/2024 Self, Other No Yes    Take 1 tablet by mouth Every 12 (Twelve) Hours.    flecainide (TAMBOCOR) 100 MG tablet 12/19/2024 Self, Other No Yes    TAKE 1 TABLET BY MOUTH TWICE A DAY    levothyroxine (SYNTHROID, LEVOTHROID) 125 MCG tablet 12/19/2024 Self, Other Yes Yes    Take 1 tablet by mouth Daily.    magnesium oxide (MAG-OX) 400 MG tablet 12/19/2024 Self Yes Yes    Take 1 tablet by mouth Daily.    vitamin B-12 (CYANOCOBALAMIN) 1000 MCG tablet 12/19/2024 Self Yes Yes    Take 1 tablet by mouth Daily.    vitamin D3 125 MCG (5000 UT) capsule capsule 12/19/2024 Self Yes Yes    Take 1 capsule by mouth Daily.    Tirzepatide (Mounjaro) 5 MG/0.5ML solution auto-injector 12/13/2024 Self, Other No No    Inject 0.5 mL under the skin into the appropriate area as directed 1 (One) Time Per Week.          ---------------------------------------------------------------------------------------------------------------------  Objective     Hospital Scheduled Meds:  ammonium lactate, 1 Application, Topical, Daily  aspirin, 81 mg, Oral, Daily  atorvastatin, 40 mg, Oral, Nightly  flecainide, 100 mg, Oral, BID  folic acid, 1 mg, Oral, Daily  Gelocast [place] boot, 2 each, Topical, Q3 Days  hydrocortisone, 10 mg, Oral, Daily  hydrocortisone, 15 mg, Oral, Daily With Breakfast  hydrocortisone, 1 Application, Topical, Q8H  levothyroxine, 125 mcg, Oral, Daily  magnesium oxide, 400 mg, Oral, Daily  sodium chloride, 10 mL, Intravenous, Q12H  Tirzepatide, 0.5 mL, Subcutaneous, Weekly  vitamin B-12, 1,000 mcg, Oral, Daily  vitamin D3, 5,000 Units, Oral, Daily  warfarin, 8 mg, Oral, Once      Pharmacy Consult,   Pharmacy to dose warfarin,          Current listed hospital scheduled medications may not yet reflect those currently placed in orders that are signed and held, awaiting patient's arrival to floor/unit.    ---------------------------------------------------------------------------------------------------------------------   Vital Signs:  Temp:  [97.7 °F (36.5 °C)-98.5 °F (36.9 °C)] 97.7 °F (36.5 °C)  Heart Rate:  [] 101  Resp:  [18] 18  BP: (133-173)/(62-85) 173/85  No data found.  SpO2 Percentage    01/07/25 1900 01/07/25 2300 01/08/25 0300   SpO2: 98% 97% 98%     SpO2:  [97 %-98 %] 98 %  on   ;   Device (Oxygen Therapy): room air    Body mass index is 56.25 kg/m².  Wt Readings from Last 3 Encounters:   12/19/24 (!) 153 kg (338 lb)   10/24/24 116 kg (256 lb 6.4 oz)   10/11/24 (!) 164 kg (362 lb)     ---------------------------------------------------------------------------------------------------------------------   Physical Exam:  Physical Exam  Bilatearl lower leg- xerosis present. No cellulitis. Lymphedema present .  ---------------------------------------------------------------------------------------------------------------------             Results from last 7 days   Lab Units 01/08/25 0849 01/07/25 2044 01/06/25 0202 01/05/25  0002 01/04/25 0303   WBC 10*3/mm3  --   --  9.65 8.51 9.34   HEMOGLOBIN g/dL  --   --  9.8* 9.8* 9.7*   HEMATOCRIT %  --   --  31.4* 32.0* 31.9*   MCV fL  --   --  99.1* 101.6* 100.9*   MCHC g/dL  --   --  31.2* 30.6* 30.4*   PLATELETS 10*3/mm3  --   --  167 175 183   INR  1.06 1.26*  --   --   --      Results from last 7 days   Lab Units 01/06/25 0202 01/05/25  0002 01/04/25 0303   SODIUM mmol/L 136 138 137   POTASSIUM mmol/L 4.2 4.6 4.4   MAGNESIUM mg/dL 1.8 2.0 2.0   CHLORIDE mmol/L 103 103 104   CO2 mmol/L 25.4 26.0 24.5   BUN mg/dL 16 15 14   CREATININE mg/dL 0.94 0.96 0.87   CALCIUM mg/dL 8.4* 8.2* 8.2*   GLUCOSE mg/dL 133* 117* 99   Estimated Creatinine Clearance: 97 mL/min (by C-G formula  "based on SCr of 0.94 mg/dL).  No results found for: \"AMMONIA\"    No results found for: \"HGBA1C\", \"POCGLU\"  No results found for: \"HGBA1C\"  Lab Results   Component Value Date    TSH 3.320 12/19/2024    FREET4 1.30 12/27/2024       Pain Management Panel  More data may exist         Latest Ref Rng & Units 12/19/2024 6/22/2023   Pain Management Panel   Creatinine, Urine mg/dL  mg/dL - 152.9  152.9    Amphetamine, Urine Qual Negative Negative  -   Barbiturates Screen, Urine Negative Negative  -   Benzodiazepine Screen, Urine Negative Negative  -   Buprenorphine, Screen, Urine Negative Negative  -   Cocaine Screen, Urine Negative Negative  -   Fentanyl, Urine Negative Negative  -   Methadone Screen , Urine Negative Negative  -   Methamphetamine, Ur Negative Negative  -      Details          Multiple values from one day are sorted in reverse-chronological order             I have personally reviewed the above laboratory results.     ---------------------------------------------------------------------------------------------------------------------    Assessment & Plan      Assessment     Lymphedema  Xerosis     Plan:      B/L lower extremities showing improvement.      ISABEL values within normal limits.  No evidence of peripheral arterial disease. Recommend to continue to apply ammonium lactate lotion followed by application of unna boots. This can be changed to weekly if the patient is agreeable.She is concerned about dressing changes frequency so may benefit from outpatient wound care clinic or home health if eligible.      She would benefit from outpatient lymphedema treatment as well.      TREASURE [place], LEXISS  [facility]  01/08/25  12:05 EST    "

## 2025-01-08 NOTE — PROGRESS NOTES
[facility] HOSPITALIST PROGRESS NOTE     Patient Identification:  Name:  Ana Maradiaga  Age:  59 y.o.  Sex:  female  :  1965  MRN:  [id number]  Visit Number:  [id number]  ROOM: [address] [place] [address]     Primary Care Provider:  Val Purcell PA    Length of stay in inpatient status:  20    Subjective     Chief Compliant:    Chief Complaint   Patient presents with    Weakness - Generalized    Blood in Urine       History of Presenting Illness:    Patient seen in follow-up for UTI and overall weakness.  Patient is stable.  Will have a bed at [facility] tomorrow.  I discussed Eliquis versus Coumadin.  She would like more time to decide regarding the price of Eliquis.  She does not like the idea of frequent INR checks and the possibility of being subtherapeutic on Coumadin and would rather take Eliquis for now and figure it out later.    Objective     Current Hospital Meds:ammonium lactate, 1 Application, Topical, Daily  apixaban, 5 mg, Oral, Q12H  aspirin, 81 mg, Oral, Daily  atorvastatin, 40 mg, Oral, Nightly  flecainide, 100 mg, Oral, BID  folic acid, 1 mg, Oral, Daily  Gelocast Unnas boot, 2 each, Topical, Q3 Days  hydrocortisone, 10 mg, Oral, Daily  hydrocortisone, 15 mg, Oral, Daily With Breakfast  hydrocortisone, 1 Application, Topical, Q8H  levothyroxine, 125 mcg, Oral, Daily  magnesium oxide, 400 mg, Oral, Daily  sodium chloride, 10 mL, Intravenous, Q12H  Tirzepatide, 0.5 mL, Subcutaneous, Weekly  vitamin B-12, 1,000 mcg, Oral, Daily  vitamin D3, 5,000 Units, Oral, Daily    Pharmacy Consult,         Current Antimicrobial Therapy:  Anti-Infectives (From admission, onward)      Ordered     Dose/Rate Route Frequency Start Stop    24 0935  cefTRIAXone (ROCEPHIN) 2,000 mg in sodium chloride 0.9 % 100 mL IVPB-VTB        Ordering Provider: Thien Reardon DO    2,000 mg  200 mL/hr over 30 Minutes Intravenous Every 24 Hours 24 1700 24 1758    24 1555  cefTRIAXone (ROCEPHIN) 2,000 mg in  sodium chloride 0.9 % 100 mL IVPB-VTB        Ordering Provider: Mark Vasquez DO    2,000 mg  200 mL/hr over 30 Minutes Intravenous Once 12/19/24 1610 12/19/24 1813          Current Diuretic Therapy:  Diuretics (From admission, onward)      Ordered     Dose/Rate Route Frequency Start Stop    01/07/25 1009  bumetanide (BUMEX) tablet 2 mg        Ordering Provider: Mark Vasquez DO    2 mg Oral Daily PRN 01/07/25 1007            ----------------------------------------------------------------------------------------------------------------------  Vital Signs:  Temp:  [97.7 °F (36.5 °C)-98.5 °F (36.9 °C)] 97.7 °F (36.5 °C)  Heart Rate:  [] 101  Resp:  [18] 18  BP: (133-173)/(62-85) 173/85  SpO2:  [97 %-98 %] 98 %  on   ;   Device (Oxygen Therapy): room air  Body mass index is 56.25 kg/m².    Wt Readings from Last 3 Encounters:   12/19/24 (!) 153 kg (338 lb)   10/24/24 116 kg (256 lb 6.4 oz)   10/11/24 (!) 164 kg (362 lb)     Intake & Output (last 3 days)         12/21 0701 12/22 0700 12/22 0701 12/23 0700 12/23 0701 12/24 0700    P.O. 1035 1300 960    I.V. (mL/kg)  0 (0) 203 (1.3)    IV Piggyback  100     Total Intake(mL/kg) 1035 (6.8) 1400 (9.2) 1163 (7.6)    Urine (mL/kg/hr) 650 (0.2) 250 (0.1) 200 (0.1)    Stool 0      Total Output 650 250 200    Net +385 +1150 +963           Urine Unmeasured Occurrence  2 x 2 x    Stool Unmeasured Occurrence 1 x            Diet: Cardiac; Healthy Heart (2-3 Na+); Fluid Consistency: Thin (IDDSI 0)  ----------------------------------------------------------------------------------------------------------------------  Physical exam:  Constitutional: Early obese female, nontoxic, Well-developed and well-nourished, resting comfortably in bed, no acute distress.      HENT:  Head:  Normocephalic and atraumatic.  Mouth:  Moist mucous membranes.  Eyes:  Conjunctivae and EOM are normal. No scleral icterus.   Cardiovascular:  Normal rate, regular rhythm and  "normal heart sounds with no murmur. No JVD.   Pulmonary/Chest:  No respiratory distress, no wheezes, no crackles, with normal breath sounds and good air movement. Unlabored. No accessory muscle use.  Abdominal:  Soft. No distension and no tenderness.  Bowel sounds present. No rebound or guarding.   Musculoskeletal:  No tenderness, and no deformity.  No red or swollen joints anywhere.    Neurological:  Alert and oriented to person, place, and time. Nonfocal, weakness in upper and lower extremities bilaterally  Skin:  Skin is warm and dry.  Morbilliform rash on upper and lower extremities, trunk .no pallor.   Peripheral vascular:  No clubbing, no cyanosis, bilateral venous stasis.      ----------------------------------------------------------------------------------------------------------------------  Results from last 7 days   Lab Units 01/08/25  0849 01/07/25 2044 01/06/25 0202 01/05/25 0002 01/04/25 0303   WBC 10*3/mm3  --   --  9.65 8.51 9.34   HEMOGLOBIN g/dL  --   --  9.8* 9.8* 9.7*   HEMATOCRIT %  --   --  31.4* 32.0* 31.9*   MCV fL  --   --  99.1* 101.6* 100.9*   MCHC g/dL  --   --  31.2* 30.6* 30.4*   PLATELETS 10*3/mm3  --   --  167 175 183   INR  1.06 1.26*  --   --   --          Results from last 7 days   Lab Units 01/06/25 0202 01/05/25 0002 01/04/25 0303   SODIUM mmol/L 136 138 137   POTASSIUM mmol/L 4.2 4.6 4.4   MAGNESIUM mg/dL 1.8 2.0 2.0   CHLORIDE mmol/L 103 103 104   CO2 mmol/L 25.4 26.0 24.5   BUN mg/dL 16 15 14   CREATININE mg/dL 0.94 0.96 0.87   CALCIUM mg/dL 8.4* 8.2* 8.2*   GLUCOSE mg/dL 133* 117* 99   Estimated Creatinine Clearance: 97 mL/min (by C-G formula based on SCr of 0.94 mg/dL).  No results found for: \"AMMONIA\"                  No results found for: \"HGBA1C\", \"POCGLU\"  Lab Results   Component Value Date    TSH 3.320 12/19/2024    FREET4 1.30 12/27/2024     No results found for: \"PREGTESTUR\", \"PREGSERUM\", \"HCG\", \"HCGQUANT\"  Pain Management Panel  More data may exist         " "Latest Ref Rng & Units 12/19/2024 6/22/2023   Pain Management Panel   Creatinine, Urine mg/dL  mg/dL - 152.9  152.9    Amphetamine, Urine Qual Negative Negative  -   Barbiturates Screen, Urine Negative Negative  -   Benzodiazepine Screen, Urine Negative Negative  -   Buprenorphine, Screen, Urine Negative Negative  -   Cocaine Screen, Urine Negative Negative  -   Fentanyl, Urine Negative Negative  -   Methadone Screen , Urine Negative Negative  -   Methamphetamine, Ur Negative Negative  -      Details          Multiple values from one day are sorted in reverse-chronological order             Brief Urine Lab Results  (Last result in the past 365 days)        Color   Clarity   Blood   Leuk Est   Nitrite   Protein   CREAT   Urine HCG        01/01/25 1249 Red  Comment: Dipstick results may be inaccurate due to color interference.       Turbid   Large (3+)   Trace   Negative   100 mg/dL (2+)                 Blood Culture   Date Value Ref Range Status   12/19/2024 No growth at 4 days  Preliminary   12/19/2024 No growth at 4 days  Preliminary     Urine Culture   Date Value Ref Range Status   12/19/2024 >100,000 CFU/mL Mixed Toya Isolated  Final     No results found for: \"WOUNDCX\"  No results found for: \"STOOLCX\"  No results found for: \"RESPCX\"  No results found for: \"AFBCX\"          I have personally looked at the labs and they are summarized above.  ----------------------------------------------------------------------------------------------------------------------  Detailed radiology reports for the last 24 hours:  Imaging Results (Last 24 Hours)       ** No results found for the last 24 hours. **          Assessment & Plan      Patient is a 59-year-old female with history significant for atrial fibrillation, CKD and prior stroke who came to the ER with reports of gross hematuria and weakness.    #Acute on chronic debility  #Bilateral lower extremity weakness, multifactorial  --PT/OT, has not done well with therapy, " notes updated, IPR declined, case management following    #Adrenal insufficiency, suspect central process  #Elevated testosterone, unclear etiology  --Cortisol low, ACTH low, testosterone elevated, DHEA-sulfate low, prolactin normal  --Estradiol elevated, FSH/LH low  --MRI brain with/without unremarkable for lesions  --CT abdomen pelvis with contrast negative for ovarian/adrenal tumor  --Pelvic ultrasound negative for ovarian lesions  --MRI pituitary protocol with and without without abnormal pituitary lesion  --After discussion with endocrinology, increase Solu-Cortef to 15 mg daily with breakfast, 10 mg daily at 2 PM  --I discussed with endocrinology again on 12/27, working diagnosis remains a suspected small ovarian tumor, given the above images have been unremarkable, with MRI of the abdomen/pelvis with/without contrast, if this is negative, we will continue treatment plan and have her follow-up outpatient    #Acute urinary tract infection the E. coli  #Left staghorn calculus  --Urine culture positive for E. Coli  --Completed course of Rocephin  --Hematuria likely due to large UPJ calculus exacerbated by Eliquis  --Urology evaluated, recommend outpatient follow-up    #Atrial fibrillation, rate controlled  #Prior ischemic infarct  --MRI of the brain noted old infarcts but nothing acute  --Initially changed from Eliquis to Coumadin due to p.o., she would like more time to decide regarding the price of Eliquis.  She does not like the idea of frequent INR checks and the possibility of being subtherapeutic on Coumadin and would rather take Eliquis for now and figure it out later.  --DC Coumadin, switch back to Eliquis  --Continue flecainide  --Follow-up outpatient    #Chronic venous stasis, wound care following  #Morbid obesity, BMI 56    Copied portions of this report have been reviewed and are accurate as of 01/08/25     CHECKLIST:  Abx: None  VTE: Eliquis   GI ppx: PPI  Diet: Consistent carb  Code: CPR,  full  Dispo: Patient is hemodynamically stable, plan to discharge tomorrow to SNF when bed is available.    Mark Vasquez DO  [facility] Hospitalist  01/08/25  13:59 EST

## 2025-01-08 NOTE — THERAPY TREATMENT NOTE
Acute Care - Physical Therapy Treatment Note   [place]     Patient Name: Ana Maradiaga  : 1965  MRN: [id number]  Today's Date: 2025   Onset of Illness/Injury or Date of Surgery: 24  Visit Dx:     ICD-10-CM ICD-9-CM   1. Urinary tract infection with hematuria, site unspecified  N39.0 599.0    R31.9 599.70   2. Weakness  R53.1 780.79   3. High serum testosterone  R79.89 790.99     Patient Active Problem List   Diagnosis    Type 2 diabetes mellitus with hyperglycemia, without long-term current use of insulin    Acquired hypothyroidism    Hirsutism    Elevated testosterone level in female    Complicated UTI (urinary tract infection)     Past Medical History:   Diagnosis Date    Anemia     Arrhythmia     Afib    Arthritis     Atrial fibrillation     Bronchitis     Chronic kidney disease     Gout     Hypertension     Stroke 2021     History reviewed. No pertinent surgical history.  PT Assessment (Last 12 Hours)       PT Evaluation and Treatment       Row Name 25 0946          Physical Therapy Time and Intention    Subjective Information complains of;weakness  -     Document Type therapy note (daily note)  -     Mode of Treatment physical therapy  -     Patient Effort good  -     Comment Pt and RN in agreement for PT. Pt walked 25' with RW CGA/SBA. Sitting exercises completed EOB. Pt does required mod-max A 1-2 for bed monbility secondary to LE edema.  -       Row Name 25 0946          Cognition    Personal Safety Interventions fall prevention program maintained;nonskid shoes/slippers when out of bed;supervised activity  -       Row Name 25 0946          Bed Mobility    Bed Mobility supine-sit  -     Scooting/Bridging [place] (Bed Mobility) moderate assist (50% patient effort);maximum assist (25% patient effort);2 person assist  -     Supine-Sit [place] (Bed Mobility) minimum assist (75% patient effort);moderate assist (50% patient  effort)  -     Sit-Supine [place] (Bed Mobility) moderate assist (50% patient effort);maximum assist (25% patient effort)  LEs  -       Row Name 01/08/25 0946          Transfers    Transfers sit-stand transfer;stand-sit transfer  -       Row Name 01/08/25 0946          Sit-Stand Transfer    Sit-Stand [place] (Transfers) contact guard  -     Assistive Device (Sit-Stand Transfers) walker, front-wheeled  -       Row Name 01/08/25 0946          Stand-Sit Transfer    Stand-Sit [place] (Transfers) contact guard;standby assist;supervision  -     Assistive Device (Stand-Sit Transfers) walker, front-wheeled  -       Row Name 01/08/25 0946          Gait/Stairs (Locomotion)    Gait/Stairs Locomotion gait/ambulation assistive device  -     Ward Level (Gait) contact guard;standby assist  -     Assistive Device (Gait) walker, front-wheeled  -     Distance in Feet (Gait) 25  -       Row Name 01/08/25 0946          Motor Skills    Therapeutic Exercise other (see comments)  Sitting: LAQ, March, knees in/out  -       Row Name 01/08/25 0946          Positioning and Restraints    Pre-Treatment Position in bed  -     Post Treatment Position bed  -     In Bed fowlers;call light within reach;encouraged to call for assist;exit alarm on;side rails up x2  -       Row Name 01/08/25 0946          Physical Therapy Goals    Bed Mobility Goal Selection (PT) bed mobility, PT goal 1  -     Transfer Goal Selection (PT) transfer, PT goal 1  -     Gait Training Goal Selection (PT) gait training, PT goal 1  -       Row Name 01/08/25 0946          Bed Mobility Goal 1 (PT)    Activity/Assistive Device (Bed Mobility Goal 1, PT) scooting;sit to supine;supine to sit  -     [place]ard Level/Cues Needed (Bed Mobility Goal 1, PT) standby assist  -     Time Frame (Bed Mobility Goal 1, PT) by discharge  -     Progress/Outcomes (Bed Mobility Goal 1, PT) goal ongoing  -       Row Name 01/08/25  0946          Transfer Goal 1 (PT)    Activity/Assistive Device (Transfer Goal 1, PT) sit-to-stand/stand-to-sit;bed-to-chair/chair-to-bed;toilet;walker, rolling  -HC     [place] Level/Cues Needed (Transfer Goal 1, PT) standby assist  -HC     Time Frame (Transfer Goal 1, PT) by discharge  -HC     Progress/Outcome (Transfer Goal 1, PT) goal partially met  was SBA/CGA with PTA  -HC       Row Name 01/08/25 0946          Gait Training Goal 1 (PT)    Activity/Assistive Device (Gait Training Goal 1, PT) gait (walking locomotion);assistive device use;decrease fall risk;diminish gait deviation;improve balance and speed;increase endurance/gait distance;walker, rolling  -HC     [place] Level (Gait Training Goal 1, PT) standby assist  -HC     Distance (Gait Training Goal 1, PT) 30  -HC     Time Frame (Gait Training Goal 1, PT) by discharge  -HC     Progress/Outcome (Gait Training Goal 1, PT) goal partially met  ambulates 30' with SBA/CGA using RW  -HC               User Key  (r) = Recorded By, (t) = Taken By, (c) = Cosigned By      Initials Name Provider Type    Mary Field, PTA Physical Therapist Assistant                    Physical Therapy Education       Title: PT OT SLP Therapies (Done)       Topic: Physical Therapy (Done)       Point: Mobility training (Done)       Learning Progress Summary            Patient Acceptance, E,TB, VU by DM at 1/7/2025 1705    Acceptance, E,TB, VU by DM at 1/5/2025 1713    Acceptance, E,TB, VU by DM at 1/3/2025 1453    Acceptance, TB,E, VU by RG at 12/29/2024 1000    Acceptance, E,TB, VU by AD at 12/27/2024 2313    Acceptance, E,TB, VU by CF at 12/26/2024 1222    Acceptance, E,TB, VU by CF at 12/24/2024 0905    Acceptance, E, VU by SC at 12/24/2024 0331    Acceptance, E,D, VU,NR by AG at 12/23/2024 1249                      Point: Home exercise program (Done)       Learning Progress Summary            Patient Acceptance, E,TB, VU by DM at 1/7/2025 1705    Acceptance,  E,TB, VU by DM at 1/5/2025 1713    Acceptance, E,TB, VU by DM at 1/3/2025 1453    Acceptance, TB,E, VU by RG at 12/29/2024 1000    Acceptance, E,TB, VU by AD at 12/27/2024 2313    Acceptance, E,TB, VU by CF at 12/26/2024 1222    Acceptance, E,TB, VU by CF at 12/24/2024 0905    Acceptance, E, VU by SC at 12/24/2024 0331    Acceptance, E,D, VU,NR by AG at 12/23/2024 1249                      Point: Body mechanics (Done)       Learning Progress Summary            Patient Acceptance, E,TB, VU by DM at 1/7/2025 1705    Acceptance, E,TB, VU by DM at 1/5/2025 1713    Acceptance, E,TB, VU by DM at 1/3/2025 1453    Acceptance, TB,E, VU by RG at 12/29/2024 1000    Acceptance, E,TB, VU by AD at 12/27/2024 2313    Acceptance, E,TB, VU by CF at 12/26/2024 1222    Acceptance, E,TB, VU by CF at 12/24/2024 0905    Acceptance, E, VU by SC at 12/24/2024 0331    Acceptance, E,D, VU,NR by AG at 12/23/2024 1249                      Point: Precautions (Done)       Learning Progress Summary            Patient Acceptance, E,TB, VU by DM at 1/7/2025 1705    Acceptance, E,TB, VU by DM at 1/5/2025 1713    Acceptance, E,TB, VU by DM at 1/3/2025 1453    Acceptance, TB,E, VU by RG at 12/29/2024 1000    Acceptance, E,TB, VU by AD at 12/27/2024 2313    Acceptance, E,TB, VU by CF at 12/26/2024 1222    Acceptance, E,TB, VU by CF at 12/24/2024 0905    Acceptance, E, VU by SC at 12/24/2024 0331    Acceptance, E,D, VU,NR by AG at 12/23/2024 1249                                      User Key       Initials Effective Dates Name Provider Type Discipline    AD 06/06/23 -  Kelle Pinto, RN Registered Nurse Nurse    AG 06/16/21 -  Elizabeth Aquino, PT Physical Therapist PT    DM 06/16/21 -  Moniz, Deborah, RN Registered Nurse Nurse    RG 06/06/23 -  Robert Engle, RN Registered Nurse Nurse    CF 06/25/24 -  Ebonie Rangel, RN Registered Nurse Nurse    SC 09/11/24 -  Naya Lowry, RN Registered Nurse Nurse                  PT Recommendation and  Plan             Time Calculation:    PT Charges       Row Name 01/08/25 0949             Time Calculation    PT Received On 01/08/25  -HC         Time Calculation- PT    Total Timed Code Minutes- PT 25 minute(s)  -HC                User Key  (r) = Recorded By, (t) = Taken By, (c) = Cosigned By      Initials Name Provider Type     Mary Jorgensen, ITZ Physical Therapist Assistant                  Therapy Charges for Today       Code Description Service Date Service Provider Modifiers Qty    48694253103 HC GAIT TRAINING EA 15 MIN 1/8/2025 Mary Jorgensen PTA GP, CQ 1    60983978002 HC PT THER PROC EA 15 MIN 1/8/2025 Mary Jorgensen, ITZ GP, CQ 1            PT G-Codes  AM-PAC 6 Clicks Score (PT): 13    Mary Jorgensen PTA  1/8/2025

## 2025-01-08 NOTE — PLAN OF CARE
Goal Outcome Evaluation:           Progress: improving  Pt resting in bed. VSS on RA. Pt restarted on Eliquis. No acute changes. Will continue with POC.

## 2025-01-08 NOTE — NURSING NOTE
"Moved patient and her personal belongings to Room 351B; she stated, \"I would rather not because I am probably going home tomorrow.\" I explained that we had to make a bed for a male patient who was going to be in isolation. She is alert and oriented and stated that she would call her family this morning to let them know that she moved to 351B.  "

## 2025-01-08 NOTE — PAYOR COMM NOTE
"Stiven Armstrong RN  Swing Bed Nurse  [phone] Ex [phone] FAX: [phone]  [email or web address]  [facility]  NPI [id number]  Reference Number     Patient needs post acute SNF for continued PT/OT strength, mobility, transfer and ADL training. PLOF lives with son ambulated with rollator household distances.    ICD10  N39.0  R53.1    Ana Maradiaga (59 y.o. Female)       Date of Birth   1965    Social Security Number       Address   [address] [place] [address]    Home Phone   [phone]    MRN   [id number]       Pentecostal   Quaker    Marital Status   Single                            Admission Date   12/19/24    Admission Type   Emergency    Admitting Provider   Thien Reardon DO    Attending Provider   Mark Vasquez DO    Department, Room/Bed   [address], 3351/2S       Discharge Date       Discharge Disposition       Discharge Destination                                 Attending Provider: Mark Vasquez DO    Allergies: Vancomycin, Cefepime, Cephalosporins    Isolation: None   Infection: None   Code Status: CPR    Ht: 165.1 cm (65\")   Wt: 153 kg (338 lb)    Admission Cmt: None   Principal Problem: Complicated UTI (urinary tract infection) [N39.0]                   Active Insurance as of 12/19/2024       Primary Coverage       Payor Plan Insurance Group Employer/Plan Group    ANTH MEDICARE REPLACEMENT ANTH MEDICARE ADVANTAGE [id number]       Payor Plan Address Payor Plan Phone Number Payor Plan Fax Number Effective Dates    [address] [phone]  4/1/2024 - None Entered    [facility] [id number]         Subscriber Name Subscriber Birth Date Member ID       ANA MARADIAGA 1965 [id number]                     Emergency Contacts        (Rel.) Home Phone Work Phone Mobile Phone    CAMPOSGALIBLANCA (Son) -- -- [phone]    CYDNEY MARADIAGA (Relative) -- -- [phone]                 History & " Physical        AnaheimThien DO at 24 1837          [facility]   HISTORY AND PHYSICAL    Patient Name: Ana Maradiaga  : 1965  MRN: [id number]  Primary Care Physician:  Val Purcell PA  Date of admission: 2024    Subjective  Subjective     Chief Complaint: Hematuria leg weakness    History of Present Illness  Patient is a 59-year-old female with past medical history of anemia, atrial fibrillation, chronic kidney disease, hypertension, gout, and a history of a CVA.  Patient presents to the emergency department with a couple of different complaints.  Patient says she started noticing some hematuria for the last couple of days.  Patient had a CT scan of her abdomen to evaluate for possible intra-abdominal tumors.  The patient has had very high levels of testosterone and they were looking to rule out hormone secreting tumors.  They did not discover tumors but did find a staghorn calculus in her left kidney.  Patient says she had had occasional dysuria but all of it has been much worse today.  Patient is also been experiencing gradual but significant lower extremity weakness.  Patient says that it is not on focal but over the last several weeks she has had more and more difficulty ambulating throughout the house.  Patient says the only thing that is really different in her routine is that she was started on Mounjaro.  She has lost 20 pounds and is having no GI difficulties.  Here in the emergency department the patient was found to have a significant nitrite positive urinary tract infection with large blood.  Her blood work shows an increased creatinine at 1.55, her white blood cell count is greater than 10 and her LDH is elevated.  Review of Systems   As stated above in his present illness all other systems were reviewed and subsequently negative  Personal History     Past Medical History:   Diagnosis Date    Anemia     Arrhythmia 2018    Afib    Arthritis     Atrial fibrillation 2018     Bronchitis     Chronic kidney disease 2018    Gout     Hypertension     Stroke November 2021       No past surgical history on file.    Family History: family history includes Arthritis in her father; Breast cancer in her paternal cousin; Heart disease in her mother; Hypertension in her father and sister; Stroke in her mother. Otherwise pertinent FHx was reviewed and not pertinent to current issue.    Social History:  reports that she has never smoked. She has never used smokeless tobacco. She reports that she does not drink alcohol and does not use drugs.    Home Medications:  Tirzepatide, amLODIPine, apixaban, aspirin, atorvastatin, bumetanide, carvedilol, flecainide, levothyroxine, magnesium oxide, vitamin B-12, and vitamin D3    Allergies:  Allergies   Allergen Reactions    Vancomycin Hives    Cefepime Rash    Cephalosporins Rash       Objective   Objective     Vitals:   Temp:  [97.9 °F (36.6 °C)] 97.9 °F (36.6 °C)  Heart Rate:  [66-69] 69  Resp:  [17] 17  BP: (127-159)/(74-92) 149/81    Physical Exam  Constitutional:  Well-developed and well-nourished.  No acute distress.      HENT:  Head:  Normocephalic and atraumatic.  Mouth:  Moist mucous membranes.    Eyes:  Conjunctivae and EOM are normal. No scleral icterus.    Neck:  Neck supple.  No JVD present.    Cardiovascular:  Normal rate, regular rhythm and normal heart sounds with no murmur.  Pulmonary/Chest:  No respiratory distress, no wheezes, no crackles, with normal breath sounds and good air movement.  Abdominal:  Soft. No distension and no tenderness.   Musculoskeletal:  No tenderness, and no deformity.  No red or swollen joints anywhere.    Neurological:  Alert and oriented to person, place, and time.  No cranial nerve deficit.    Skin:  Skin is warm and dry. No rash noted. No pallor.   Peripheral vascular:  No clubbing, no cyanosis, no edema.  Psychiatric: Appropriate mood and affect  :    Result Review   Result Review:  I have personally reviewed the  results from the time of this admission to 12/19/2024 18:37 EST and agree with these findings:  []  Laboratory list / accordion  []  Microbiology  []  Radiology  []  EKG/Telemetry   []  Cardiology/Vascular   []  Pathology  []  Old records  []  Other:  Most notable findings include:       Assessment & Plan  Assessment / Plan     Brief Patient Summary:  Ana Maradiaga is a 59 y.o. female who presents to the ED with complaints of bilateral lower extremity weakness and hematuria.  She is found to have a significant urinary tract infection and the workup for her lower extremity weakness has began    Active Hospital Problems:  Complicated UTI  - Patient was started on Rocephin in the ED which we will continue on the floor  - Gentle hydration overnight  - Await urine culture    Bilateral lower extremity weakness  -MRI of the brain  - PT and OT evaluation  - Consider neurology consultation  -    VTE Prophylaxis:  Mechanical VTE prophylaxis orders are signed & held.          CODE STATUS:    Code Status (Patient has no pulse and is not breathing): CPR (Attempt to Resuscitate)  Medical Interventions (Patient has pulse or is breathing): Full Support    Admission Status:  I believe this patient meets inpatient status.    Thien Reardon DO    Electronically signed by Thien Reardon DO at 12/19/24 1901       Current Facility-Administered Medications   Medication Dose Route Frequency Provider Last Rate Last Admin    ammonium lactate (AMLACTIN) 12 % cream 1 Application  1 Application Topical Daily Ema Paris PA-C   1 Application at 01/07/25 1001    aspirin EC tablet 81 mg  81 mg Oral Daily Thien Reardon DO   81 mg at 01/07/25 1000    atorvastatin (LIPITOR) tablet 40 mg  40 mg Oral Nightly Thien Reardon DO   40 mg at 01/07/25 2223    sennosides-docusate (PERICOLACE) 8.6-50 MG per tablet 2 tablet  2 tablet Oral BID PRN Thien Reardon DO        And    polyethylene glycol (MIRALAX) packet 17 g  17 g Oral Daily PRN  Thien Reardon DO   17 g at 12/20/24 1035    And    bisacodyl (DULCOLAX) EC tablet 5 mg  5 mg Oral Daily PRN Thien Reardon DO        And    bisacodyl (DULCOLAX) suppository 10 mg  10 mg Rectal Daily PRN Thien Reardon DO        bumetanide (BUMEX) tablet 2 mg  2 mg Oral Daily PRN Mark Vasquez DO   2 mg at 01/07/25 2223    Calcium Replacement - Follow Nurse / BPA Driven Protocol   Not Applicable PRN Thien Reardon DO        flecainide (TAMBOCOR) tablet 100 mg  100 mg Oral BID Thien Reardon DO   100 mg at 01/07/25 2223    folic acid (FOLVITE) tablet 1 mg  1 mg Oral Daily Mark Vasquez DO   1 mg at 01/07/25 1001    Gelocast Unnas boot 2 each  2 each Topical Q3 Days Ema Paris PA-C   2 each at 01/02/25 1443    hydrocortisone (CORTEF) tablet 10 mg  10 mg Oral Daily Mark Vasquez DO   10 mg at 01/07/25 1539    hydrocortisone (CORTEF) tablet 15 mg  15 mg Oral Daily With Breakfast Mark Vasquez DO   15 mg at 01/07/25 1000    hydrocortisone 1 % cream 1 Application  1 Application Topical Q8H Mark Vasquez DO   1 Application at 01/06/25 1545    levothyroxine (SYNTHROID, LEVOTHROID) tablet 125 mcg  125 mcg Oral Daily Thien Reardon DO   125 mcg at 01/07/25 1000    magnesium oxide (MAG-OX) tablet 400 mg  400 mg Oral Daily Thien Reardon DO   400 mg at 01/07/25 1000    Magnesium Standard Dose Replacement - Follow Nurse / BPA Driven Protocol   Not Applicable PRN Thien Reardon DO        ondansetron ODT (ZOFRAN-ODT) disintegrating tablet 4 mg  4 mg Oral Q6H PRN Thien Reardon DO        Or    ondansetron (ZOFRAN) injection 4 mg  4 mg Intravenous Q6H PRN Thien Reardon DO        Pharmacy Consult   Not Applicable Continuous PRN Mark Vasquez DO        Pharmacy to dose warfarin   Not Applicable Continuous PRN Mark Vasquez DO        Phosphorus Replacement - Follow Nurse / BPA Driven Protocol   Not Applicable PRN Thien Reardon DO         Potassium Replacement - Follow Nurse / BPA Driven Protocol   Not Applicable PRN Thien Reardon DO        sodium chloride 0.9 % flush 10 mL  10 mL Intravenous PRN Mark Vasquez DO        sodium chloride 0.9 % flush 10 mL  10 mL Intravenous Q12H Thien Reardon DO   10 mL at 01/01/25 2037    sodium chloride 0.9 % flush 10 mL  10 mL Intravenous PRN Thien Reardon DO        sodium chloride 0.9 % infusion 40 mL  40 mL Intravenous PRN Thien Reardon DO        Tirzepatide solution auto-injector 0.5 mL  0.5 mL Subcutaneous Weekly Thien Reardon DO   0.5 mL at 12/31/24 1540    vitamin B-12 (CYANOCOBALAMIN) tablet 1,000 mcg  1,000 mcg Oral Daily Thien Reardon DO   1,000 mcg at 01/07/25 1001    vitamin D3 capsule 5,000 Units  5,000 Units Oral Daily Thien Reardon DO   5,000 Units at 01/07/25 1000     Orders (active)        Start     Ordered    01/08/25 0715  Protime-INR  Daily       01/08/25 0714    01/07/25 2029  Pharmacy to dose warfarin  Continuous PRN         01/07/25 2029 01/07/25 1629  Pharmacy Consult  Continuous PRN        Note to Pharmacy: Told TREVOR    01/07/25 1629    01/07/25 1007  bumetanide (BUMEX) tablet 2 mg  Daily PRN         01/07/25 1009    01/06/25 0904  Skin Tear Care - Minimal Drainage Q3 Days  Every Third Day        Comments: - Realign Skin Flap (if Viable) Gently Ease Flap Into Place Using a Dampened Cotton-Tipped Applicator or Gloved Finger  - Gently Cleanse Area & Pat Dry  - Apply Hydrogel & Non-Adherent Layer (Silicone Sheet, Oil Emulsion Dressing or Vaseline Gauze)  - Secure With Silicone Border Dressing (Unless Skin Fragile)  - If Skin is Fragile Secure With Roll Gauze - Do NOT Put Tape Directly on Skin  - Change Every 3 Days & As Needed    01/06/25 0905    01/06/25 0904  Follow Pressure Ulcer Prevention Measures Policy  Continuous        Comments: Implement Appropriate Pressure Injury Prevention Measures  - Open Order Report to View Full Instructions  Enter Wound LDA &  Document Assessment  Add Wound Care Plan  Add Patient Education Per Policy    01/06/25 0905    01/02/25 1330  Gelocast Unnas boot 2 each  Every 3 Days         01/02/25 1236    01/02/25 1229  Wound Ostomy Eval & Treat  Once         01/02/25 1229    01/02/25 1218  Application Unnaboot  Every 72 Hours        Comments: Apply ammonium lactate lotion followed by application of unna boots to be changed every 3 days initially to assess if patient will tolerate.    01/02/25 1218    12/31/24 1500  Tirzepatide solution auto-injector 0.5 mL  Weekly         12/31/24 1205    12/28/24 0800  hydrocortisone (CORTEF) tablet 15 mg  Daily With Breakfast         12/27/24 1718    12/27/24 1815  hydrocortisone (CORTEF) tablet 10 mg  Daily         12/27/24 1719    12/26/24 0900  folic acid (FOLVITE) tablet 1 mg  Daily         12/25/24 2320    12/24/24 1043  Bladder Scan  Once         12/24/24 1043    12/23/24 1700  ammonium lactate (AMLACTIN) 12 % cream 1 Application  Daily         12/23/24 1607    12/23/24 1400  hydrocortisone 1 % cream 1 Application  Every 8 Hours Scheduled         12/23/24 0957    12/21/24 1045  nystatin (MYCOSTATIN) powder  Every 12 Hours Scheduled         12/21/24 0950    12/20/24 2100  atorvastatin (LIPITOR) tablet 40 mg  Nightly         12/20/24 1344    12/20/24 2100  flecainide (TAMBOCOR) tablet 100 mg  2 Times Daily         12/20/24 1344    12/20/24 1430  aspirin EC tablet 81 mg  Daily         12/20/24 1344    12/20/24 1430  levothyroxine (SYNTHROID, LEVOTHROID) tablet 125 mcg  Daily         12/20/24 1344    12/20/24 1430  magnesium oxide (MAG-OX) tablet 400 mg  Daily         12/20/24 1344    12/20/24 1430  vitamin B-12 (CYANOCOBALAMIN) tablet 1,000 mcg  Daily         12/20/24 1344    12/20/24 1430  vitamin D3 capsule 5,000 Units  Daily         12/20/24 1344    12/20/24 0800  Oral Care  2 Times Daily       12/19/24 1931    12/20/24 0542  PT Consult: Eval & Treat Functional Mobility Below Baseline  Once          "12/20/24 0541    12/20/24 0542  OT Consult: Eval & Treat ADL Performance Below Baseline  Once         12/20/24 0541    12/19/24 2100  sodium chloride 0.9 % flush 10 mL  Every 12 Hours Scheduled         12/19/24 1931 12/19/24 2030  sodium chloride 0.9 % infusion  Continuous         12/19/24 1931 12/19/24 2000  Vital Signs  Every 4 Hours       12/19/24 1931 12/19/24 1932  Intake & Output  Every Shift       12/19/24 1931 12/19/24 1932  Weigh Patient  Once         12/19/24 1931 12/19/24 1932  Saline Lock & Maintain IV Access  Continuous         12/19/24 1931 12/19/24 1932  Maintain Sequential Compression Device  Continuous         12/19/24 1931 12/19/24 1932  Activity - Ad Celena  Until Discontinued         12/19/24 1931 12/19/24 1932  Notify Provider (With Default Parameters)  Continuous        Comments: Open Order Report to View Parameters Requiring Provider Notification    12/19/24 1931 12/19/24 1932  Diet: Cardiac; Healthy Heart (2-3 Na+); Fluid Consistency: Thin (IDDSI 0)  Diet Effective Now         12/19/24 1931 12/19/24 1931  Potassium Replacement - Follow Nurse / BPA Driven Protocol  As Needed         12/19/24 1931 12/19/24 1931  Magnesium Standard Dose Replacement - Follow Nurse / BPA Driven Protocol  As Needed         12/19/24 1931 12/19/24 1931  Phosphorus Replacement - Follow Nurse / BPA Driven Protocol  As Needed         12/19/24 1931 12/19/24 1931  Calcium Replacement - Follow Nurse / BPA Driven Protocol  As Needed         12/19/24 1931 12/19/24 1931  HYDROcodone-acetaminophen (NORCO) 5-325 MG per tablet 1 tablet  Every 6 Hours PRN         12/19/24 1931 12/19/24 1931  sennosides-docusate (PERICOLACE) 8.6-50 MG per tablet 2 tablet  2 Times Daily PRN        Placed in \"And\" Linked Group    12/19/24 1931 12/19/24 1931  polyethylene glycol (MIRALAX) packet 17 g  Daily PRN        Placed in \"And\" Linked Group    12/19/24 1931    12/19/24 1931  bisacodyl (DULCOLAX) EC " "tablet 5 mg  Daily PRN        Placed in \"And\" Linked Group    24  bisacodyl (DULCOLAX) suppository 10 mg  Daily PRN        Placed in \"And\" Linked Group    24  ondansetron ODT (ZOFRAN-ODT) disintegrating tablet 4 mg  Every 6 Hours PRN        Placed in \"Or\" Linked Group    24  ondansetron (ZOFRAN) injection 4 mg  Every 6 Hours PRN        Placed in \"Or\" Linked Group    24  sodium chloride 0.9 % flush 10 mL  As Needed         24  sodium chloride 0.9 % infusion 40 mL  As Needed         24 174  Code Status and Medical Interventions: CPR (Attempt to Resuscitate); Full Support  Continuous         24 1749    24 1421  Insert Peripheral IV  Once         24 1422    24 1420  sodium chloride 0.9 % flush 10 mL  As Needed         24 142    Unscheduled  Oxygen Therapy- Nasal Cannula; Titrate 1-6 LPM Per SpO2; 90 - 95%  Continuous PRN       24    Unscheduled  Skin Tear Care - Minimal Drainage PRN  As Needed      Comments: - Realign Skin Flap (if Viable) Gently Ease Flap Into Place Using a Dampened Cotton-Tipped Applicator or Gloved Finger  - Gently Cleanse Area & Pat Dry  - Apply Hydrogel & Non-Adherent Layer (Silicone Sheet, Oil Emulsion Dressing or Vaseline Gauze)  - Secure With Silicone Border Dressing (Unless Skin Fragile)  - If Skin is Fragile Secure With Roll Gauze - Do NOT Put Tape Directly on Skin  - Change Every 3 Days & As Needed    25 0905                     Physician Progress Notes (most recent note)        Mark Vasquez DO at 25 1629              [facility] PROGRESS NOTE     Patient Identification:  Name:  Ana Maradiaga  Age:  59 y.o.  Sex:  female  :  1965  MRN:  [id number]  Visit Number:  [id number]  ROOM: [facility]/1P     Primary Care Provider:  Val Purcell, " PA    Length of stay in inpatient status:  19    Subjective     Chief Compliant:    Chief Complaint   Patient presents with    Weakness - Generalized    Blood in Urine       History of Presenting Illness:    Patient seen in follow-up for UTI and overall weakness.  Patient states she does feel better than when she was admitted.  Still weak and having difficulty ambulating.  Working with PT/OT, stable for placement.    Objective     Current Hospital Meds:ammonium lactate, 1 Application, Topical, Daily  apixaban, 5 mg, Oral, Q12H  aspirin, 81 mg, Oral, Daily  atorvastatin, 40 mg, Oral, Nightly  flecainide, 100 mg, Oral, BID  folic acid, 1 mg, Oral, Daily  Gelocast Unnas boot, 2 each, Topical, Q3 Days  hydrocortisone, 10 mg, Oral, Daily  hydrocortisone, 15 mg, Oral, Daily With Breakfast  hydrocortisone, 1 Application, Topical, Q8H  levothyroxine, 125 mcg, Oral, Daily  magnesium oxide, 400 mg, Oral, Daily  sodium chloride, 10 mL, Intravenous, Q12H  Tirzepatide, 0.5 mL, Subcutaneous, Weekly  vitamin B-12, 1,000 mcg, Oral, Daily  vitamin D3, 5,000 Units, Oral, Daily         Current Antimicrobial Therapy:  Anti-Infectives (From admission, onward)      Ordered     Dose/Rate Route Frequency Start Stop    12/20/24 0935  cefTRIAXone (ROCEPHIN) 2,000 mg in sodium chloride 0.9 % 100 mL IVPB-VTB        Ordering Provider: Thien Reardon DO    2,000 mg  200 mL/hr over 30 Minutes Intravenous Every 24 Hours 12/20/24 1700 12/24/24 1758    12/19/24 1555  cefTRIAXone (ROCEPHIN) 2,000 mg in sodium chloride 0.9 % 100 mL IVPB-VTB        Ordering Provider: Mark Vasquez DO    2,000 mg  200 mL/hr over 30 Minutes Intravenous Once 12/19/24 1610 12/19/24 1813          Current Diuretic Therapy:  Diuretics (From admission, onward)      Ordered     Dose/Rate Route Frequency Start Stop    01/07/25 1009  bumetanide (BUMEX) tablet 2 mg        Ordering Provider: Mark Vasquez DO    2 mg Oral Daily PRN 01/07/25 1007             ----------------------------------------------------------------------------------------------------------------------  Vital Signs:  Temp:  [97.4 °F (36.3 °C)-98.8 °F (37.1 °C)] 98 °F (36.7 °C)  Heart Rate:  [74-83] 81  Resp:  [17-18] 18  BP: (134-149)/(62-73) 138/68     on   ;   Device (Oxygen Therapy): room air  Body mass index is 56.25 kg/m².    Wt Readings from Last 3 Encounters:   12/19/24 (!) 153 kg (338 lb)   10/24/24 116 kg (256 lb 6.4 oz)   10/11/24 (!) 164 kg (362 lb)     Intake & Output (last 3 days)         12/21 0701  12/22 0700 12/22 0701  12/23 0700 12/23 0701  12/24 0700    P.O. 1035 1300 960    I.V. (mL/kg)  0 (0) 203 (1.3)    IV Piggyback  100     Total Intake(mL/kg) 1035 (6.8) 1400 (9.2) 1163 (7.6)    Urine (mL/kg/hr) 650 (0.2) 250 (0.1) 200 (0.1)    Stool 0      Total Output 650 250 200    Net +385 +1150 +963           Urine Unmeasured Occurrence  2 x 2 x    Stool Unmeasured Occurrence 1 x            Diet: Cardiac; Healthy Heart (2-3 Na+); Fluid Consistency: Thin (IDDSI 0)  ----------------------------------------------------------------------------------------------------------------------  Physical exam:  Constitutional: Early obese female, nontoxic, Well-developed and well-nourished, resting comfortably in bed, no acute distress.      HENT:  Head:  Normocephalic and atraumatic.  Mouth:  Moist mucous membranes.  Eyes:  Conjunctivae and EOM are normal. No scleral icterus.   Cardiovascular:  Normal rate, regular rhythm and normal heart sounds with no murmur. No JVD.   Pulmonary/Chest:  No respiratory distress, no wheezes, no crackles, with normal breath sounds and good air movement. Unlabored. No accessory muscle use.  Abdominal:  Soft. No distension and no tenderness.  Bowel sounds present. No rebound or guarding.   Musculoskeletal:  No tenderness, and no deformity.  No red or swollen joints anywhere.    Neurological:  Alert and oriented to person, place, and time. Nonfocal, weakness in  "upper and lower extremities bilaterally  Skin:  Skin is warm and dry.  Morbilliform rash on upper and lower extremities, trunk .no pallor.   Peripheral vascular:  No clubbing, no cyanosis, bilateral venous stasis.      ----------------------------------------------------------------------------------------------------------------------  Results from last 7 days   Lab Units 01/06/25  0202 01/05/25  0002 01/04/25  0303   WBC 10*3/mm3 9.65 8.51 9.34   HEMOGLOBIN g/dL 9.8* 9.8* 9.7*   HEMATOCRIT % 31.4* 32.0* 31.9*   MCV fL 99.1* 101.6* 100.9*   MCHC g/dL 31.2* 30.6* 30.4*   PLATELETS 10*3/mm3 167 175 183         Results from last 7 days   Lab Units 01/06/25  0202 01/05/25  0002 01/04/25  0303   SODIUM mmol/L 136 138 137   POTASSIUM mmol/L 4.2 4.6 4.4   MAGNESIUM mg/dL 1.8 2.0 2.0   CHLORIDE mmol/L 103 103 104   CO2 mmol/L 25.4 26.0 24.5   BUN mg/dL 16 15 14   CREATININE mg/dL 0.94 0.96 0.87   CALCIUM mg/dL 8.4* 8.2* 8.2*   GLUCOSE mg/dL 133* 117* 99   Estimated Creatinine Clearance: 97 mL/min (by C-G formula based on SCr of 0.94 mg/dL).  No results found for: \"AMMONIA\"                  No results found for: \"HGBA1C\", \"POCGLU\"  Lab Results   Component Value Date    TSH 3.320 12/19/2024    FREET4 1.30 12/27/2024     No results found for: \"PREGTESTUR\", \"PREGSERUM\", \"HCG\", \"HCGQUANT\"  Pain Management Panel  More data may exist         Latest Ref Rng & Units 12/19/2024 6/22/2023   Pain Management Panel   Creatinine, Urine mg/dL  mg/dL - 152.9  152.9    Amphetamine, Urine Qual Negative Negative  -   Barbiturates Screen, Urine Negative Negative  -   Benzodiazepine Screen, Urine Negative Negative  -   Buprenorphine, Screen, Urine Negative Negative  -   Cocaine Screen, Urine Negative Negative  -   Fentanyl, Urine Negative Negative  -   Methadone Screen , Urine Negative Negative  -   Methamphetamine, Ur Negative Negative  -      Details          Multiple values from one day are sorted in reverse-chronological order         " "    Brief Urine Lab Results  (Last result in the past 365 days)        Color   Clarity   Blood   Leuk Est   Nitrite   Protein   CREAT   Urine HCG        01/01/25 1249 Red  Comment: Dipstick results may be inaccurate due to color interference.       Turbid   Large (3+)   Trace   Negative   100 mg/dL (2+)                 Blood Culture   Date Value Ref Range Status   12/19/2024 No growth at 4 days  Preliminary   12/19/2024 No growth at 4 days  Preliminary     Urine Culture   Date Value Ref Range Status   12/19/2024 >100,000 CFU/mL Mixed Toya Isolated  Final     No results found for: \"WOUNDCX\"  No results found for: \"STOOLCX\"  No results found for: \"RESPCX\"  No results found for: \"AFBCX\"          I have personally looked at the labs and they are summarized above.  ----------------------------------------------------------------------------------------------------------------------  Detailed radiology reports for the last 24 hours:  Imaging Results (Last 24 Hours)       ** No results found for the last 24 hours. **          Assessment & Plan      Patient is a 59-year-old female with history significant for atrial fibrillation, CKD and prior stroke who came to the ER with reports of gross hematuria and weakness.    #Acute on chronic debility  #Bilateral lower extremity weakness, multifactorial  --PT/OT, has not done well with therapy, notes updated, IPR declined, case management following    #Adrenal insufficiency, suspect central process  #Elevated testosterone, unclear etiology  --Cortisol low, ACTH low, testosterone elevated, DHEA-sulfate low, prolactin normal  --Estradiol elevated, FSH/LH low  --MRI brain with/without unremarkable for lesions  --CT abdomen pelvis with contrast negative for ovarian/adrenal tumor  --Pelvic ultrasound negative for ovarian lesions  --MRI pituitary protocol with and without without abnormal pituitary lesion  --After discussion with endocrinology, increase Solu-Cortef to 15 mg daily with " breakfast, 10 mg daily at 2 PM  --I discussed with endocrinology again on , working diagnosis remains a suspected small ovarian tumor, given the above images have been unremarkable, with MRI of the abdomen/pelvis with/without contrast, if this is negative, we will continue treatment plan and have her follow-up outpatient    #Acute urinary tract infection the E. coli  #Left staghorn calculus  --Urine culture positive for E. Coli  --Completed course of Rocephin  --Hematuria likely due to large UPJ calculus exacerbated by Eliquis  --Urology evaluated, recommend outpatient follow-up    #Atrial fibrillation, rate controlled  #Prior ischemic infarct  --MRI of the brain noted old infarcts but nothing acute  --Continue flecainide, Eliquis  --Follow-up outpatient    #Chronic venous stasis, wound care  #Morbid obesity, BMI 56    Copied portions of this report have been reviewed and are accurate as of 25     CHECKLIST:  Abx: None  VTE: Eliquis   GI ppx: PPI  Diet: Consistent carb  Code: CPR, full  Dispo: Patient is hemodynamically stable, has not done well with PT, declined by IPR Agreeable to SNF as well. Case management following.     Mark Vasquez DO  [facility]  25  16:29 EST      Electronically signed by Mark Vasquez DO at 25 1629          Consult Notes (most recent note)        Ema Paris PA-C at 24 1544        Consult Orders    1. Inpatient Wound APRN Consult [[id number]] ordered by Mark Vasquez DO at 24 0956              Attestation signed by Jun Eastman MD at 24 0955    I was available for assistance.  I have reviewed this documentation and agree.                    Consult Note     Patient Identification:  Name:  Ana Maradiaga  Age:  59 y.o.  Sex:  female  :  1965  MRN:  [id number]   Visit Number:  [id number]  Primary Care Physician:  Val Purcell PA     Subjective     Chief complaint:   Chief  Complaint   Patient presents with    Weakness - Generalized    Blood in Urine       History of presenting illness:   Patient is a 59 y.o. female with past medical history significant for anemia, CKD, atrial fibrillation, hypertension, gout and CVA that presented to the [facility] for complaints of hematuria. Wound care consulted to evaluate bilateral lower extremities. She reports she has had lymphedema for several years now but has not undergone any therapy or treatment. She states she has dealt with recurrent cellulitis which led to hospitalization. She currently does not appear to have cellulitis but has a purple/red discoloration that she reports has been chronic after her last recurrence of cellulitis. Denies pain or open wounds. No fever or chills. The patients nurse, Dia, was at bedside during her exam.    ---------------------------------------------------------------------------------------------------------------------   Review of Systems:  Review of Systems   Constitutional: Negative.    Respiratory: Negative.     Cardiovascular:  Positive for leg swelling.   Endocrine: Negative.    Skin:  Positive for color change.        ---------------------------------------------------------------------------------------------------------------------   Past Medical History:   Diagnosis Date    Anemia     Arrhythmia 2018    Afib    Arthritis     Atrial fibrillation 2018    Bronchitis     Chronic kidney disease 2018    Gout     Hypertension     Stroke November 2021     History reviewed. No pertinent surgical history.  Family History   Problem Relation Age of Onset    Heart disease Mother     Stroke Mother     Hypertension Father     Arthritis Father     Hypertension Sister     Breast cancer Paternal Cousin      Social History     Socioeconomic History    Marital status: Single   Tobacco Use    Smoking status: Never    Smokeless tobacco: Never   Vaping Use    Vaping status: Never Used    Substance and Sexual Activity    Alcohol use: Never    Drug use: Never    Sexual activity: Not Currently     Partners: Male     ---------------------------------------------------------------------------------------------------------------------   Allergies:  Vancomycin, Cefepime, and Cephalosporins  ---------------------------------------------------------------------------------------------------------------------   Medications below are reported home medications pulling from within the system; at this time, these medications have not been reconciled unless otherwise specified and are in the verification process for further verifcation as current home medications.    Prior to Admission Medications       Prescriptions Last Dose Informant Patient Reported? Taking?    amLODIPine (NORVASC) 5 MG tablet Past Week Self, Other No Yes    TAKE 1 TABLET BY MOUTH DAILY    Aspirin Low Dose 81 MG EC tablet 12/19/2024 Self, Other Yes Yes    Take 1 tablet by mouth Daily.    atorvastatin (LIPITOR) 40 MG tablet Past Week Self, Other Yes Yes    Take 1 tablet by mouth every night at bedtime.    bumetanide (BUMEX) 1 MG tablet 12/19/2024 Self, Other Yes Yes    Take 2 tablets by mouth Daily.    carvedilol (COREG) 6.25 MG tablet 12/19/2024 Self, Other No Yes    TAKE 1 TABLET BY MOUTH EVERY 12 HOURS    Eliquis 5 MG tablet tablet 12/19/2024 Self, Other No Yes    Take 1 tablet by mouth Every 12 (Twelve) Hours.    flecainide (TAMBOCOR) 100 MG tablet 12/19/2024 Self, Other No Yes    TAKE 1 TABLET BY MOUTH TWICE A DAY    levothyroxine (SYNTHROID, LEVOTHROID) 125 MCG tablet 12/19/2024 Self, Other Yes Yes    Take 1 tablet by mouth Daily.    magnesium oxide (MAG-OX) 400 MG tablet 12/19/2024 Self Yes Yes    Take 1 tablet by mouth Daily.    Tirzepatide (Mounjaro) 5 MG/0.5ML solution auto-injector 12/13/2024 Self, Other No No    Inject 0.5 mL under the skin into the appropriate area as directed 1 (One) Time Per Week.    vitamin B-12  (CYANOCOBALAMIN) 1000 MCG tablet 12/19/2024 Self Yes Yes    Take 1 tablet by mouth Daily.    vitamin D3 125 MCG (5000 UT) capsule capsule 12/19/2024 Self Yes Yes    Take 1 capsule by mouth Daily.          ---------------------------------------------------------------------------------------------------------------------    Objective     Hospital Scheduled Meds:  amLODIPine, 5 mg, Oral, Daily  apixaban, 5 mg, Oral, Q12H  aspirin, 81 mg, Oral, Daily  atorvastatin, 40 mg, Oral, Nightly  carvedilol, 6.25 mg, Oral, Q12H  cefTRIAXone, 2,000 mg, Intravenous, Q24H  flecainide, 100 mg, Oral, BID  hydrocortisone, 1 Application, Topical, Q8H  levothyroxine, 125 mcg, Oral, Daily  magnesium oxide, 400 mg, Oral, Daily  nystatin, , Topical, Q12H  sodium chloride, 10 mL, Intravenous, Q12H  vitamin B-12, 1,000 mcg, Oral, Daily  vitamin D3, 5,000 Units, Oral, Daily           Current listed hospital scheduled medications may not yet reflect those currently placed in orders that are signed and held, awaiting patient's arrival to floor/unit.    ---------------------------------------------------------------------------------------------------------------------   Vital Signs:  Temp:  [98.2 °F (36.8 °C)-98.6 °F (37 °C)] 98.2 °F (36.8 °C)  Heart Rate:  [64-77] 73  Resp:  [16-18] 16  BP: ()/(54-76) 108/60  Mean Arterial Pressure (Non-Invasive) for the past 24 hrs (Last 3 readings):   Noninvasive MAP (mmHg)   12/23/24 0300 81   12/23/24 0003 79   12/22/24 1900 97     SpO2 Percentage    12/23/24 0653 12/23/24 1100 12/23/24 1500   SpO2: 96% 94% 96%     SpO2:  [94 %-96 %] 96 %  on   ;   Device (Oxygen Therapy): room air    Body mass index is 56.25 kg/m².  Wt Readings from Last 3 Encounters:   12/19/24 (!) 153 kg (338 lb)   10/24/24 116 kg (256 lb 6.4 oz)   10/11/24 (!) 164 kg (362 lb)       ---------------------------------------------------------------------------------------------------------------------   Physical Exam:    B/L lower  extremities with evidence of edema, dryness and discoloration. No significant warmth or erythema noted.     Assessment & Plan      Assessment     Lymphedema  Xerosis    Plan:     Apply ammonium lactate lotion to the bilateral lower extremities QD/PRN. Patient would benefit from compression but will order B/L ABIs before initiating. She is agreeable to this plan.     She would benefit from outpatient lymphedema treatment as well.       Ema Paris PA-C  [facility]    12/23/24  15:44 EST      Electronically signed by Jun Eastman MD at 12/24/24 0930          Nutrition Notes (most recent note)        Nancy Ngo RD at 12/27/24 0273          Adult Nutrition  Assessment/PES    Patient Name:  Ana Maradiaga  YOB: 1965  MRN: [id number]  Admit Date:  12/19/2024    Assessment Date:  12/27/2024    Comments:  LOS    PO intake 69% ave of meals.    Will cont to follow and monitor     Reason for Assessment       Row Name 12/27/24 7341          Reason for Assessment    Reason For Assessment per organizational policy  LOS; remote [place], [place]     Diagnosis infection/sepsis  Acute on chronic debility, adrenal insufficiency, UTI, afib                    Nutrition/Diet History       Row Name 12/27/24 0750          Nutrition/Diet History    Typical Intake (Food/Fluid/EN/PN) varying po intakes                    Labs/Tests/Procedures/Meds       Row Name 12/27/24 8866          Labs/Procedures/Meds    Lab Results Reviewed reviewed        Diagnostic Tests/Procedures    Diagnostic Test/Procedure Reviewed reviewed        Medications    Pertinent Medications Reviewed reviewed     Pertinent Medications Comments B12, Vitamin D, magox, folic acid                      Estimated/Assessed Needs - Anthropometrics       Row Name 12/27/24 0874          Anthropometrics    Calculation Weight 153 kg (337 lb 4.9 oz)        Estimated/Assessed Needs    Additional Documentation Estimated Calorie  Needs (Group);Fluid Requirements (Group);Protein Requirements (Group)        Estimated Calorie Needs    Estimated Calorie Require (kcal/day) 2034 kcal ( mifflin 1.2 minus 500 kcal )     Estimated Calorie Need Method Whitley-St Jeor        Protein Requirements    Est Protein Requirement Amount (gms/kg) 0.8 gm protein  122 gm pro        Fluid Requirements    Estimated Fluid Requirement Method RDA Method  2034 ml                    Nutrition Prescription Ordered       Row Name 12/27/24 2255          Nutrition Prescription PO    Common Modifiers Cardiac                    Evaluation of Received Nutrient/Fluid Intake       Row Name 12/27/24 2255          Fluid Intake Evaluation    Oral Fluid (mL) 1180  ave x 5        PO Evaluation    Number of Meals 9     % PO Intake 69                       Problem/Interventions:   Problem 1       Row Name 12/27/24 2255          Nutrition Diagnoses Problem 1    Problem 1 Other (comment)  Obesity related to debility, lifestyle as evidenced by BMI                          Intervention Goal       Row Name 12/27/24 2256          Intervention Goal    General Meet nutritional needs for age/condition     PO Continue positive trend;PO intake (%)     PO Intake % 75 %                    Nutrition Intervention       Row Name 12/27/24 2256          Nutrition Intervention    RD/Tech Action Follow Tx progress                      Education/Evaluation       Row Name 12/27/24 2256          Education    Education Education not appropriate at this time        Monitor/Evaluation    Monitor Per protocol;I&O;PO intake;Pertinent labs;Weight                     Electronically signed by:  Nancy Ngo RD  12/27/24 22:56 EST    Electronically signed by Nancy Ngo RD at 12/27/24 2257  Elizabeth Aquino, PT   Physical Therapist  Specialty: Physical Therapy     Therapy Evaluation     Addendum     Date of Service: 12/23/24 1309  Creation Time: 12/23/24 1309     Expand All Collapse All    Acute Care - Physical  Therapy Initial Evaluation   Elijah     Patient Name: Ana Maradiaga                  : 1965                      MRN: [id number]  Today's Date: 2024                             Onset of Illness/Injury or Date of Surgery: 24  Visit Dx:   Visit Diagnosis       ICD-10-CM ICD-9-CM   1. Urinary tract infection with hematuria, site unspecified  N39.0 599.0     R31.9 599.70   2. Weakness  R53.1 780.79   3. High serum testosterone  R79.89 790.99         Problem List       Patient Active Problem List   Diagnosis    Type 2 diabetes mellitus with hyperglycemia, without long-term current use of insulin    Acquired hypothyroidism    Hirsutism    Elevated testosterone level in female    Complicated UTI (urinary tract infection)         Medical History        Past Medical History:   Diagnosis Date    Anemia      Arrhythmia      Afib    Arthritis      Atrial fibrillation     Bronchitis      Chronic kidney disease     Gout      Hypertension      Stroke 2021         Surgical History   History reviewed. No pertinent surgical history.     PT Assessment (Last 12 Hours)            PT Evaluation and Treatment         Row Name 24 1253                 Physical Therapy Time and Intention     Subjective Information complains of;weakness;swelling  -AG       Document Type evaluation  -AG       Mode of Treatment physical therapy  -AG       Patient Effort adequate  -AG       Symptoms Noted During/After Treatment fatigue  -AG          Row Name 24 1253                 General Information     Patient Profile Reviewed yes  -AG       Onset of Illness/Injury or Date of Surgery 24  -       Referring Physician Dr. Reardon  -       Patient Observations agree to therapy;cooperative;alert  -AG       Patient/Family/Caregiver Comments/Observations pt. supine in bed on room air; pt. morbidly obese, agreeable to participation.  Lymphedema B lower legs.  -AG       Prior Level of Function --  pt.  "reports incr difficulty with ambulation; states he legs begin to \"buckle and she is too shaky to walk far\".  Pt. has been using rollator at all times lately, short distances.  -AG       Equipment Currently Used at Home cane, quad  -AG       Pertinent History of Current Functional Problem pt. admitted wtih complicated UTI; hx CVA  -AG       Existing Precautions/Restrictions fall  -AG       Equipment Issued to Patient gait belt  -AG       Risks Reviewed patient:;increased discomfort;LOB  -AG       Benefits Reviewed patient:;improve function;increase independence;increase strength;increase balance;increase knowledge;decrease risk of DVT  -AG       Barriers to Rehab medically complex;physical barrier  -AG          Row Name 12/23/24 1253                 Previous Level of Function/Home Environm     BADLs, Previous Functional Level independent  -AG       Bed Mobility, Previous Functional Level independent  uses an adjustable bed at home, requires HOB elevated  -AG       Household Ambulation, Previous Functional Level independent;uses device or equipment  -AG          Row Name 12/23/24 1253                 Living Environment     Current Living Arrangements home  -AG       Home Accessibility --  son is currently building exterior ramp into home; no interior stairs  -AG       People in Home child(woody), adult  -AG       Name(s) of People in Home pt. lives with son, DIL and grandchild  -AG       Primary Care Provided by self  -AG          Row Name 12/23/24 1253                 Home Use of Assistive/Adaptive Equipment     Equipment Currently Used at Home rollator  -AG          Row Name 12/23/24 1253                 Pain     Pretreatment Pain Rating 0/10 - no pain  -AG       Posttreatment Pain Rating 0/10 - no pain  -AG          Row Name 12/23/24 1253                 Cognition     Affect/Mental Status (Cognition) WFL  -AG       Orientation Status (Cognition) oriented x 3  -AG       Follows Commands (Cognition) WFL  -AG       " Personal Safety Interventions fall prevention program maintained;gait belt;nonskid shoes/slippers when out of bed;supervised activity  -          Row Name 12/23/24 1253                 Range of Motion Comprehensive     General Range of Motion --  B knees, ankles WFL; decr B hips  -          Row Name 12/23/24 1253                 Strength Comprehensive (MMT)     General Manual Muscle Testing (MMT) Assessment --  L LE 3+/5; R LE 4-/5 quads; B psoas 2+/5  -          Row Name 12/23/24 1253                 Bed Mobility     Bed Mobility scooting/bridging;supine-sit;sit-supine  -AG       Scooting/Bridging [place] (Bed Mobility) standby assist  -       Supine-Sit [place] (Bed Mobility) minimum assist (75% patient effort);nonverbal cues (demo/gesture);verbal cues  -       Bed Mobility, Safety Issues decreased use of arms for pushing/pulling;decreased use of legs for bridging/pushing  -       Assistive Device (Bed Mobility) bed rails  -          Row Name 12/23/24 1253                 Transfers     Transfers sit-stand transfer;stand-sit transfer;stand pivot/stand step transfer  -          Row Name 12/23/24 1253                 Sit-Stand Transfer     Sit-Stand [place] (Transfers) verbal cues;nonverbal cues (demo/gesture);contact guard  -       Assistive Device (Sit-Stand Transfers) walker, front-wheeled  -AG          Row Name 12/23/24 1253                 Stand-Sit Transfer     Stand-Sit [place] (Transfers) verbal cues;nonverbal cues (demo/gesture);contact guard;minimum assist (75% patient effort)  -       Assistive Device (Stand-Sit Transfers) walker, front-wheeled  -AG          Row Name 12/23/24 1253                 Stand Pivot/Stand Step Transfer     Stand Pivot/Stand Step [place] (Transfers) contact guard;nonverbal cues (demo/gesture);verbal cues  -       Assistive Device (Stand Pivot Stand Step Transfer) walker, front-wheeled  -AG          Row Name 12/23/24 1253                  Gait/Stairs (Locomotion)     [place] Level (Gait) contact guard;nonverbal cues (demo/gesture);verbal cues  -AG       Assistive Device (Gait) walker, front-wheeled  -AG       Patient was able to Ambulate yes  -AG       Distance in Feet (Gait) 6  -AG       Pattern (Gait) step-to  -AG          Row Name 12/23/24 1253                 Safety Issues/Impairments Affecting Functional Mobility     Impairments Affecting Function (Mobility) balance;strength;endurance/activity tolerance;range of motion (ROM)  -AG          Row Name 12/23/24 1253                 Balance     Balance Assessment sitting static balance;sitting dynamic balance;sit to stand dynamic balance;standing static balance;standing dynamic balance  -AG       Static Sitting Balance independent  -AG       Position, Sitting Balance unsupported;sitting edge of bed  -AG       Static Standing Balance contact guard;non-verbal cues (demo/gesture);verbal cues  -AG       Dynamic Standing Balance contact guard;non-verbal cues (demo/gesture);verbal cues  -AG       Position/Device Used, Standing Balance walker, front-wheeled  -AG          Row Name 12/23/24 1253                 Coping     Observed Emotional State calm;cooperative;pleasant  -AG       Verbalized Emotional State acceptance  -AG       Trust Relationship/Rapport care explained;choices provided;thoughts/feelings acknowledged  -       Family/Support Persons family  -AG       Involvement in Care not present at bedside  -       Family/Support System Care self-care encouraged;support provided  -          Row Name 12/23/24 1255                 Plan of Care Review     Plan of Care Reviewed With patient  -AG       Outcome Evaluation PT evaluation complete. Pt. required min A for supine>sit, STS w/ RW and min A/ CGA and ambulated x 6 ft to bedside chair; pt. fatigued quickly and stated B LE felt very weak while ambulating.  Pt. would benefit from continued skilled PT prior to d/c home.  Will continue to follow.   -AG          Row Name 12/23/24 1256                 Positioning and Restraints     Pre-Treatment Position in bed  -AG       Post Treatment Position chair  -AG          Row Name 12/23/24 1259                 Therapy Assessment/Plan (PT)     Patient/Family Therapy Goals Statement (PT) return home with family  -AG       Functional Level at Time of Evaluation (PT) min A  -AG       PT Diagnosis (PT) impaired functional endurance, mobility  -AG       Rehab Potential (PT) good  -AG       Criteria for Skilled Interventions Met (PT) yes;meets criteria  -AG       Therapy Frequency (PT) 2 times/wk  1-5 times/ week per priority  -AG       Predicted Duration of Therapy Intervention (PT) LOS  -AG       Problem List (PT) problems related to;balance;mobility;strength;range of motion (ROM)  -AG       Activity Limitations Related to Problem List (PT) unable to ambulate safely;unable to transfer safely;BADLs not performed adequately or safely  -AG          Row Name 12/23/24 1256                 Therapy Plan Review/Discharge Plan (PT)     Therapy Plan Review (PT) evaluation/treatment results reviewed;care plan/treatment goals reviewed;patient;participants included;participants voiced agreement with care plan;current/potential barriers reviewed;risks/benefits reviewed  -AG          Row Name 12/23/24 1252                 Physical Therapy Goals     Bed Mobility Goal Selection (PT) bed mobility, PT goal 1  -AG       Transfer Goal Selection (PT) transfer, PT goal 1  -AG       Gait Training Goal Selection (PT) gait training, PT goal 1  -AG          Row Name 12/23/24 1252                 Bed Mobility Goal 1 (PT)     Activity/Assistive Device (Bed Mobility Goal 1, PT) scooting;sit to supine;supine to sit  -AG       [place] Level/Cues Needed (Bed Mobility Goal 1, PT) standby assist  -AG       Time Frame (Bed Mobility Goal 1, PT) by discharge  -AG          Row Name 12/23/24 1256                 Transfer Goal 1 (PT)     Activity/Assistive  Device (Transfer Goal 1, PT) sit-to-stand/stand-to-sit;bed-to-chair/chair-to-bed;toilet;walker, rolling  -AG       [place] Level/Cues Needed (Transfer Goal 1, PT) standby assist  -AG       Time Frame (Transfer Goal 1, PT) by discharge  -AG          Row Name 12/23/24 1257                 Gait Training Goal 1 (PT)     Activity/Assistive Device (Gait Training Goal 1, PT) gait (walking locomotion);assistive device use;decrease fall risk;diminish gait deviation;improve balance and speed;increase endurance/gait distance;walker, rolling  -AG       [place] Level (Gait Training Goal 1, PT) standby assist  -AG       Distance (Gait Training Goal 1, PT) 30  -AG       Time Frame (Gait Training Goal 1, PT) by discharge  -AG                    User Key  (r) = Recorded By, (t) = Taken By, (c) = Cosigned By        Initials Name Provider Type      Elizabeth qAuino, PT Physical Therapist                             Physical Therapy Education            Title: PT OT SLP Therapies (Done)         Topic: Physical Therapy (Done)         Point: Mobility training (Done)         Learning Progress Summary             Patient Acceptance, E,D, VU,NR by  at 12/23/2024 1249                            Point: Home exercise program (Done)         Learning Progress Summary             Patient Acceptance, E,D, VU,NR by  at 12/23/2024 1249                            Point: Body mechanics (Done)         Learning Progress Summary             Patient Acceptance, E,D, VU,NR by  at 12/23/2024 1249                            Point: Precautions (Done)         Learning Progress Summary             Patient Acceptance, E,D, VU,NR by  at 12/23/2024 1249                                                User Key         Initials Effective Dates Name Provider Type [facility] 06/16/21 -  Elizabeth Aquino, PT Physical Therapist PT                          PT Recommendation and Plan  Anticipated Discharge Disposition (PT): home with assist, home  with home health  Planned Therapy Interventions (PT): balance training, bed mobility training, gait training, home exercise program, orthotic fitting/training, transfer training, strengthening, patient/family education  Therapy Frequency (PT): 2 times/wk (1-5 times/ week per priority)  Plan of Care Reviewed With: patient  Outcome Evaluation: PT evaluation complete. Pt. required min A for supine>sit, STS w/ RW and min A/ CGA and ambulated x 6 ft to bedside chair; pt. fatigued quickly and stated B LE felt very weak while ambulating.  Pt. would benefit from continued skilled PT prior to d/c home.  Will continue to follow.               Time Calculation:     PT Charges         Row Name 24 1310                       Time Calculation     PT Received On 24  -         PT Goal Re-Cert Due Date 25  -                      User Key  (r) = Recorded By, (t) = Taken By, (c) = Cosigned By        Initials Name Provider Type      Elizabeth Aquino, PT Physical Therapist                       Therapy Charges for Today         Code Description Service Date Service Provider Modifiers Qty     02162999280 HC PT EVAL MOD COMPLEXITY 4 2024 Elizabeth Aquino, PT GP 1                PT G-Codes  AM-PAC 6 Clicks Score (PT): 12     Elizabeth Aquino, PT                2024                          Revision History        Armida Odell, PT   Physical Therapist  Physical Therapy     Therapy Re-Evaluation     Signed     Date of Service: 25 110  Creation Time: 25     Signed       Expand All Collapse All    Acute Care - Physical Therapy Re-Evaluation   Elijah     Patient Name: Ana Maradiaga                  : 1965                      MRN: [id number]  Today's Date: 2025                                 Onset of Illness/Injury or Date of Surgery: 24  Visit Dx:   Visit Diagnosis       ICD-10-CM ICD-9-CM   1. Urinary tract infection with hematuria, site unspecified  N39.0 599.0      R31.9 599.70   2. Weakness  R53.1 780.79   3. High serum testosterone  R79.89 790.99         Problem List       Patient Active Problem List   Diagnosis    Type 2 diabetes mellitus with hyperglycemia, without long-term current use of insulin    Acquired hypothyroidism    Hirsutism    Elevated testosterone level in female    Complicated UTI (urinary tract infection)         Medical History        Past Medical History:   Diagnosis Date    Anemia      Arrhythmia 2018     Afib    Arthritis      Atrial fibrillation 2018    Bronchitis      Chronic kidney disease 2018    Gout      Hypertension      Stroke November 2021         Surgical History   History reviewed. No pertinent surgical history.     PT Assessment (Last 12 Hours)            PT Evaluation and Treatment         [facility] Name 01/07/25 1035                 Physical Therapy Time and Intention     Document Type re-evaluation  -       Mode of Treatment physical therapy  -       Patient Effort good  -       Comment Pt seen for re-evaluation this date, pleasant and cooperative with therapy. Pt ambulated grossly 30'x2 with RW, CGA in room. MMT reassessed grossly.  -[facility] Name 01/07/25 1035                 Strength Comprehensive (MMT)     Comment, General Manual Muscle Testing (MMT) Assessment Gross MMT: 3/5 for DF, knee ext/flex, hip flexion < 3, hip abd/add grossly 4 or more; PF functional  -[facility] Name 01/07/25 1035                 Bed Mobility     Bed Mobility supine-sit  -       Supine-Sit [place] (Bed Mobility) minimum assist (75% patient effort)  Grossly with L LE getting EOB, some assist with leverage to sit upright.  -          Row Name 01/07/25 1035                 Transfers     Transfers sit-stand transfer;stand-sit transfer  -[facility] Name 01/07/25 1035                 Sit-Stand Transfer     Sit-Stand [place] (Transfers) contact guard  -       Assistive Device (Sit-Stand Transfers) walker, front-wheeled  -           Row Name 01/07/25 1035                 Stand-Sit Transfer     Stand-Sit [place] (Transfers) contact guard;standby assist;supervision  -       Assistive Device (Stand-Sit Transfers) walker, front-wheeled  -          Row Name 01/07/25 1035                 Gait/Stairs (Locomotion)     Gait/Stairs Locomotion gait/ambulation assistive device  -       [place] Level (Gait) contact guard;standby assist  -       Assistive Device (Gait) walker, front-wheeled  -       Patient was able to Ambulate yes  -       Distance in Feet (Gait) 30  30x2  -          Row Name 01/07/25 1035                 Plan of Care Review     Outcome Evaluation Pt seen for re-evaluation this date in which pt presents with deficits that may improve with continued intervention. Prognosis guarded to fair.  -          Row Name 01/07/25 1035                 Positioning and Restraints     Pre-Treatment Position in bed  -       Post Treatment Position chair  -       In Chair reclined;call light within reach;encouraged to call for assist  notified CNA  -          Row Name 01/07/25 1035                 Physical Therapy Goals     Bed Mobility Goal Selection (PT) bed mobility, PT goal 1  -       Transfer Goal Selection (PT) transfer, PT goal 1  -       Gait Training Goal Selection (PT) gait training, PT goal 1  -          Row Name 01/07/25 1035                 Bed Mobility Goal 1 (PT)     Activity/Assistive Device (Bed Mobility Goal 1, PT) scooting;sit to supine;supine to sit  -       [place] Level/Cues Needed (Bed Mobility Goal 1, PT) standby assist  -       Time Frame (Bed Mobility Goal 1, PT) by discharge  -       Progress/Outcomes (Bed Mobility Goal 1, PT) goal ongoing  -          Row Name 01/07/25 1035                 Transfer Goal 1 (PT)     Activity/Assistive Device (Transfer Goal 1, PT) sit-to-stand/stand-to-sit;bed-to-chair/chair-to-bed;toilet;walker, rolling  -       [place] Level/Cues Needed  (Transfer Goal 1, PT) standby assist  -KH       Time Frame (Transfer Goal 1, PT) by discharge  -KH       Progress/Outcome (Transfer Goal 1, PT) goal partially met  was SBA/CGA with PTA  -KH          Row Name 01/07/25 1035                 Gait Training Goal 1 (PT)     Activity/Assistive Device (Gait Training Goal 1, PT) gait (walking locomotion);assistive device use;decrease fall risk;diminish gait deviation;improve balance and speed;increase endurance/gait distance;walker, rolling  -KH       [place] Level (Gait Training Goal 1, PT) standby assist  -KH       Distance (Gait Training Goal 1, PT) 30  -KH       Time Frame (Gait Training Goal 1, PT) by discharge  -KH       Progress/Outcome (Gait Training Goal 1, PT) goal partially met  ambulates 30' with SBA/CGA using RW  -KH                    User Key  (r) = Recorded By, (t) = Taken By, (c) = Cosigned By        Initials Name Provider Type     Armida Andrews, PT Physical Therapist                             Physical Therapy Education            Title: PT OT SLP Therapies (Done)         Topic: Physical Therapy (Done)         Point: Mobility training (Done)         Learning Progress Summary             Patient Acceptance, E,TB, VU by DM at 1/5/2025 1713     Acceptance, E,TB, VU by DM at 1/3/2025 1453     Acceptance, TB,E, VU by RG at 12/29/2024 1000     Acceptance, E,TB, VU by AD at 12/27/2024 2313     Acceptance, E,TB, VU by CF at 12/26/2024 1222     Acceptance, E,TB, VU by CF at 12/24/2024 0905     Acceptance, E, VU by SC at 12/24/2024 0331     Acceptance, E,D, VU,NR by AG at 12/23/2024 1249                            Point: Home exercise program (Done)         Learning Progress Summary             Patient Acceptance, E,TB, VU by DM at 1/5/2025 1713     Acceptance, E,TB, VU by DM at 1/3/2025 1453     Acceptance, TB,E, VU by RG at 12/29/2024 1000     Acceptance, E,TB, VU by AD at 12/27/2024 2313     Acceptance, E,TB, VU by CF at 12/26/2024 1222     Acceptance,  E,TB, VU by CF at 12/24/2024 0905     Acceptance, E, VU by SC at 12/24/2024 0331     Acceptance, E,D, VU,NR by AG at 12/23/2024 1249                            Point: Body mechanics (Done)         Learning Progress Summary             Patient Acceptance, E,TB, VU by DM at 1/5/2025 1713     Acceptance, E,TB, VU by DM at 1/3/2025 1453     Acceptance, TB,E, VU by RG at 12/29/2024 1000     Acceptance, E,TB, VU by AD at 12/27/2024 2313     Acceptance, E,TB, VU by CF at 12/26/2024 1222     Acceptance, E,TB, VU by CF at 12/24/2024 0905     Acceptance, E, VU by SC at 12/24/2024 0331     Acceptance, E,D, VU,NR by AG at 12/23/2024 1249                            Point: Precautions (Done)         Learning Progress Summary             Patient Acceptance, E,TB, VU by DM at 1/5/2025 1713     Acceptance, E,TB, VU by DM at 1/3/2025 1453     Acceptance, TB,E, VU by RG at 12/29/2024 1000     Acceptance, E,TB, VU by AD at 12/27/2024 2313     Acceptance, E,TB, VU by CF at 12/26/2024 1222     Acceptance, E,TB, VU by CF at 12/24/2024 0905     Acceptance, E, VU by SC at 12/24/2024 0331     Acceptance, E,D, VU,NR by AG at 12/23/2024 1249                                                User Key         Initials Effective Dates Name Provider Type Discipline     AD 06/06/23 -  Kelle Pinto, RN Registered Nurse Nurse     AG 06/16/21 -  Elizabeth Aquino, PT Physical Therapist PT     DM 06/16/21 -  Moniz, Deborah, RN Registered Nurse Nurse     RG 06/06/23 -  Robert Engle, DAVE Registered Nurse Nurse     CF 06/25/24 -  Ebonie Rangel, RN Registered Nurse Nurse     SC 09/11/24 -  Naya Lowry, RN Registered Nurse Nurse                          PT Recommendation and Plan  Anticipated Discharge Disposition (PT): sub acute care setting, skilled nursing facility  Progress Summary (PT)  Daily Progress Summary (PT): Pt ambulated grossly 2x7' with RW, grossly CGA x1-2 (2nd person assit for pt and staff safety). Pt may benefit from therapy  interventions, unsure if pt could tolerate IPR however pt seems willing. No change in POC, prognosis fair.  Outcome Evaluation: Pt seen for re-evaluation this date in which pt presents with deficits that may improve with continued intervention. Prognosis guarded to fair.               Time Calculation:     PT Charges         Row Name 25 1105                       Time Calculation     Start Time 1035  -KH         PT Received On 25  -         PT Goal Re-Cert Due Date 25  -                      User Key  (r) = Recorded By, (t) = Taken By, (c) = Cosigned By        Initials Name Provider Type     Armida Andrews PT Physical Therapist                       Therapy Charges for Today         Code Description Service Date Service Provider Modifiers Qty     25965367899 HC PT RE-EVAL ESTABLISHED PLAN 2 2025 Armida Odell PT GP 1                PT G-Codes  AM-PAC 6 Clicks Score (PT): 13     Armida Odell PT                  2025                                          Physical Therapy Notes (most recent note)        Armida Odell PT at 25 1106  Version 1 of 1         Goal Outcome Evaluation:              Outcome Evaluation: Pt seen for re-evaluation this date in which pt presents with deficits that may improve with continued intervention. Prognosis guarded to fair.    Anticipated Discharge Disposition (PT): sub acute care setting, skilled nursing facility                          Electronically signed by Armida Odell PT at 25 1106     Dano Samuel OT   Occupational Therapist  Occupational Therapy     Therapy Evaluation     Signed     Date of Service: 24 104  Creation Time: 24     Signed       Expand All Collapse All    Acute Care - Occupational Therapy Initial Evaluation  ABIGAIL Nava     Patient Name: Ana Maradiaga                  : 1965                      MRN: [id number]  Today's Date: 2024                                                                       Admit Date: 12/19/2024     Visit Diagnosis       ICD-10-CM ICD-9-CM   1. Urinary tract infection with hematuria, site unspecified  N39.0 599.0     R31.9 599.70   2. Weakness  R53.1 780.79   3. High serum testosterone  R79.89 790.99         Problem List       Patient Active Problem List   Diagnosis    Type 2 diabetes mellitus with hyperglycemia, without long-term current use of insulin    Acquired hypothyroidism    Hirsutism    Elevated testosterone level in female    Complicated UTI (urinary tract infection)         Medical History        Past Medical History:   Diagnosis Date    Anemia      Arrhythmia 2018     Afib    Arthritis      Atrial fibrillation 2018    Bronchitis      Chronic kidney disease 2018    Gout      Hypertension      Stroke November 2021         Surgical History   History reviewed. No pertinent surgical history.                 OT ASSESSMENT FLOWSHEET (Last 12 Hours)         OT Evaluation and Treatment         Row Name 12/20/24 1030                                   OT Time and Intention     Subjective Information complains of;weakness  -KR             Document Type evaluation  -KR             Mode of Treatment occupational therapy  -KR             Patient Effort adequate  -KR             Symptoms Noted During/After Treatment fatigue  -KR                       General Information     General Observations of Patient alert/cooperative  -KR             Prior Level of Function mod assist:  -KR                       Living Environment     Current Living Arrangements home  -KR             People in Home child(woody), adult  -KR             Primary Care Provided by self;child(woody)  -KR                       Home Use of Assistive/Adaptive Equipment     Equipment Currently Used at Home rollator  -KR                       Cognition     Affect/Mental Status (Cognition) WFL  -KR             Orientation Status (Cognition) oriented x 3  -KR             Follows Commands (Cognition)  WFL  -KR                       Range of Motion Comprehensive     Comment, General Range of Motion BUE WFL  -KR                       Strength Comprehensive (MMT)     Comment, General Manual Muscle Testing (MMT) Assessment BUE 3-/5  -KR                       Activities of Daily Living     BADL Assessment/Intervention bathing;upper body dressing;lower body dressing;grooming;feeding;toileting  -KR                       Bathing Assessment/Intervention     [place] Level (Bathing) bathing skills;maximum assist (25% patient effort)  -KR                       Upper Body Dressing Assessment/Training     [place] Level (Upper Body Dressing) upper body dressing skills;maximum assist (25% patient effort)  -KR                       Lower Body Dressing Assessment/Training     [place] Level (Lower Body Dressing) lower body dressing skills;maximum assist (25% patient effort)  -KR                       Grooming Assessment/Training     [place] Level (Grooming) grooming skills;moderate assist (50% patient effort)  -KR                       Self-Feeding Assessment/Training     [place] Level (Feeding) feeding skills;set up  -KR                       Toileting Assessment/Training     [place] Level (Toileting) toileting skills;maximum assist (25% patient effort);dependent (less than 25% patient effort)  -KR                       Plan of Care Review     Plan of Care Reviewed With patient  -KR                       Therapy Assessment/Plan (OT)     Planned Therapy Interventions (OT) activity tolerance training;adaptive equipment training;BADL retraining;strengthening exercise  -KR                       Therapy Plan Review/Discharge Plan (OT)     Anticipated Discharge Disposition (OT) inpatient rehabilitation facility;home  -KR                       OT Goals     Dressing Goal Selection (OT) dressing, OT goal 1  -KR             Strength Goal Selection (OT) strength, OT goal 1  -KR             Activity Tolerance  Goal Selection (OT) activity tolerance, OT goal 1  -KR                       Dressing Goal 1 (OT)     Activity/Device (Dressing Goal 1, OT) dressing skills, all  -KR             [place]/Cues Needed (Dressing Goal 1, OT) minimum assist (75% or more patient effort)  -KR             Time Frame (Dressing Goal 1, OT) by discharge  -KR                       Strength Goal 1 (OT)     Strength Goal 1 (OT) BUE increase x 1 to improve mobility/self care  -KR             Time Frame (Strength Goal 1, OT) by discharge  -KR                        Activity Tolerance Goal 1 (OT)     Activity Tolerance Goal 1 (OT) Increase to enhance ADL performance  -KR             Activity Level (Endurance Goal 1, OT) 15 min activity  -KR             Time Frame (Activity Tolerance Goal 1, OT) by discharge  -KR                       Patient Education Goal (OT)     Activity (Patient Education Goal, OT) AE/DME training to enhance safety/independence in home environment  -KR             [place]/Cues/Accuracy (Memory Goal 2, OT) verbalizes understanding  -KR             Time Frame (Patient Education Goal, OT) by discharge  -KR                          User Key  (r) = Recorded By, (t) = Taken By, (c) = Cosigned By        Initials Name Effective Dates     Dano Bruno OT 06/16/21 -                           Occupational Therapy Education              No education to display                                OT Recommendation and Plan  Planned Therapy Interventions (OT): activity tolerance training, adaptive equipment training, BADL retraining, strengthening exercise  Plan of Care Review  Plan of Care Reviewed With: patient  Plan of Care Reviewed With: patient                 Time Calculation:      Therapy Charges for Today         Code Description Service Date Service Provider Modifiers Qty     37931414963  OT EVAL HIGH COMPLEXITY 4 12/20/2024 Dano Samuel OT GO 1                   Dano Samuel OT                     12/20/2024                     Occupational Therapy Notes (most recent note)        Dano Samuel, OT at 25 1201          Acute Care - Occupational Therapy Treatment Note   Elijah     Patient Name: Ana Maradiaga  : 1965  MRN: [id number]  Today's Date: 2025  Onset of Illness/Injury or Date of Surgery: 24     Referring Physician: Dr. Reardon    Admit Date: 2024       ICD-10-CM ICD-9-CM   1. Urinary tract infection with hematuria, site unspecified  N39.0 599.0    R31.9 599.70   2. Weakness  R53.1 780.79   3. High serum testosterone  R79.89 790.99     OT ASSESSMENT FLOWSHEET (Last 12 Hours)       OT Evaluation and Treatment       Row Name 25 1157                   OT Time and Intention    Document Type therapy note (daily note)  -KR        Mode of Treatment occupational therapy  -KR        Patient Effort adequate  -KR           General Information    General Observations of Patient alert/cooperative  -KR           Bathing Assessment/Intervention    [place] Level (Bathing) bathing skills;moderate assist (50% patient effort)  -KR           Upper Body Dressing Assessment/Training    [place] Level (Upper Body Dressing) upper body dressing skills;moderate assist (50% patient effort)  -KR           Lower Body Dressing Assessment/Training    [place] Level (Lower Body Dressing) lower body dressing skills;moderate assist (50% patient effort)  -KR           Grooming Assessment/Training    [place] Level (Grooming) grooming skills;set up  -KR           Self-Feeding Assessment/Training    [place] Level (Feeding) feeding skills;set up  -KR           Toileting Assessment/Training    [place] Level (Toileting) toileting skills;moderate assist (50% patient effort)  -KR           Shoulder (Therapeutic Exercise)    Shoulder AROM (Therapeutic Exercise) bilateral;flexion;extension;sitting  -KR        Shoulder AAROM (Therapeutic Exercise) bilateral;flexion;extension;sitting  -KR            Elbow/Forearm (Therapeutic Exercise)    Elbow/Forearm AROM (Therapeutic Exercise) bilateral;flexion;extension;sitting  -KR           Hand (Therapeutic Exercise)    Hand AROM/AAROM (Therapeutic Exercise) bilateral;finger flexion;finger extension  -KR           Plan of Care Review    Plan of Care Reviewed With patient  -KR        Progress improving  -KR           Dressing Goal 1 (OT)    Progress/Outcome (Dressing Goal 1, OT) continuing progress toward goal  -KR           Strength Goal 1 (OT)    Progress/Outcome (Strength Goal 1, OT) continuing progress toward goal  -KR            Activity Tolerance Goal 1 (OT)    Progress/Outcome (Activity Tolerance Goal 1, OT) continuing progress toward goal  -KR                  User Key  (r) = Recorded By, (t) = Taken By, (c) = Cosigned By      Initials Name Effective Dates    KR Dano Samuel OT 06/16/21 -                      Occupational Therapy Education        No education to display                      OT Recommendation and Plan  Planned Therapy Interventions (OT): activity tolerance training, adaptive equipment training, BADL retraining, strengthening exercise  Plan of Care Review  Plan of Care Reviewed With: patient  Progress: improving  Plan of Care Reviewed With: patient        Time Calculation:     Therapy Charges for Today       Code Description Service Date Service Provider Modifiers Qty    09291877700  OT THERAPEUTIC ACT EA 15 MIN 1/6/2025 Dano Samuel OT GO 1    66767579399 HC OT THERAPEUTIC ACT EA 15 MIN 1/7/2025 Dano Samuel OT GO 1                 Dano Samuel OT  1/7/2025    Electronically signed by Dano Samuel OT at 01/07/25 1202       Speech Language Pathology Notes (most recent note)    No notes exist for this encounter.     Olga Lidia Holley BSW     Case Management     Case Management/Social Work     Signed     Date of Service: 12/26/24 1737  Creation Time: 12/26/24 1737     Signed         Discharge Planning Assessment  ABIGAIL Nava      Patient Name: Ana Maradiaga            MRN: [id number]  Today's Date: 12/26/2024              Admit Date: 12/19/2024     Plan: SS received a consult for d/c planning SNF. SS spoke with pt at bedside on this date who states she is agreeable for SNF placement. Pt request's for SS to contact franky Matson. SS attempted to contact son on this date with no success. SS to follow and assist with discharge planning.       Discharge Plan         Row Name 12/26/24 1736           Plan     Plan SS received a consult for d/c planning SNF. SS spoke with pt at bedside on this date who states she is agreeable for SNF placement. Pt request's for SS to contact franky Matson. SS attempted to contact son on this date with no success. SS to follow and assist with discharge planning.     Patient/Family in Agreement with Plan yes         Expected Discharge Date and Time         Expected Discharge Date Expected Discharge Time     Dec 25, 2024             Demographic Summary         Row Name 12/26/24 1732           General Information     Admission Type inpatient     Referral Source nursing     Reason for Consult discharge planning  SS received a consult for d/c planning SNF.            ARMANDO Sands

## 2025-01-08 NOTE — PLAN OF CARE
Goal Outcome Evaluation:              Outcome Evaluation: Patient resting in bed at this time. VSS. PRN bumex given per patient request. No acute changes or complaints at this time. Will continue plan of care.

## 2025-01-08 NOTE — CASE MANAGEMENT/SOCIAL WORK
Discharge Planning Assessment  [facility]     Patient Name: Ana Maradiaga  MRN: [id number]  Today's Date: 1/8/2025    Admit Date: 12/19/2024       Discharge Plan       Row Name 01/08/25 1151       Plan    Plan SS notified by [facility] Mary that facility has accepted Pt pending pre-auth. SS notified by  pre-auth team that pre-auth has been submitted on this date and remains pending. SS updated Provider. SS to follow.    16:33pm: SS notified by [facility] per Mary that Pt's SNF pre-auth has been approved for admission on 01/09/25. SS updated Provider and changed pharmacy to [facility]. SS to follow.                   Continued Care and Services - Admitted Since 12/19/2024       Destination Coordination complete.      Service Provider Request Status Services Address Phone Fax Patient Preferred    [facility]  Selected Skilled Nursing [address] [address] [phone] --    [facility] Declined  over weight limit -- [address] [phone] --    [facility] Declined  Bed not available -- [address] [id number] [phone] --                    ARMANDO Pinto

## 2025-01-08 NOTE — THERAPY TREATMENT NOTE
Acute Care - Occupational Therapy Treatment Note   [facility]     Patient Name: Ana Maradiaga  : 1965  MRN: [id number]  Today's Date: 2025  Onset of Illness/Injury or Date of Surgery: 24     Referring Physician: Dr. Reardon    Admit Date: 2024       ICD-10-CM ICD-9-CM   1. Urinary tract infection with hematuria, site unspecified  N39.0 599.0    R31.9 599.70   2. Weakness  R53.1 780.79   3. High serum testosterone  R79.89 790.99     Patient Active Problem List   Diagnosis    Type 2 diabetes mellitus with hyperglycemia, without long-term current use of insulin    Acquired hypothyroidism    Hirsutism    Elevated testosterone level in female    Complicated UTI (urinary tract infection)     Past Medical History:   Diagnosis Date    Anemia     Arrhythmia     Afib    Arthritis     Atrial fibrillation     Bronchitis     Chronic kidney disease     Gout     Hypertension     Stroke 2021     History reviewed. No pertinent surgical history.      OT ASSESSMENT FLOWSHEET (Last 12 Hours)       OT Evaluation and Treatment       Row Name 25 1104                   OT Time and Intention    Document Type therapy note (daily note)  -KR        Mode of Treatment occupational therapy  -KR        Patient Effort adequate  -KR        Comment Pt seen for continued UE ROM as tolerated. Pt displays improved motion/decreased pain BUE shoulders on this date, allowing enhance fxl performance of self care tasks.  -KR           General Information    General Observations of Patient alert/cooperative  -KR           Bathing Assessment/Intervention    [place] Level (Bathing) bathing skills;moderate assist (50% patient effort)  -KR           Upper Body Dressing Assessment/Training    [place] Level (Upper Body Dressing) upper body dressing skills;minimum assist (75% patient effort)  -KR           Lower Body Dressing Assessment/Training    [place] Level (Lower Body Dressing) lower body  dressing skills;moderate assist (50% patient effort)  -KR           Grooming Assessment/Training    [place] Level (Grooming) grooming skills;set up  -KR           Self-Feeding Assessment/Training    [place] Level (Feeding) feeding skills;set up  -KR           Toileting Assessment/Training    [place] Level (Toileting) toileting skills;minimum assist (75% patient effort);moderate assist (50% patient effort)  -KR           Shoulder (Therapeutic Exercise)    Shoulder AROM (Therapeutic Exercise) bilateral;flexion;extension  -KR        Shoulder AAROM (Therapeutic Exercise) bilateral;flexion;extension  -KR           Elbow/Forearm (Therapeutic Exercise)    Elbow/Forearm AROM (Therapeutic Exercise) bilateral;flexion;extension  -KR           Hand (Therapeutic Exercise)    Hand AROM/AAROM (Therapeutic Exercise) bilateral;finger flexion;finger extension  -KR           Plan of Care Review    Progress improving  -KR           Dressing Goal 1 (OT)    Progress/Outcome (Dressing Goal 1, OT) continuing progress toward goal  -KR           Strength Goal 1 (OT)    Progress/Outcome (Strength Goal 1, OT) continuing progress toward goal  -KR            Activity Tolerance Goal 1 (OT)    Progress/Outcome (Activity Tolerance Goal 1, OT) continuing progress toward goal  -KR                  User Key  (r) = Recorded By, (t) = Taken By, (c) = Cosigned By      Initials Name Effective Dates    KR Dano Samuel, OT 06/16/21 -                      Occupational Therapy Education        No education to display                      OT Recommendation and Plan  Planned Therapy Interventions (OT): activity tolerance training, adaptive equipment training, BADL retraining, strengthening exercise  Plan of Care Review  Plan of Care Reviewed With: patient  Progress: improving  Plan of Care Reviewed With: patient        Time Calculation:     Therapy Charges for Today       Code Description Service Date Service Provider Modifiers Qty     71034135020 HC OT THERAPEUTIC ACT EA 15 MIN 1/7/2025 Dano Samuel, OT GO 1    34161912787 HC OT THERAPEUTIC ACT EA 15 MIN 1/8/2025 Dano Samuel OT GO 1                 Dano Samuel OT  1/8/2025

## 2025-01-09 VITALS
HEART RATE: 77 BPM | TEMPERATURE: 98.6 F | DIASTOLIC BLOOD PRESSURE: 77 MMHG | WEIGHT: 293 LBS | BODY MASS INDEX: 48.82 KG/M2 | SYSTOLIC BLOOD PRESSURE: 166 MMHG | RESPIRATION RATE: 16 BRPM | HEIGHT: 65 IN | OXYGEN SATURATION: 96 %

## 2025-01-09 PROCEDURE — 99239 HOSP IP/OBS DSCHRG MGMT >30: CPT | Performed by: STUDENT IN AN ORGANIZED HEALTH CARE EDUCATION/TRAINING PROGRAM

## 2025-01-09 RX ORDER — BUMETANIDE 1 MG/1
2 TABLET ORAL DAILY PRN
Qty: 30 TABLET | Refills: 0 | Status: SHIPPED | OUTPATIENT
Start: 2025-01-09

## 2025-01-09 RX ORDER — HYDROCORTISONE 10 MG/1
15 TABLET ORAL
Qty: 45 TABLET | Refills: 0 | Status: SHIPPED | OUTPATIENT
Start: 2025-01-10 | End: 2025-02-09

## 2025-01-09 RX ORDER — FOLIC ACID 1 MG/1
1 TABLET ORAL DAILY
Qty: 30 TABLET | Refills: 0 | Status: SHIPPED | OUTPATIENT
Start: 2025-01-10

## 2025-01-09 RX ORDER — HYDROCORTISONE 10 MG/1
10 TABLET ORAL DAILY
Qty: 30 TABLET | Refills: 0 | Status: SHIPPED | OUTPATIENT
Start: 2025-01-09 | End: 2025-02-08

## 2025-01-09 RX ORDER — ONDANSETRON 4 MG/1
4 TABLET, ORALLY DISINTEGRATING ORAL EVERY 6 HOURS PRN
Qty: 30 TABLET | Refills: 0 | Status: SHIPPED | OUTPATIENT
Start: 2025-01-09

## 2025-01-09 RX ORDER — AMMONIUM LACTATE 12 G/100G
1 CREAM TOPICAL DAILY
Qty: 385 G | Refills: 0 | Status: SHIPPED | OUTPATIENT
Start: 2025-01-09

## 2025-01-09 RX ADMIN — APIXABAN 5 MG: 5 TABLET, FILM COATED ORAL at 09:28

## 2025-01-09 RX ADMIN — HYDROCORTISONE 15 MG: 10 TABLET ORAL at 09:27

## 2025-01-09 RX ADMIN — LEVOTHYROXINE SODIUM 125 MCG: 0.12 TABLET ORAL at 09:28

## 2025-01-09 RX ADMIN — Medication 5000 UNITS: at 09:28

## 2025-01-09 RX ADMIN — ASPIRIN 81 MG: 81 TABLET, COATED ORAL at 09:28

## 2025-01-09 RX ADMIN — FOLIC ACID 1 MG: 1 TABLET ORAL at 09:28

## 2025-01-09 RX ADMIN — Medication 400 MG: at 09:27

## 2025-01-09 RX ADMIN — FLECAINIDE ACETATE 100 MG: 50 TABLET ORAL at 09:28

## 2025-01-09 RX ADMIN — Medication 1000 MCG: at 09:28

## 2025-01-09 RX ADMIN — HYDROCORTISONE 10 MG: 10 TABLET ORAL at 14:58

## 2025-01-09 NOTE — CASE MANAGEMENT/SOCIAL WORK
Discharge Planning Assessment  [facility]     Patient Name: Ana Maradiaga  MRN: [id number]  Today's Date: 1/9/2025    Admit Date: 12/19/2024            Discharge Plan       Row Name 01/09/25 1018       Plan    Final Discharge Disposition Code 03 - skilled nursing facility (SNF)    Final Note Pt is being [facility]carged to [facility] on this date. Pt is aware and agreeable to discharge. Pt's son Dano is aware and agreeable to discharge. SS to fax AVS summary once pharmacy has confirmed it may be printed. Lead RN notified. SS to provide RN report number once AVS summary has been received to SNF. PCS form completed and faxed to floor for EMS transport.    10:22am: SS faxed AVS summary to [facility] fax [phone].   11:12am:  SS provided RN report number for [facility] [phone].     12:19pm: SS contacted [facility] and scheduled  EMS .                 Continued Care and Services - Admitted Since 12/19/2024       Destination Coordination complete.      Service Provider Request Status Services Address Phone Fax Patient Preferred    [facility]  Selected Skilled Nursing [address] [facility] [address] [facility] [address] [phone] --    [facility] Declined  over weight limit -- [address] [facility] [address] [phone] --    [facility] Declined  Bed not available -- [address] [facility] [address] [phone] --                      Expected Discharge Date and Time       Expected Discharge Date Expected Discharge Time    Jan 9, 2025             ARMANDO Pinto

## 2025-01-09 NOTE — PLAN OF CARE
Goal Outcome Evaluation:              Outcome Evaluation: Patient resting in bed. VSS on room air. Patient refused to ambulted this shift, educated on the importance. No acute changes noted. Will continue plan of care.

## 2025-01-09 NOTE — CASE MANAGEMENT/SOCIAL WORK
Discharge Planning Assessment  [facility]     Patient Name: Ana Maradiaga  MRN: [id number]  Today's Date: 1/9/2025    Admit Date: 12/19/2024    Plan: [facility] that Pt's SNF pre-auth has been approved for admission on 01/09/25. SS updated Provider and changed pharmacy to [facility]. SS to follow.     Discharge Plan       Row Name 01/09/25 1457       Plan    Final Note SS received a call from [facility] per Mary who states pt will not be able to got to their facility with Mounjaro. SS notified provider. New AVS faxed to [facility] and notified Mary. No other needs identified.    1631: SS was notified by Lead RN that [facility] can't transport pt and [facility] was contacted and states they will not transport anymore NH's. SS contacted [facility] who accepted run. SS notified Lead RN.         Continued Care and Services - Admitted Since 12/19/2024       Destination Coordination complete.      Service Provider Request Status Services Address Phone Fax Patient Preferred    [facility]  Selected Skilled Nursing [phone] --    [facility] Declined  over weight limit -- [address] [id number] [phone] --    [facility] Declined  Bed not available -- [address] [facility] [address] [phone] --          Expected Discharge Date and Time       Expected Discharge Date Expected Discharge Time    Jan 9, 2025        ARMANDO Sands

## 2025-01-09 NOTE — DISCHARGE SUMMARY
[facility] HOSPITALISTS DISCHARGE SUMMARY    Patient Identification:  Name:  Ana Maradiaga  Age:  59 y.o.  Sex:  female  :  1965  MRN:  [id number]  Visit Number:  [id number]    Date of Admission: 2024  Date of Discharge:  2025     PCP: Val Purcell PA    DISCHARGE DIAGNOSIS  #Acute on chronic debility  #Bilateral lower extremity weakness, multifactorial  #Adrenal insufficiency, unclear etiology  #Elevated testosterone, unclear etiology  #Acute urinary tract infection the E. coli  #Left staghorn calculus  #Atrial fibrillation, rate controlled  #Prior ischemic infarct  #Chronic venous stasis  #Morbid obesity    CONSULTS   Urology  Endocrinology    PROCEDURES PERFORMED  None    HOSPITAL COURSE  Patient is a 59 y.o. female presented to [facility] complaining of gross hematuria, weakness and difficulty ambulating.  Please see the admitting history and physical for further details.  Patient initially presented with gross hematuria which was felt to be due to a large UPJ staghorn calculus.  She completed a course of antibiotics for E. coli urinary tract infection was evaluated by urology.  No role for inpatient cystoscopy or procedure recommended, urology recommended outpatient follow-up.    In regards to her fatigue and prior outpatient workup by endocrinology which had recently been initiated, she was noted to have abnormally high testosterone levels, hirsutism and low ACTH/cortisol levels.  I did discuss with her primary endocrinologist on the day of admission and he had recommended a pelvic ultrasound looking for ovarian lesions/tumors and recommended outpatient follow-up.    Given her persistent weakness and failure to improve while waiting SNF, patient was started on hydrocortisone bid. With the initiation of solucortef, she had significant improvement in energy and did much well the remainder of the hospital stay.     The time of discharge, patient was  hemodynamically stable and discharged to SNF for PT/OT.  She was instructed to follow-up outpatient with PCP and subspecialties as noted below.    VITAL SIGNS:  Temp:  [97.7 °F (36.5 °C)-98.6 °F (37 °C)] 98.6 °F (37 °C)  Heart Rate:  [] 75  Resp:  [16-20] 20  BP: (131-173)/(71-86) 149/73  SpO2:  [96 %-97 %] 97 %  on   ;   Device (Oxygen Therapy): room air    Body mass index is 56.25 kg/m².  Wt Readings from Last 3 Encounters:   12/19/24 (!) 153 kg (338 lb)   10/24/24 116 kg (256 lb 6.4 oz)   10/11/24 (!) 164 kg (362 lb)       PHYSICAL EXAM:  Constitutional: Early obese female, nontoxic, Well-developed and well-nourished, resting comfortably in bed, no acute distress.      HENT:  Head:  Normocephalic and atraumatic.  Mouth:  Moist mucous membranes.  Eyes:  Conjunctivae and EOM are normal. No scleral icterus.   Cardiovascular:  Normal rate, regular rhythm and normal heart sounds with no murmur. No JVD.   Pulmonary/Chest:  No respiratory distress, no wheezes, no crackles, with normal breath sounds and good air movement. Unlabored. No accessory muscle use.  Abdominal:  Soft. No distension and no tenderness.  Bowel sounds present. No rebound or guarding.   Musculoskeletal:  No tenderness, and no deformity.  No red or swollen joints anywhere.    Neurological:  Alert and oriented to person, place, and time. Nonfocal, weakness in upper and lower extremities bilaterally  Skin:  Skin is warm and dry.  Morbilliform rash on upper and lower extremities, trunk .no pallor.   Peripheral vascular:  No clubbing, no cyanosis, bilateral venous stasis.     DISCHARGE DISPOSITION   Stable    DISCHARGE MEDICATIONS:     Discharge Medications        New Medications        Instructions Start Date   ammonium lactate 12 % cream  Commonly known as: AMLACTIN   1 Application, Topical, Daily      folic acid 1 MG tablet  Commonly known as: FOLVITE   1 mg, Oral, Daily   Start Date: January 10, 2025     hydrocortisone 10 MG tablet  Commonly  known as: CORTEF   10 mg, Oral, Daily      hydrocortisone 10 MG tablet  Commonly known as: CORTEF   15 mg, Oral, Daily With Breakfast   Start Date: January 10, 2025     ondansetron ODT 4 MG disintegrating tablet  Commonly known as: ZOFRAN-ODT   4 mg, Oral, Every 6 Hours PRN             Changes to Medications        Instructions Start Date   bumetanide 1 MG tablet  Commonly known as: BUMEX  What changed:   when to take this  reasons to take this   2 mg, Oral, Daily PRN             Continue These Medications        Instructions Start Date   Aspirin Low Dose 81 MG EC tablet  Generic drug: aspirin   1 tablet, Oral, Daily      atorvastatin 40 MG tablet  Commonly known as: LIPITOR   40 mg, Oral, Every Night at Bedtime      Eliquis 5 MG tablet tablet  Generic drug: apixaban   5 mg, Oral, Every 12 Hours Scheduled      flecainide 100 MG tablet  Commonly known as: TAMBOCOR   100 mg, Oral, 2 Times Daily      levothyroxine 125 MCG tablet  Commonly known as: SYNTHROID, LEVOTHROID   1 tablet, Oral, Daily      magnesium oxide 400 MG tablet  Commonly known as: MAG-OX   400 mg, Oral, Daily      Mounjaro 5 MG/0.5ML solution auto-injector  Generic drug: Tirzepatide   0.5 mL, Subcutaneous, Weekly      vitamin B-12 1000 MCG tablet  Commonly known as: CYANOCOBALAMIN   1,000 mcg, Oral, Daily      vitamin D3 125 MCG (5000 UT) capsule capsule   5,000 Units, Oral, Daily             Stop These Medications      amLODIPine 5 MG tablet  Commonly known as: NORVASC     carvedilol 6.25 MG tablet  Commonly known as: COREG                Additional Instructions for the Follow-ups that You Need to Schedule       Discharge Follow-up with PCP   As directed       Currently Documented PCP:    Val Purcell PA    PCP Phone Number:    [phone]     [facility]        Discharge Follow-up with Specialty: endocrinology; 2 Weeks   As directed      Specialty: endocrinology   Follow Up: 2 Weeks   Follow Up Details: in [facility]         Discharge Follow-up with Specialty: urology; 1 Month   As directed      Specialty: urology   Follow Up: 1 Month   Follow Up Details: staghorn calculus               Contact information for follow-up providers       Val Purcell PA .    Specialty: Family Medicine  Why: [facility]  Contact information:  [address] [address]  [facility] [address]  [phone]                       Contact information for after-discharge care       Destination       [facility] .    Service: Skilled Nursing  Contact information:  [address] [facility] [address]  [phone]                                    TEST  RESULTS PENDING AT DISCHARGE       CODE STATUS  Code Status and Medical Interventions: CPR (Attempt to Resuscitate); Full Support   Ordered at: 12/19/24 1750     Code Status (Patient has no pulse and is not breathing):    CPR (Attempt to Resuscitate)     Medical Interventions (Patient has pulse or is breathing):    Full Support       Mark Vasquez DO  [facility]  01/09/25  09:53 EST    Please note that this discharge summary required more than 30 minutes to complete.

## 2025-01-09 NOTE — PLAN OF CARE
Goal Outcome Evaluation:              Outcome Evaluation: Pt being discharged to NH.

## 2025-01-10 NOTE — PAYOR COMM NOTE
"[facility]  NPI:[id number]    Utilization Review  Contact: Ny Odom RN  Phone: [phone]  Fax:[phone]    DISCHARGE NOTIFICATION    Ana Maradiaga (59 y.o. Female)       Date of Birth   1965    Social Security Number       Address   [address] [place] [address]    Home Phone   [phone]    MRN   [id number]       Judaism   Spiritism    Marital Status   Single                            Admission Date   12/19/24    Admission Type   Emergency    Admitting Provider   Thien Reardon DO    Attending Provider       Department, Room/Bed   [address], 3351/2S       Discharge Date   1/9/2025    Discharge Disposition   Skilled Nursing Facility (DC - External)    Discharge Destination                                 Attending Provider: (none)   Allergies: Vancomycin, Cefepime, Cephalosporins    Isolation: None   Infection: None   Code Status: Prior    Ht: 165.1 cm (65\")   Wt: 153 kg (338 lb)    Admission Cmt: None   Principal Problem: Complicated UTI (urinary tract infection) [N39.0]                   Active Insurance as of 12/19/2024       Primary Coverage       Payor Plan Insurance Group Employer/Plan Group    ANTHEM MEDICARE REPLACEMENT ANTHEM MEDICARE ADVANTAGE [id number]       Payor Plan Address Payor Plan Phone Number Payor Plan Fax Number Effective Dates    [address] [phone]  4/1/2024 - None Entered    [facility] [address]         Subscriber Name Subscriber Birth Date Member ID       ANA MARADIAGA 1965 [id number]                     Emergency Contacts        (Rel.) Home Phone Work Phone Mobile Phone    BLANCA MARADIAGA (Son) -- -- [phone]    CYDNEY MARADIAGA (Relative) -- -- [phone]          Mark Vasquez DO   Physician  Hospitalist     Discharge Summary     Incomplete     Date of Service: 01/09/25 0953  Creation Time: 01/09/25 0953     Incomplete              [facility] " HOSPITALISTS DISCHARGE SUMMARY     Patient Identification:  Name:  Ana Maradiaga  Age:  59 y.o.  Sex:  female  :  1965  MRN:  [id number]  Visit Number:  [id number]     Date of Admission: 2024  Date of Discharge:  2025      PCP: Val Purcell PA     DISCHARGE DIAGNOSIS        CONSULTS         PROCEDURES PERFORMED        HOSPITAL COURSE  Patient is a 59 y.o. female presented to [facility] complaining of .  Please see the admitting history and physical for further details.        trip patient the mother of a EMT.  She is unable to walk.  We have not done much over the weekend his PT and OT have not been here but of asked for IPR to evaluate for placement.  We have been treating urinary tract infection.  Patient still has grossly bloody urine.  Urology's consults made     Pedro   I went down the rabbit hole with all the endo stuff while waiting for SNF.  All imaging has been negative.  Endocrine's recommendation was Solu-Cortef and outpatient follow-up.  Nothing to do other than follow-up with endocrinology and PCP, send to SNF when accepted     VITAL SIGNS:  Temp:  [97.7 °F (36.5 °C)-98.6 °F (37 °C)] 98.6 °F (37 °C)  Heart Rate:  [] 75  Resp:  [16-20] 20  BP: (131-173)/(71-86) 149/73  SpO2:  [96 %-97 %] 97 %  on   ;   Device (Oxygen Therapy): room air     Body mass index is 56.25 kg/m².      Wt Readings from Last 3 Encounters:   24 (!) 153 kg (338 lb)   10/24/24 116 kg (256 lb 6.4 oz)   10/11/24 (!) 164 kg (362 lb)         PHYSICAL EXAM:  Constitutional:  Well-developed and well-nourished.  No respiratory distress.      HENT:  Head:  Normocephalic and atraumatic.  Mouth:  Moist mucous membranes.    Eyes:  Conjunctivae and EOM are normal.  No scleral icterus.    Neck:  Neck supple.  No JVD present.    Cardiovascular:  Normal rate, regular rhythm and normal heart sounds with no murmur.  Pulmonary/Chest:  No respiratory distress, no wheezes, no crackles, with  normal breath sounds and good air movement.  Abdominal:  Soft. No distension and no tenderness.   Musculoskeletal:  No tenderness, and no deformity.  No red or swollen joints anywhere.    Neurological:  Alert and oriented to person, place, and time.  No cranial nerve deficit.    Skin:  Skin is warm and dry. No rash noted. No pallor.   Peripheral vascular: no clubbing, no cyanosis, no edema.     DISCHARGE DISPOSITION   Stable     DISCHARGE MEDICATIONS:      Discharge Medications          New Medications         Instructions Start Date   ammonium lactate 12 % cream  Commonly known as: AMLACTIN    1 Application, Topical, Daily        folic acid 1 MG tablet  Commonly known as: FOLVITE    1 mg, Oral, Daily    Start Date: January 10, 2025      hydrocortisone 10 MG tablet  Commonly known as: CORTEF    10 mg, Oral, Daily        hydrocortisone 10 MG tablet  Commonly known as: CORTEF    15 mg, Oral, Daily With Breakfast    Start Date: January 10, 2025      ondansetron ODT 4 MG disintegrating tablet  Commonly known as: ZOFRAN-ODT    4 mg, Oral, Every 6 Hours PRN                  Changes to Medications         Instructions Start Date   bumetanide 1 MG tablet  Commonly known as: BUMEX  What changed:   when to take this  reasons to take this    2 mg, Oral, Daily PRN                  Continue These Medications         Instructions Start Date   Aspirin Low Dose 81 MG EC tablet  Generic drug: aspirin    1 tablet, Oral, Daily        atorvastatin 40 MG tablet  Commonly known as: LIPITOR    40 mg, Oral, Every Night at Bedtime        Eliquis 5 MG tablet tablet  Generic drug: apixaban    5 mg, Oral, Every 12 Hours Scheduled        flecainide 100 MG tablet  Commonly known as: TAMBOCOR    100 mg, Oral, 2 Times Daily        levothyroxine 125 MCG tablet  Commonly known as: SYNTHROID, LEVOTHROID    1 tablet, Oral, Daily        magnesium oxide 400 MG tablet  Commonly known as: MAG-OX    400 mg, Oral, Daily        Mounjaro 5 MG/0.5ML solution  auto-injector  Generic drug: Tirzepatide    0.5 mL, Subcutaneous, Weekly        vitamin B-12 1000 MCG tablet  Commonly known as: CYANOCOBALAMIN    1,000 mcg, Oral, Daily        vitamin D3 125 MCG (5000 UT) capsule capsule    5,000 Units, Oral, Daily                  Stop These Medications       amLODIPine 5 MG tablet  Commonly known as: NORVASC      carvedilol 6.25 MG tablet  Commonly known as: COREG                      Additional Instructions for the Follow-ups that You Need to Schedule         Discharge Follow-up with PCP   As directed         Currently Documented PCP:    Val Purcell PA    PCP Phone Number:    [phone]      Follow Up Details: [address] [place] [address]           Discharge Follow-up with Specialty: endocrinology; 2 Weeks   As directed        Specialty: endocrinology   Follow Up: 2 Weeks   [facility]           Discharge Follow-up with Specialty: urology; 1 Month   As directed        Specialty: urology   Follow Up: 1 Month   Follow Up Details: staghorn calculus                     Contact information for follow-up providers         Val Purcell PA .    Specialty: Family Medicine  Why: [address] [place] [address] fu  Contact information:  [address] [address]  [phone]                              Contact information for after-discharge care         Destination         [facility] .    Service: Skilled Nursing  Contact information:  [address] [place] [address]  [phone]                                             TEST  RESULTS PENDING AT DISCHARGE        CODE STATUS      Code Status and Medical Interventions: CPR (Attempt to Resuscitate); Full Support   Ordered at: 12/19/24 1750     Code Status (Patient has no pulse and is not breathing):     CPR (Attempt to Resuscitate)     Medical Interventions (Patient has pulse or is breathing):     Full Support         Mark Vasquez DO  [facility]  01/09/25  09:53 EST      Please note that this discharge summary required more than 30 minutes to complete.

## 2025-01-13 ENCOUNTER — TELEPHONE (OUTPATIENT)
Dept: ENDOCRINOLOGY | Facility: CLINIC | Age: 60
End: 2025-01-13
Payer: MEDICARE

## 2025-01-13 NOTE — TELEPHONE ENCOUNTER
I WOULD WANT TO SEE HER BACK BEFORE THAT DECISION IS MADE.  THIS MEDICATION IS FOR A1C CONTROL AND HER LASAT A1C WAS 6.5 SO I AM NOT SURE SHE NEEDS TO INCREASE THE DOSE. SHE DOES NOT HAVE AN APPOINTMENT SCHEDULED FOR FOLLOW UP

## 2025-01-13 NOTE — TELEPHONE ENCOUNTER
Patient has been on the 5 mg of Mounjaro for a couple of months and has done well. She wants to know if she can move up to the next dosage?

## 2025-02-07 RX ORDER — HYDROCORTISONE 10 MG/1
10 TABLET ORAL DAILY
Qty: 30 TABLET | Refills: 0 | Status: SHIPPED | OUTPATIENT
Start: 2025-02-07 | End: 2025-03-09

## 2025-02-13 ENCOUNTER — SPECIALTY PHARMACY (OUTPATIENT)
Dept: PHARMACY | Facility: HOSPITAL | Age: 60
End: 2025-02-13
Payer: MEDICARE

## 2025-02-13 ENCOUNTER — OFFICE VISIT (OUTPATIENT)
Dept: ENDOCRINOLOGY | Facility: CLINIC | Age: 60
End: 2025-02-13
Payer: MEDICARE

## 2025-02-13 VITALS
BODY MASS INDEX: 55.25 KG/M2 | DIASTOLIC BLOOD PRESSURE: 73 MMHG | SYSTOLIC BLOOD PRESSURE: 136 MMHG | WEIGHT: 293 LBS | HEART RATE: 64 BPM | OXYGEN SATURATION: 97 %

## 2025-02-13 DIAGNOSIS — E03.9 ACQUIRED HYPOTHYROIDISM: ICD-10-CM

## 2025-02-13 DIAGNOSIS — E11.65 TYPE 2 DIABETES MELLITUS WITH HYPERGLYCEMIA, WITHOUT LONG-TERM CURRENT USE OF INSULIN: Primary | ICD-10-CM

## 2025-02-13 DIAGNOSIS — E27.40 ADRENAL INSUFFICIENCY: ICD-10-CM

## 2025-02-13 DIAGNOSIS — R79.89 ELEVATED TESTOSTERONE LEVEL IN FEMALE: ICD-10-CM

## 2025-02-13 LAB
ALBUMIN SERPL-MCNC: 4.2 G/DL (ref 3.5–5.2)
ALBUMIN/GLOB SERPL: 1 G/DL
ALP SERPL-CCNC: 124 U/L (ref 39–117)
ALT SERPL W P-5'-P-CCNC: 30 U/L (ref 1–33)
ANION GAP SERPL CALCULATED.3IONS-SCNC: 15.5 MMOL/L (ref 5–15)
AST SERPL-CCNC: 57 U/L (ref 1–32)
BILIRUB SERPL-MCNC: 1.2 MG/DL (ref 0–1.2)
BUN SERPL-MCNC: 21 MG/DL (ref 6–20)
BUN/CREAT SERPL: 15.1 (ref 7–25)
CALCIUM SPEC-SCNC: 10.2 MG/DL (ref 8.6–10.5)
CHLORIDE SERPL-SCNC: 95 MMOL/L (ref 98–107)
CHOLEST SERPL-MCNC: 124 MG/DL (ref 0–200)
CO2 SERPL-SCNC: 28.5 MMOL/L (ref 22–29)
CREAT SERPL-MCNC: 1.39 MG/DL (ref 0.57–1)
EGFRCR SERPLBLD CKD-EPI 2021: 43.8 ML/MIN/1.73
EXPIRATION DATE: NORMAL
EXPIRATION DATE: NORMAL
FSH SERPL-ACNC: 2.22 MIU/ML
GLOBULIN UR ELPH-MCNC: 4.2 GM/DL
GLUCOSE BLDC GLUCOMTR-MCNC: 108 MG/DL (ref 70–130)
GLUCOSE SERPL-MCNC: 99 MG/DL (ref 65–99)
HBA1C MFR BLD: 5 % (ref 4.5–5.7)
HDLC SERPL-MCNC: 48 MG/DL (ref 40–60)
LDLC SERPL CALC-MCNC: 57 MG/DL (ref 0–100)
LDLC/HDLC SERPL: 1.15 {RATIO}
LH SERPL-ACNC: <0.3 MIU/ML
Lab: NORMAL
Lab: NORMAL
POTASSIUM SERPL-SCNC: 3.6 MMOL/L (ref 3.5–5.2)
PROT SERPL-MCNC: 8.4 G/DL (ref 6–8.5)
SODIUM SERPL-SCNC: 139 MMOL/L (ref 136–145)
T4 FREE SERPL-MCNC: 2.39 NG/DL (ref 0.92–1.68)
TESTOST SERPL-MCNC: 237 NG/DL (ref 2.9–40.8)
TRIGL SERPL-MCNC: 104 MG/DL (ref 0–150)
TSH SERPL DL<=0.05 MIU/L-ACNC: 3.68 UIU/ML (ref 0.27–4.2)
VLDLC SERPL-MCNC: 19 MG/DL (ref 5–40)

## 2025-02-13 PROCEDURE — 80053 COMPREHEN METABOLIC PANEL: CPT | Performed by: NURSE PRACTITIONER

## 2025-02-13 PROCEDURE — 83001 ASSAY OF GONADOTROPIN (FSH): CPT | Performed by: NURSE PRACTITIONER

## 2025-02-13 PROCEDURE — 80061 LIPID PANEL: CPT | Performed by: NURSE PRACTITIONER

## 2025-02-13 PROCEDURE — 84439 ASSAY OF FREE THYROXINE: CPT | Performed by: NURSE PRACTITIONER

## 2025-02-13 PROCEDURE — 84443 ASSAY THYROID STIM HORMONE: CPT | Performed by: NURSE PRACTITIONER

## 2025-02-13 PROCEDURE — 83002 ASSAY OF GONADOTROPIN (LH): CPT | Performed by: NURSE PRACTITIONER

## 2025-02-13 PROCEDURE — 84403 ASSAY OF TOTAL TESTOSTERONE: CPT | Performed by: NURSE PRACTITIONER

## 2025-02-13 RX ORDER — HYDROCORTISONE 5 MG/1
TABLET ORAL
Qty: 150 TABLET | Refills: 5 | Status: SHIPPED | OUTPATIENT
Start: 2025-02-13

## 2025-02-13 NOTE — ASSESSMENT & PLAN NOTE
Elevated testosterone in females can come from the ovaries or the adrenal glands.  With patient's diagnosis of adrenal sufficiency, it is less likely adrenal in origin. Will obtain repeat testosterone with FSH/LH to determine if patient has hyperthecosis.  Her ovaries on CT scan and US were normal.  Will treat and follow as indicated based on results.

## 2025-02-13 NOTE — ASSESSMENT & PLAN NOTE
-Clinically euthyroid.  -TFT's today with medication adjustment accordingly.  -Reminded of proper administration including taking 7 pills, once daily, per week on an empty stomach with no missed doses, waiting 30-60 minutes prior to other medications or food.  -Follow-up in 1 month.

## 2025-02-13 NOTE — PROGRESS NOTES
Specialty Pharmacy Patient Management Program  Endocrinology Follow-Up Assessment       Ana Maradiaga is a 59 y.o. female with Type 2 Diabetes seen by an Endocrinology provider and enrolled in the Endocrinology Patient Management program offered by [facility].  An initial outreach was conducted, including assessment of therapy appropriateness and specialty medication education for Mounjaro.     Pt currently uses Mounjaro 5mg subq weekly.  Pt states that she hasn't checked her BG since she has been home from the nursing home but that at the nursing home her BG ranged from 104-136. Pt denies any low BG < 70 episodes and denies any side effects or adherence issues with her Mounjaro at this time.    Pt denies any personal or family history of thyroid cancer, denies any issues with pancreatitis, and reports recurrent UTI's/yeast infections.  Pt states that she had hospital admission in December 2024 for weakness/UTI.    Insurance Coverage & Financial Support  [facility]    Relevant Past Medical History and Comorbidities  Relevant medical history and concomitant health conditions were discussed with the patient. The patient's chart has been reviewed for relevant past medical history and comorbid health conditions and updated as necessary.   Past Medical History:   Diagnosis Date    Anemia     Arrhythmia 2018    Afib    Arthritis     Atrial fibrillation 2018    Bronchitis     Chronic kidney disease 2018    Gout     Hypertension     Stroke November 2021     Social History     Socioeconomic History    Marital status: Single   Tobacco Use    Smoking status: Never    Smokeless tobacco: Never   Vaping Use    Vaping status: Never Used   Substance and Sexual Activity    Alcohol use: Never    Drug use: Never    Sexual activity: Not Currently     Partners: Male       Problem list reviewed by Blaine Powell RPH on 2/13/2025 at  1:46 PM    Allergies  Known allergies and reactions were discussed with the  patient. The patient's chart has been reviewed for  allergy information and updated as necessary.   Allergies   Allergen Reactions    Vancomycin Hives    Cefepime Rash    Cephalosporins Rash       Allergies reviewed by Blaine Powell RPH on 2/13/2025 at 11:10 AM  Allergies reviewed by Blaine Powell RPH on 2/13/2025 at  1:46 PM    Relevant Laboratory Values  A1C Last 3 Results          2/13/2025    11:04   HGBA1C Last 3 Results   Hemoglobin A1C 5.0      Lab Results   Component Value Date    HGBA1C 5.0 02/13/2025     Lab Results   Component Value Date    GLUCOSE 133 (H) 01/06/2025    CALCIUM 8.4 (L) 01/06/2025     01/06/2025    K 4.2 01/06/2025    CO2 25.4 01/06/2025     01/06/2025    BUN 16 01/06/2025    CREATININE 0.94 01/06/2025    EGFRIFAFRI >60 08/13/2022    EGFRIFNONA >60 08/13/2022    BCR 17.0 01/06/2025    ANIONGAP 7.6 01/06/2025     Lab Results   Component Value Date    CHOL 94 12/25/2024    TRIG 114 12/25/2024    HDL 32 (L) 12/25/2024    LDL 41 12/25/2024    LDLDIRECT 12 (L) 12/25/2024         Current Medication List  This medication list has been reviewed with the patient and evaluated for any interactions or necessary modifications/recommendations, and updated to include all prescription medications, OTC medications, and supplements the patient is currently taking.  This list reflects what is contained in the patient's profile, which has also been marked as reviewed to communicate to other providers it is the most up to date version of the patient's current medication therapy.     Current Outpatient Medications:     ammonium lactate (AMLACTIN) 12 % cream, Apply 1 Application topically to the appropriate area as directed Daily. (Patient taking differently: Apply 1 Application topically to the appropriate area as directed Every Other Day.), Disp: 385 g, Rfl: 0    Aspirin Low Dose 81 MG EC tablet, Take 1 tablet by mouth Daily., Disp: , Rfl:     atorvastatin (LIPITOR) 40 MG tablet, Take 1 tablet  by mouth every night at bedtime., Disp: , Rfl:     bumetanide (BUMEX) 1 MG tablet, Take 2 tablets by mouth Daily As Needed (Weight gain, shortness of breath or lower extremity swelling)., Disp: 30 tablet, Rfl: 0    Eliquis 5 MG tablet tablet, Take 1 tablet by mouth Every 12 (Twelve) Hours., Disp: 180 tablet, Rfl: 1    flecainide (TAMBOCOR) 100 MG tablet, TAKE 1 TABLET BY MOUTH TWICE A DAY, Disp: 180 tablet, Rfl: 1    folic acid (FOLVITE) 1 MG tablet, Take 1 tablet by mouth Daily., Disp: 30 tablet, Rfl: 0    levothyroxine (SYNTHROID, LEVOTHROID) 125 MCG tablet, Take 1 tablet by mouth Daily., Disp: , Rfl:     magnesium oxide (MAG-OX) 400 MG tablet, Take 1 tablet by mouth Daily., Disp: , Rfl:     Tirzepatide 5 MG/0.5ML solution auto-injector, Inject 5 mg under the skin into the appropriate area as directed 1 (One) Time Per Week., Disp: 2 mL, Rfl: 5    vitamin B-12 (CYANOCOBALAMIN) 1000 MCG tablet, Take 1 tablet by mouth Daily., Disp: , Rfl:     vitamin D3 125 MCG (5000 UT) capsule capsule, Take 1 capsule by mouth Daily., Disp: , Rfl:     hydrocortisone (CORTEF) 5 MG tablet, Take 3 tablets by mouth in the morning and 2 tablets in the evening., Disp: 150 tablet, Rfl: 5    ondansetron ODT (ZOFRAN-ODT) 4 MG disintegrating tablet, Take 1 tablet by mouth Every 6 (Six) Hours As Needed for Nausea or Vomiting. (Patient not taking: Reported on 2/13/2025), Disp: 30 tablet, Rfl: 0    Medicines reviewed by Blaine Powell, [facility] on 2/13/2025 at 11:14 AM    Drug Interactions  -Aspirin + Eliquis: use together than increase risk of bleeding. Patient denies any signs or symptoms of of bleeding   -Magnesium may decrease the concentration of levothyroxine.  Separate administration of levothyroxine from oral magnesium by at least 4 hours. Counseled pt on this interaction.  -Magnesium may decrease the bioavailability of hydrocortisone.  Consider  the doses of these medications by 2 hours or more. Counseled pt on this interaction. Pt  now plans on taking her magnesium at bedtime.  -Aspirin may enhance the hypoglycemic effect of Mounjaro.  -Bumex and Hydrocortisone may diminish the effect of Mounjaro.      Recommended Medications Assessment  Aspirin -  Currently Taking   Statin -  Currently Taking   ACEi/ARB - Not Taking Currently      Adherence and Self-Administration  Adherence related to the patient's specialty therapy was discussed with the patient. The Adherence segment of this outreach has been reviewed and updated.     Barriers to Patient Adherence and/or Self-Administration: Medication none  Methods for Supporting Patient Adherence and/or Self-Administration: none    Goals of Therapy  Goals related to the patient's specialty therapy were discussed with the patient. The Patient Goals segment of this outreach has been reviewed and updated.    Goals Addressed Today        HEMOGLOBIN A1C < 7      Pt at goal as A1c = 5.0%        Specialty Pharmacy General Goal      Minimize hypoglycemia--Pt denies any low BG < 70 episodes.            Reassessment Plan & Follow-Up  Patient's diabetes is controlled with A1C of 5.0%.  Medication Therapy Changes:  Per Martha Sweet APRN:  Continue Mounjaro 5mg weekly   Related Plans, Therapy Recommendations or Therapy Problems to Be Addressed:   Sent refill for Mounjaro to [facility] for pickup today.  Pt's copay is $207.55 for one month supply and pt agreed to current copayment. Pt would like to start getting this medication with [facility] going forward.  Recommend monitoring LFT's as pt is on a statin and AST was slightly elevated at 33 U/L with last CMP on 12/29/24.  Recommended pt be sent rx for testing supplies since she hasn't been testing her BG at home recently.  Pharmacist to perform regular reassessments no more than (6) months from the previous assessment.  Care Coordinator to set up future refill outreaches, coordinate prescription delivery, and escalate clinical questions to  pharmacist.    Attestation  I attest the patient was actively involved in and has agreed to the above plan of care. If the prescribed therapy is at any point deemed not appropriate based on the current or future assessments, a consultation will be initiated with the patient's specialty care provider to determine the best course of action. The revised plan of therapy will be documented along with any required assessments and/or additional patient education provided..    Blaine Powell RPH  14:09 EST

## 2025-02-13 NOTE — ASSESSMENT & PLAN NOTE
-Diabetes is at goal with A1c 5.0.  -Discussed dietary and exercise guidelines with patient and family.  -Discussed the importance of yearly eye exams.  -Discussed the importance of checking BG's regularly.    -Continue Mounjaro 5 mg weekly.  Patient has no personal history of pancreatitis, no family history of MEN syndrome or medullary thyroid cancer. Possible side effects including nausea, bloating, other GI upset and rarely pancreatitis were discussed. She was advised to call the office with any symptoms or concerns.   -Discussed Glucagon use and appropriate timing for this.   -S/S hypoglycemia reviewed with Rule of 15's advised.  -Follow-up in 1 month.

## 2025-02-13 NOTE — PROGRESS NOTES
Chief Complaint   Patient presents with    Diabetes     Pre-diabetes        Referring Provider  No ref. provider found     HPI   Ana Maradiaga is a 59 y.o. female had concerns including Diabetes (Pre-diabetes).    Pre-Diabetes, Hypothyroidism, Adrenal Insufficiency.    She was in the hospital in 12/2024 and was noted to have abnormal labs suggesting adrenal insufficiency.  She was sent to the nursing home at discharge for rehab and was given Hydrocortisone 15 mg QAM and 10 mg QPM while she was there up until the end.  She informs that they told her that they didn't have anymore and stopped giving it to her.  She has been home from the nursing home for about 3 weeks.  She called the office last week stating that she felt like she did when she went to the hospital last time.  At that time, I sent in Hydrocortisone 10 mg QD to get her to this appointment for further eval and discussion.  She reports that starting on the steroids has helped, but that she is continuing to have increased weakness and fatigue and has not been able to hold up her body weight well.  She is in a wheelchair today. Her son is with her today.     Pre-Diabetes:  She has been taking her Mounjaro 5 mg weekly since being home. She is tolerating this well without missing doses.  She denies any hypo/hyper symptoms.      Thyroid:  She is currently taking T4 125 mg QD. She has been taking this regularly without missing does since being home.  She denies any conflicting medication.  She denies any compressive s/sx's.    Adrenal Insufficiency:  She has been taking Hydrocortisone 10 mg QD since last week and has noted some improvements to her symptoms.     Symptoms:  Extreme tiredness: yes  Weak muscles: yes  Reduced appetite: yes  Weight loss: no  Darkening of the skin (hyperpigmentation): no  Reduced heart rate or low blood pressure: no  Light-headedness and fainting: yes  Salt craving: no  Hypoglycemia: no  Nausea or vomiting: no  Diarrhea:  no  Abdominal pain: no  Muscle or joint pains: yes  Irritability: no  Depression: no  Body hair loss or sexual dysfunction in women: no    The following portions of the patient's history were reviewed and updated as appropriate: allergies, current medications, past family history, past medical history, past social history, past surgical history, and problem list.    Past Medical History:   Diagnosis Date    Anemia     Arrhythmia 2018    Afib    Arthritis     Atrial fibrillation 2018    Bronchitis     Chronic kidney disease 2018    Gout     Hypertension     Stroke November 2021     History reviewed. No pertinent surgical history.   Family History   Problem Relation Age of Onset    Heart disease Mother     Stroke Mother     Hypertension Father     Arthritis Father     Hypertension Sister     Breast cancer Paternal Cousin       Social History     Socioeconomic History    Marital status: Single   Tobacco Use    Smoking status: Never    Smokeless tobacco: Never   Vaping Use    Vaping status: Never Used   Substance and Sexual Activity    Alcohol use: Never    Drug use: Never    Sexual activity: Not Currently     Partners: Male      Allergies   Allergen Reactions    Vancomycin Hives    Cefepime Rash    Cephalosporins Rash      Current Outpatient Medications on File Prior to Visit   Medication Sig Dispense Refill    ammonium lactate (AMLACTIN) 12 % cream Apply 1 Application topically to the appropriate area as directed Daily. (Patient taking differently: Apply 1 Application topically to the appropriate area as directed Every Other Day.) 385 g 0    Aspirin Low Dose 81 MG EC tablet Take 1 tablet by mouth Daily.      atorvastatin (LIPITOR) 40 MG tablet Take 1 tablet by mouth every night at bedtime.      bumetanide (BUMEX) 1 MG tablet Take 2 tablets by mouth Daily As Needed (Weight gain, shortness of breath or lower extremity swelling). 30 tablet 0    Eliquis 5 MG tablet tablet Take 1 tablet by mouth Every 12 (Twelve) Hours.  180 tablet 1    flecainide (TAMBOCOR) 100 MG tablet TAKE 1 TABLET BY MOUTH TWICE A  tablet 1    folic acid (FOLVITE) 1 MG tablet Take 1 tablet by mouth Daily. 30 tablet 0    levothyroxine (SYNTHROID, LEVOTHROID) 125 MCG tablet Take 1 tablet by mouth Daily.      magnesium oxide (MAG-OX) 400 MG tablet Take 1 tablet by mouth Daily.      ondansetron ODT (ZOFRAN-ODT) 4 MG disintegrating tablet Take 1 tablet by mouth Every 6 (Six) Hours As Needed for Nausea or Vomiting. (Patient not taking: Reported on 2/13/2025) 30 tablet 0    vitamin B-12 (CYANOCOBALAMIN) 1000 MCG tablet Take 1 tablet by mouth Daily.      vitamin D3 125 MCG (5000 UT) capsule capsule Take 1 capsule by mouth Daily.      [DISCONTINUED] hydrocortisone (CORTEF) 10 MG tablet Take 1 tablet by mouth Daily for 30 days. 30 tablet 0    [DISCONTINUED] Tirzepatide 5 MG/0.5ML solution auto-injector Inject 5 mg under the skin into the appropriate area as directed 1 (One) Time Per Week. 2 mL 0     No current facility-administered medications on file prior to visit.        Review of Systems   Constitutional:  Positive for appetite change and fatigue. Negative for unexpected weight loss.        In a wheelchair   Eyes: Negative.    Musculoskeletal:  Positive for arthralgias and myalgias.   Neurological:  Positive for weakness and light-headedness.   All other systems reviewed and are negative.     /73 (BP Location: Left arm, Patient Position: Sitting, Cuff Size: Adult)   Pulse 64   Wt (!) 151 kg (332 lb)   LMP  (LMP Unknown)   SpO2 97%   BMI 55.25 kg/m²      Physical Exam  Vitals reviewed.   Constitutional:       Appearance: Normal appearance.      Comments: Using a wheelchair   Eyes:      Extraocular Movements: Extraocular movements intact.   Neck:      Thyroid: No thyroid mass, thyromegaly or thyroid tenderness.   Cardiovascular:      Rate and Rhythm: Normal rate.   Pulmonary:      Effort: Pulmonary effort is normal.   Feet:      Right foot:      Skin  integrity: Skin integrity normal.      Left foot:      Skin integrity: Skin integrity normal.   Skin:     Findings: No abrasion or wound.   Neurological:      General: No focal deficit present.      Mental Status: She is alert and oriented to person, place, and time.   Psychiatric:         Mood and Affect: Mood normal.         Behavior: Behavior normal.         Thought Content: Thought content normal.         Judgment: Judgment normal.           CMP:  Lab Results   Component Value Date    GLU 92 08/13/2022    BUN 16 01/06/2025    CREATININE 0.94 01/06/2025    EGFRIFNONA >60 08/13/2022    EGFRIFAFRI >60 08/13/2022    BCR 17.0 01/06/2025     01/06/2025    K 4.2 01/06/2025    CO2 25.4 01/06/2025    CALCIUM 8.4 (L) 01/06/2025    ALBUMIN 2.5 (L) 12/29/2024    AGRATIO 0.7 12/29/2024    BILITOT 0.6 12/29/2024    ALKPHOS 138 (H) 12/29/2024    AST 33 (H) 12/29/2024    ALT 28 12/29/2024     Lipid Panel:  Lab Results   Component Value Date    CHOL 94 12/25/2024    TRIG 114 12/25/2024    HDL 32 (L) 12/25/2024    VLDL 21 12/25/2024    LDL 41 12/25/2024     HbA1c:  Lab Results   Component Value Date    HGBA1C 5.0 02/13/2025      Glucose:  Lab Results   Component Value Date    POCGLU 108 02/13/2025     Microalbumin:  Lab Results   Component Value Date    MALBCRERATIO 113.1 06/22/2023     TSH:  Lab Results   Component Value Date    TSH 3.320 12/19/2024       Assessment and Plan    Diagnoses and all orders for this visit:    1. Type 2 diabetes mellitus with hyperglycemia, without long-term current use of insulin (Primary)  Assessment & Plan:  -Diabetes is at goal with A1c 5.0.  -Discussed dietary and exercise guidelines with patient and family.  -Discussed the importance of yearly eye exams.  -Discussed the importance of checking BG's regularly.    -Continue Mounjaro 5 mg weekly.  Patient has no personal history of pancreatitis, no family history of MEN syndrome or medullary thyroid cancer. Possible side effects including  nausea, bloating, other GI upset and rarely pancreatitis were discussed. She was advised to call the office with any symptoms or concerns.   -Discussed Glucagon use and appropriate timing for this.   -S/S hypoglycemia reviewed with Rule of 15's advised.  -Follow-up in 1 month.    Orders:  -     POC Glycosylated Hemoglobin (Hb A1C)  -     POC Glucose, Blood  -     Comprehensive Metabolic Panel  -     Lipid Panel  -     TSH  -     T4, free    2. Elevated testosterone level in female  Assessment & Plan:  Elevated testosterone in females can come from the ovaries or the adrenal glands.  With patient's diagnosis of adrenal sufficiency, it is less likely adrenal in origin. Will obtain repeat testosterone with FSH/LH to determine if patient has hyperthecosis.  Her ovaries on CT scan and US were normal.  Will treat and follow as indicated based on results.     Orders:  -     Testosterone  -     FSH & LH    3. Acquired hypothyroidism  Assessment & Plan:  -Clinically euthyroid.  -TFT's today with medication adjustment accordingly.  -Reminded of proper administration including taking 7 pills, once daily, per week on an empty stomach with no missed doses, waiting 30-60 minutes prior to other medications or food.  -Follow-up in 1 month.      4. Adrenal insufficiency  Assessment & Plan:  Discussed adrenal insufficiency with patient and family.  Discussed the importance of taking dosing appropriately and not missing doses. She will start Hydrocortisone 15 mg QAM and 10 mg QPM.  Discussed getting a medical alert bracelet for patient to wear.  We discussed sick day dosing and increasing dose by 1 pill per dose on sick days, then back to normal dosing thereafter for more routine management.  They will check her BP daily and notify if starting to become hypotensive.  Follow-up in 1 month.      Other orders  -     hydrocortisone (CORTEF) 5 MG tablet; Take 3 tablets by mouth in the morning and 2 tablets in the evening.  Dispense: 150  tablet; Refill: 5         Return in about 4 weeks (around 3/13/2025) for Follow-up appointment. The patient was instructed to contact the clinic with any interval questions or concerns.        This document has been electronically signed by TREASURE Alexander  February 13, 2025 15:19 EST   Endocrinology    Please note that portions of this document were completed with a voice recognition program. Efforts were made to edit the dictations, but occasionally words are mis-transcribed.

## 2025-02-13 NOTE — ASSESSMENT & PLAN NOTE
Discussed adrenal insufficiency with patient and family.  Discussed the importance of taking dosing appropriately and not missing doses. She will start Hydrocortisone 15 mg QAM and 10 mg QPM.  Discussed getting a medical alert bracelet for patient to wear.  We discussed sick day dosing and increasing dose by 1 pill per dose on sick days, then back to normal dosing thereafter for more routine management.  They will check her BP daily and notify if starting to become hypotensive.  Follow-up in 1 month.

## 2025-02-14 RX ORDER — BLOOD-GLUCOSE METER
1 EACH MISCELLANEOUS ONCE
Qty: 1 KIT | Refills: 0 | Status: SHIPPED | OUTPATIENT
Start: 2025-02-14 | End: 2025-02-18

## 2025-02-14 RX ORDER — LANCETS 30 GAUGE
1 EACH MISCELLANEOUS DAILY
Qty: 100 EACH | Refills: 2 | Status: SHIPPED | OUTPATIENT
Start: 2025-02-14

## 2025-02-14 NOTE — PROGRESS NOTES
Addendum 2/14/25.  Provider Martha AMANDA accepted recommendation and sent in testing supplies to [facility].  Called pt and provided full education on how to use testing supplies.  Pt will be testing once daily per provider.  Instructed pt to call clinic back if she has any questions or issues with testing her glucose.    Blaine Powell RPH  02/14/25  09:30 EST

## 2025-02-17 RX ORDER — SPIRONOLACTONE 25 MG/1
25 TABLET ORAL DAILY
Qty: 90 TABLET | Refills: 1 | Status: SHIPPED | OUTPATIENT
Start: 2025-02-17

## 2025-02-17 RX ORDER — SPIRONOLACTONE 25 MG/1
25 TABLET ORAL DAILY
Qty: 90 TABLET | Refills: 1 | Status: SHIPPED | OUTPATIENT
Start: 2025-02-17 | End: 2025-02-17

## 2025-03-06 ENCOUNTER — TELEPHONE (OUTPATIENT)
Dept: CARDIOLOGY | Facility: CLINIC | Age: 60
End: 2025-03-06
Payer: MEDICARE

## 2025-03-06 RX ORDER — HYDROCORTISONE 10 MG/1
10 TABLET ORAL DAILY
Qty: 30 TABLET | Refills: 0 | Status: SHIPPED | OUTPATIENT
Start: 2025-03-06

## 2025-03-06 NOTE — TELEPHONE ENCOUNTER
Called pt and advised her to refer to her med list from the hospital and nursing home and take what they advised her to.     She stated she stated herself back on the Coreg 6.25 BID about a month ago.   118/127 on top and bottom number 60-70.     BP today 118/52 didn't write HR down.

## 2025-03-06 NOTE — TELEPHONE ENCOUNTER
Caller: Ana Maradiaga    Relationship: Self    Best call back number: [phone]    What is the best time to reach you: ANY    Who are you requesting to speak with (clinical staff, provider,  specific staff member): CLINICAL    What was the call regarding:     FROM 12.19 TO 01.29 PT WAS ADMITTED TO THE ED, DURING THAT TIME SHE WAS TAKEN OFF HER CARVEDILOL, SPENT SOME TIME IN A NURSING HOME AND WHEN SHE GOT BACK HOME, SHE RESTARTED IT BACK IN MID FEB. SHE WAS UNSURE ON IF SHE SHOULD HAVE STOPPED TAKING IT OR NOT AND NOW HER BP IS STARTING TO DROP AGAIN WHILE ON MEDICATION. SHE IS WANTING TO KNOW IF SHE SHOULD 100% STOP THE MEDICATION

## 2025-03-13 ENCOUNTER — OFFICE VISIT (OUTPATIENT)
Dept: ENDOCRINOLOGY | Facility: CLINIC | Age: 60
End: 2025-03-13
Payer: MEDICARE

## 2025-03-13 VITALS
HEART RATE: 88 BPM | DIASTOLIC BLOOD PRESSURE: 88 MMHG | BODY MASS INDEX: 53.08 KG/M2 | OXYGEN SATURATION: 96 % | WEIGHT: 293 LBS | SYSTOLIC BLOOD PRESSURE: 151 MMHG

## 2025-03-13 DIAGNOSIS — E27.40 ADRENAL INSUFFICIENCY: ICD-10-CM

## 2025-03-13 DIAGNOSIS — E11.65 TYPE 2 DIABETES MELLITUS WITH HYPERGLYCEMIA, WITHOUT LONG-TERM CURRENT USE OF INSULIN: Primary | ICD-10-CM

## 2025-03-13 DIAGNOSIS — E03.9 ACQUIRED HYPOTHYROIDISM: ICD-10-CM

## 2025-03-13 LAB
EXPIRATION DATE: NORMAL
GLUCOSE BLDC GLUCOMTR-MCNC: 122 MG/DL (ref 70–130)
Lab: NORMAL

## 2025-03-13 PROCEDURE — 1160F RVW MEDS BY RX/DR IN RCRD: CPT | Performed by: NURSE PRACTITIONER

## 2025-03-13 PROCEDURE — 84439 ASSAY OF FREE THYROXINE: CPT | Performed by: NURSE PRACTITIONER

## 2025-03-13 PROCEDURE — 85025 COMPLETE CBC W/AUTO DIFF WBC: CPT | Performed by: NURSE PRACTITIONER

## 2025-03-13 PROCEDURE — 80053 COMPREHEN METABOLIC PANEL: CPT | Performed by: NURSE PRACTITIONER

## 2025-03-13 PROCEDURE — 1159F MED LIST DOCD IN RCRD: CPT | Performed by: NURSE PRACTITIONER

## 2025-03-13 PROCEDURE — 80061 LIPID PANEL: CPT | Performed by: NURSE PRACTITIONER

## 2025-03-13 PROCEDURE — 82947 ASSAY GLUCOSE BLOOD QUANT: CPT | Performed by: NURSE PRACTITIONER

## 2025-03-13 PROCEDURE — 99214 OFFICE O/P EST MOD 30 MIN: CPT | Performed by: NURSE PRACTITIONER

## 2025-03-13 PROCEDURE — 3044F HG A1C LEVEL LT 7.0%: CPT | Performed by: NURSE PRACTITIONER

## 2025-03-13 PROCEDURE — 84443 ASSAY THYROID STIM HORMONE: CPT | Performed by: NURSE PRACTITIONER

## 2025-03-13 RX ORDER — AMLODIPINE BESYLATE 5 MG/1
5 TABLET ORAL DAILY
COMMUNITY

## 2025-03-13 NOTE — PROGRESS NOTES
Chief Complaint   Patient presents with    Diabetes     F/u last A1c 02/13/25 value 5.0, wanting mounjaro increased.        Referring Provider  No ref. provider found     HPI   Ana Maradiaga is a 59 y.o. female had concerns including Diabetes (F/u last A1c 02/13/25 value 5.0, wanting mounjaro increased.).    Pre-Diabetes, Hypothyroidism, Adrenal Insufficiency.    She is here with her son today and is in a wheelchair.   She is currently taking Hydrocortisone 15 mg QAM and 10 mg QPM. She has noted improvements in her symptoms since being on this dose more consistently.  She denies any related episodes similar her prior one that landed her in the hospital.  She does report that she is taking her medication regularly and not missing doses.  She reports that she has some fatigue, but that this is not worse than prior.       Pre-Diabetes:  She has been taking her Mounjaro 5 mg weekly. She is tolerating this well without missing doses.  She denies any hypo/hyper symptoms.  She is taking it regularly. She denies any adverse side effects to the medication.     Thyroid:  She is currently taking T4 125 mg QD. She has been taking this regularly without missing does since being home.  She denies any conflicting medication.  She denies any compressive s/sx's.    Adrenal Insufficiency:  She has been taking Hydrocortisone 15 mg QAM and 10 mg QHS and has noted some improvements to her symptoms.     Symptoms:  Extreme tiredness: yes  Weak muscles: yes  Reduced appetite: yes  Weight loss: no  Darkening of the skin (hyperpigmentation): no  Reduced heart rate or low blood pressure: no  Light-headedness and fainting: yes  Salt craving: no  Hypoglycemia: no  Nausea or vomiting: no  Diarrhea: no  Abdominal pain: no  Muscle or joint pains: yes  Irritability: no  Depression: no  Body hair loss or sexual dysfunction in women: no    The following portions of the patient's history were reviewed and updated as appropriate: allergies, current  medications, past family history, past medical history, past social history, past surgical history, and problem list.    Past Medical History:   Diagnosis Date    Anemia     Arrhythmia 2018    Afib    Arthritis     Atrial fibrillation 2018    Bronchitis     Chronic kidney disease 2018    Gout     Hypertension     Stroke November 2021     History reviewed. No pertinent surgical history.   Family History   Problem Relation Age of Onset    Heart disease Mother     Stroke Mother     Hypertension Father     Arthritis Father     Hypertension Sister     Breast cancer Paternal Cousin       Social History     Socioeconomic History    Marital status: Single   Tobacco Use    Smoking status: Never    Smokeless tobacco: Never   Vaping Use    Vaping status: Never Used   Substance and Sexual Activity    Alcohol use: Never    Drug use: Never    Sexual activity: Not Currently     Partners: Male      Allergies   Allergen Reactions    Vancomycin Hives    Cefepime Rash    Cephalosporins Rash      Current Outpatient Medications on File Prior to Visit   Medication Sig Dispense Refill    ammonium lactate (AMLACTIN) 12 % cream Apply 1 Application topically to the appropriate area as directed Daily. (Patient taking differently: Apply 1 Application topically to the appropriate area as directed Every Other Day.) 385 g 0    Aspirin Low Dose 81 MG EC tablet Take 1 tablet by mouth Daily.      atorvastatin (LIPITOR) 40 MG tablet Take 1 tablet by mouth every night at bedtime.      bumetanide (BUMEX) 1 MG tablet Take 2 tablets by mouth Daily As Needed (Weight gain, shortness of breath or lower extremity swelling). 30 tablet 0    Eliquis 5 MG tablet tablet Take 1 tablet by mouth Every 12 (Twelve) Hours. 180 tablet 1    flecainide (TAMBOCOR) 100 MG tablet TAKE 1 TABLET BY MOUTH TWICE A  tablet 1    folic acid (FOLVITE) 1 MG tablet Take 1 tablet by mouth Daily. 30 tablet 0    glucose blood test strip Use 1 strip to test blood sugar Daily. 100  each 2    hydrocortisone (CORTEF) 10 MG tablet TAKE 1 TABLET BY MOUTH EVERY DAY 30 tablet 0    hydrocortisone (CORTEF) 5 MG tablet Take 3 tablets by mouth in the morning and 2 tablets in the evening. 150 tablet 5    Lancets misc Use 1 lancet to test blood sugar Daily. 100 each 2    levothyroxine (SYNTHROID, LEVOTHROID) 125 MCG tablet Take 1 tablet by mouth Daily.      magnesium oxide (MAG-OX) 400 MG tablet Take 1 tablet by mouth Daily.      Tirzepatide 5 MG/0.5ML solution auto-injector Inject 5 mg under the skin into the appropriate area as directed 1 (One) Time Per Week. 2 mL 5    vitamin B-12 (CYANOCOBALAMIN) 1000 MCG tablet Take 1 tablet by mouth Daily.      vitamin D3 125 MCG (5000 UT) capsule capsule Take 1 capsule by mouth Daily.      amLODIPine (NORVASC) 5 MG tablet Take 1 tablet by mouth Daily. (Patient not taking: Reported on 3/13/2025)      flecainide (TAMBOCOR) 100 MG tablet Take 1 tablet by mouth 2 (Two) Times a Day. (Patient not taking: Reported on 3/13/2025) 180 tablet 1    ondansetron ODT (ZOFRAN-ODT) 4 MG disintegrating tablet Take 1 tablet by mouth Every 6 (Six) Hours As Needed for Nausea or Vomiting. (Patient not taking: Reported on 3/13/2025) 30 tablet 0    spironolactone (Aldactone) 25 MG tablet Take 1 tablet by mouth Daily. (Patient not taking: Reported on 3/13/2025) 90 tablet 1     No current facility-administered medications on file prior to visit.        Review of Systems   Constitutional:  Positive for appetite change and fatigue. Negative for unexpected weight loss.        In a wheelchair   Eyes: Negative.    Musculoskeletal:  Positive for arthralgias and myalgias.   Neurological:  Positive for weakness and light-headedness.   All other systems reviewed and are negative.     /88 (BP Location: Right arm, Patient Position: Sitting, Cuff Size: Adult)   Pulse 88   Wt (!) 145 kg (319 lb)   LMP  (LMP Unknown)   SpO2 96%   BMI 53.08 kg/m²      Physical Exam  Vitals reviewed.    Constitutional:       Appearance: Normal appearance.      Comments: Using a wheelchair   Eyes:      Extraocular Movements: Extraocular movements intact.   Neck:      Thyroid: No thyroid mass, thyromegaly or thyroid tenderness.   Cardiovascular:      Rate and Rhythm: Normal rate.   Pulmonary:      Effort: Pulmonary effort is normal.   Feet:      Right foot:      Skin integrity: Skin integrity normal.      Left foot:      Skin integrity: Skin integrity normal.   Skin:     Findings: No abrasion or wound.   Neurological:      General: No focal deficit present.      Mental Status: She is alert and oriented to person, place, and time.   Psychiatric:         Mood and Affect: Mood normal.         Behavior: Behavior normal.         Thought Content: Thought content normal.         Judgment: Judgment normal.           CMP:  Lab Results   Component Value Date    GLU 92 08/13/2022    BUN 22 (H) 03/13/2025    CREATININE 1.40 (H) 03/13/2025    EGFRIFNONA >60 08/13/2022    EGFRIFAFRI >60 08/13/2022    BCR 15.7 03/13/2025     03/13/2025    K 3.7 03/13/2025    CO2 26.0 03/13/2025    CALCIUM 9.5 03/13/2025    ALBUMIN 4.1 03/13/2025    AGRATIO 1.2 03/13/2025    BILITOT 1.3 (H) 03/13/2025    ALKPHOS 109 03/13/2025    AST 35 (H) 03/13/2025    ALT 56 (H) 03/13/2025     Lipid Panel:  Lab Results   Component Value Date    CHOL 142 03/13/2025    TRIG 97 03/13/2025    HDL 51 03/13/2025    VLDL 18 03/13/2025    LDL 73 03/13/2025     HbA1c:  Lab Results   Component Value Date    HGBA1C 5.0 02/13/2025      Glucose:  Lab Results   Component Value Date    POCGLU 122 03/13/2025     Microalbumin:  Lab Results   Component Value Date    MALBCRERATIO 113.1 06/22/2023     TSH:  Lab Results   Component Value Date    TSH 1.610 03/13/2025       Assessment and Plan    Diagnoses and all orders for this visit:    1. Type 2 diabetes mellitus with hyperglycemia, without long-term current use of insulin (Primary)  Assessment & Plan:  -Diabetes is at goal  with A1c 5.0.  -Discussed dietary and exercise guidelines with patient and family.  -Discussed the importance of yearly eye exams.  -Discussed the importance of checking BG's regularly.    -Continue Mounjaro 5 mg weekly.  Patient has no personal history of pancreatitis, no family history of MEN syndrome or medullary thyroid cancer. Possible side effects including nausea, bloating, other GI upset and rarely pancreatitis were discussed. She was advised to call the office with any symptoms or concerns.   -Discussed Glucagon use and appropriate timing for this.   -S/S hypoglycemia reviewed with Rule of 15's advised.  -Follow-up in 3 months.    Orders:  -     POC Glucose, Blood  -     TSH  -     T4, free  -     Comprehensive Metabolic Panel  -     Lipid Panel  -     CBC & Differential    2. Adrenal insufficiency  Assessment & Plan:  Discussed the importance of taking dosing appropriately and not missing doses. Appears stable in office with regulated vitals and will obtain labs today to monitor as well. Continue Hydrocortisone 15 mg QAM and 10 mg QPM.  Discussed getting a medical alert bracelet for patient to wear.  We discussed sick day dosing and increasing dose by 1 pill per dose on sick days, then back to normal dosing thereafter for more routine management.  They will check her BP daily and notify if starting to become hypotensive.  Follow-up in 3 months.      3. Acquired hypothyroidism  Assessment & Plan:  -Clinically euthyroid.  -TFT's today with medication adjustment accordingly.  -Reminded of proper administration including taking 7 pills, once daily, per week on an empty stomach with no missed doses, waiting 30-60 minutes prior to other medications or food.  -Follow-up in 3 months.             Return in about 3 months (around 6/13/2025) for Follow-up appointment, A1C. The patient was instructed to contact the clinic with any interval questions or concerns.        This document has been electronically signed by  Martha Sweet, APRN  March 17, 2025 15:17 EDT   Endocrinology    Please note that portions of this document were completed with a voice recognition program. Efforts were made to edit the dictations, but occasionally words are mis-transcribed.

## 2025-03-14 LAB
ALBUMIN SERPL-MCNC: 4.1 G/DL (ref 3.5–5.2)
ALBUMIN/GLOB SERPL: 1.2 G/DL
ALP SERPL-CCNC: 109 U/L (ref 39–117)
ALT SERPL W P-5'-P-CCNC: 56 U/L (ref 1–33)
ANION GAP SERPL CALCULATED.3IONS-SCNC: 16 MMOL/L (ref 5–15)
AST SERPL-CCNC: 35 U/L (ref 1–32)
BASOPHILS # BLD AUTO: 0.02 10*3/MM3 (ref 0–0.2)
BASOPHILS NFR BLD AUTO: 0.2 % (ref 0–1.5)
BILIRUB SERPL-MCNC: 1.3 MG/DL (ref 0–1.2)
BUN SERPL-MCNC: 22 MG/DL (ref 6–20)
BUN/CREAT SERPL: 15.7 (ref 7–25)
CALCIUM SPEC-SCNC: 9.5 MG/DL (ref 8.6–10.5)
CHLORIDE SERPL-SCNC: 98 MMOL/L (ref 98–107)
CHOLEST SERPL-MCNC: 142 MG/DL (ref 0–200)
CO2 SERPL-SCNC: 26 MMOL/L (ref 22–29)
CREAT SERPL-MCNC: 1.4 MG/DL (ref 0.57–1)
DEPRECATED RDW RBC AUTO: 51.9 FL (ref 37–54)
EGFRCR SERPLBLD CKD-EPI 2021: 43.4 ML/MIN/1.73
EOSINOPHIL # BLD AUTO: 0.04 10*3/MM3 (ref 0–0.4)
EOSINOPHIL NFR BLD AUTO: 0.3 % (ref 0.3–6.2)
ERYTHROCYTE [DISTWIDTH] IN BLOOD BY AUTOMATED COUNT: 15.2 % (ref 12.3–15.4)
GLOBULIN UR ELPH-MCNC: 3.3 GM/DL
GLUCOSE SERPL-MCNC: 105 MG/DL (ref 65–99)
HCT VFR BLD AUTO: 41.7 % (ref 34–46.6)
HDLC SERPL-MCNC: 51 MG/DL (ref 40–60)
HGB BLD-MCNC: 12.7 G/DL (ref 12–15.9)
IMM GRANULOCYTES # BLD AUTO: 0.08 10*3/MM3 (ref 0–0.05)
IMM GRANULOCYTES NFR BLD AUTO: 0.7 % (ref 0–0.5)
LDLC SERPL CALC-MCNC: 73 MG/DL (ref 0–100)
LDLC/HDLC SERPL: 1.4 {RATIO}
LYMPHOCYTES # BLD AUTO: 1.22 10*3/MM3 (ref 0.7–3.1)
LYMPHOCYTES NFR BLD AUTO: 10.5 % (ref 19.6–45.3)
MCH RBC QN AUTO: 28 PG (ref 26.6–33)
MCHC RBC AUTO-ENTMCNC: 30.5 G/DL (ref 31.5–35.7)
MCV RBC AUTO: 92.1 FL (ref 79–97)
MONOCYTES # BLD AUTO: 0.64 10*3/MM3 (ref 0.1–0.9)
MONOCYTES NFR BLD AUTO: 5.5 % (ref 5–12)
NEUTROPHILS NFR BLD AUTO: 82.8 % (ref 42.7–76)
NEUTROPHILS NFR BLD AUTO: 9.63 10*3/MM3 (ref 1.7–7)
NRBC BLD AUTO-RTO: 0 /100 WBC (ref 0–0.2)
PLATELET # BLD AUTO: 229 10*3/MM3 (ref 140–450)
PMV BLD AUTO: 11.9 FL (ref 6–12)
POTASSIUM SERPL-SCNC: 3.7 MMOL/L (ref 3.5–5.2)
PROT SERPL-MCNC: 7.4 G/DL (ref 6–8.5)
RBC # BLD AUTO: 4.53 10*6/MM3 (ref 3.77–5.28)
SODIUM SERPL-SCNC: 140 MMOL/L (ref 136–145)
T4 FREE SERPL-MCNC: 2.4 NG/DL (ref 0.92–1.68)
TRIGL SERPL-MCNC: 97 MG/DL (ref 0–150)
TSH SERPL DL<=0.05 MIU/L-ACNC: 1.61 UIU/ML (ref 0.27–4.2)
VLDLC SERPL-MCNC: 18 MG/DL (ref 5–40)
WBC NRBC COR # BLD AUTO: 11.63 10*3/MM3 (ref 3.4–10.8)

## 2025-03-17 NOTE — ASSESSMENT & PLAN NOTE
-Diabetes is at goal with A1c 5.0.  -Discussed dietary and exercise guidelines with patient and family.  -Discussed the importance of yearly eye exams.  -Discussed the importance of checking BG's regularly.    -Continue Mounjaro 5 mg weekly.  Patient has no personal history of pancreatitis, no family history of MEN syndrome or medullary thyroid cancer. Possible side effects including nausea, bloating, other GI upset and rarely pancreatitis were discussed. She was advised to call the office with any symptoms or concerns.   -Discussed Glucagon use and appropriate timing for this.   -S/S hypoglycemia reviewed with Rule of 15's advised.  -Follow-up in 3 months.

## 2025-03-17 NOTE — ASSESSMENT & PLAN NOTE
Discussed the importance of taking dosing appropriately and not missing doses. Appears stable in office with regulated vitals and will obtain labs today to monitor as well. Continue Hydrocortisone 15 mg QAM and 10 mg QPM.  Discussed getting a medical alert bracelet for patient to wear.  We discussed sick day dosing and increasing dose by 1 pill per dose on sick days, then back to normal dosing thereafter for more routine management.  They will check her BP daily and notify if starting to become hypotensive.  Follow-up in 3 months.

## 2025-03-18 ENCOUNTER — SPECIALTY PHARMACY (OUTPATIENT)
Dept: PHARMACY | Facility: HOSPITAL | Age: 60
End: 2025-03-18
Payer: MEDICARE

## 2025-03-18 NOTE — PROGRESS NOTES
Specialty Pharmacy Patient Management Program  Refill Outreach     Ana was contacted today regarding refills of their medication(s).    Refill Questions      Flowsheet Row Most Recent Value   Changes to allergies? No   Changes to medications? No   New conditions or infections since last clinic visit No   If yes, please describe  na   Unplanned office visit, urgent care, ED, or hospital admission in the last 4 weeks  No   How does patient/caregiver feel medication is working? Very good   Financial problems or insurance changes  No   Since the previous refill, were any specialty medication doses or scheduled injections missed or delayed?  No   Does this patient require a clinical escalation to a pharmacist? No            Delivery Questions      Flowsheet Row Most Recent Value   Delivery method UPS   Delivery address verified with patient/caregiver? Yes   Delivery address Home   Number of medications in delivery 2   Medication(s) being filled and delivered Hydrocortisone (CORTEF), Tirzepatide   Doses left of specialty medications na   Copay verified? Yes   Copay amount 214.55$   Copay form of payment Credit/debit on file   Delivery Date Selection 03/19/25   Signature Required Yes                 Follow-up: 30 day(s)     Meme Desai, Pharmacy Technician  3/18/2025  13:34 EDT

## 2025-03-21 ENCOUNTER — TELEPHONE (OUTPATIENT)
Dept: CARDIOLOGY | Facility: CLINIC | Age: 60
End: 2025-03-21
Payer: MEDICARE

## 2025-03-21 NOTE — TELEPHONE ENCOUNTER
Caller: Maradiaga Ana K    Relationship: Self    Best call back number: [phone]    What is the best time to reach you: ANY    Who are you requesting to speak with (clinical staff, provider,  specific staff member): CLINICAL    What was the call regarding: PATIENT CALLED TO RELAY SOME READINGS OF HER BLOOD  PRESSURE AND PULSE. FIRST ONE WAS ON 3/10/25 IT /72, HR 58. ON 3/11 IT /78, HR 59, LATER ON 3/11 IT /78, HR 59. ON 3/12 IT /74, HR 55, LATER IN THE DAY IT /72, HR 68. ON 3/13 IT /84, HR 82, LATE IT /75, HR 63. ON 3/18 IT /91, HR 60. ON 3/20 IT /80, HR 55. TODAY IT /71, HR 56. PLEASE CALL THE PATIENT WITH ANY QUESTIONS. THANK YOU    Is it okay if the provider responds through [employer]: PLEASE CALL

## 2025-03-24 NOTE — TELEPHONE ENCOUNTER
Called pt and she stated she has only been taking Coreg 6.25 mg BID and has not been taking Amlodipine. Does she need to take the increase dosage?

## 2025-04-03 ENCOUNTER — SPECIALTY PHARMACY (OUTPATIENT)
Dept: PHARMACY | Facility: HOSPITAL | Age: 60
End: 2025-04-03
Payer: MEDICARE

## 2025-04-03 NOTE — PROGRESS NOTES
Specialty Pharmacy Patient Management Program  Refill Outreach     Ana was contacted today regarding refills of their medication(s).    Refill Questions      Flowsheet Row Most Recent Value   Changes to allergies? No   Changes to medications? No   New conditions or infections since last clinic visit No   If yes, please describe  na   Unplanned office visit, urgent care, ED, or hospital admission in the last 4 weeks  No   How does patient/caregiver feel medication is working? Very good   Financial problems or insurance changes  No   Since the previous refill, were any specialty medication doses or scheduled injections missed or delayed?  No   Does this patient require a clinical escalation to a pharmacist? No            Delivery Questions      Flowsheet Row Most Recent Value   Delivery method UPS   Delivery address verified with patient/caregiver? Yes   Delivery address Home   Number of medications in delivery 1   Medication(s) being filled and delivered Tirzepatide   Doses left of specialty medications na   Copay verified? Yes   Copay amount 207.55$   Copay form of payment Credit/debit on file   Delivery Date Selection 04/08/25   Signature Required Yes   Do you consent to receive electronic handouts?  Yes                 Follow-up: 30 day(s)     Meme Desai, Pharmacy Technician  4/3/2025  13:48 EDT

## 2025-04-11 ENCOUNTER — OFFICE VISIT (OUTPATIENT)
Dept: CARDIOLOGY | Facility: CLINIC | Age: 60
End: 2025-04-11
Payer: MEDICARE

## 2025-04-11 ENCOUNTER — LAB (OUTPATIENT)
Dept: LAB | Facility: HOSPITAL | Age: 60
End: 2025-04-11
Payer: MEDICARE

## 2025-04-11 VITALS
OXYGEN SATURATION: 99 % | BODY MASS INDEX: 48.82 KG/M2 | DIASTOLIC BLOOD PRESSURE: 90 MMHG | HEIGHT: 65 IN | HEART RATE: 72 BPM | WEIGHT: 293 LBS | SYSTOLIC BLOOD PRESSURE: 164 MMHG | RESPIRATION RATE: 18 BRPM

## 2025-04-11 DIAGNOSIS — I10 ESSENTIAL HYPERTENSION: ICD-10-CM

## 2025-04-11 DIAGNOSIS — I48.0 PAROXYSMAL ATRIAL FIBRILLATION: ICD-10-CM

## 2025-04-11 DIAGNOSIS — I48.0 PAROXYSMAL ATRIAL FIBRILLATION: Primary | ICD-10-CM

## 2025-04-11 PROCEDURE — 1159F MED LIST DOCD IN RCRD: CPT | Performed by: NURSE PRACTITIONER

## 2025-04-11 PROCEDURE — 93000 ELECTROCARDIOGRAM COMPLETE: CPT | Performed by: NURSE PRACTITIONER

## 2025-04-11 PROCEDURE — 85025 COMPLETE CBC W/AUTO DIFF WBC: CPT

## 2025-04-11 PROCEDURE — 99214 OFFICE O/P EST MOD 30 MIN: CPT | Performed by: NURSE PRACTITIONER

## 2025-04-11 PROCEDURE — 83735 ASSAY OF MAGNESIUM: CPT

## 2025-04-11 PROCEDURE — 36415 COLL VENOUS BLD VENIPUNCTURE: CPT

## 2025-04-11 PROCEDURE — 1160F RVW MEDS BY RX/DR IN RCRD: CPT | Performed by: NURSE PRACTITIONER

## 2025-04-11 PROCEDURE — 80048 BASIC METABOLIC PNL TOTAL CA: CPT

## 2025-04-11 RX ORDER — TRAMADOL HYDROCHLORIDE 50 MG/1
TABLET ORAL
COMMUNITY

## 2025-04-11 RX ORDER — CARVEDILOL 6.25 MG/1
TABLET ORAL
COMMUNITY

## 2025-04-11 NOTE — PROGRESS NOTES
Val Purcell PA  Ana Maradiaga  1965  04/11/2025    Patient Active Problem List   Diagnosis    Type 2 diabetes mellitus with hyperglycemia, without long-term current use of insulin    Acquired hypothyroidism    Hirsutism    Elevated testosterone level in female    Complicated UTI (urinary tract infection)    Adrenal insufficiency       Dear Val Purcell PA:    Subjective     Chief Complaint   Patient presents with    Follow-up         History of Present Illness:    Ana Maradiaga is a 59 y.o. female with a past medical history of paroxysmal atrial fibrillation and CVA.  She presents today for cardiology follow-up.  Since her last visit, went to the ER with weakness and was admitted for adrenal insufficiency and acute UTI.  During hospitalization she was hypotensive.  This amlodipine and carvedilol were discontinued.  However,  Since hospital discharge, BP was around 150-160 systolic. She has since resumed amlodipine and carvedilol. Now home BP logs show BP around 120-130 systolic and 70's diastolic.  She denies any chest pains, shortness of breath, or palpitations.    Allergies   Allergen Reactions    Vancomycin Hives    Cefepime Rash    Cephalosporins Rash   :      Current Outpatient Medications:     amLODIPine (NORVASC) 5 MG tablet, Take 1 tablet by mouth Daily., Disp: , Rfl:     ammonium lactate (AMLACTIN) 12 % cream, Apply 1 Application topically to the appropriate area as directed Daily. (Patient taking differently: Apply 1 Application topically to the appropriate area as directed Every Other Day.), Disp: 385 g, Rfl: 0    Aspirin Low Dose 81 MG EC tablet, Take 1 tablet by mouth Daily., Disp: , Rfl:     atorvastatin (LIPITOR) 40 MG tablet, Take 1 tablet by mouth every night at bedtime., Disp: , Rfl:     bumetanide (BUMEX) 1 MG tablet, Take 2 tablets by mouth Daily As Needed (Weight gain, shortness of breath or lower extremity swelling)., Disp: 30 tablet, Rfl: 0    carvedilol (COREG) 6.25 MG  tablet, Take  by mouth., Disp: , Rfl:     Eliquis 5 MG tablet tablet, Take 1 tablet by mouth Every 12 (Twelve) Hours., Disp: 180 tablet, Rfl: 1    flecainide (TAMBOCOR) 100 MG tablet, Take 1 tablet by mouth 2 (Two) Times a Day., Disp: 180 tablet, Rfl: 1    folic acid (FOLVITE) 1 MG tablet, Take 1 tablet by mouth Daily., Disp: 30 tablet, Rfl: 0    glucose blood test strip, Use 1 strip to test blood sugar Daily., Disp: 100 each, Rfl: 2    hydrocortisone (CORTEF) 10 MG tablet, TAKE 1 TABLET BY MOUTH EVERY DAY, Disp: 30 tablet, Rfl: 0    hydrocortisone (CORTEF) 5 MG tablet, Take 3 tablets by mouth in the morning and 2 tablets in the evening., Disp: 150 tablet, Rfl: 5    Lancets misc, Use 1 lancet to test blood sugar Daily., Disp: 100 each, Rfl: 2    levothyroxine (SYNTHROID, LEVOTHROID) 125 MCG tablet, Take 1 tablet by mouth Daily., Disp: , Rfl:     magnesium oxide (MAG-OX) 400 MG tablet, Take 1 tablet by mouth Daily., Disp: , Rfl:     nitrofurantoin, macrocrystal-monohydrate, (Macrobid) 100 MG capsule, Take 1 (one) capsule by mouth two times daily, Disp: 10 capsule, Rfl: 0    ondansetron ODT (ZOFRAN-ODT) 4 MG disintegrating tablet, Take 1 tablet by mouth Every 6 (Six) Hours As Needed for Nausea or Vomiting., Disp: 30 tablet, Rfl: 0    spironolactone (Aldactone) 25 MG tablet, Take 1 tablet by mouth Daily., Disp: 90 tablet, Rfl: 1    Tirzepatide 5 MG/0.5ML solution auto-injector, Inject 5 mg under the skin into the appropriate area as directed 1 (One) Time Per Week., Disp: 2 mL, Rfl: 5    traMADol (ULTRAM) 50 MG tablet, Take  by mouth., Disp: , Rfl:     vitamin B-12 (CYANOCOBALAMIN) 1000 MCG tablet, Take 1 tablet by mouth Daily., Disp: , Rfl:     vitamin D3 125 MCG (5000 UT) capsule capsule, Take 1 capsule by mouth Daily., Disp: , Rfl:       The following portions of the patient's history were reviewed and updated as appropriate: allergies, current medications, past family history, past medical history, past social  "history, past surgical history and problem list.    Social History     Tobacco Use    Smoking status: Never    Smokeless tobacco: Never   Vaping Use    Vaping status: Never Used   Substance Use Topics    Alcohol use: Never    Drug use: Never       Review of Systems   Constitutional: Negative for decreased appetite and malaise/fatigue.   Cardiovascular:  Negative for chest pain, dyspnea on exertion and palpitations.   Respiratory:  Negative for cough and shortness of breath.        Objective   Vitals:    04/11/25 1124   BP: 164/90   BP Location: Left arm   Patient Position: Sitting   Cuff Size: Adult   Pulse: 72   Resp: 18   SpO2: 99%   Weight: 135 kg (297 lb)   Height: 165.1 cm (65\")     Body mass index is 49.42 kg/m².        Vitals reviewed.   Constitutional:       Appearance: Healthy appearance. Well-developed and not in distress.   HENT:      Head: Normocephalic and atraumatic.   Neck:      Vascular: No carotid bruit or JVD.   Pulmonary:      Effort: Pulmonary effort is normal.      Breath sounds: Normal breath sounds. No wheezing. No rales.   Cardiovascular:      Normal rate. Regular rhythm.      Murmurs: There is no murmur.      . No S3 and S4 gallop.   Edema:     Peripheral edema absent.   Abdominal:      General: Bowel sounds are normal.      Palpations: Abdomen is soft.   Skin:     General: Skin is warm and dry.   Neurological:      Mental Status: Alert, oriented to person, place, and time and oriented to person, place and time.   Psychiatric:         Mood and Affect: Mood normal.         Behavior: Behavior normal.         Lab Results   Component Value Date     03/13/2025    K 3.7 03/13/2025    CL 98 03/13/2025    CO2 26.0 03/13/2025    BUN 22 (H) 03/13/2025    CREATININE 1.40 (H) 03/13/2025    GLUCOSE 105 (H) 03/13/2025    CALCIUM 9.5 03/13/2025    AST 35 (H) 03/13/2025    ALT 56 (H) 03/13/2025    ALKPHOS 109 03/13/2025     Lab Results   Component Value Date    WBC 11.63 (H) 03/13/2025    HGB 12.7 " 03/13/2025    HCT 41.7 03/13/2025     03/13/2025     Lab Results   Component Value Date    TSH 1.610 03/13/2025    TRIG 97 03/13/2025    HDL 51 03/13/2025    LDL 73 03/13/2025          Results for orders placed during the hospital encounter of 03/23/23    Adult Transthoracic Echo Complete w/ Color, Spectral and Contrast if necessary per protocol    Interpretation Summary    Normal left ventricular cavity size and wall thickness noted. All left ventricular wall segments contract normally.    Left ventricular ejection fraction appears to be 56 - 60%.    The aortic valve is structurally normal with no regurgitation or stenosis present.    The mitral valve is structurally normal with no significant stenosis present. Trace mitral valve regurgitation is present.    There is no evidence of pericardial effusion. .                ECG 12 Lead    Date/Time: 4/11/2025 11:57 AM  Performed by: Neli Vergara APRN    Authorized by: Neli Vergara APRN  Comparison: compared with previous ECG   Similar to previous ECG  Rhythm: sinus rhythm  BPM: 67  Conduction: 1st degree AV block  Comments: QTc 483 ms          Assessment & Plan    Diagnosis Plan   1. Paroxysmal atrial fibrillation        2. Essential hypertension  ECG 12 Lead    Basic Metabolic Panel    Magnesium               Recommendations:  Paroxysmal atrial fibrillation-maintaining sinus rhythm.  QRS acceptable on EKG.  However, QTc now 483 MS which is lengthened compared to prior.  Upon review of her labs, renal function has worsened recently, she is now seeing a nephrologist.  Will order a BMP and magnesium level.  She is going return in 2 weeks for EKG to recheck QTc and QRS.  May have to consider alternative antiarrhythmic therapy.  Essential hypertension-BP well-controlled at home now with amlodipine and carvedilol, will continue.  Follow-up in 6 months or sooner if needed.      Return in about 6 months (around 10/11/2025) for Recheck.    As always, I  appreciate very much the opportunity to participate in the cardiovascular care of your patients.      With Best Regards,    TREASURE Garrett

## 2025-04-12 LAB
ANION GAP SERPL CALCULATED.3IONS-SCNC: 16 MMOL/L (ref 5–15)
BASOPHILS # BLD AUTO: 0.03 10*3/MM3 (ref 0–0.2)
BASOPHILS NFR BLD AUTO: 0.3 % (ref 0–1.5)
BUN SERPL-MCNC: 20 MG/DL (ref 6–20)
BUN/CREAT SERPL: 11.8 (ref 7–25)
CALCIUM SPEC-SCNC: 9.8 MG/DL (ref 8.6–10.5)
CHLORIDE SERPL-SCNC: 95 MMOL/L (ref 98–107)
CO2 SERPL-SCNC: 26 MMOL/L (ref 22–29)
CREAT SERPL-MCNC: 1.69 MG/DL (ref 0.57–1)
DEPRECATED RDW RBC AUTO: 53.7 FL (ref 37–54)
EGFRCR SERPLBLD CKD-EPI 2021: 34.6 ML/MIN/1.73
EOSINOPHIL # BLD AUTO: 0.06 10*3/MM3 (ref 0–0.4)
EOSINOPHIL NFR BLD AUTO: 0.6 % (ref 0.3–6.2)
ERYTHROCYTE [DISTWIDTH] IN BLOOD BY AUTOMATED COUNT: 16.2 % (ref 12.3–15.4)
GLUCOSE SERPL-MCNC: 114 MG/DL (ref 65–99)
HCT VFR BLD AUTO: 42.1 % (ref 34–46.6)
HGB BLD-MCNC: 13.7 G/DL (ref 12–15.9)
IMM GRANULOCYTES # BLD AUTO: 0.1 10*3/MM3 (ref 0–0.05)
IMM GRANULOCYTES NFR BLD AUTO: 1.1 % (ref 0–0.5)
LYMPHOCYTES # BLD AUTO: 1.34 10*3/MM3 (ref 0.7–3.1)
LYMPHOCYTES NFR BLD AUTO: 14.1 % (ref 19.6–45.3)
MAGNESIUM SERPL-MCNC: 2.1 MG/DL (ref 1.6–2.6)
MCH RBC QN AUTO: 29.1 PG (ref 26.6–33)
MCHC RBC AUTO-ENTMCNC: 32.5 G/DL (ref 31.5–35.7)
MCV RBC AUTO: 89.6 FL (ref 79–97)
MONOCYTES # BLD AUTO: 0.7 10*3/MM3 (ref 0.1–0.9)
MONOCYTES NFR BLD AUTO: 7.4 % (ref 5–12)
NEUTROPHILS NFR BLD AUTO: 7.27 10*3/MM3 (ref 1.7–7)
NEUTROPHILS NFR BLD AUTO: 76.5 % (ref 42.7–76)
NRBC BLD AUTO-RTO: 0 /100 WBC (ref 0–0.2)
PLATELET # BLD AUTO: 239 10*3/MM3 (ref 140–450)
PMV BLD AUTO: 11.5 FL (ref 6–12)
POTASSIUM SERPL-SCNC: 3.8 MMOL/L (ref 3.5–5.2)
RBC # BLD AUTO: 4.7 10*6/MM3 (ref 3.77–5.28)
SODIUM SERPL-SCNC: 137 MMOL/L (ref 136–145)
WBC NRBC COR # BLD AUTO: 9.5 10*3/MM3 (ref 3.4–10.8)

## 2025-04-14 DIAGNOSIS — N28.9 ABNORMAL RENAL FUNCTION: Primary | ICD-10-CM

## 2025-04-18 RX ORDER — HYDROCORTISONE 5 MG/1
5 TABLET ORAL EVERY MORNING
Qty: 90 TABLET | Refills: 5 | Status: SHIPPED | OUTPATIENT
Start: 2025-04-18

## 2025-04-18 RX ORDER — HYDROCORTISONE 10 MG/1
TABLET ORAL
Qty: 180 TABLET | Refills: 2 | Status: SHIPPED | OUTPATIENT
Start: 2025-04-18

## 2025-04-18 NOTE — TELEPHONE ENCOUNTER
Caller: Ana Maradiaga    Relationship: Self    Best call back number: [phone]    Requested Prescriptions:   Requested Prescriptions     Pending Prescriptions Disp Refills    amLODIPine (NORVASC) 5 MG tablet       Sig: Take 1 tablet by mouth Daily.    carvedilol (COREG) 6.25 MG tablet       Sig: Take  by mouth.        Pharmacy where request should be sent: [facility]     Last office visit with prescribing clinician: 4/11/2025   Last telemedicine visit with prescribing clinician: Visit date not found   Next office visit with prescribing clinician: 10/10/2025     NEEDS 90 DAY SUPPLY FOR BOTH MEDICATION     Does the patient have less than a 3 day supply:  [x] Yes  [] No    Would you like a call back once the refill request has been completed: [] Yes [x] No    If the office needs to give you a call back, can they leave a voicemail: [] Yes [x] No    Kristina Cooper Rep   04/18/25 10:08 EDT

## 2025-04-18 NOTE — TELEPHONE ENCOUNTER
Patient requested to move up a dose in [facility]. She currently takes 5 mg. I requested 7.5 mg.

## 2025-04-21 RX ORDER — AMLODIPINE BESYLATE 5 MG/1
5 TABLET ORAL DAILY
Qty: 90 TABLET | Refills: 3 | Status: SHIPPED | OUTPATIENT
Start: 2025-04-21

## 2025-04-21 RX ORDER — CARVEDILOL 6.25 MG/1
6.25 TABLET ORAL 2 TIMES DAILY WITH MEALS
Qty: 180 TABLET | Refills: 3 | Status: SHIPPED | OUTPATIENT
Start: 2025-04-21

## 2025-04-24 ENCOUNTER — LAB (OUTPATIENT)
Dept: LAB | Facility: HOSPITAL | Age: 60
End: 2025-04-24
Payer: MEDICARE

## 2025-04-24 DIAGNOSIS — N28.9 ABNORMAL RENAL FUNCTION: ICD-10-CM

## 2025-04-24 LAB
ANION GAP SERPL CALCULATED.3IONS-SCNC: 13.2 MMOL/L (ref 5–15)
BUN SERPL-MCNC: 17 MG/DL (ref 6–20)
BUN/CREAT SERPL: 13.5 (ref 7–25)
CALCIUM SPEC-SCNC: 9.5 MG/DL (ref 8.6–10.5)
CHLORIDE SERPL-SCNC: 101 MMOL/L (ref 98–107)
CO2 SERPL-SCNC: 25.8 MMOL/L (ref 22–29)
CREAT SERPL-MCNC: 1.26 MG/DL (ref 0.57–1)
EGFRCR SERPLBLD CKD-EPI 2021: 49.3 ML/MIN/1.73
GLUCOSE SERPL-MCNC: 111 MG/DL (ref 65–99)
POTASSIUM SERPL-SCNC: 4.3 MMOL/L (ref 3.5–5.2)
SODIUM SERPL-SCNC: 140 MMOL/L (ref 136–145)

## 2025-04-24 PROCEDURE — 80048 BASIC METABOLIC PNL TOTAL CA: CPT

## 2025-04-24 PROCEDURE — 36415 COLL VENOUS BLD VENIPUNCTURE: CPT

## 2025-04-25 ENCOUNTER — CLINICAL SUPPORT (OUTPATIENT)
Dept: CARDIOLOGY | Facility: CLINIC | Age: 60
End: 2025-04-25
Payer: MEDICARE

## 2025-04-25 DIAGNOSIS — I48.0 PAROXYSMAL ATRIAL FIBRILLATION: Primary | ICD-10-CM

## 2025-04-25 PROCEDURE — 93000 ELECTROCARDIOGRAM COMPLETE: CPT | Performed by: NURSE PRACTITIONER

## 2025-04-25 NOTE — PROGRESS NOTES
ECG 12 Lead    Date/Time: 4/25/2025 8:11 AM  Performed by: Gill Rainey APRN    Authorized by: Gill Rainey APRN  Comparison: compared with previous ECG   Rhythm: sinus rhythm  Rate: normal  Conduction: 1st degree AV block  Comments: QTC 457ms on flecanide

## 2025-05-01 ENCOUNTER — SPECIALTY PHARMACY (OUTPATIENT)
Dept: PHARMACY | Facility: HOSPITAL | Age: 60
End: 2025-05-01
Payer: MEDICARE

## 2025-05-01 NOTE — PROGRESS NOTES
Specialty Pharmacy Patient Management Program  Prescription Order or Refill Request     Patient will be filling or currently fills medications at [facility], and requires a prescription order/refill on the following:      Requested Prescriptions     Pending Prescriptions Disp Refills    Tirzepatide 7.5 MG/0.5ML solution auto-injector 6 mL 2     Sig: Inject 7.5 mg under the skin into the appropriate area as directed 1 (One) Time Per Week.     Prescription orders above were sent to the pharmacy per TREASURE Alexander.     Patient contacted the clinic requesting increase in Mounjaro to 7.5 mg weekly, provider was agreeable. Medication sent to [facility] for patient .     Jelly Olvera RPH   5/1/2025  11:58 EDT

## 2025-05-04 DIAGNOSIS — I48.0 PAROXYSMAL ATRIAL FIBRILLATION: ICD-10-CM

## 2025-05-05 RX ORDER — AMLODIPINE BESYLATE 5 MG/1
5 TABLET ORAL DAILY
Qty: 90 TABLET | Refills: 0 | Status: SHIPPED | OUTPATIENT
Start: 2025-05-05

## 2025-06-12 ENCOUNTER — OFFICE VISIT (OUTPATIENT)
Dept: ENDOCRINOLOGY | Facility: CLINIC | Age: 60
End: 2025-06-12
Payer: MEDICARE

## 2025-06-12 ENCOUNTER — SPECIALTY PHARMACY (OUTPATIENT)
Dept: PHARMACY | Facility: HOSPITAL | Age: 60
End: 2025-06-12
Payer: MEDICARE

## 2025-06-12 VITALS
WEIGHT: 293 LBS | SYSTOLIC BLOOD PRESSURE: 122 MMHG | DIASTOLIC BLOOD PRESSURE: 90 MMHG | BODY MASS INDEX: 49.66 KG/M2 | OXYGEN SATURATION: 94 % | HEART RATE: 96 BPM

## 2025-06-12 DIAGNOSIS — E11.65 TYPE 2 DIABETES MELLITUS WITH HYPERGLYCEMIA, WITHOUT LONG-TERM CURRENT USE OF INSULIN: Primary | ICD-10-CM

## 2025-06-12 DIAGNOSIS — E03.9 ACQUIRED HYPOTHYROIDISM: ICD-10-CM

## 2025-06-12 DIAGNOSIS — E27.40 ADRENAL INSUFFICIENCY: ICD-10-CM

## 2025-06-12 LAB
EXPIRATION DATE: ABNORMAL
EXPIRATION DATE: NORMAL
GLUCOSE BLDC GLUCOMTR-MCNC: 137 MG/DL (ref 70–130)
HBA1C MFR BLD: 5.4 % (ref 4.5–5.7)
Lab: ABNORMAL
Lab: NORMAL

## 2025-06-12 PROCEDURE — 84443 ASSAY THYROID STIM HORMONE: CPT | Performed by: NURSE PRACTITIONER

## 2025-06-12 PROCEDURE — 84439 ASSAY OF FREE THYROXINE: CPT | Performed by: NURSE PRACTITIONER

## 2025-06-12 PROCEDURE — 80053 COMPREHEN METABOLIC PANEL: CPT | Performed by: NURSE PRACTITIONER

## 2025-06-12 RX ORDER — TIRZEPATIDE 10 MG/.5ML
10 INJECTION, SOLUTION SUBCUTANEOUS WEEKLY
Qty: 2 ML | Refills: 5 | Status: SHIPPED | OUTPATIENT
Start: 2025-06-12

## 2025-06-12 NOTE — PROGRESS NOTES
Specialty Pharmacy Patient Management Program  Endocrinology Follow-Up Assessment       Ana Maradiaga is a 59 y.o. female with Type 2 Diabetes seen by an Endocrinology provider and enrolled in the Endocrinology Patient Management program offered by [facility].  An initial outreach was conducted, including assessment of therapy appropriateness and specialty medication education for Mounjaro.     Pt currently takes Mounjaro 7.5 mg subq weekly. She doesn't routinely monitor her BG at home. Pt denies any low BG <70 episodes and denies any side effects or adherence issues with her Mounjaro at this time. She would like to increase her dose of Mounjaro today.     Pt denies any personal or family history of thyroid cancer, denies any issues with pancreatitis, and reports recurrent UTI's/yeast infections.  Pt states that she had hospital admission in December 2024 for weakness/UTI.    Insurance Coverage & Financial Support  [employer]    Relevant Past Medical History and Comorbidities  Relevant medical history and concomitant health conditions were discussed with the patient. The patient's chart has been reviewed for relevant past medical history and comorbid health conditions and updated as necessary.   Past Medical History:   Diagnosis Date    Hitchcock's disease     Anemia     Arrhythmia 2018    Afib    Arthritis     Atrial fibrillation 2018    Bronchitis     Chronic kidney disease 2018    Diabetes mellitus 2024    Gout     Hypertension     Stroke November 2021     Social History     Socioeconomic History    Marital status: Single   Tobacco Use    Smoking status: Never     Passive exposure: Never    Smokeless tobacco: Never   Vaping Use    Vaping status: Never Used   Substance and Sexual Activity    Alcohol use: Never    Drug use: Never    Sexual activity: Not Currently     Partners: Male       Problem list reviewed by Leyla Crooks, PharmD on 6/12/2025 at  3:05 PM    Allergies  Known allergies  and reactions were discussed with the patient. The patient's chart has been reviewed for  allergy information and updated as necessary.   Allergies   Allergen Reactions    Vancomycin Hives    Cefepime Rash    Cephalosporins Rash       Allergies reviewed by Leyla Crooks, PharmD on 6/12/2025 at  3:04 PM    Relevant Laboratory Values  A1C Last 3 Results          2/13/2025    11:04 6/12/2025    15:04   HGBA1C Last 3 Results   Hemoglobin A1C 5.0  5.4      Lab Results   Component Value Date    HGBA1C 5.4 06/12/2025     Lab Results   Component Value Date    GLUCOSE 111 (H) 04/24/2025    CALCIUM 9.5 04/24/2025     04/24/2025    K 4.3 04/24/2025    CO2 25.8 04/24/2025     04/24/2025    BUN 17 04/24/2025    CREATININE 1.26 (H) 04/24/2025    EGFRIFAFRI >60 08/13/2022    EGFRIFNONA >60 08/13/2022    BCR 13.5 04/24/2025    ANIONGAP 13.2 04/24/2025     Lab Results   Component Value Date    CHOL 142 03/13/2025    TRIG 97 03/13/2025    HDL 51 03/13/2025    LDL 73 03/13/2025    LDLDIRECT 12 (L) 12/25/2024       Current Medication List  This medication list has been reviewed with the patient and evaluated for any interactions or necessary modifications/recommendations, and updated to include all prescription medications, OTC medications, and supplements the patient is currently taking.  This list reflects what is contained in the patient's profile, which has also been marked as reviewed to communicate to other providers it is the most up to date version of the patient's current medication therapy.     Current Outpatient Medications:     amLODIPine (NORVASC) 5 MG tablet, TAKE 1 TABLET BY MOUTH EVERY DAY, Disp: 90 tablet, Rfl: 0    ammonium lactate (AMLACTIN) 12 % cream, Apply 1 Application topically to the appropriate area as directed Daily., Disp: 385 g, Rfl: 0    apixaban (Eliquis) 5 MG tablet tablet, Take 1 tablet by mouth 2 times daily., Disp: 180 tablet, Rfl: 1    aspirin (Aspirin Low Dose) 81 MG EC tablet, Take 1  tablet by mouth Daily., Disp: 90 tablet, Rfl: 1    Aspirin Low Dose 81 MG EC tablet, Take 1 tablet by mouth Daily., Disp: , Rfl:     atorvastatin (LIPITOR) 40 MG tablet, Take 1 tablet by mouth every night at bedtime., Disp: , Rfl:     atorvastatin (LIPITOR) 40 MG tablet, Take 1 tablet by mouth daily, Disp: 90 tablet, Rfl: 1    bumetanide (BUMEX) 1 MG tablet, Take 2 tablets by mouth Daily As Needed (Weight gain, shortness of breath or lower extremity swelling)., Disp: 30 tablet, Rfl: 0    bumetanide (BUMEX) 1 MG tablet, Take 1 tablet by mouth Every 12 (Twelve) Hours., Disp: 90 tablet, Rfl: 1    carvedilol (COREG) 6.25 MG tablet, Take 1 tablet by mouth 2 (Two) Times a Day With Meals., Disp: 180 tablet, Rfl: 3    Cholecalciferol (D-5000) 125 MCG (5000 UT) tablet, Take 1 tablet by mouth daily, Disp: 90 tablet, Rfl: 1    cyanocobalamin (VITAMIN B-12) 2500 MCG tablet tablet, Take 1 tablet by mouth daily, Disp: 90 tablet, Rfl: 1    Diclofenac Sodium (VOLTAREN) 1 % gel gel, Apply 1 g topically to the appropriate area as directed Daily., Disp: 100 g, Rfl: 0    Eliquis 5 MG tablet tablet, Take 1 tablet by mouth Every 12 (Twelve) Hours., Disp: 180 tablet, Rfl: 1    flecainide (TAMBOCOR) 100 MG tablet, Take 1 tablet by mouth 2 (Two) Times a Day., Disp: 180 tablet, Rfl: 1    folic acid (FOLVITE) 1 MG tablet, Take 1 tablet by mouth Daily., Disp: 30 tablet, Rfl: 0    folic acid (FOLVITE) 1 MG tablet, Take 1 tablet by mouth daily, Disp: 30 tablet, Rfl: 2    glucose blood test strip, Use 1 strip to test blood sugar Daily., Disp: 100 each, Rfl: 2    hydrocortisone (CORTEF) 10 MG tablet, Take 1 tablet by mouth Every Morning AND 1 tablet Every Night., Disp: 180 tablet, Rfl: 2    hydrocortisone (CORTEF) 5 MG tablet, Take 1 tablet by mouth Every Morning., Disp: 90 tablet, Rfl: 5    Lancets misc, Use 1 lancet to test blood sugar Daily., Disp: 100 each, Rfl: 2    levothyroxine (SYNTHROID, LEVOTHROID) 125 MCG tablet, Take 1 tablet by mouth  Daily., Disp: , Rfl:     levothyroxine (SYNTHROID, LEVOTHROID) 125 MCG tablet, Take 1 tablet by mouth daily, Disp: 90 tablet, Rfl: 1    Magnesium 200 MG tablet, Take 1 tablet by mouth Daily., Disp: 90 each, Rfl: 1    magnesium oxide (MAG-OX) 400 MG tablet, Take 1 tablet by mouth Daily., Disp: , Rfl:     Tirzepatide 7.5 MG/0.5ML solution auto-injector, Inject 7.5 mg under the skin into the appropriate area as directed 1 (One) Time Per Week., Disp: 6 mL, Rfl: 2    traMADol (ULTRAM) 50 MG tablet, Take  by mouth., Disp: , Rfl:     vitamin B-12 (CYANOCOBALAMIN) 1000 MCG tablet, Take 1 tablet by mouth Daily., Disp: , Rfl:     vitamin D3 125 MCG (5000 UT) capsule capsule, Take 1 capsule by mouth Daily., Disp: , Rfl:     Medicines reviewed by Leyla Crooks, PharmD on 6/12/2025 at  3:10 PM    Drug Interactions  -Aspirin + Eliquis: use together than increase risk of bleeding. Patient denies any signs or symptoms of of bleeding   -Magnesium may decrease the concentration of levothyroxine.  Separate administration of levothyroxine from oral magnesium by at least 4 hours. Counseled pt on this interaction.  -Magnesium may decrease the bioavailability of hydrocortisone.  Consider  the doses of these medications by 2 hours or more. Counseled pt on this interaction. Pt now plans on taking her magnesium at bedtime.  -Aspirin may enhance the hypoglycemic effect of Mounjaro.  -Bumex and Hydrocortisone may diminish the effect of Mounjaro.    Recommended Medications Assessment  Aspirin -  Currently Taking   Statin -  Currently Taking   ACEi/ARB - Not Taking Currently    Adherence and Self-Administration  Adherence related to the patient's specialty therapy was discussed with the patient. The Adherence segment of this outreach has been reviewed and updated.     Barriers to Patient Adherence and/or Self-Administration: Medication none  Methods for Supporting Patient Adherence and/or Self-Administration: none    Goals of  Therapy  Goals related to the patient's specialty therapy were discussed with the patient. The Patient Goals segment of this outreach has been reviewed and updated.    Goals Addressed Today        HEMOGLOBIN A1C < 7      Pt at goal as A1c = 5.4%        Specialty Pharmacy General Goal      Minimize hypoglycemia--Pt denies any low BG < 70 episodes.            Reassessment Plan & Follow-Up  Patient's diabetes is controlled with A1C of 5.4%.  Medication Therapy Changes:  Per verbal order from Martha Sweet APRN will order:  Increase to Mounjaro 10 mg weekly   Related Plans, Therapy Recommendations or Therapy Problems to Be Addressed:   Sent refill for Mounjaro to [facility] for shipping services.    Reviewed rule of 15 for hypoglycemia.   Pharmacist to perform regular reassessments no more than (6) months from the previous assessment.  Care Coordinator to set up future refill outreaches, coordinate prescription delivery, and escalate clinical questions to pharmacist.    Attestation  I attest the patient was actively involved in and has agreed to the above plan of care. If the prescribed therapy is at any point deemed not appropriate based on the current or future assessments, a consultation will be initiated with the patient's specialty care provider to determine the best course of action. The revised plan of therapy will be documented along with any required assessments and/or additional patient education provided.    Leyla Crooks, PharmD, [facility]  Clinical Specialty Pharmacist, Endocrinology   06/12/25   16:14 EDT

## 2025-06-12 NOTE — PROGRESS NOTES
Chief Complaint   Patient presents with    Diabetes     F/u        Referring Provider  No ref. provider found     HPI   Ana Maradiaga is a 59 y.o. female had concerns including Diabetes (F/u).    Pre-Diabetes, Hypothyroidism, Adrenal Insufficiency.    She is here with her granddaughter today and is in a wheelchair.     She reports that she is taking her medication regularly without missing doses.  She feels like her weight has plateaued and she is not losing any more weight on her dose of Mounjaro and is asking about increasing this dose.      Pre-Diabetes:  She has been taking her Mounjaro 7.5 mg weekly. She is tolerating this well without missing doses.  She denies any hypo/hyper symptoms.  She is taking it regularly. She denies any adverse side effects to the medication.     Thyroid:  She is currently taking T4 125 mg QD. She has been taking this regularly without missing does since being home.  She denies any conflicting medication.  She denies any compressive s/sx's.    Adrenal Insufficiency:  She has been taking Hydrocortisone 15 mg QAM and 10 mg QHS and has noted some improvements to her symptoms.     Symptoms:  Extreme tiredness: yes  Weak muscles: yes  Reduced appetite: yes  Weight loss: no  Darkening of the skin (hyperpigmentation): no  Reduced heart rate or low blood pressure: no  Light-headedness and fainting: yes  Salt craving: no  Hypoglycemia: no  Nausea or vomiting: no  Diarrhea: no  Abdominal pain: no  Muscle or joint pains: yes  Irritability: no  Depression: no  Body hair loss or sexual dysfunction in women: no    The following portions of the patient's history were reviewed and updated as appropriate: allergies, current medications, past family history, past medical history, past social history, past surgical history, and problem list.    Past Medical History:   Diagnosis Date    Morrill's disease     Anemia     Arrhythmia 2018    Afib    Arthritis     Atrial fibrillation 2018    Bronchitis      Chronic kidney disease 2018    Diabetes mellitus 2024    Gout     Hypertension     Stroke November 2021     History reviewed. No pertinent surgical history.   Family History   Problem Relation Age of Onset    Heart disease Mother     Stroke Mother     Hypertension Father     Arthritis Father     Hypertension Sister     Breast cancer Paternal Cousin       Social History     Socioeconomic History    Marital status: Single   Tobacco Use    Smoking status: Never     Passive exposure: Never    Smokeless tobacco: Never   Vaping Use    Vaping status: Never Used   Substance and Sexual Activity    Alcohol use: Never    Drug use: Never    Sexual activity: Not Currently     Partners: Male      Allergies   Allergen Reactions    Vancomycin Hives    Cefepime Rash    Cephalosporins Rash      Current Outpatient Medications on File Prior to Visit   Medication Sig Dispense Refill    amLODIPine (NORVASC) 5 MG tablet TAKE 1 TABLET BY MOUTH EVERY DAY 90 tablet 0    ammonium lactate (AMLACTIN) 12 % cream Apply 1 Application topically to the appropriate area as directed Daily. 385 g 0    apixaban (Eliquis) 5 MG tablet tablet Take 1 tablet by mouth 2 times daily. 180 tablet 1    aspirin (Aspirin Low Dose) 81 MG EC tablet Take 1 tablet by mouth Daily. 90 tablet 1    Aspirin Low Dose 81 MG EC tablet Take 1 tablet by mouth Daily.      atorvastatin (LIPITOR) 40 MG tablet Take 1 tablet by mouth every night at bedtime.      atorvastatin (LIPITOR) 40 MG tablet Take 1 tablet by mouth daily 90 tablet 1    bumetanide (BUMEX) 1 MG tablet Take 2 tablets by mouth Daily As Needed (Weight gain, shortness of breath or lower extremity swelling). 30 tablet 0    bumetanide (BUMEX) 1 MG tablet Take 1 tablet by mouth Every 12 (Twelve) Hours. 90 tablet 1    carvedilol (COREG) 6.25 MG tablet Take 1 tablet by mouth 2 (Two) Times a Day With Meals. 180 tablet 3    Cholecalciferol (D-5000) 125 MCG (5000 UT) tablet Take 1 tablet by mouth daily 90 tablet 1     cyanocobalamin (VITAMIN B-12) 2500 MCG tablet tablet Take 1 tablet by mouth daily 90 tablet 1    Diclofenac Sodium (VOLTAREN) 1 % gel gel Apply 1 g topically to the appropriate area as directed Daily. 100 g 0    Eliquis 5 MG tablet tablet Take 1 tablet by mouth Every 12 (Twelve) Hours. 180 tablet 1    flecainide (TAMBOCOR) 100 MG tablet Take 1 tablet by mouth 2 (Two) Times a Day. 180 tablet 1    folic acid (FOLVITE) 1 MG tablet Take 1 tablet by mouth Daily. 30 tablet 0    folic acid (FOLVITE) 1 MG tablet Take 1 tablet by mouth daily 30 tablet 2    glucose blood test strip Use 1 strip to test blood sugar Daily. 100 each 2    hydrocortisone (CORTEF) 10 MG tablet Take 1 tablet by mouth Every Morning AND 1 tablet Every Night. 180 tablet 2    hydrocortisone (CORTEF) 5 MG tablet Take 1 tablet by mouth Every Morning. 90 tablet 5    Lancets misc Use 1 lancet to test blood sugar Daily. 100 each 2    levothyroxine (SYNTHROID, LEVOTHROID) 125 MCG tablet Take 1 tablet by mouth Daily.      levothyroxine (SYNTHROID, LEVOTHROID) 125 MCG tablet Take 1 tablet by mouth daily 90 tablet 1    Magnesium 200 MG tablet Take 1 tablet by mouth Daily. 90 each 1    magnesium oxide (MAG-OX) 400 MG tablet Take 1 tablet by mouth Daily.      Tirzepatide 7.5 MG/0.5ML solution auto-injector Inject 7.5 mg under the skin into the appropriate area as directed 1 (One) Time Per Week. 6 mL 2    traMADol (ULTRAM) 50 MG tablet Take  by mouth.      vitamin B-12 (CYANOCOBALAMIN) 1000 MCG tablet Take 1 tablet by mouth Daily.      vitamin D3 125 MCG (5000 UT) capsule capsule Take 1 capsule by mouth Daily.      [DISCONTINUED] nitrofurantoin, macrocrystal-monohydrate, (Macrobid) 100 MG capsule Take 1 (one) capsule by mouth two times daily 10 capsule 0    [DISCONTINUED] ondansetron ODT (ZOFRAN-ODT) 4 MG disintegrating tablet Take 1 tablet by mouth Every 6 (Six) Hours As Needed for Nausea or Vomiting. (Patient not taking: Reported on 6/12/2025) 30 tablet 0     [DISCONTINUED] spironolactone (Aldactone) 25 MG tablet Take 1 tablet by mouth Daily. 90 tablet 1     No current facility-administered medications on file prior to visit.        Review of Systems   Constitutional:  Positive for appetite change and fatigue. Negative for unexpected weight loss.        In a wheelchair   Eyes: Negative.    Musculoskeletal:  Positive for arthralgias and myalgias.   Neurological:  Positive for weakness and light-headedness.   All other systems reviewed and are negative.     /90 (BP Location: Right arm, Patient Position: Sitting, Cuff Size: Adult)   Pulse 96   Wt 135 kg (298 lb 6.4 oz)   LMP  (LMP Unknown)   SpO2 94%   BMI 49.66 kg/m²      Physical Exam  Vitals reviewed.   Constitutional:       Appearance: Normal appearance.      Comments: Using a wheelchair   Eyes:      Extraocular Movements: Extraocular movements intact.   Neck:      Thyroid: No thyroid mass, thyromegaly or thyroid tenderness.   Cardiovascular:      Rate and Rhythm: Normal rate.   Pulmonary:      Effort: Pulmonary effort is normal.   Feet:      Right foot:      Skin integrity: Skin integrity normal.      Left foot:      Skin integrity: Skin integrity normal.   Skin:     Findings: No abrasion or wound.   Neurological:      General: No focal deficit present.      Mental Status: She is alert and oriented to person, place, and time.   Psychiatric:         Mood and Affect: Mood normal.         Behavior: Behavior normal.         Thought Content: Thought content normal.         Judgment: Judgment normal.           CMP:  Lab Results   Component Value Date    GLU 92 08/13/2022    BUN 17 04/24/2025    CREATININE 1.26 (H) 04/24/2025    EGFRIFNONA >60 08/13/2022    EGFRIFAFRI >60 08/13/2022    BCR 13.5 04/24/2025     04/24/2025    K 4.3 04/24/2025    CO2 25.8 04/24/2025    CALCIUM 9.5 04/24/2025    ALBUMIN 4.1 03/13/2025    AGRATIO 1.2 03/13/2025    BILITOT 1.3 (H) 03/13/2025    ALKPHOS 109 03/13/2025    AST 35 (H)  03/13/2025    ALT 56 (H) 03/13/2025     Lipid Panel:  Lab Results   Component Value Date    CHOL 142 03/13/2025    TRIG 97 03/13/2025    HDL 51 03/13/2025    VLDL 18 03/13/2025    LDL 73 03/13/2025     HbA1c:  Lab Results   Component Value Date    HGBA1C 5.4 06/12/2025      Glucose:  Lab Results   Component Value Date    POCGLU 137 (A) 06/12/2025     Microalbumin:  Lab Results   Component Value Date    MALBCRERATIO 113.1 06/22/2023     TSH:  Lab Results   Component Value Date    TSH 1.610 03/13/2025       Assessment and Plan    Diagnoses and all orders for this visit:    1. Type 2 diabetes mellitus with hyperglycemia, without long-term current use of insulin (Primary)  Assessment & Plan:  -Diabetes is at goal with A1c 5.4, but this has increased from prior at A1c 5.0.  -Discussed dietary and exercise guidelines with patient and family.  -Discussed the importance of yearly eye exams.  -Discussed the importance of checking BG's regularly.    -Increase Mounjaro 10 mg weekly.  Patient has no personal history of pancreatitis, no family history of MEN syndrome or medullary thyroid cancer. Possible side effects including nausea, bloating, other GI upset and rarely pancreatitis were discussed. She was advised to call the office with any symptoms or concerns.   -Discussed Glucagon use and appropriate timing for this.   -S/S hypoglycemia reviewed with Rule of 15's advised.  -Follow-up in 3 months.    Orders:  -     POC Glycosylated Hemoglobin (Hb A1C)  -     POC Glucose, Blood  -     Comprehensive Metabolic Panel    2. Acquired hypothyroidism  Assessment & Plan:  -Clinically euthyroid.  -TFT's today with medication adjustment accordingly.  -Reminded of proper administration including taking 7 pills, once daily, per week on an empty stomach with no missed doses, waiting 30-60 minutes prior to other medications or food.  -Follow-up in 3 months.    Orders:  -     TSH  -     T4, free    3. Adrenal insufficiency  Assessment &  Plan:  Discussed the importance of taking dosing appropriately and not missing doses. Appears stable in office with regulated vitals and will obtain labs today to monitor as well. Continue Hydrocortisone 15 mg QAM and 10 mg QPM.  Discussed getting a medical alert bracelet for patient to wear.  We discussed sick day dosing and increasing dose by 1 pill per dose on sick days, then back to normal dosing thereafter for more routine management.  They will check her BP daily and notify if starting to become hypotensive.  Follow-up in 3 months.             Return in about 3 months (around 9/12/2025) for Follow-up appointment, A1C. The patient was instructed to contact the clinic with any interval questions or concerns.        This document has been electronically signed by TREASURE Alexander  June 12, 2025 15:40 EDT   Endocrinology    Please note that portions of this document were completed with a voice recognition program. Efforts were made to edit the dictations, but occasionally words are mis-transcribed.

## 2025-06-12 NOTE — ASSESSMENT & PLAN NOTE
-Diabetes is at goal with A1c 5.4, but this has increased from prior at A1c 5.0.  -Discussed dietary and exercise guidelines with patient and family.  -Discussed the importance of yearly eye exams.  -Discussed the importance of checking BG's regularly.    -Increase Mounjaro 10 mg weekly.  Patient has no personal history of pancreatitis, no family history of MEN syndrome or medullary thyroid cancer. Possible side effects including nausea, bloating, other GI upset and rarely pancreatitis were discussed. She was advised to call the office with any symptoms or concerns.   -Discussed Glucagon use and appropriate timing for this.   -S/S hypoglycemia reviewed with Rule of 15's advised.  -Follow-up in 3 months.

## 2025-06-13 LAB
ALBUMIN SERPL-MCNC: 4.3 G/DL (ref 3.5–5.2)
ALBUMIN/GLOB SERPL: 1.2 G/DL
ALP SERPL-CCNC: 110 U/L (ref 39–117)
ALT SERPL W P-5'-P-CCNC: 37 U/L (ref 1–33)
ANION GAP SERPL CALCULATED.3IONS-SCNC: 15.2 MMOL/L (ref 5–15)
AST SERPL-CCNC: 27 U/L (ref 1–32)
BILIRUB SERPL-MCNC: 1.2 MG/DL (ref 0–1.2)
BUN SERPL-MCNC: 17 MG/DL (ref 6–20)
BUN/CREAT SERPL: 10.2 (ref 7–25)
CALCIUM SPEC-SCNC: 10 MG/DL (ref 8.6–10.5)
CHLORIDE SERPL-SCNC: 99 MMOL/L (ref 98–107)
CO2 SERPL-SCNC: 24.8 MMOL/L (ref 22–29)
CREAT SERPL-MCNC: 1.66 MG/DL (ref 0.57–1)
EGFRCR SERPLBLD CKD-EPI 2021: 35.4 ML/MIN/1.73
GLOBULIN UR ELPH-MCNC: 3.5 GM/DL
GLUCOSE SERPL-MCNC: 131 MG/DL (ref 65–99)
POTASSIUM SERPL-SCNC: 3.8 MMOL/L (ref 3.5–5.2)
PROT SERPL-MCNC: 7.8 G/DL (ref 6–8.5)
SODIUM SERPL-SCNC: 139 MMOL/L (ref 136–145)
T4 FREE SERPL-MCNC: 1.99 NG/DL (ref 0.92–1.68)
TSH SERPL DL<=0.05 MIU/L-ACNC: 3.36 UIU/ML (ref 0.27–4.2)

## 2025-07-08 RX ORDER — FLECAINIDE ACETATE 100 MG/1
100 TABLET ORAL 2 TIMES DAILY
Qty: 180 TABLET | Refills: 0 | Status: SHIPPED | OUTPATIENT
Start: 2025-07-08

## 2025-07-10 ENCOUNTER — SPECIALTY PHARMACY (OUTPATIENT)
Dept: PHARMACY | Facility: HOSPITAL | Age: 60
End: 2025-07-10
Payer: MEDICARE

## 2025-07-10 NOTE — PROGRESS NOTES
"   Specialty Pharmacy Patient Management Program  Refill Outreach     Ana \"Ana Maradiaga\" was contacted today regarding refills of their medication(s).    Refill Questions      Flowsheet Row Most Recent Value   Changes to allergies? No   Changes to medications? No   New conditions or infections since last clinic visit No   Unplanned office visit, urgent care, ED, or hospital admission in the last 4 weeks  No   How does patient/caregiver feel medication is working? Very good   Financial problems or insurance changes  No   Since the previous refill, were any specialty medication doses or scheduled injections missed or delayed?  No   Does this patient require a clinical escalation to a pharmacist? No            Delivery Questions      Flowsheet Row Most Recent Value   Delivery method [employer]   Delivery address verified with patient/caregiver? Yes   Delivery address Home   Number of medications in delivery 1   Medication(s) being filled and delivered Tirzepatide (Mounjaro)   Doses left of specialty medications na   Copay verified? Yes   Copay amount $0.00   Copay form of payment No copayment ($0)   Delivery Date Selection 07/11/25   Signature Required Yes   Do you consent to receive electronic handouts?  Yes                 Follow-up: 30 day(s)     Meme Desai, Pharmacy Technician  7/10/2025  09:55 EDT    "

## 2025-07-23 RX ORDER — AMLODIPINE BESYLATE 5 MG/1
5 TABLET ORAL DAILY
Qty: 90 TABLET | Refills: 3 | OUTPATIENT
Start: 2025-07-23

## 2025-08-07 RX ORDER — FOLIC ACID 1 MG/1
1000 TABLET ORAL DAILY
Qty: 30 TABLET | Refills: 2 | OUTPATIENT
Start: 2025-08-07